# Patient Record
Sex: MALE | Race: WHITE | Employment: OTHER | URBAN - METROPOLITAN AREA
[De-identification: names, ages, dates, MRNs, and addresses within clinical notes are randomized per-mention and may not be internally consistent; named-entity substitution may affect disease eponyms.]

---

## 2019-02-08 ENCOUNTER — HOSPITAL ENCOUNTER (INPATIENT)
Age: 69
LOS: 4 days | Discharge: HOME | DRG: 281 | End: 2019-02-12
Attending: EMERGENCY MEDICINE | Admitting: INTERNAL MEDICINE
Payer: MEDICARE

## 2019-02-08 ENCOUNTER — APPOINTMENT (OUTPATIENT)
Dept: GENERAL RADIOLOGY | Age: 69
DRG: 281 | End: 2019-02-08
Payer: MEDICARE

## 2019-02-08 DIAGNOSIS — T82.9XXA DISORDER OF CARDIAC PACEMAKER SYSTEM, INITIAL ENCOUNTER: ICD-10-CM

## 2019-02-08 DIAGNOSIS — R91.1 LUNG NODULE: ICD-10-CM

## 2019-02-08 DIAGNOSIS — R31.29 HEMATURIA, MICROSCOPIC: ICD-10-CM

## 2019-02-08 DIAGNOSIS — R77.8 ELEVATED TROPONIN: Primary | ICD-10-CM

## 2019-02-08 DIAGNOSIS — R06.09 DYSPNEA ON EXERTION: ICD-10-CM

## 2019-02-08 DIAGNOSIS — N17.9 AKI (ACUTE KIDNEY INJURY) (HCC): ICD-10-CM

## 2019-02-08 PROBLEM — I21.4 NSTEMI (NON-ST ELEVATED MYOCARDIAL INFARCTION) (HCC): Status: ACTIVE | Noted: 2019-02-08

## 2019-02-08 PROBLEM — I25.5 ISCHEMIC CARDIOMYOPATHY: Status: ACTIVE | Noted: 2019-02-08

## 2019-02-08 PROBLEM — I10 HTN (HYPERTENSION): Status: ACTIVE | Noted: 2019-02-08

## 2019-02-08 PROBLEM — Z95.810 S/P IMPLANTATION OF AUTOMATIC CARDIOVERTER/DEFIBRILLATOR (AICD): Status: ACTIVE | Noted: 2019-02-08

## 2019-02-08 PROBLEM — I25.10 CAD (CORONARY ARTERY DISEASE): Status: ACTIVE | Noted: 2019-02-08

## 2019-02-08 LAB
ABSOLUTE EOS #: 0.08 K/UL (ref 0–0.44)
ABSOLUTE IMMATURE GRANULOCYTE: <0.03 K/UL (ref 0–0.3)
ABSOLUTE LYMPH #: 0.97 K/UL (ref 1.1–3.7)
ABSOLUTE MONO #: 0.39 K/UL (ref 0.1–1.2)
ANION GAP SERPL CALCULATED.3IONS-SCNC: 11 MMOL/L (ref 9–17)
BASOPHILS # BLD: 1 % (ref 0–2)
BASOPHILS ABSOLUTE: 0.05 K/UL (ref 0–0.2)
BUN BLDV-MCNC: 14 MG/DL (ref 8–23)
BUN/CREAT BLD: ABNORMAL (ref 9–20)
CALCIUM SERPL-MCNC: 9.3 MG/DL (ref 8.6–10.4)
CHLORIDE BLD-SCNC: 102 MMOL/L (ref 98–107)
CO2: 23 MMOL/L (ref 20–31)
CREAT SERPL-MCNC: 1.42 MG/DL (ref 0.7–1.2)
DIFFERENTIAL TYPE: ABNORMAL
EOSINOPHILS RELATIVE PERCENT: 1 % (ref 1–4)
ESTIMATED AVERAGE GLUCOSE: 111 MG/DL
GFR AFRICAN AMERICAN: 60 ML/MIN
GFR NON-AFRICAN AMERICAN: 49 ML/MIN
GFR SERPL CREATININE-BSD FRML MDRD: ABNORMAL ML/MIN/{1.73_M2}
GFR SERPL CREATININE-BSD FRML MDRD: ABNORMAL ML/MIN/{1.73_M2}
GLUCOSE BLD-MCNC: 115 MG/DL (ref 70–99)
HBA1C MFR BLD: 5.5 % (ref 4–6)
HCT VFR BLD CALC: 50.7 % (ref 40.7–50.3)
HEMOGLOBIN: 16.5 G/DL (ref 13–17)
IMMATURE GRANULOCYTES: 0 %
LYMPHOCYTES # BLD: 15 % (ref 24–43)
MCH RBC QN AUTO: 31 PG (ref 25.2–33.5)
MCHC RBC AUTO-ENTMCNC: 32.5 G/DL (ref 28.4–34.8)
MCV RBC AUTO: 95.1 FL (ref 82.6–102.9)
MONOCYTES # BLD: 6 % (ref 3–12)
NRBC AUTOMATED: 0 PER 100 WBC
PARTIAL THROMBOPLASTIN TIME: 26.1 SEC (ref 20.5–30.5)
PDW BLD-RTO: 13.6 % (ref 11.8–14.4)
PLATELET # BLD: 175 K/UL (ref 138–453)
PLATELET ESTIMATE: ABNORMAL
PMV BLD AUTO: 10.8 FL (ref 8.1–13.5)
POTASSIUM SERPL-SCNC: 4.2 MMOL/L (ref 3.7–5.3)
RBC # BLD: 5.33 M/UL (ref 4.21–5.77)
RBC # BLD: ABNORMAL 10*6/UL
SEG NEUTROPHILS: 77 % (ref 36–65)
SEGMENTED NEUTROPHILS ABSOLUTE COUNT: 5.08 K/UL (ref 1.5–8.1)
SODIUM BLD-SCNC: 136 MMOL/L (ref 135–144)
TROPONIN INTERP: ABNORMAL
TROPONIN T: ABNORMAL NG/ML
TROPONIN, HIGH SENSITIVITY: 35 NG/L (ref 0–22)
TROPONIN, HIGH SENSITIVITY: 36 NG/L (ref 0–22)
TROPONIN, HIGH SENSITIVITY: 81 NG/L (ref 0–22)
WBC # BLD: 6.6 K/UL (ref 3.5–11.3)
WBC # BLD: ABNORMAL 10*3/UL

## 2019-02-08 PROCEDURE — 6360000002 HC RX W HCPCS

## 2019-02-08 PROCEDURE — 2580000003 HC RX 258: Performed by: EMERGENCY MEDICINE

## 2019-02-08 PROCEDURE — 2500000003 HC RX 250 WO HCPCS

## 2019-02-08 PROCEDURE — 6370000000 HC RX 637 (ALT 250 FOR IP): Performed by: INTERNAL MEDICINE

## 2019-02-08 PROCEDURE — C1769 GUIDE WIRE: HCPCS

## 2019-02-08 PROCEDURE — 93005 ELECTROCARDIOGRAM TRACING: CPT

## 2019-02-08 PROCEDURE — 1200000000 HC SEMI PRIVATE

## 2019-02-08 PROCEDURE — 96374 THER/PROPH/DIAG INJ IV PUSH: CPT

## 2019-02-08 PROCEDURE — 36415 COLL VENOUS BLD VENIPUNCTURE: CPT

## 2019-02-08 PROCEDURE — 71045 X-RAY EXAM CHEST 1 VIEW: CPT

## 2019-02-08 PROCEDURE — 7100000011 HC PHASE II RECOVERY - ADDTL 15 MIN

## 2019-02-08 PROCEDURE — B2111ZZ FLUOROSCOPY OF MULTIPLE CORONARY ARTERIES USING LOW OSMOLAR CONTRAST: ICD-10-PCS | Performed by: INTERNAL MEDICINE

## 2019-02-08 PROCEDURE — 2709999900 HC NON-CHARGEABLE SUPPLY

## 2019-02-08 PROCEDURE — B2151ZZ FLUOROSCOPY OF LEFT HEART USING LOW OSMOLAR CONTRAST: ICD-10-PCS | Performed by: INTERNAL MEDICINE

## 2019-02-08 PROCEDURE — 4A023N7 MEASUREMENT OF CARDIAC SAMPLING AND PRESSURE, LEFT HEART, PERCUTANEOUS APPROACH: ICD-10-PCS | Performed by: INTERNAL MEDICINE

## 2019-02-08 PROCEDURE — 83036 HEMOGLOBIN GLYCOSYLATED A1C: CPT

## 2019-02-08 PROCEDURE — 6370000000 HC RX 637 (ALT 250 FOR IP): Performed by: EMERGENCY MEDICINE

## 2019-02-08 PROCEDURE — C1760 CLOSURE DEV, VASC: HCPCS

## 2019-02-08 PROCEDURE — C1894 INTRO/SHEATH, NON-LASER: HCPCS

## 2019-02-08 PROCEDURE — 85025 COMPLETE CBC W/AUTO DIFF WBC: CPT

## 2019-02-08 PROCEDURE — 7100000010 HC PHASE II RECOVERY - FIRST 15 MIN

## 2019-02-08 PROCEDURE — 93458 L HRT ARTERY/VENTRICLE ANGIO: CPT | Performed by: INTERNAL MEDICINE

## 2019-02-08 PROCEDURE — 6360000004 HC RX CONTRAST MEDICATION

## 2019-02-08 PROCEDURE — 80048 BASIC METABOLIC PNL TOTAL CA: CPT

## 2019-02-08 PROCEDURE — 85730 THROMBOPLASTIN TIME PARTIAL: CPT

## 2019-02-08 PROCEDURE — 99285 EMERGENCY DEPT VISIT HI MDM: CPT

## 2019-02-08 PROCEDURE — 84484 ASSAY OF TROPONIN QUANT: CPT

## 2019-02-08 RX ORDER — POTASSIUM CHLORIDE 20 MEQ/1
40 TABLET, EXTENDED RELEASE ORAL PRN
Status: DISCONTINUED | OUTPATIENT
Start: 2019-02-08 | End: 2019-02-12 | Stop reason: HOSPADM

## 2019-02-08 RX ORDER — SODIUM CHLORIDE 0.9 % (FLUSH) 0.9 %
10 SYRINGE (ML) INJECTION PRN
Status: DISCONTINUED | OUTPATIENT
Start: 2019-02-08 | End: 2019-02-12 | Stop reason: HOSPADM

## 2019-02-08 RX ORDER — NITROGLYCERIN 0.4 MG/1
0.4 TABLET SUBLINGUAL EVERY 5 MIN PRN
Status: DISCONTINUED | OUTPATIENT
Start: 2019-02-08 | End: 2019-02-12 | Stop reason: HOSPADM

## 2019-02-08 RX ORDER — LABETALOL HYDROCHLORIDE 5 MG/ML
10 INJECTION, SOLUTION INTRAVENOUS EVERY 6 HOURS PRN
Status: DISCONTINUED | OUTPATIENT
Start: 2019-02-08 | End: 2019-02-12 | Stop reason: HOSPADM

## 2019-02-08 RX ORDER — POTASSIUM CHLORIDE 7.45 MG/ML
10 INJECTION INTRAVENOUS PRN
Status: DISCONTINUED | OUTPATIENT
Start: 2019-02-08 | End: 2019-02-12 | Stop reason: HOSPADM

## 2019-02-08 RX ORDER — ATORVASTATIN CALCIUM 80 MG/1
80 TABLET, FILM COATED ORAL NIGHTLY
Status: DISCONTINUED | OUTPATIENT
Start: 2019-02-08 | End: 2019-02-12 | Stop reason: HOSPADM

## 2019-02-08 RX ORDER — SODIUM CHLORIDE 0.9 % (FLUSH) 0.9 %
10 SYRINGE (ML) INJECTION EVERY 12 HOURS SCHEDULED
Status: DISCONTINUED | OUTPATIENT
Start: 2019-02-08 | End: 2019-02-12 | Stop reason: HOSPADM

## 2019-02-08 RX ORDER — ASPIRIN 81 MG/1
324 TABLET, CHEWABLE ORAL ONCE
Status: COMPLETED | OUTPATIENT
Start: 2019-02-08 | End: 2019-02-08

## 2019-02-08 RX ORDER — ASPIRIN 81 MG/1
81 TABLET, CHEWABLE ORAL DAILY
Status: DISCONTINUED | OUTPATIENT
Start: 2019-02-09 | End: 2019-02-12 | Stop reason: HOSPADM

## 2019-02-08 RX ORDER — ONDANSETRON 2 MG/ML
4 INJECTION INTRAMUSCULAR; INTRAVENOUS EVERY 6 HOURS PRN
Status: DISCONTINUED | OUTPATIENT
Start: 2019-02-08 | End: 2019-02-12 | Stop reason: HOSPADM

## 2019-02-08 RX ORDER — POTASSIUM CHLORIDE 20MEQ/15ML
40 LIQUID (ML) ORAL PRN
Status: DISCONTINUED | OUTPATIENT
Start: 2019-02-08 | End: 2019-02-12 | Stop reason: HOSPADM

## 2019-02-08 RX ORDER — HEPARIN SODIUM 1000 [USP'U]/ML
INJECTION, SOLUTION INTRAVENOUS; SUBCUTANEOUS
Status: COMPLETED
Start: 2019-02-08 | End: 2019-02-08

## 2019-02-08 RX ORDER — HEPARIN SODIUM 1000 [USP'U]/ML
4000 INJECTION, SOLUTION INTRAVENOUS; SUBCUTANEOUS ONCE
Status: COMPLETED | OUTPATIENT
Start: 2019-02-08 | End: 2019-02-08

## 2019-02-08 RX ORDER — HEPARIN SODIUM 1000 [USP'U]/ML
4000 INJECTION, SOLUTION INTRAVENOUS; SUBCUTANEOUS PRN
Status: DISCONTINUED | OUTPATIENT
Start: 2019-02-08 | End: 2019-02-08

## 2019-02-08 RX ORDER — SODIUM CHLORIDE 9 MG/ML
INJECTION, SOLUTION INTRAVENOUS CONTINUOUS
Status: ACTIVE | OUTPATIENT
Start: 2019-02-08 | End: 2019-02-08

## 2019-02-08 RX ORDER — METOPROLOL TARTRATE 50 MG/1
25 TABLET, FILM COATED ORAL 2 TIMES DAILY
Status: DISCONTINUED | OUTPATIENT
Start: 2019-02-08 | End: 2019-02-09

## 2019-02-08 RX ORDER — HEPARIN SODIUM 10000 [USP'U]/100ML
12 INJECTION, SOLUTION INTRAVENOUS CONTINUOUS
Status: DISCONTINUED | OUTPATIENT
Start: 2019-02-08 | End: 2019-02-08

## 2019-02-08 RX ORDER — ACETAMINOPHEN 325 MG/1
650 TABLET ORAL EVERY 4 HOURS PRN
Status: DISCONTINUED | OUTPATIENT
Start: 2019-02-08 | End: 2019-02-12 | Stop reason: HOSPADM

## 2019-02-08 RX ORDER — HEPARIN SODIUM 10000 [USP'U]/100ML
INJECTION, SOLUTION INTRAVENOUS
Status: COMPLETED
Start: 2019-02-08 | End: 2019-02-08

## 2019-02-08 RX ORDER — HEPARIN SODIUM 1000 [USP'U]/ML
2000 INJECTION, SOLUTION INTRAVENOUS; SUBCUTANEOUS PRN
Status: DISCONTINUED | OUTPATIENT
Start: 2019-02-08 | End: 2019-02-08

## 2019-02-08 RX ADMIN — HEPARIN SODIUM AND DEXTROSE 12 UNITS/KG/HR: 10000; 5 INJECTION INTRAVENOUS at 01:00

## 2019-02-08 RX ADMIN — Medication 10 ML: at 20:57

## 2019-02-08 RX ADMIN — HEPARIN SODIUM 4000 UNITS: 1000 INJECTION, SOLUTION INTRAVENOUS; SUBCUTANEOUS at 11:29

## 2019-02-08 RX ADMIN — METOPROLOL TARTRATE 25 MG: 50 TABLET, FILM COATED ORAL at 20:57

## 2019-02-08 RX ADMIN — HEPARIN SODIUM 12 UNITS/KG/HR: 10000 INJECTION, SOLUTION INTRAVENOUS at 01:00

## 2019-02-08 RX ADMIN — ASPIRIN 81 MG 324 MG: 81 TABLET ORAL at 08:29

## 2019-02-08 RX ADMIN — METOPROLOL TARTRATE 25 MG: 50 TABLET, FILM COATED ORAL at 12:21

## 2019-02-09 ENCOUNTER — APPOINTMENT (OUTPATIENT)
Dept: ULTRASOUND IMAGING | Age: 69
DRG: 281 | End: 2019-02-09
Payer: MEDICARE

## 2019-02-09 LAB
-: NORMAL
AMORPHOUS: NORMAL
ANION GAP SERPL CALCULATED.3IONS-SCNC: 10 MMOL/L (ref 9–17)
BACTERIA: NORMAL
BILIRUBIN URINE: NEGATIVE
BUN BLDV-MCNC: 16 MG/DL (ref 8–23)
BUN/CREAT BLD: ABNORMAL (ref 9–20)
CALCIUM SERPL-MCNC: 8.9 MG/DL (ref 8.6–10.4)
CASTS UA: NORMAL /LPF (ref 0–8)
CHLORIDE BLD-SCNC: 105 MMOL/L (ref 98–107)
CHOLESTEROL/HDL RATIO: 4.8
CHOLESTEROL: 195 MG/DL
CO2: 24 MMOL/L (ref 20–31)
COLOR: YELLOW
COMPLEMENT C3: 109 MG/DL (ref 90–180)
COMPLEMENT C4: 30 MG/DL (ref 10–40)
CREAT SERPL-MCNC: 1.5 MG/DL (ref 0.7–1.2)
CREATININE URINE: 71.9 MG/DL (ref 39–259)
CRYSTALS, UA: NORMAL /HPF
EPITHELIAL CELLS UA: NORMAL /HPF (ref 0–5)
GFR AFRICAN AMERICAN: 56 ML/MIN
GFR NON-AFRICAN AMERICAN: 46 ML/MIN
GFR SERPL CREATININE-BSD FRML MDRD: ABNORMAL ML/MIN/{1.73_M2}
GFR SERPL CREATININE-BSD FRML MDRD: ABNORMAL ML/MIN/{1.73_M2}
GLUCOSE BLD-MCNC: 103 MG/DL (ref 70–99)
GLUCOSE URINE: NEGATIVE
HCT VFR BLD CALC: 46.7 % (ref 40.7–50.3)
HDLC SERPL-MCNC: 41 MG/DL
HEMOGLOBIN: 15.1 G/DL (ref 13–17)
KETONES, URINE: NEGATIVE
LDL CHOLESTEROL: 139 MG/DL (ref 0–130)
LEUKOCYTE ESTERASE, URINE: NEGATIVE
MCH RBC QN AUTO: 30.6 PG (ref 25.2–33.5)
MCHC RBC AUTO-ENTMCNC: 32.3 G/DL (ref 28.4–34.8)
MCV RBC AUTO: 94.7 FL (ref 82.6–102.9)
MUCUS: NORMAL
NITRITE, URINE: NEGATIVE
NRBC AUTOMATED: 0 PER 100 WBC
OTHER OBSERVATIONS UA: NORMAL
PARTIAL THROMBOPLASTIN TIME: 24 SEC (ref 20.5–30.5)
PARTIAL THROMBOPLASTIN TIME: 27.3 SEC (ref 20.5–30.5)
PDW BLD-RTO: 13.7 % (ref 11.8–14.4)
PH UA: 6.5 (ref 5–8)
PLATELET # BLD: 170 K/UL (ref 138–453)
PMV BLD AUTO: 10.8 FL (ref 8.1–13.5)
POTASSIUM SERPL-SCNC: 5.1 MMOL/L (ref 3.7–5.3)
POTASSIUM, UR: 47 MMOL/L
PROTEIN UA: NEGATIVE
RBC # BLD: 4.93 M/UL (ref 4.21–5.77)
RBC UA: NORMAL /HPF (ref 0–4)
RENAL EPITHELIAL, UA: NORMAL /HPF
SODIUM BLD-SCNC: 139 MMOL/L (ref 135–144)
SPECIFIC GRAVITY UA: 1.02 (ref 1–1.03)
TOTAL PROTEIN, URINE: 6 MG/DL
TRICHOMONAS: NORMAL
TRIGL SERPL-MCNC: 77 MG/DL
TURBIDITY: CLEAR
URINE HGB: NEGATIVE
UROBILINOGEN, URINE: NORMAL
VLDLC SERPL CALC-MCNC: ABNORMAL MG/DL (ref 1–30)
WBC # BLD: 6.8 K/UL (ref 3.5–11.3)
WBC UA: NORMAL /HPF (ref 0–5)
YEAST: NORMAL

## 2019-02-09 PROCEDURE — 86038 ANTINUCLEAR ANTIBODIES: CPT

## 2019-02-09 PROCEDURE — 6360000002 HC RX W HCPCS: Performed by: STUDENT IN AN ORGANIZED HEALTH CARE EDUCATION/TRAINING PROGRAM

## 2019-02-09 PROCEDURE — 85730 THROMBOPLASTIN TIME PARTIAL: CPT

## 2019-02-09 PROCEDURE — 6370000000 HC RX 637 (ALT 250 FOR IP): Performed by: INTERNAL MEDICINE

## 2019-02-09 PROCEDURE — 84165 PROTEIN E-PHORESIS SERUM: CPT

## 2019-02-09 PROCEDURE — 81001 URINALYSIS AUTO W/SCOPE: CPT

## 2019-02-09 PROCEDURE — 76770 US EXAM ABDO BACK WALL COMP: CPT

## 2019-02-09 PROCEDURE — 85027 COMPLETE CBC AUTOMATED: CPT

## 2019-02-09 PROCEDURE — 83516 IMMUNOASSAY NONANTIBODY: CPT

## 2019-02-09 PROCEDURE — 84166 PROTEIN E-PHORESIS/URINE/CSF: CPT

## 2019-02-09 PROCEDURE — 36415 COLL VENOUS BLD VENIPUNCTURE: CPT

## 2019-02-09 PROCEDURE — 80061 LIPID PANEL: CPT

## 2019-02-09 PROCEDURE — 2580000003 HC RX 258: Performed by: INTERNAL MEDICINE

## 2019-02-09 PROCEDURE — 6370000000 HC RX 637 (ALT 250 FOR IP): Performed by: STUDENT IN AN ORGANIZED HEALTH CARE EDUCATION/TRAINING PROGRAM

## 2019-02-09 PROCEDURE — 84155 ASSAY OF PROTEIN SERUM: CPT

## 2019-02-09 PROCEDURE — 84133 ASSAY OF URINE POTASSIUM: CPT

## 2019-02-09 PROCEDURE — 86160 COMPLEMENT ANTIGEN: CPT

## 2019-02-09 PROCEDURE — 84156 ASSAY OF PROTEIN URINE: CPT

## 2019-02-09 PROCEDURE — 99223 1ST HOSP IP/OBS HIGH 75: CPT | Performed by: INTERNAL MEDICINE

## 2019-02-09 PROCEDURE — 1200000000 HC SEMI PRIVATE

## 2019-02-09 PROCEDURE — 86334 IMMUNOFIX E-PHORESIS SERUM: CPT

## 2019-02-09 PROCEDURE — 80048 BASIC METABOLIC PNL TOTAL CA: CPT

## 2019-02-09 PROCEDURE — 82570 ASSAY OF URINE CREATININE: CPT

## 2019-02-09 RX ORDER — HEPARIN SODIUM 5000 [USP'U]/ML
5000 INJECTION, SOLUTION INTRAVENOUS; SUBCUTANEOUS EVERY 8 HOURS SCHEDULED
Status: DISCONTINUED | OUTPATIENT
Start: 2019-02-09 | End: 2019-02-12 | Stop reason: HOSPADM

## 2019-02-09 RX ORDER — METOPROLOL SUCCINATE 25 MG/1
25 TABLET, EXTENDED RELEASE ORAL NIGHTLY
Status: DISCONTINUED | OUTPATIENT
Start: 2019-02-09 | End: 2019-02-12 | Stop reason: HOSPADM

## 2019-02-09 RX ORDER — FUROSEMIDE 20 MG/1
20 TABLET ORAL DAILY
Status: DISCONTINUED | OUTPATIENT
Start: 2019-02-09 | End: 2019-02-12 | Stop reason: HOSPADM

## 2019-02-09 RX ADMIN — METOPROLOL SUCCINATE 25 MG: 25 TABLET, FILM COATED, EXTENDED RELEASE ORAL at 22:13

## 2019-02-09 RX ADMIN — ASPIRIN 81 MG: 81 TABLET, CHEWABLE ORAL at 11:10

## 2019-02-09 RX ADMIN — METOPROLOL TARTRATE 25 MG: 50 TABLET, FILM COATED ORAL at 11:10

## 2019-02-09 RX ADMIN — FUROSEMIDE 20 MG: 20 TABLET ORAL at 13:30

## 2019-02-09 RX ADMIN — HEPARIN SODIUM 5000 UNITS: 5000 INJECTION INTRAVENOUS; SUBCUTANEOUS at 22:13

## 2019-02-09 RX ADMIN — Medication 10 ML: at 11:11

## 2019-02-09 RX ADMIN — Medication 10 ML: at 22:14

## 2019-02-09 RX ADMIN — ATORVASTATIN CALCIUM 80 MG: 80 TABLET, FILM COATED ORAL at 22:13

## 2019-02-09 ASSESSMENT — ENCOUNTER SYMPTOMS
EYE PAIN: 0
NAUSEA: 0
DIARRHEA: 0
SORE THROAT: 0
COUGH: 0
ABDOMINAL PAIN: 0

## 2019-02-10 ENCOUNTER — APPOINTMENT (OUTPATIENT)
Dept: CT IMAGING | Age: 69
DRG: 281 | End: 2019-02-10
Payer: MEDICARE

## 2019-02-10 LAB
ANION GAP SERPL CALCULATED.3IONS-SCNC: 13 MMOL/L (ref 9–17)
BUN BLDV-MCNC: 19 MG/DL (ref 8–23)
BUN/CREAT BLD: ABNORMAL (ref 9–20)
CALCIUM SERPL-MCNC: 9.2 MG/DL (ref 8.6–10.4)
CHLORIDE BLD-SCNC: 103 MMOL/L (ref 98–107)
CO2: 22 MMOL/L (ref 20–31)
CREAT SERPL-MCNC: 1.39 MG/DL (ref 0.7–1.2)
GFR AFRICAN AMERICAN: >60 ML/MIN
GFR NON-AFRICAN AMERICAN: 51 ML/MIN
GFR SERPL CREATININE-BSD FRML MDRD: ABNORMAL ML/MIN/{1.73_M2}
GFR SERPL CREATININE-BSD FRML MDRD: ABNORMAL ML/MIN/{1.73_M2}
GLUCOSE BLD-MCNC: 101 MG/DL (ref 70–99)
P E INTERPRETATION, U: NORMAL
PATHOLOGIST: NORMAL
PLATELET # BLD: 248 K/UL (ref 138–453)
POTASSIUM SERPL-SCNC: 4.5 MMOL/L (ref 3.7–5.3)
SODIUM BLD-SCNC: 138 MMOL/L (ref 135–144)
SPECIMEN TYPE: NORMAL
URINE TOTAL PROTEIN: 6 MG/DL

## 2019-02-10 PROCEDURE — 6360000002 HC RX W HCPCS: Performed by: STUDENT IN AN ORGANIZED HEALTH CARE EDUCATION/TRAINING PROGRAM

## 2019-02-10 PROCEDURE — 85049 AUTOMATED PLATELET COUNT: CPT

## 2019-02-10 PROCEDURE — 6370000000 HC RX 637 (ALT 250 FOR IP): Performed by: INTERNAL MEDICINE

## 2019-02-10 PROCEDURE — 6370000000 HC RX 637 (ALT 250 FOR IP): Performed by: STUDENT IN AN ORGANIZED HEALTH CARE EDUCATION/TRAINING PROGRAM

## 2019-02-10 PROCEDURE — 99232 SBSQ HOSP IP/OBS MODERATE 35: CPT | Performed by: INTERNAL MEDICINE

## 2019-02-10 PROCEDURE — 2580000003 HC RX 258: Performed by: INTERNAL MEDICINE

## 2019-02-10 PROCEDURE — 74176 CT ABD & PELVIS W/O CONTRAST: CPT

## 2019-02-10 PROCEDURE — 1200000000 HC SEMI PRIVATE

## 2019-02-10 PROCEDURE — 51798 US URINE CAPACITY MEASURE: CPT

## 2019-02-10 PROCEDURE — 80048 BASIC METABOLIC PNL TOTAL CA: CPT

## 2019-02-10 PROCEDURE — 36415 COLL VENOUS BLD VENIPUNCTURE: CPT

## 2019-02-10 RX ORDER — LISINOPRIL 5 MG/1
5 TABLET ORAL DAILY
Status: DISCONTINUED | OUTPATIENT
Start: 2019-02-10 | End: 2019-02-12 | Stop reason: HOSPADM

## 2019-02-10 RX ADMIN — LISINOPRIL 5 MG: 5 TABLET ORAL at 14:32

## 2019-02-10 RX ADMIN — Medication 10 ML: at 09:52

## 2019-02-10 RX ADMIN — HEPARIN SODIUM 5000 UNITS: 5000 INJECTION INTRAVENOUS; SUBCUTANEOUS at 14:02

## 2019-02-10 RX ADMIN — METOPROLOL SUCCINATE 25 MG: 25 TABLET, FILM COATED, EXTENDED RELEASE ORAL at 19:32

## 2019-02-10 RX ADMIN — FUROSEMIDE 20 MG: 20 TABLET ORAL at 09:51

## 2019-02-10 RX ADMIN — HEPARIN SODIUM 5000 UNITS: 5000 INJECTION INTRAVENOUS; SUBCUTANEOUS at 05:32

## 2019-02-10 RX ADMIN — HEPARIN SODIUM 5000 UNITS: 5000 INJECTION INTRAVENOUS; SUBCUTANEOUS at 21:48

## 2019-02-10 RX ADMIN — ASPIRIN 81 MG: 81 TABLET, CHEWABLE ORAL at 09:51

## 2019-02-10 RX ADMIN — ATORVASTATIN CALCIUM 80 MG: 80 TABLET, FILM COATED ORAL at 19:32

## 2019-02-10 RX ADMIN — Medication 10 ML: at 19:32

## 2019-02-11 ENCOUNTER — APPOINTMENT (OUTPATIENT)
Dept: CARDIAC CATH/INVASIVE PROCEDURES | Age: 69
DRG: 281 | End: 2019-02-11
Payer: MEDICARE

## 2019-02-11 LAB
ALBUMIN (CALCULATED): 3.9 G/DL (ref 3.2–5.2)
ALBUMIN PERCENT: 64 % (ref 45–65)
ALPHA 1 PERCENT: 3 % (ref 3–6)
ALPHA 2 PERCENT: 10 % (ref 6–13)
ALPHA-1-GLOBULIN: 0.2 G/DL (ref 0.1–0.4)
ALPHA-2-GLOBULIN: 0.6 G/DL (ref 0.5–0.9)
ANION GAP SERPL CALCULATED.3IONS-SCNC: 12 MMOL/L (ref 9–17)
ANTI-NUCLEAR ANTIBODY (ANA): NEGATIVE
BETA GLOBULIN: 0.6 G/DL (ref 0.5–1.1)
BETA PERCENT: 10 % (ref 11–19)
BUN BLDV-MCNC: 21 MG/DL (ref 8–23)
BUN/CREAT BLD: ABNORMAL (ref 9–20)
CALCIUM SERPL-MCNC: 9.4 MG/DL (ref 8.6–10.4)
CHLORIDE BLD-SCNC: 104 MMOL/L (ref 98–107)
CO2: 23 MMOL/L (ref 20–31)
CREAT SERPL-MCNC: 1.48 MG/DL (ref 0.7–1.2)
GAMMA GLOBULIN %: 13 % (ref 9–20)
GAMMA GLOBULIN: 0.8 G/DL (ref 0.5–1.5)
GFR AFRICAN AMERICAN: 57 ML/MIN
GFR NON-AFRICAN AMERICAN: 47 ML/MIN
GFR SERPL CREATININE-BSD FRML MDRD: ABNORMAL ML/MIN/{1.73_M2}
GFR SERPL CREATININE-BSD FRML MDRD: ABNORMAL ML/MIN/{1.73_M2}
GLUCOSE BLD-MCNC: 112 MG/DL (ref 70–99)
GLUCOSE BLD-MCNC: 127 MG/DL (ref 75–110)
LV EF: 18 %
LVEF MODALITY: NORMAL
PATHOLOGIST: ABNORMAL
PATHOLOGIST: NORMAL
POTASSIUM SERPL-SCNC: 4.8 MMOL/L (ref 3.7–5.3)
PROTEIN ELECTROPHORESIS, SERUM: ABNORMAL
SERUM IFX INTERP: NORMAL
SODIUM BLD-SCNC: 139 MMOL/L (ref 135–144)
TOTAL PROT. SUM,%: 100 % (ref 98–102)
TOTAL PROT. SUM: 6.1 G/DL (ref 6.3–8.2)
TOTAL PROTEIN: 6.1 G/DL (ref 6.4–8.3)

## 2019-02-11 PROCEDURE — 99232 SBSQ HOSP IP/OBS MODERATE 35: CPT | Performed by: INTERNAL MEDICINE

## 2019-02-11 PROCEDURE — 6370000000 HC RX 637 (ALT 250 FOR IP): Performed by: INTERNAL MEDICINE

## 2019-02-11 PROCEDURE — 82947 ASSAY GLUCOSE BLOOD QUANT: CPT

## 2019-02-11 PROCEDURE — 6360000002 HC RX W HCPCS: Performed by: STUDENT IN AN ORGANIZED HEALTH CARE EDUCATION/TRAINING PROGRAM

## 2019-02-11 PROCEDURE — 6360000002 HC RX W HCPCS

## 2019-02-11 PROCEDURE — 1200000000 HC SEMI PRIVATE

## 2019-02-11 PROCEDURE — C1882 AICD, OTHER THAN SING/DUAL: HCPCS

## 2019-02-11 PROCEDURE — 33264 RMVL & RPLCMT DFB GEN MLT LD: CPT | Performed by: INTERNAL MEDICINE

## 2019-02-11 PROCEDURE — 36415 COLL VENOUS BLD VENIPUNCTURE: CPT

## 2019-02-11 PROCEDURE — 2709999900 HC NON-CHARGEABLE SUPPLY

## 2019-02-11 PROCEDURE — 94762 N-INVAS EAR/PLS OXIMTRY CONT: CPT

## 2019-02-11 PROCEDURE — 93306 TTE W/DOPPLER COMPLETE: CPT

## 2019-02-11 PROCEDURE — 2720000010 HC SURG SUPPLY STERILE

## 2019-02-11 PROCEDURE — 2500000003 HC RX 250 WO HCPCS

## 2019-02-11 PROCEDURE — 80048 BASIC METABOLIC PNL TOTAL CA: CPT

## 2019-02-11 RX ORDER — NITROGLYCERIN 0.4 MG/1
TABLET SUBLINGUAL
Qty: 25 TABLET | Refills: 3 | Status: SHIPPED | OUTPATIENT
Start: 2019-02-11

## 2019-02-11 RX ORDER — FUROSEMIDE 20 MG/1
20 TABLET ORAL DAILY
Qty: 60 TABLET | Refills: 3 | Status: SHIPPED | OUTPATIENT
Start: 2019-02-12 | End: 2020-01-01

## 2019-02-11 RX ORDER — ATORVASTATIN CALCIUM 80 MG/1
80 TABLET, FILM COATED ORAL NIGHTLY
Qty: 30 TABLET | Refills: 3 | Status: SHIPPED | OUTPATIENT
Start: 2019-02-11 | End: 2020-01-01

## 2019-02-11 RX ORDER — ASPIRIN 81 MG/1
81 TABLET, CHEWABLE ORAL DAILY
Qty: 30 TABLET | Refills: 3 | Status: SHIPPED | OUTPATIENT
Start: 2019-02-12

## 2019-02-11 RX ORDER — METOPROLOL SUCCINATE 25 MG/1
25 TABLET, EXTENDED RELEASE ORAL NIGHTLY
Qty: 30 TABLET | Refills: 3 | Status: SHIPPED | OUTPATIENT
Start: 2019-02-11 | End: 2020-01-01

## 2019-02-11 RX ORDER — LISINOPRIL 5 MG/1
5 TABLET ORAL DAILY
Qty: 30 TABLET | Refills: 3 | Status: SHIPPED | OUTPATIENT
Start: 2019-02-12 | End: 2020-01-01

## 2019-02-11 RX ADMIN — ATORVASTATIN CALCIUM 80 MG: 80 TABLET, FILM COATED ORAL at 20:45

## 2019-02-11 RX ADMIN — ONDANSETRON 4 MG: 2 INJECTION INTRAMUSCULAR; INTRAVENOUS at 06:35

## 2019-02-11 RX ADMIN — METOPROLOL SUCCINATE 25 MG: 25 TABLET, FILM COATED, EXTENDED RELEASE ORAL at 20:45

## 2019-02-11 RX ADMIN — HEPARIN SODIUM 5000 UNITS: 5000 INJECTION INTRAVENOUS; SUBCUTANEOUS at 06:50

## 2019-02-11 ASSESSMENT — ENCOUNTER SYMPTOMS
SHORTNESS OF BREATH: 1
VOMITING: 0

## 2019-02-12 VITALS
DIASTOLIC BLOOD PRESSURE: 71 MMHG | TEMPERATURE: 97.2 F | SYSTOLIC BLOOD PRESSURE: 131 MMHG | RESPIRATION RATE: 16 BRPM | BODY MASS INDEX: 22.61 KG/M2 | HEART RATE: 67 BPM | HEIGHT: 65 IN | OXYGEN SATURATION: 99 % | WEIGHT: 135.7 LBS

## 2019-02-12 LAB
ANCA MYELOPEROXIDASE: 15 AU/ML
ANCA PROTEINASE 3: 13 AU/ML
ANION GAP SERPL CALCULATED.3IONS-SCNC: 12 MMOL/L (ref 9–17)
BUN BLDV-MCNC: 22 MG/DL (ref 8–23)
BUN/CREAT BLD: ABNORMAL (ref 9–20)
CALCIUM SERPL-MCNC: 9.1 MG/DL (ref 8.6–10.4)
CHLORIDE BLD-SCNC: 103 MMOL/L (ref 98–107)
CO2: 23 MMOL/L (ref 20–31)
CREAT SERPL-MCNC: 1.47 MG/DL (ref 0.7–1.2)
GFR AFRICAN AMERICAN: 58 ML/MIN
GFR NON-AFRICAN AMERICAN: 47 ML/MIN
GFR SERPL CREATININE-BSD FRML MDRD: ABNORMAL ML/MIN/{1.73_M2}
GFR SERPL CREATININE-BSD FRML MDRD: ABNORMAL ML/MIN/{1.73_M2}
GLUCOSE BLD-MCNC: 104 MG/DL (ref 70–99)
PLATELET # BLD: 172 K/UL (ref 138–453)
POTASSIUM SERPL-SCNC: 4.9 MMOL/L (ref 3.7–5.3)
SODIUM BLD-SCNC: 138 MMOL/L (ref 135–144)

## 2019-02-12 PROCEDURE — 36415 COLL VENOUS BLD VENIPUNCTURE: CPT

## 2019-02-12 PROCEDURE — 6360000002 HC RX W HCPCS: Performed by: STUDENT IN AN ORGANIZED HEALTH CARE EDUCATION/TRAINING PROGRAM

## 2019-02-12 PROCEDURE — 6370000000 HC RX 637 (ALT 250 FOR IP): Performed by: STUDENT IN AN ORGANIZED HEALTH CARE EDUCATION/TRAINING PROGRAM

## 2019-02-12 PROCEDURE — 85049 AUTOMATED PLATELET COUNT: CPT

## 2019-02-12 PROCEDURE — 6370000000 HC RX 637 (ALT 250 FOR IP): Performed by: EMERGENCY MEDICINE

## 2019-02-12 PROCEDURE — 6370000000 HC RX 637 (ALT 250 FOR IP): Performed by: INTERNAL MEDICINE

## 2019-02-12 PROCEDURE — 80048 BASIC METABOLIC PNL TOTAL CA: CPT

## 2019-02-12 PROCEDURE — 2580000003 HC RX 258: Performed by: INTERNAL MEDICINE

## 2019-02-12 PROCEDURE — 99239 HOSP IP/OBS DSCHRG MGMT >30: CPT | Performed by: INTERNAL MEDICINE

## 2019-02-12 RX ADMIN — LISINOPRIL 5 MG: 5 TABLET ORAL at 09:13

## 2019-02-12 RX ADMIN — Medication 10 ML: at 09:15

## 2019-02-12 RX ADMIN — HEPARIN SODIUM 5000 UNITS: 5000 INJECTION INTRAVENOUS; SUBCUTANEOUS at 09:15

## 2019-02-12 RX ADMIN — ACETAMINOPHEN 650 MG: 325 TABLET ORAL at 13:25

## 2019-02-12 RX ADMIN — ACETAMINOPHEN 650 MG: 325 TABLET ORAL at 06:23

## 2019-02-12 RX ADMIN — ASPIRIN 81 MG: 81 TABLET, CHEWABLE ORAL at 09:13

## 2019-02-12 RX ADMIN — HEPARIN SODIUM 5000 UNITS: 5000 INJECTION INTRAVENOUS; SUBCUTANEOUS at 01:05

## 2019-02-12 RX ADMIN — FUROSEMIDE 20 MG: 20 TABLET ORAL at 09:13

## 2019-02-12 RX ADMIN — ACETAMINOPHEN 650 MG: 325 TABLET ORAL at 01:09

## 2019-02-12 ASSESSMENT — PAIN SCALES - GENERAL
PAINLEVEL_OUTOF10: 3
PAINLEVEL_OUTOF10: 3
PAINLEVEL_OUTOF10: 4

## 2019-02-13 ENCOUNTER — CARE COORDINATION (OUTPATIENT)
Dept: CASE MANAGEMENT | Age: 69
End: 2019-02-13

## 2019-02-13 ENCOUNTER — TELEPHONE (OUTPATIENT)
Dept: PULMONOLOGY | Age: 69
End: 2019-02-13

## 2019-02-13 LAB
EKG ATRIAL RATE: 84 BPM
EKG P AXIS: 38 DEGREES
EKG P-R INTERVAL: 140 MS
EKG Q-T INTERVAL: 398 MS
EKG QRS DURATION: 128 MS
EKG QTC CALCULATION (BAZETT): 470 MS
EKG R AXIS: -92 DEGREES
EKG T AXIS: 84 DEGREES
EKG VENTRICULAR RATE: 84 BPM

## 2019-02-19 ENCOUNTER — HOSPITAL ENCOUNTER (OUTPATIENT)
Dept: CT IMAGING | Age: 69
Discharge: HOME | End: 2019-02-21
Payer: MEDICARE

## 2019-02-19 DIAGNOSIS — R91.1 LUNG NODULE: ICD-10-CM

## 2019-02-19 PROCEDURE — 71250 CT THORAX DX C-: CPT

## 2019-02-20 ENCOUNTER — TELEPHONE (OUTPATIENT)
Dept: INTERNAL MEDICINE | Age: 69
End: 2019-02-20

## 2019-02-20 DIAGNOSIS — R91.1 LUNG NODULE: Primary | ICD-10-CM

## 2019-02-21 ENCOUNTER — CARE COORDINATION (OUTPATIENT)
Dept: CASE MANAGEMENT | Age: 69
End: 2019-02-21

## 2019-03-06 ENCOUNTER — CARE COORDINATION (OUTPATIENT)
Dept: CASE MANAGEMENT | Age: 69
End: 2019-03-06

## 2019-03-20 ENCOUNTER — CARE COORDINATION (OUTPATIENT)
Dept: CASE MANAGEMENT | Age: 69
End: 2019-03-20

## 2019-04-10 ENCOUNTER — HOSPITAL ENCOUNTER (OUTPATIENT)
Age: 69
Setting detail: SPECIMEN
Discharge: HOME | End: 2019-04-10
Payer: MEDICARE

## 2019-04-10 LAB
ANION GAP SERPL CALCULATED.3IONS-SCNC: 20 MMOL/L (ref 9–17)
BUN BLDV-MCNC: 16 MG/DL (ref 8–23)
BUN/CREAT BLD: ABNORMAL (ref 9–20)
CALCIUM SERPL-MCNC: 9.4 MG/DL (ref 8.6–10.4)
CHLORIDE BLD-SCNC: 111 MMOL/L (ref 98–107)
CO2: 20 MMOL/L (ref 20–31)
CREAT SERPL-MCNC: 1.29 MG/DL (ref 0.7–1.2)
GFR AFRICAN AMERICAN: >60 ML/MIN
GFR NON-AFRICAN AMERICAN: 55 ML/MIN
GFR SERPL CREATININE-BSD FRML MDRD: ABNORMAL ML/MIN/{1.73_M2}
GFR SERPL CREATININE-BSD FRML MDRD: ABNORMAL ML/MIN/{1.73_M2}
GLUCOSE BLD-MCNC: 103 MG/DL (ref 70–99)
POTASSIUM SERPL-SCNC: 5 MMOL/L (ref 3.7–5.3)
SODIUM BLD-SCNC: 151 MMOL/L (ref 135–144)

## 2019-04-22 ENCOUNTER — CARE COORDINATION (OUTPATIENT)
Dept: CASE MANAGEMENT | Age: 69
End: 2019-04-22

## 2019-05-13 ENCOUNTER — CARE COORDINATION (OUTPATIENT)
Dept: CASE MANAGEMENT | Age: 69
End: 2019-05-13

## 2019-05-13 NOTE — CARE COORDINATION
[address] [facility] Care Improvement [facility]) Final 90 Day Follow Up Call  Qualifying Diagnosis of AMI    5/13/2019  Patient Name:  Nitesh Nunez   YOB: 1950  Discharge Date:  2/12/19  RARS:  Readmission Risk Score: 11    PCP:  No primary care provider on file. 90 day final call placed:  Message left. Stated who I was, representing the [facility] SPECIALTY [facility], Care Transitions Team.   No future appointments.     Anastasiia Chin RN, CTC

## 2020-01-01 ENCOUNTER — HOSPITAL ENCOUNTER (OUTPATIENT)
Facility: MEDICAL CENTER | Age: 70
End: 2020-01-01
Payer: MEDICARE

## 2020-01-01 ENCOUNTER — TELEPHONE (OUTPATIENT)
Dept: CASE MANAGEMENT | Age: 70
End: 2020-01-01

## 2020-01-01 ENCOUNTER — APPOINTMENT (OUTPATIENT)
Dept: CT IMAGING | Age: 70
DRG: 987 | End: 2020-01-01
Payer: MEDICARE

## 2020-01-01 ENCOUNTER — APPOINTMENT (OUTPATIENT)
Dept: CT IMAGING | Age: 70
DRG: 477 | End: 2020-01-01
Payer: MEDICARE

## 2020-01-01 ENCOUNTER — HOSPITAL ENCOUNTER (OUTPATIENT)
Dept: RADIATION ONCOLOGY | Facility: MEDICAL CENTER | Age: 70
Discharge: HOME | End: 2020-08-18
Payer: MEDICARE

## 2020-01-01 ENCOUNTER — HOSPITAL ENCOUNTER (OUTPATIENT)
Dept: RADIATION ONCOLOGY | Facility: MEDICAL CENTER | Age: 70
Discharge: HOME | End: 2020-08-20
Attending: RADIOLOGY
Payer: MEDICARE

## 2020-01-01 ENCOUNTER — TELEPHONE (OUTPATIENT)
Dept: INFUSION THERAPY | Facility: MEDICAL CENTER | Age: 70
End: 2020-01-01

## 2020-01-01 ENCOUNTER — OFFICE VISIT (OUTPATIENT)
Dept: ONCOLOGY | Age: 70
End: 2020-01-01
Payer: MEDICARE

## 2020-01-01 ENCOUNTER — HOSPITAL ENCOUNTER (OUTPATIENT)
Dept: RADIATION ONCOLOGY | Facility: MEDICAL CENTER | Age: 70
Discharge: HOME | End: 2020-08-27
Attending: RADIOLOGY
Payer: MEDICARE

## 2020-01-01 ENCOUNTER — TELEPHONE (OUTPATIENT)
Dept: ONCOLOGY | Age: 70
End: 2020-01-01

## 2020-01-01 ENCOUNTER — HOSPITAL ENCOUNTER (OUTPATIENT)
Dept: INFUSION THERAPY | Facility: MEDICAL CENTER | Age: 70
Discharge: HOME | End: 2020-10-05
Payer: MEDICARE

## 2020-01-01 ENCOUNTER — HOSPITAL ENCOUNTER (OUTPATIENT)
Dept: RADIATION ONCOLOGY | Facility: MEDICAL CENTER | Age: 70
Discharge: HOME | End: 2020-08-25
Attending: RADIOLOGY
Payer: MEDICARE

## 2020-01-01 ENCOUNTER — HOSPITAL ENCOUNTER (INPATIENT)
Age: 70
LOS: 3 days | Discharge: HOME | DRG: 987 | End: 2020-08-12
Attending: EMERGENCY MEDICINE | Admitting: INTERNAL MEDICINE
Payer: MEDICARE

## 2020-01-01 ENCOUNTER — TELEPHONE (OUTPATIENT)
Dept: RADIATION ONCOLOGY | Facility: MEDICAL CENTER | Age: 70
End: 2020-01-01

## 2020-01-01 ENCOUNTER — HOSPITAL ENCOUNTER (OUTPATIENT)
Dept: CT IMAGING | Facility: CLINIC | Age: 70
Discharge: HOME | End: 2020-08-16
Payer: MEDICARE

## 2020-01-01 ENCOUNTER — SOCIAL WORK (OUTPATIENT)
Dept: ONCOLOGY | Age: 70
End: 2020-01-01

## 2020-01-01 ENCOUNTER — HOSPITAL ENCOUNTER (OUTPATIENT)
Dept: RADIATION ONCOLOGY | Facility: MEDICAL CENTER | Age: 70
Discharge: HOME | End: 2020-08-13
Payer: MEDICARE

## 2020-01-01 ENCOUNTER — APPOINTMENT (OUTPATIENT)
Dept: GENERAL RADIOLOGY | Age: 70
DRG: 808 | End: 2020-01-01
Payer: MEDICARE

## 2020-01-01 ENCOUNTER — HOSPITAL ENCOUNTER (OUTPATIENT)
Dept: RADIATION ONCOLOGY | Facility: MEDICAL CENTER | Age: 70
Discharge: HOME | End: 2020-08-21
Attending: RADIOLOGY
Payer: MEDICARE

## 2020-01-01 ENCOUNTER — HOSPITAL ENCOUNTER (INPATIENT)
Age: 70
LOS: 5 days | Discharge: HOME | DRG: 180 | End: 2020-10-14
Attending: EMERGENCY MEDICINE | Admitting: FAMILY MEDICINE
Payer: MEDICARE

## 2020-01-01 ENCOUNTER — HOSPITAL ENCOUNTER (OUTPATIENT)
Dept: PREADMISSION TESTING | Age: 70
Setting detail: SPECIMEN
Discharge: HOME | End: 2020-08-24
Payer: MEDICARE

## 2020-01-01 ENCOUNTER — INITIAL CONSULT (OUTPATIENT)
Dept: ONCOLOGY | Age: 70
End: 2020-01-01
Payer: MEDICARE

## 2020-01-01 ENCOUNTER — APPOINTMENT (OUTPATIENT)
Dept: CT IMAGING | Age: 70
End: 2020-01-01
Payer: MEDICARE

## 2020-01-01 ENCOUNTER — HOSPITAL ENCOUNTER (OUTPATIENT)
Dept: INFUSION THERAPY | Facility: MEDICAL CENTER | Age: 70
Discharge: HOME | End: 2020-09-10
Payer: MEDICARE

## 2020-01-01 ENCOUNTER — HOSPITAL ENCOUNTER (INPATIENT)
Age: 70
LOS: 5 days | Discharge: HOME HEALTH | DRG: 477 | End: 2020-09-29
Attending: EMERGENCY MEDICINE | Admitting: FAMILY MEDICINE
Payer: MEDICARE

## 2020-01-01 ENCOUNTER — APPOINTMENT (OUTPATIENT)
Dept: MRI IMAGING | Age: 70
DRG: 987 | End: 2020-01-01
Payer: MEDICARE

## 2020-01-01 ENCOUNTER — HOSPITAL ENCOUNTER (OUTPATIENT)
Dept: INFUSION THERAPY | Facility: MEDICAL CENTER | Age: 70
Discharge: HOME | End: 2020-08-26
Payer: MEDICARE

## 2020-01-01 ENCOUNTER — HOSPITAL ENCOUNTER (INPATIENT)
Age: 70
LOS: 3 days | Discharge: HOME | DRG: 808 | End: 2020-09-22
Attending: EMERGENCY MEDICINE | Admitting: INTERNAL MEDICINE
Payer: MEDICARE

## 2020-01-01 ENCOUNTER — APPOINTMENT (OUTPATIENT)
Dept: RADIATION ONCOLOGY | Facility: MEDICAL CENTER | Age: 70
End: 2020-01-01
Attending: RADIOLOGY
Payer: MEDICARE

## 2020-01-01 ENCOUNTER — TELEPHONE (OUTPATIENT)
Dept: PRIMARY CARE CLINIC | Age: 70
End: 2020-01-01

## 2020-01-01 ENCOUNTER — HOSPITAL ENCOUNTER (OUTPATIENT)
Dept: RADIATION ONCOLOGY | Facility: MEDICAL CENTER | Age: 70
Discharge: HOME | End: 2020-08-25
Payer: MEDICARE

## 2020-01-01 ENCOUNTER — APPOINTMENT (OUTPATIENT)
Dept: GENERAL RADIOLOGY | Age: 70
DRG: 477 | End: 2020-01-01
Payer: MEDICARE

## 2020-01-01 ENCOUNTER — HOSPITAL ENCOUNTER (OUTPATIENT)
Dept: RADIATION ONCOLOGY | Facility: MEDICAL CENTER | Age: 70
Discharge: HOME | End: 2020-08-19
Attending: RADIOLOGY
Payer: MEDICARE

## 2020-01-01 ENCOUNTER — HOSPITAL ENCOUNTER (OUTPATIENT)
Dept: RADIATION ONCOLOGY | Facility: MEDICAL CENTER | Age: 70
Discharge: HOME | End: 2020-08-13
Attending: RADIOLOGY
Payer: MEDICARE

## 2020-01-01 ENCOUNTER — HOSPITAL ENCOUNTER (EMERGENCY)
Facility: CLINIC | Age: 70
Discharge: HOME | End: 2020-08-21
Attending: EMERGENCY MEDICINE
Payer: MEDICARE

## 2020-01-01 ENCOUNTER — HOSPITAL ENCOUNTER (OUTPATIENT)
Dept: INFUSION THERAPY | Facility: MEDICAL CENTER | Age: 70
Discharge: HOME | End: 2020-09-28
Payer: MEDICARE

## 2020-01-01 ENCOUNTER — HOSPITAL ENCOUNTER (OUTPATIENT)
Facility: MEDICAL CENTER | Age: 70
Discharge: HOME | End: 2020-08-17
Payer: MEDICARE

## 2020-01-01 ENCOUNTER — VIRTUAL VISIT (OUTPATIENT)
Dept: PULMONOLOGY | Age: 70
End: 2020-01-01
Payer: MEDICARE

## 2020-01-01 ENCOUNTER — HOSPITAL ENCOUNTER (EMERGENCY)
Age: 70
Discharge: HOME | End: 2020-08-04
Attending: EMERGENCY MEDICINE
Payer: MEDICARE

## 2020-01-01 ENCOUNTER — HOSPITAL ENCOUNTER (OUTPATIENT)
Dept: INTERVENTIONAL RADIOLOGY/VASCULAR | Age: 70
Discharge: HOME | End: 2020-08-26
Payer: MEDICARE

## 2020-01-01 ENCOUNTER — HOSPITAL ENCOUNTER (OUTPATIENT)
Dept: RADIATION ONCOLOGY | Facility: MEDICAL CENTER | Age: 70
Discharge: HOME | End: 2020-08-24
Attending: RADIOLOGY
Payer: MEDICARE

## 2020-01-01 ENCOUNTER — ANESTHESIA EVENT (OUTPATIENT)
Dept: OPERATING ROOM | Age: 70
DRG: 477 | End: 2020-01-01
Payer: MEDICARE

## 2020-01-01 ENCOUNTER — HOSPITAL ENCOUNTER (OUTPATIENT)
Dept: INFUSION THERAPY | Facility: MEDICAL CENTER | Age: 70
Discharge: HOME | End: 2020-09-21
Payer: MEDICARE

## 2020-01-01 ENCOUNTER — ANESTHESIA (OUTPATIENT)
Dept: OPERATING ROOM | Age: 70
DRG: 477 | End: 2020-01-01
Payer: MEDICARE

## 2020-01-01 ENCOUNTER — HOSPITAL ENCOUNTER (OUTPATIENT)
Dept: RADIATION ONCOLOGY | Facility: MEDICAL CENTER | Age: 70
Discharge: HOME | End: 2020-08-26
Attending: RADIOLOGY
Payer: MEDICARE

## 2020-01-01 ENCOUNTER — HOSPITAL ENCOUNTER (OUTPATIENT)
Dept: RADIATION ONCOLOGY | Facility: MEDICAL CENTER | Age: 70
Discharge: HOME | End: 2020-08-28
Attending: RADIOLOGY
Payer: MEDICARE

## 2020-01-01 ENCOUNTER — HOSPITAL ENCOUNTER (OUTPATIENT)
Dept: INFUSION THERAPY | Facility: MEDICAL CENTER | Age: 70
Discharge: HOME | End: 2020-09-14
Payer: MEDICARE

## 2020-01-01 ENCOUNTER — APPOINTMENT (OUTPATIENT)
Dept: GENERAL RADIOLOGY | Age: 70
End: 2020-01-01
Payer: MEDICARE

## 2020-01-01 ENCOUNTER — APPOINTMENT (OUTPATIENT)
Dept: GENERAL RADIOLOGY | Facility: CLINIC | Age: 70
End: 2020-01-01
Payer: MEDICARE

## 2020-01-01 ENCOUNTER — HOSPITAL ENCOUNTER (OUTPATIENT)
Dept: RADIATION ONCOLOGY | Facility: MEDICAL CENTER | Age: 70
Discharge: HOME | End: 2020-08-18
Attending: RADIOLOGY
Payer: MEDICARE

## 2020-01-01 ENCOUNTER — APPOINTMENT (OUTPATIENT)
Dept: GENERAL RADIOLOGY | Age: 70
DRG: 180 | End: 2020-01-01
Payer: MEDICARE

## 2020-01-01 ENCOUNTER — HOSPITAL ENCOUNTER (OUTPATIENT)
Dept: RADIATION ONCOLOGY | Facility: MEDICAL CENTER | Age: 70
Discharge: HOME | End: 2020-08-31
Attending: RADIOLOGY
Payer: MEDICARE

## 2020-01-01 VITALS
RESPIRATION RATE: 20 BRPM | TEMPERATURE: 98.6 F | SYSTOLIC BLOOD PRESSURE: 106 MMHG | HEART RATE: 96 BPM | DIASTOLIC BLOOD PRESSURE: 55 MMHG | OXYGEN SATURATION: 96 % | WEIGHT: 136 LBS | HEIGHT: 65 IN | BODY MASS INDEX: 22.66 KG/M2

## 2020-01-01 VITALS
RESPIRATION RATE: 16 BRPM | HEART RATE: 62 BPM | DIASTOLIC BLOOD PRESSURE: 55 MMHG | WEIGHT: 119 LBS | BODY MASS INDEX: 19.8 KG/M2 | SYSTOLIC BLOOD PRESSURE: 94 MMHG | TEMPERATURE: 97.8 F

## 2020-01-01 VITALS
WEIGHT: 132 LBS | SYSTOLIC BLOOD PRESSURE: 120 MMHG | OXYGEN SATURATION: 98 % | DIASTOLIC BLOOD PRESSURE: 69 MMHG | BODY MASS INDEX: 21.99 KG/M2 | TEMPERATURE: 97.9 F | RESPIRATION RATE: 15 BRPM | HEART RATE: 62 BPM | HEIGHT: 65 IN

## 2020-01-01 VITALS
BODY MASS INDEX: 21.83 KG/M2 | DIASTOLIC BLOOD PRESSURE: 62 MMHG | OXYGEN SATURATION: 96 % | SYSTOLIC BLOOD PRESSURE: 121 MMHG | HEART RATE: 60 BPM | HEIGHT: 65 IN | TEMPERATURE: 97 F | WEIGHT: 131 LBS | RESPIRATION RATE: 15 BRPM

## 2020-01-01 VITALS
OXYGEN SATURATION: 96 % | DIASTOLIC BLOOD PRESSURE: 85 MMHG | SYSTOLIC BLOOD PRESSURE: 118 MMHG | WEIGHT: 128.15 LBS | RESPIRATION RATE: 24 BRPM | HEART RATE: 105 BPM | HEIGHT: 65 IN | TEMPERATURE: 96.8 F | BODY MASS INDEX: 21.35 KG/M2

## 2020-01-01 VITALS — DIASTOLIC BLOOD PRESSURE: 71 MMHG | TEMPERATURE: 97.5 F | SYSTOLIC BLOOD PRESSURE: 106 MMHG | OXYGEN SATURATION: 100 %

## 2020-01-01 VITALS
HEART RATE: 87 BPM | WEIGHT: 120.3 LBS | DIASTOLIC BLOOD PRESSURE: 87 MMHG | SYSTOLIC BLOOD PRESSURE: 120 MMHG | TEMPERATURE: 97.2 F | BODY MASS INDEX: 20.02 KG/M2

## 2020-01-01 VITALS
SYSTOLIC BLOOD PRESSURE: 74 MMHG | HEART RATE: 93 BPM | TEMPERATURE: 97.7 F | BODY MASS INDEX: 20.34 KG/M2 | WEIGHT: 122.2 LBS | DIASTOLIC BLOOD PRESSURE: 50 MMHG

## 2020-01-01 VITALS
OXYGEN SATURATION: 99 % | SYSTOLIC BLOOD PRESSURE: 163 MMHG | DIASTOLIC BLOOD PRESSURE: 96 MMHG | HEART RATE: 76 BPM | TEMPERATURE: 97.1 F | RESPIRATION RATE: 16 BRPM | BODY MASS INDEX: 22.66 KG/M2 | WEIGHT: 132 LBS

## 2020-01-01 VITALS
BODY MASS INDEX: 23.32 KG/M2 | TEMPERATURE: 97.7 F | WEIGHT: 140 LBS | SYSTOLIC BLOOD PRESSURE: 153 MMHG | HEIGHT: 65 IN | OXYGEN SATURATION: 99 % | HEART RATE: 80 BPM | RESPIRATION RATE: 20 BRPM | DIASTOLIC BLOOD PRESSURE: 73 MMHG

## 2020-01-01 VITALS
BODY MASS INDEX: 21.96 KG/M2 | DIASTOLIC BLOOD PRESSURE: 90 MMHG | WEIGHT: 131.8 LBS | HEIGHT: 65 IN | SYSTOLIC BLOOD PRESSURE: 155 MMHG | HEART RATE: 74 BPM | RESPIRATION RATE: 18 BRPM | OXYGEN SATURATION: 96 % | TEMPERATURE: 96.7 F

## 2020-01-01 VITALS
BODY MASS INDEX: 22.5 KG/M2 | HEART RATE: 60 BPM | TEMPERATURE: 97.5 F | WEIGHT: 131.1 LBS | SYSTOLIC BLOOD PRESSURE: 140 MMHG | DIASTOLIC BLOOD PRESSURE: 69 MMHG

## 2020-01-01 VITALS
SYSTOLIC BLOOD PRESSURE: 134 MMHG | HEIGHT: 64 IN | HEART RATE: 77 BPM | TEMPERATURE: 98.1 F | WEIGHT: 129.25 LBS | RESPIRATION RATE: 20 BRPM | BODY MASS INDEX: 22.07 KG/M2 | DIASTOLIC BLOOD PRESSURE: 77 MMHG | OXYGEN SATURATION: 96 %

## 2020-01-01 VITALS
SYSTOLIC BLOOD PRESSURE: 94 MMHG | DIASTOLIC BLOOD PRESSURE: 55 MMHG | WEIGHT: 119 LBS | RESPIRATION RATE: 20 BRPM | BODY MASS INDEX: 19.8 KG/M2 | TEMPERATURE: 97.8 F | HEART RATE: 62 BPM

## 2020-01-01 VITALS
DIASTOLIC BLOOD PRESSURE: 72 MMHG | HEART RATE: 87 BPM | TEMPERATURE: 97.7 F | RESPIRATION RATE: 18 BRPM | SYSTOLIC BLOOD PRESSURE: 131 MMHG

## 2020-01-01 VITALS
TEMPERATURE: 98 F | SYSTOLIC BLOOD PRESSURE: 108 MMHG | WEIGHT: 124 LBS | RESPIRATION RATE: 18 BRPM | HEART RATE: 72 BPM | DIASTOLIC BLOOD PRESSURE: 62 MMHG | BODY MASS INDEX: 20.63 KG/M2

## 2020-01-01 VITALS — HEART RATE: 61 BPM | OXYGEN SATURATION: 98 % | DIASTOLIC BLOOD PRESSURE: 73 MMHG | SYSTOLIC BLOOD PRESSURE: 141 MMHG

## 2020-01-01 VITALS
RESPIRATION RATE: 17 BRPM | DIASTOLIC BLOOD PRESSURE: 62 MMHG | TEMPERATURE: 98.9 F | WEIGHT: 138.67 LBS | OXYGEN SATURATION: 94 % | HEART RATE: 96 BPM | HEIGHT: 65 IN | BODY MASS INDEX: 23.1 KG/M2 | SYSTOLIC BLOOD PRESSURE: 112 MMHG

## 2020-01-01 VITALS
BODY MASS INDEX: 20.17 KG/M2 | WEIGHT: 121.2 LBS | RESPIRATION RATE: 16 BRPM | HEART RATE: 79 BPM | SYSTOLIC BLOOD PRESSURE: 129 MMHG | OXYGEN SATURATION: 97 % | TEMPERATURE: 97.4 F | DIASTOLIC BLOOD PRESSURE: 84 MMHG

## 2020-01-01 DIAGNOSIS — C79.52 SECONDARY MALIGNANT NEOPLASM OF BONE AND BONE MARROW (HCC): ICD-10-CM

## 2020-01-01 DIAGNOSIS — C79.52 SECONDARY MALIGNANT NEOPLASM OF BONE AND BONE MARROW (HCC): Primary | ICD-10-CM

## 2020-01-01 DIAGNOSIS — C79.51 SECONDARY MALIGNANT NEOPLASM OF BONE AND BONE MARROW (HCC): Primary | ICD-10-CM

## 2020-01-01 DIAGNOSIS — I10 ESSENTIAL HYPERTENSION: ICD-10-CM

## 2020-01-01 DIAGNOSIS — C34.91 ADENOCARCINOMA OF RIGHT LUNG (HCC): Primary | ICD-10-CM

## 2020-01-01 DIAGNOSIS — C34.91 ADENOCARCINOMA OF RIGHT LUNG (HCC): ICD-10-CM

## 2020-01-01 DIAGNOSIS — C79.51 SECONDARY MALIGNANT NEOPLASM OF BONE AND BONE MARROW (HCC): ICD-10-CM

## 2020-01-01 LAB
% CKMB: 1.7 % (ref 0–3.5)
% CKMB: 1.7 % (ref 0–3.5)
% CKMB: 1.8 % (ref 0–3.5)
-: ABNORMAL
-: ABNORMAL
-: NORMAL
-: NORMAL
ABO/RH: NORMAL
ABO/RH: NORMAL
ABSOLUTE EOS #: 0 K/UL (ref 0–0.4)
ABSOLUTE EOS #: 0.01 K/UL (ref 0–0.4)
ABSOLUTE EOS #: 0.01 K/UL (ref 0–0.44)
ABSOLUTE EOS #: 0.09 K/UL (ref 0–0.4)
ABSOLUTE EOS #: 0.18 K/UL (ref 0–0.4)
ABSOLUTE EOS #: 0.21 K/UL (ref 0–0.4)
ABSOLUTE EOS #: 0.57 K/UL (ref 0–0.4)
ABSOLUTE EOS #: 0.61 K/UL (ref 0–0.44)
ABSOLUTE EOS #: 0.65 K/UL (ref 0–0.44)
ABSOLUTE EOS #: <0.03 K/UL (ref 0–0.44)
ABSOLUTE IMMATURE GRANULOCYTE: 0 K/UL (ref 0–0.3)
ABSOLUTE IMMATURE GRANULOCYTE: 0 K/UL (ref 0–0.3)
ABSOLUTE IMMATURE GRANULOCYTE: 0.03 K/UL (ref 0–0.3)
ABSOLUTE IMMATURE GRANULOCYTE: 0.04 K/UL (ref 0–0.3)
ABSOLUTE IMMATURE GRANULOCYTE: 0.04 K/UL (ref 0–0.3)
ABSOLUTE IMMATURE GRANULOCYTE: 0.05 K/UL (ref 0–0.3)
ABSOLUTE IMMATURE GRANULOCYTE: 0.1 K/UL (ref 0–0.3)
ABSOLUTE IMMATURE GRANULOCYTE: 0.12 K/UL (ref 0–0.3)
ABSOLUTE IMMATURE GRANULOCYTE: 0.12 K/UL (ref 0–0.3)
ABSOLUTE IMMATURE GRANULOCYTE: 0.61 K/UL (ref 0–0.3)
ABSOLUTE IMMATURE GRANULOCYTE: 1.07 K/UL (ref 0–0.3)
ABSOLUTE IMMATURE GRANULOCYTE: 1.33 K/UL (ref 0–0.3)
ABSOLUTE IMMATURE GRANULOCYTE: 1.86 K/UL (ref 0–0.3)
ABSOLUTE IMMATURE GRANULOCYTE: 3.15 K/UL (ref 0–0.3)
ABSOLUTE IMMATURE GRANULOCYTE: 4.63 K/UL (ref 0–0.3)
ABSOLUTE LYMPH #: 0.08 K/UL (ref 1–4.8)
ABSOLUTE LYMPH #: 0.15 K/UL (ref 1–4.8)
ABSOLUTE LYMPH #: 0.2 K/UL (ref 1.1–3.7)
ABSOLUTE LYMPH #: 0.38 K/UL (ref 1–4.8)
ABSOLUTE LYMPH #: 0.38 K/UL (ref 1–4.8)
ABSOLUTE LYMPH #: 0.42 K/UL (ref 1–4.8)
ABSOLUTE LYMPH #: 0.7 K/UL (ref 1.1–3.7)
ABSOLUTE LYMPH #: 0.71 K/UL (ref 1–4.8)
ABSOLUTE LYMPH #: 0.74 K/UL (ref 1.1–3.7)
ABSOLUTE LYMPH #: 0.89 K/UL (ref 1–4.8)
ABSOLUTE LYMPH #: 0.92 K/UL (ref 1.1–3.7)
ABSOLUTE LYMPH #: 0.96 K/UL (ref 1.1–3.7)
ABSOLUTE LYMPH #: 1.12 K/UL (ref 1.1–3.7)
ABSOLUTE LYMPH #: 1.2 K/UL (ref 1–4.8)
ABSOLUTE LYMPH #: 1.66 K/UL (ref 1–4.8)
ABSOLUTE MONO #: 0.05 K/UL (ref 0.2–0.8)
ABSOLUTE MONO #: 0.06 K/UL (ref 0.2–0.8)
ABSOLUTE MONO #: 0.08 K/UL (ref 0.2–0.8)
ABSOLUTE MONO #: 0.09 K/UL (ref 0.2–0.8)
ABSOLUTE MONO #: 0.14 K/UL (ref 0.1–1.2)
ABSOLUTE MONO #: 0.19 K/UL (ref 0.1–1.2)
ABSOLUTE MONO #: 0.33 K/UL (ref 0.1–1.2)
ABSOLUTE MONO #: 0.5 K/UL (ref 0.1–1.2)
ABSOLUTE MONO #: 0.59 K/UL (ref 0.1–1.2)
ABSOLUTE MONO #: 0.61 K/UL (ref 0.1–1.2)
ABSOLUTE MONO #: 0.76 K/UL (ref 0.1–0.8)
ABSOLUTE MONO #: 0.89 K/UL (ref 0.1–0.8)
ABSOLUTE MONO #: 0.9 K/UL (ref 0.1–0.8)
ABSOLUTE MONO #: 1.07 K/UL (ref 0.1–0.8)
ABSOLUTE MONO #: 1.45 K/UL (ref 0.2–0.8)
ALBUMIN SERPL-MCNC: 1.9 G/DL (ref 3.5–5.2)
ALBUMIN SERPL-MCNC: 2 G/DL (ref 3.5–5.2)
ALBUMIN SERPL-MCNC: 2.2 G/DL (ref 3.5–5.2)
ALBUMIN SERPL-MCNC: 2.2 G/DL (ref 3.5–5.2)
ALBUMIN SERPL-MCNC: 2.4 G/DL (ref 3.5–5.2)
ALBUMIN SERPL-MCNC: 2.6 G/DL (ref 3.5–5.2)
ALBUMIN SERPL-MCNC: 2.7 G/DL (ref 3.5–5.2)
ALBUMIN SERPL-MCNC: 3.7 G/DL (ref 3.5–5.2)
ALBUMIN SERPL-MCNC: 3.7 G/DL (ref 3.5–5.2)
ALBUMIN/GLOBULIN RATIO: 0.8 (ref 1–2.5)
ALBUMIN/GLOBULIN RATIO: 0.9 (ref 1–2.5)
ALBUMIN/GLOBULIN RATIO: 0.9 (ref 1–2.5)
ALBUMIN/GLOBULIN RATIO: 1 (ref 1–2.5)
ALBUMIN/GLOBULIN RATIO: 1.1 (ref 1–2.5)
ALBUMIN/GLOBULIN RATIO: ABNORMAL (ref 1–2.5)
ALLEN TEST: ABNORMAL
ALP BLD-CCNC: 109 U/L (ref 40–129)
ALP BLD-CCNC: 117 U/L (ref 40–129)
ALP BLD-CCNC: 159 U/L (ref 40–129)
ALP BLD-CCNC: 210 U/L (ref 40–129)
ALP BLD-CCNC: 227 U/L (ref 40–129)
ALP BLD-CCNC: 84 U/L (ref 40–129)
ALP BLD-CCNC: 86 U/L (ref 40–129)
ALP BLD-CCNC: 87 U/L (ref 40–129)
ALP BLD-CCNC: 97 U/L (ref 40–129)
ALT SERPL-CCNC: 12 U/L (ref 5–41)
ALT SERPL-CCNC: 13 U/L (ref 5–41)
ALT SERPL-CCNC: 19 U/L (ref 5–41)
ALT SERPL-CCNC: 30 U/L (ref 5–41)
ALT SERPL-CCNC: 31 U/L (ref 5–41)
ALT SERPL-CCNC: 42 U/L (ref 5–41)
ALT SERPL-CCNC: 8 U/L (ref 5–41)
AMORPHOUS: ABNORMAL
AMORPHOUS: ABNORMAL
AMORPHOUS: NORMAL
AMORPHOUS: NORMAL
AMYLASE: 43 U/L (ref 28–100)
ANION GAP SERPL CALCULATED.3IONS-SCNC: 10 MMOL/L (ref 9–17)
ANION GAP SERPL CALCULATED.3IONS-SCNC: 11 MMOL/L (ref 9–17)
ANION GAP SERPL CALCULATED.3IONS-SCNC: 12 MMOL/L (ref 9–17)
ANION GAP SERPL CALCULATED.3IONS-SCNC: 13 MMOL/L (ref 9–17)
ANION GAP SERPL CALCULATED.3IONS-SCNC: 14 MMOL/L (ref 9–17)
ANION GAP SERPL CALCULATED.3IONS-SCNC: 20 MMOL/L (ref 9–17)
ANION GAP SERPL CALCULATED.3IONS-SCNC: 8 MMOL/L (ref 9–17)
ANION GAP SERPL CALCULATED.3IONS-SCNC: 9 MMOL/L (ref 9–17)
ANION GAP: 9 MMOL/L (ref 7–16)
ANTIBODY SCREEN: NEGATIVE
ANTIBODY SCREEN: NEGATIVE
ARM BAND NUMBER: NORMAL
ARM BAND NUMBER: NORMAL
AST SERPL-CCNC: 11 U/L
AST SERPL-CCNC: 12 U/L
AST SERPL-CCNC: 13 U/L
AST SERPL-CCNC: 15 U/L
AST SERPL-CCNC: 15 U/L
AST SERPL-CCNC: 17 U/L
AST SERPL-CCNC: 19 U/L
AST SERPL-CCNC: 30 U/L
AST SERPL-CCNC: 37 U/L
BACTERIA: ABNORMAL
BACTERIA: ABNORMAL
BACTERIA: NORMAL
BACTERIA: NORMAL
BASOPHILS # BLD: 0 %
BASOPHILS # BLD: 0 % (ref 0–2)
BASOPHILS # BLD: 1 %
BASOPHILS # BLD: 1 %
BASOPHILS # BLD: 1 % (ref 0–2)
BASOPHILS ABSOLUTE: 0 K/UL (ref 0–0.2)
BASOPHILS ABSOLUTE: 0.01 K/UL (ref 0–0.2)
BASOPHILS ABSOLUTE: 0.05 K/UL (ref 0–0.2)
BASOPHILS ABSOLUTE: 0.06 K/UL (ref 0–0.2)
BASOPHILS ABSOLUTE: 0.21 K/UL (ref 0–0.2)
BASOPHILS ABSOLUTE: <0.03 K/UL (ref 0–0.2)
BILIRUB SERPL-MCNC: 0.25 MG/DL (ref 0.3–1.2)
BILIRUB SERPL-MCNC: 0.28 MG/DL (ref 0.3–1.2)
BILIRUB SERPL-MCNC: 0.38 MG/DL (ref 0.3–1.2)
BILIRUB SERPL-MCNC: 0.55 MG/DL (ref 0.3–1.2)
BILIRUB SERPL-MCNC: 0.57 MG/DL (ref 0.3–1.2)
BILIRUB SERPL-MCNC: 0.59 MG/DL (ref 0.3–1.2)
BILIRUB SERPL-MCNC: 0.62 MG/DL (ref 0.3–1.2)
BILIRUB SERPL-MCNC: 0.65 MG/DL (ref 0.3–1.2)
BILIRUB SERPL-MCNC: 0.69 MG/DL (ref 0.3–1.2)
BILIRUBIN URINE: NEGATIVE
BLD PROD TYP BPU: NORMAL
BLD PROD TYP BPU: NORMAL
BNP INTERPRETATION: ABNORMAL
BUN BLDV-MCNC: 11 MG/DL (ref 8–23)
BUN BLDV-MCNC: 12 MG/DL (ref 8–23)
BUN BLDV-MCNC: 13 MG/DL (ref 8–23)
BUN BLDV-MCNC: 15 MG/DL (ref 8–23)
BUN BLDV-MCNC: 15 MG/DL (ref 8–23)
BUN BLDV-MCNC: 16 MG/DL (ref 8–23)
BUN BLDV-MCNC: 18 MG/DL (ref 8–23)
BUN BLDV-MCNC: 18 MG/DL (ref 8–23)
BUN BLDV-MCNC: 19 MG/DL (ref 8–23)
BUN BLDV-MCNC: 21 MG/DL (ref 8–23)
BUN BLDV-MCNC: 21 MG/DL (ref 8–23)
BUN BLDV-MCNC: 22 MG/DL (ref 8–23)
BUN BLDV-MCNC: 23 MG/DL (ref 8–23)
BUN BLDV-MCNC: 27 MG/DL (ref 8–23)
BUN BLDV-MCNC: 30 MG/DL (ref 8–23)
BUN BLDV-MCNC: 31 MG/DL (ref 8–23)
BUN BLDV-MCNC: 31 MG/DL (ref 8–23)
BUN/CREAT BLD: 17 (ref 9–20)
BUN/CREAT BLD: 18 (ref 9–20)
BUN/CREAT BLD: 22 (ref 9–20)
BUN/CREAT BLD: 22 (ref 9–20)
BUN/CREAT BLD: 25 (ref 9–20)
BUN/CREAT BLD: 29 (ref 9–20)
BUN/CREAT BLD: ABNORMAL (ref 9–20)
C-REACTIVE PROTEIN: 81.4 MG/L (ref 0–5)
CALCIUM IONIZED: 1.18 MMOL/L (ref 1.13–1.33)
CALCIUM IONIZED: 1.19 MMOL/L (ref 1.13–1.33)
CALCIUM SERPL-MCNC: 7.1 MG/DL (ref 8.6–10.4)
CALCIUM SERPL-MCNC: 7.3 MG/DL (ref 8.6–10.4)
CALCIUM SERPL-MCNC: 7.7 MG/DL (ref 8.6–10.4)
CALCIUM SERPL-MCNC: 7.7 MG/DL (ref 8.6–10.4)
CALCIUM SERPL-MCNC: 7.8 MG/DL (ref 8.6–10.4)
CALCIUM SERPL-MCNC: 7.8 MG/DL (ref 8.6–10.4)
CALCIUM SERPL-MCNC: 8.3 MG/DL (ref 8.6–10.4)
CALCIUM SERPL-MCNC: 8.5 MG/DL (ref 8.6–10.4)
CALCIUM SERPL-MCNC: 8.8 MG/DL (ref 8.6–10.4)
CALCIUM SERPL-MCNC: 8.9 MG/DL (ref 8.6–10.4)
CALCIUM SERPL-MCNC: 9.4 MG/DL (ref 8.6–10.4)
CALCIUM SERPL-MCNC: 9.6 MG/DL (ref 8.6–10.4)
CALCIUM SERPL-MCNC: 9.7 MG/DL (ref 8.6–10.4)
CALCIUM SERPL-MCNC: 9.8 MG/DL (ref 8.6–10.4)
CALCIUM SERPL-MCNC: 9.9 MG/DL (ref 8.6–10.4)
CASTS UA: ABNORMAL /LPF
CASTS UA: ABNORMAL /LPF (ref 0–2)
CASTS UA: NORMAL /LPF (ref 0–8)
CASTS UA: NORMAL /LPF (ref 0–8)
CHLORIDE BLD-SCNC: 102 MMOL/L (ref 98–107)
CHLORIDE BLD-SCNC: 103 MMOL/L (ref 98–107)
CHLORIDE BLD-SCNC: 104 MMOL/L (ref 98–107)
CHLORIDE BLD-SCNC: 105 MMOL/L (ref 98–107)
CHLORIDE BLD-SCNC: 105 MMOL/L (ref 98–107)
CHLORIDE BLD-SCNC: 106 MMOL/L (ref 98–107)
CHLORIDE BLD-SCNC: 106 MMOL/L (ref 98–107)
CHLORIDE BLD-SCNC: 107 MMOL/L (ref 98–107)
CHLORIDE BLD-SCNC: 110 MMOL/L (ref 98–107)
CHLORIDE BLD-SCNC: 112 MMOL/L (ref 98–107)
CHLORIDE BLD-SCNC: 113 MMOL/L (ref 98–107)
CHLORIDE BLD-SCNC: 97 MMOL/L (ref 98–107)
CHLORIDE BLD-SCNC: 99 MMOL/L (ref 98–107)
CHLORIDE BLD-SCNC: 99 MMOL/L (ref 98–107)
CHOLESTEROL/HDL RATIO: 12.6
CHOLESTEROL/HDL RATIO: 3.3
CHOLESTEROL: 151 MG/DL
CHOLESTEROL: 162 MG/DL
CHP ED QC CHECK: NORMAL
CK MB: 5 NG/ML
CK MB: 7.5 NG/ML
CK MB: 8.3 NG/ML
CKMB INTERPRETATION: ABNORMAL
CKMB INTERPRETATION: ABNORMAL
CKMB INTERPRETATION: NORMAL
CO2: 18 MMOL/L (ref 20–31)
CO2: 19 MMOL/L (ref 20–31)
CO2: 19 MMOL/L (ref 20–31)
CO2: 21 MMOL/L (ref 20–31)
CO2: 22 MMOL/L (ref 20–31)
CO2: 23 MMOL/L (ref 20–31)
CO2: 23 MMOL/L (ref 20–31)
CO2: 25 MMOL/L (ref 20–31)
CO2: 25 MMOL/L (ref 20–31)
CO2: 28 MMOL/L (ref 20–31)
COLOR: YELLOW
COMMENT UA: ABNORMAL
COMMENT UA: NORMAL
CREAT SERPL-MCNC: 0.59 MG/DL (ref 0.7–1.2)
CREAT SERPL-MCNC: 0.75 MG/DL (ref 0.7–1.2)
CREAT SERPL-MCNC: 0.83 MG/DL (ref 0.7–1.2)
CREAT SERPL-MCNC: 0.87 MG/DL (ref 0.7–1.2)
CREAT SERPL-MCNC: 0.92 MG/DL (ref 0.7–1.2)
CREAT SERPL-MCNC: 0.93 MG/DL (ref 0.7–1.2)
CREAT SERPL-MCNC: 0.95 MG/DL (ref 0.7–1.2)
CREAT SERPL-MCNC: 0.96 MG/DL (ref 0.7–1.2)
CREAT SERPL-MCNC: 0.97 MG/DL (ref 0.7–1.2)
CREAT SERPL-MCNC: 1.02 MG/DL (ref 0.7–1.2)
CREAT SERPL-MCNC: 1.07 MG/DL (ref 0.7–1.2)
CREAT SERPL-MCNC: 1.08 MG/DL (ref 0.7–1.2)
CREAT SERPL-MCNC: 1.1 MG/DL (ref 0.7–1.2)
CREAT SERPL-MCNC: 1.13 MG/DL (ref 0.7–1.2)
CREAT SERPL-MCNC: 1.18 MG/DL (ref 0.7–1.2)
CREAT SERPL-MCNC: 1.28 MG/DL (ref 0.7–1.2)
CREAT SERPL-MCNC: 1.29 MG/DL (ref 0.7–1.2)
CREAT SERPL-MCNC: 1.39 MG/DL (ref 0.7–1.2)
CREAT SERPL-MCNC: 1.43 MG/DL (ref 0.7–1.2)
CRYSTALS, UA: ABNORMAL /HPF
CRYSTALS, UA: ABNORMAL /HPF
CRYSTALS, UA: NORMAL /HPF
CRYSTALS, UA: NORMAL /HPF
CULTURE: ABNORMAL
CULTURE: NO GROWTH
CULTURE: NO GROWTH
CULTURE: NORMAL
DIFFERENTIAL TYPE: ABNORMAL
DISPENSE STATUS BLOOD BANK: NORMAL
DISPENSE STATUS BLOOD BANK: NORMAL
EKG ATRIAL RATE: 108 BPM
EKG ATRIAL RATE: 110 BPM
EKG ATRIAL RATE: 112 BPM
EKG ATRIAL RATE: 113 BPM
EKG ATRIAL RATE: 118 BPM
EKG ATRIAL RATE: 202 BPM
EKG ATRIAL RATE: 76 BPM
EKG ATRIAL RATE: 86 BPM
EKG ATRIAL RATE: 91 BPM
EKG ATRIAL RATE: 94 BPM
EKG ATRIAL RATE: 95 BPM
EKG ATRIAL RATE: 96 BPM
EKG ATRIAL RATE: 97 BPM
EKG P AXIS: 32 DEGREES
EKG P AXIS: 40 DEGREES
EKG P AXIS: 54 DEGREES
EKG P AXIS: 55 DEGREES
EKG P AXIS: 57 DEGREES
EKG P AXIS: 57 DEGREES
EKG P AXIS: 58 DEGREES
EKG P AXIS: 58 DEGREES
EKG P AXIS: 68 DEGREES
EKG P AXIS: 70 DEGREES
EKG P AXIS: 72 DEGREES
EKG P AXIS: 88 DEGREES
EKG P AXIS: 99 DEGREES
EKG P-R INTERVAL: 108 MS
EKG P-R INTERVAL: 114 MS
EKG P-R INTERVAL: 118 MS
EKG P-R INTERVAL: 122 MS
EKG P-R INTERVAL: 122 MS
EKG P-R INTERVAL: 124 MS
EKG P-R INTERVAL: 124 MS
EKG P-R INTERVAL: 128 MS
EKG P-R INTERVAL: 128 MS
EKG P-R INTERVAL: 136 MS
EKG P-R INTERVAL: 164 MS
EKG P-R INTERVAL: 94 MS
EKG Q-T INTERVAL: 356 MS
EKG Q-T INTERVAL: 356 MS
EKG Q-T INTERVAL: 358 MS
EKG Q-T INTERVAL: 366 MS
EKG Q-T INTERVAL: 370 MS
EKG Q-T INTERVAL: 374 MS
EKG Q-T INTERVAL: 378 MS
EKG Q-T INTERVAL: 380 MS
EKG Q-T INTERVAL: 384 MS
EKG Q-T INTERVAL: 384 MS
EKG Q-T INTERVAL: 386 MS
EKG Q-T INTERVAL: 390 MS
EKG Q-T INTERVAL: 392 MS
EKG QRS DURATION: 130 MS
EKG QRS DURATION: 134 MS
EKG QRS DURATION: 152 MS
EKG QRS DURATION: 156 MS
EKG QRS DURATION: 158 MS
EKG QRS DURATION: 158 MS
EKG QRS DURATION: 160 MS
EKG QRS DURATION: 160 MS
EKG QRS DURATION: 162 MS
EKG QRS DURATION: 166 MS
EKG QRS DURATION: 180 MS
EKG QRS DURATION: 184 MS
EKG QRS DURATION: 74 MS
EKG QTC CALCULATION (BAZETT): 432 MS
EKG QTC CALCULATION (BAZETT): 449 MS
EKG QTC CALCULATION (BAZETT): 449 MS
EKG QTC CALCULATION (BAZETT): 452 MS
EKG QTC CALCULATION (BAZETT): 462 MS
EKG QTC CALCULATION (BAZETT): 479 MS
EKG QTC CALCULATION (BAZETT): 495 MS
EKG QTC CALCULATION (BAZETT): 497 MS
EKG QTC CALCULATION (BAZETT): 498 MS
EKG QTC CALCULATION (BAZETT): 504 MS
EKG QTC CALCULATION (BAZETT): 513 MS
EKG QTC CALCULATION (BAZETT): 517 MS
EKG QTC CALCULATION (BAZETT): 524 MS
EKG R AXIS: -107 DEGREES
EKG R AXIS: -59 DEGREES
EKG R AXIS: -64 DEGREES
EKG R AXIS: -64 DEGREES
EKG R AXIS: -68 DEGREES
EKG R AXIS: -68 DEGREES
EKG R AXIS: -69 DEGREES
EKG R AXIS: -75 DEGREES
EKG R AXIS: -76 DEGREES
EKG R AXIS: -76 DEGREES
EKG R AXIS: -80 DEGREES
EKG R AXIS: -92 DEGREES
EKG R AXIS: 81 DEGREES
EKG T AXIS: 100 DEGREES
EKG T AXIS: 100 DEGREES
EKG T AXIS: 103 DEGREES
EKG T AXIS: 108 DEGREES
EKG T AXIS: 109 DEGREES
EKG T AXIS: 113 DEGREES
EKG T AXIS: 116 DEGREES
EKG T AXIS: 68 DEGREES
EKG T AXIS: 85 DEGREES
EKG T AXIS: 88 DEGREES
EKG T AXIS: 89 DEGREES
EKG T AXIS: 96 DEGREES
EKG T AXIS: 98 DEGREES
EKG VENTRICULAR RATE: 106 BPM
EKG VENTRICULAR RATE: 108 BPM
EKG VENTRICULAR RATE: 110 BPM
EKG VENTRICULAR RATE: 112 BPM
EKG VENTRICULAR RATE: 113 BPM
EKG VENTRICULAR RATE: 118 BPM
EKG VENTRICULAR RATE: 76 BPM
EKG VENTRICULAR RATE: 86 BPM
EKG VENTRICULAR RATE: 91 BPM
EKG VENTRICULAR RATE: 94 BPM
EKG VENTRICULAR RATE: 95 BPM
EKG VENTRICULAR RATE: 96 BPM
EKG VENTRICULAR RATE: 97 BPM
EOSINOPHILS RELATIVE PERCENT: 0 % (ref 1–4)
EOSINOPHILS RELATIVE PERCENT: 1 % (ref 1–4)
EOSINOPHILS RELATIVE PERCENT: 3 % (ref 1–4)
EOSINOPHILS RELATIVE PERCENT: 3 % (ref 1–4)
EOSINOPHILS RELATIVE PERCENT: 6 % (ref 1–4)
EOSINOPHILS RELATIVE PERCENT: 8 % (ref 1–4)
EOSINOPHILS RELATIVE PERCENT: 9 % (ref 1–4)
EPITHELIAL CELLS UA: ABNORMAL /HPF (ref 0–5)
EPITHELIAL CELLS UA: ABNORMAL /HPF (ref 0–5)
EPITHELIAL CELLS UA: NORMAL /HPF (ref 0–5)
EPITHELIAL CELLS UA: NORMAL /HPF (ref 0–5)
EXPIRATION DATE: NORMAL
EXPIRATION DATE: NORMAL
FIO2: ABNORMAL
FOLATE: 18.1 NG/ML
GFR AFRICAN AMERICAN: 59 ML/MIN
GFR AFRICAN AMERICAN: >60 ML/MIN
GFR NON-AFRICAN AMERICAN: 49 ML/MIN
GFR NON-AFRICAN AMERICAN: 50 ML/MIN
GFR NON-AFRICAN AMERICAN: 55 ML/MIN
GFR NON-AFRICAN AMERICAN: 55 ML/MIN
GFR NON-AFRICAN AMERICAN: >60 ML/MIN
GFR SERPL CREATININE-BSD FRML MDRD: >60 ML/MIN
GFR SERPL CREATININE-BSD FRML MDRD: ABNORMAL ML/MIN/{1.73_M2}
GFR SERPL CREATININE-BSD FRML MDRD: NORMAL ML/MIN/{1.73_M2}
GLUCOSE BLD-MCNC: 101 MG/DL (ref 70–99)
GLUCOSE BLD-MCNC: 101 MG/DL (ref 70–99)
GLUCOSE BLD-MCNC: 104 MG/DL (ref 70–99)
GLUCOSE BLD-MCNC: 105 MG/DL (ref 70–99)
GLUCOSE BLD-MCNC: 105 MG/DL (ref 70–99)
GLUCOSE BLD-MCNC: 107 MG/DL (ref 70–99)
GLUCOSE BLD-MCNC: 110 MG/DL (ref 70–99)
GLUCOSE BLD-MCNC: 113 MG/DL (ref 70–99)
GLUCOSE BLD-MCNC: 113 MG/DL (ref 70–99)
GLUCOSE BLD-MCNC: 114 MG/DL (ref 70–99)
GLUCOSE BLD-MCNC: 115 MG/DL (ref 70–99)
GLUCOSE BLD-MCNC: 117 MG/DL (ref 70–99)
GLUCOSE BLD-MCNC: 118 MG/DL (ref 70–99)
GLUCOSE BLD-MCNC: 119 MG/DL (ref 70–99)
GLUCOSE BLD-MCNC: 120 MG/DL (ref 70–99)
GLUCOSE BLD-MCNC: 124 MG/DL (ref 70–99)
GLUCOSE BLD-MCNC: 170 MG/DL (ref 70–99)
GLUCOSE BLD-MCNC: 86 MG/DL (ref 70–99)
GLUCOSE BLD-MCNC: 98 MG/DL (ref 70–99)
GLUCOSE BLD-MCNC: 99 MG/DL
GLUCOSE URINE: NEGATIVE
HCO3 VENOUS: 20.1 MMOL/L (ref 22–29)
HCT VFR BLD CALC: 26.2 % (ref 40.7–50.3)
HCT VFR BLD CALC: 27.8 % (ref 40.7–50.3)
HCT VFR BLD CALC: 27.9 % (ref 40.7–50.3)
HCT VFR BLD CALC: 28.3 % (ref 40.7–50.3)
HCT VFR BLD CALC: 28.4 % (ref 40.7–50.3)
HCT VFR BLD CALC: 28.5 % (ref 40.7–50.3)
HCT VFR BLD CALC: 29 % (ref 40.7–50.3)
HCT VFR BLD CALC: 29 % (ref 40.7–50.3)
HCT VFR BLD CALC: 29.5 % (ref 40.7–50.3)
HCT VFR BLD CALC: 30 % (ref 40.7–50.3)
HCT VFR BLD CALC: 30.9 % (ref 40.7–50.3)
HCT VFR BLD CALC: 31.9 % (ref 40.7–50.3)
HCT VFR BLD CALC: 32 % (ref 40.7–50.3)
HCT VFR BLD CALC: 32.6 % (ref 40.7–50.3)
HCT VFR BLD CALC: 38 % (ref 40.7–50.3)
HCT VFR BLD CALC: 39.1 % (ref 40.7–50.3)
HCT VFR BLD CALC: 43.6 % (ref 40.7–50.3)
HCT VFR BLD CALC: 43.7 % (ref 40.7–50.3)
HCT VFR BLD CALC: 44 % (ref 40.7–50.3)
HCT VFR BLD CALC: 44.7 % (ref 40.7–50.3)
HCT VFR BLD CALC: 45.6 % (ref 40.7–50.3)
HDLC SERPL-MCNC: 12 MG/DL
HDLC SERPL-MCNC: 49 MG/DL
HEMOGLOBIN: 10 G/DL (ref 13–17)
HEMOGLOBIN: 10.3 G/DL (ref 13–17)
HEMOGLOBIN: 12.2 G/DL (ref 13–17)
HEMOGLOBIN: 12.5 G/DL (ref 13–17)
HEMOGLOBIN: 13.3 G/DL (ref 13–17)
HEMOGLOBIN: 13.6 G/DL (ref 13–17)
HEMOGLOBIN: 13.7 G/DL (ref 13–17)
HEMOGLOBIN: 13.8 G/DL (ref 13–17)
HEMOGLOBIN: 13.8 G/DL (ref 13–17)
HEMOGLOBIN: 8.6 G/DL (ref 13–17)
HEMOGLOBIN: 8.7 G/DL (ref 13–17)
HEMOGLOBIN: 8.7 G/DL (ref 13–17)
HEMOGLOBIN: 8.8 G/DL (ref 13–17)
HEMOGLOBIN: 8.8 G/DL (ref 13–17)
HEMOGLOBIN: 8.9 G/DL (ref 13–17)
HEMOGLOBIN: 9 G/DL (ref 13–17)
HEMOGLOBIN: 9 G/DL (ref 13–17)
HEMOGLOBIN: 9.1 G/DL (ref 13–17)
HEMOGLOBIN: 9.4 G/DL (ref 13–17)
HEMOGLOBIN: 9.8 G/DL (ref 13–17)
HEMOGLOBIN: 9.8 G/DL (ref 13–17)
IMMATURE GRANULOCYTES: 0 %
IMMATURE GRANULOCYTES: 1 %
IMMATURE GRANULOCYTES: 13 %
IMMATURE GRANULOCYTES: 16 %
IMMATURE GRANULOCYTES: 21 %
IMMATURE GRANULOCYTES: 26 %
IMMATURE GRANULOCYTES: 6 %
IMMATURE GRANULOCYTES: 7 %
IMMATURE GRANULOCYTES: 9 %
IMMATURE GRANULOCYTES: 9 %
INR BLD: 1
INR BLD: 1.2
INR BLD: 1.2
INR BLD: 1.3
INR BLD: 1.3
INR BLD: 1.4
INR BLD: 1.4
INTERVENTION: NORMAL
KETONES, URINE: ABNORMAL
KETONES, URINE: ABNORMAL
KETONES, URINE: NEGATIVE
LACTIC ACID, SEPSIS WHOLE BLOOD: 1.7 MMOL/L (ref 0.5–1.9)
LACTIC ACID, SEPSIS: NORMAL MMOL/L (ref 0.5–1.9)
LACTIC ACID, WHOLE BLOOD: 1.9 MMOL/L (ref 0.7–2.1)
LACTIC ACID, WHOLE BLOOD: 2 MMOL/L (ref 0.7–2.1)
LACTIC ACID, WHOLE BLOOD: 2.9 MMOL/L (ref 0.7–2.1)
LACTIC ACID, WHOLE BLOOD: 3.2 MMOL/L (ref 0.7–2.1)
LACTIC ACID: 1.3 MMOL/L (ref 0.5–2.2)
LACTIC ACID: 1.5 MMOL/L (ref 0.5–2.2)
LACTIC ACID: ABNORMAL MMOL/L
LACTIC ACID: ABNORMAL MMOL/L
LACTIC ACID: NORMAL MMOL/L
LDL CHOLESTEROL: 74 MG/DL (ref 0–130)
LDL CHOLESTEROL: 93 MG/DL (ref 0–130)
LEGIONELLA PNEUMOPHILIA AG, URINE: NEGATIVE
LEUKOCYTE ESTERASE, URINE: NEGATIVE
LIPASE: 25 U/L (ref 13–60)
LIPASE: 28 U/L (ref 13–60)
LYMPHOCYTES # BLD: 10 % (ref 24–44)
LYMPHOCYTES # BLD: 11 % (ref 24–44)
LYMPHOCYTES # BLD: 12 % (ref 24–43)
LYMPHOCYTES # BLD: 16 % (ref 24–43)
LYMPHOCYTES # BLD: 2 % (ref 24–44)
LYMPHOCYTES # BLD: 21 % (ref 24–44)
LYMPHOCYTES # BLD: 22 % (ref 24–43)
LYMPHOCYTES # BLD: 4 % (ref 24–44)
LYMPHOCYTES # BLD: 5 % (ref 24–43)
LYMPHOCYTES # BLD: 5 % (ref 24–44)
LYMPHOCYTES # BLD: 6 % (ref 24–43)
LYMPHOCYTES # BLD: 6 % (ref 24–43)
LYMPHOCYTES # BLD: 7 % (ref 24–44)
LYMPHOCYTES # BLD: 8 % (ref 24–44)
LYMPHOCYTES # BLD: 8 % (ref 24–44)
Lab: ABNORMAL
Lab: NORMAL
MAGNESIUM: 1.8 MG/DL (ref 1.6–2.6)
MAGNESIUM: 2 MG/DL (ref 1.6–2.6)
MAGNESIUM: 2.1 MG/DL (ref 1.6–2.6)
MAGNESIUM: 2.2 MG/DL (ref 1.6–2.6)
MAGNESIUM: 4.4 MG/DL (ref 1.6–2.6)
MCH RBC QN AUTO: 28.4 PG (ref 25.2–33.5)
MCH RBC QN AUTO: 28.6 PG (ref 25.2–33.5)
MCH RBC QN AUTO: 28.7 PG (ref 25.2–33.5)
MCH RBC QN AUTO: 28.8 PG (ref 25.2–33.5)
MCH RBC QN AUTO: 28.9 PG (ref 25.2–33.5)
MCH RBC QN AUTO: 29.1 PG (ref 25.2–33.5)
MCH RBC QN AUTO: 29.1 PG (ref 25.2–33.5)
MCH RBC QN AUTO: 29.2 PG (ref 25.2–33.5)
MCH RBC QN AUTO: 29.2 PG (ref 25.2–33.5)
MCH RBC QN AUTO: 29.3 PG (ref 25.2–33.5)
MCH RBC QN AUTO: 29.4 PG (ref 25.2–33.5)
MCH RBC QN AUTO: 29.5 PG (ref 25.2–33.5)
MCH RBC QN AUTO: 29.7 PG (ref 25.2–33.5)
MCH RBC QN AUTO: 29.8 PG (ref 25.2–33.5)
MCH RBC QN AUTO: 29.9 PG (ref 25.2–33.5)
MCH RBC QN AUTO: 30.1 PG (ref 25.2–33.5)
MCH RBC QN AUTO: 30.1 PG (ref 25.2–33.5)
MCH RBC QN AUTO: 30.4 PG (ref 25.2–33.5)
MCH RBC QN AUTO: 30.6 PG (ref 25.2–33.5)
MCH RBC QN AUTO: 30.7 PG (ref 25.2–33.5)
MCH RBC QN AUTO: 31.4 PG (ref 25.2–33.5)
MCHC RBC AUTO-ENTMCNC: 29.2 G/DL (ref 28.4–34.8)
MCHC RBC AUTO-ENTMCNC: 30 G/DL (ref 28.4–34.8)
MCHC RBC AUTO-ENTMCNC: 30.5 G/DL (ref 28.4–34.8)
MCHC RBC AUTO-ENTMCNC: 30.6 G/DL (ref 28.4–34.8)
MCHC RBC AUTO-ENTMCNC: 30.7 G/DL (ref 28.4–34.8)
MCHC RBC AUTO-ENTMCNC: 30.7 G/DL (ref 28.4–34.8)
MCHC RBC AUTO-ENTMCNC: 30.8 G/DL (ref 28.4–34.8)
MCHC RBC AUTO-ENTMCNC: 30.9 G/DL (ref 28.4–34.8)
MCHC RBC AUTO-ENTMCNC: 30.9 G/DL (ref 28.4–34.8)
MCHC RBC AUTO-ENTMCNC: 31.1 G/DL (ref 28.4–34.8)
MCHC RBC AUTO-ENTMCNC: 31.3 G/DL (ref 28.4–34.8)
MCHC RBC AUTO-ENTMCNC: 31.4 G/DL (ref 28.4–34.8)
MCHC RBC AUTO-ENTMCNC: 31.6 G/DL (ref 28.4–34.8)
MCHC RBC AUTO-ENTMCNC: 31.6 G/DL (ref 28.4–34.8)
MCHC RBC AUTO-ENTMCNC: 31.7 G/DL (ref 28.4–34.8)
MCHC RBC AUTO-ENTMCNC: 31.7 G/DL (ref 28.4–34.8)
MCHC RBC AUTO-ENTMCNC: 32 G/DL (ref 28.4–34.8)
MCHC RBC AUTO-ENTMCNC: 32 G/DL (ref 28.4–34.8)
MCHC RBC AUTO-ENTMCNC: 32.1 G/DL (ref 28.4–34.8)
MCHC RBC AUTO-ENTMCNC: 32.3 G/DL (ref 28.4–34.8)
MCHC RBC AUTO-ENTMCNC: 33.2 G/DL (ref 28.4–34.8)
MCV RBC AUTO: 100.3 FL (ref 82.6–102.9)
MCV RBC AUTO: 102.5 FL (ref 82.6–102.9)
MCV RBC AUTO: 90.1 FL (ref 82.6–102.9)
MCV RBC AUTO: 90.7 FL (ref 82.6–102.9)
MCV RBC AUTO: 90.7 FL (ref 82.6–102.9)
MCV RBC AUTO: 91 FL (ref 82.6–102.9)
MCV RBC AUTO: 91 FL (ref 82.6–102.9)
MCV RBC AUTO: 92.8 FL (ref 82.6–102.9)
MCV RBC AUTO: 92.8 FL (ref 82.6–102.9)
MCV RBC AUTO: 92.9 FL (ref 82.6–102.9)
MCV RBC AUTO: 93.2 FL (ref 82.6–102.9)
MCV RBC AUTO: 93.3 FL (ref 82.6–102.9)
MCV RBC AUTO: 93.4 FL (ref 82.6–102.9)
MCV RBC AUTO: 94.6 FL (ref 82.6–102.9)
MCV RBC AUTO: 95 FL (ref 82.6–102.9)
MCV RBC AUTO: 95.2 FL (ref 82.6–102.9)
MCV RBC AUTO: 95.8 FL (ref 82.6–102.9)
MCV RBC AUTO: 97.4 FL (ref 82.6–102.9)
MCV RBC AUTO: 97.6 FL (ref 82.6–102.9)
MCV RBC AUTO: 99 FL (ref 82.6–102.9)
MCV RBC AUTO: 99.3 FL (ref 82.6–102.9)
MODE: ABNORMAL
MONOCYTES # BLD: 1 % (ref 1–7)
MONOCYTES # BLD: 14 % (ref 1–7)
MONOCYTES # BLD: 15 % (ref 3–12)
MONOCYTES # BLD: 2 % (ref 1–7)
MONOCYTES # BLD: 2 % (ref 3–12)
MONOCYTES # BLD: 2 % (ref 3–12)
MONOCYTES # BLD: 3 % (ref 3–12)
MONOCYTES # BLD: 4 % (ref 1–7)
MONOCYTES # BLD: 5 % (ref 1–7)
MONOCYTES # BLD: 6 % (ref 1–7)
MONOCYTES # BLD: 7 % (ref 1–7)
MONOCYTES # BLD: 7 % (ref 3–12)
MONOCYTES # BLD: 9 % (ref 3–12)
MORPHOLOGY: ABNORMAL
MUCUS: ABNORMAL
MUCUS: ABNORMAL
MUCUS: NORMAL
MUCUS: NORMAL
MYOGLOBIN: 139 NG/ML (ref 28–72)
MYOGLOBIN: 206 NG/ML (ref 28–72)
MYOGLOBIN: 314 NG/ML (ref 28–72)
MYOGLOBIN: 710 NG/ML (ref 28–72)
NEGATIVE BASE EXCESS, VEN: 5 (ref 0–2)
NITRITE, URINE: NEGATIVE
NRBC AUTOMATED: 0 PER 100 WBC
NRBC AUTOMATED: 0.1 PER 100 WBC
NRBC AUTOMATED: 0.2 PER 100 WBC
NRBC AUTOMATED: 0.2 PER 100 WBC
NRBC AUTOMATED: 0.3 PER 100 WBC
NRBC AUTOMATED: 0.3 PER 100 WBC
NRBC AUTOMATED: 0.4 PER 100 WBC
NRBC AUTOMATED: 0.8 PER 100 WBC
NUCLEATED RED BLOOD CELLS: 1 PER 100 WBC
O2 DEVICE/FLOW/%: ABNORMAL
O2 SAT, VEN: 57 % (ref 60–85)
OTHER OBSERVATIONS UA: ABNORMAL
OTHER OBSERVATIONS UA: ABNORMAL
OTHER OBSERVATIONS UA: NORMAL
OTHER OBSERVATIONS UA: NORMAL
PARTIAL THROMBOPLASTIN TIME: 28.3 SEC (ref 20.5–30.5)
PARTIAL THROMBOPLASTIN TIME: 29.4 SEC (ref 20.5–30.5)
PARTIAL THROMBOPLASTIN TIME: 29.4 SEC (ref 20.5–30.5)
PATIENT TEMP: ABNORMAL
PCO2, VEN: 34.6 MM HG (ref 41–51)
PDW BLD-RTO: 14.2 % (ref 11.8–14.4)
PDW BLD-RTO: 14.3 % (ref 11.8–14.4)
PDW BLD-RTO: 14.6 % (ref 11.8–14.4)
PDW BLD-RTO: 16.7 % (ref 11.8–14.4)
PDW BLD-RTO: 17.1 % (ref 11.8–14.4)
PDW BLD-RTO: 17.2 % (ref 11.8–14.4)
PDW BLD-RTO: 17.2 % (ref 11.8–14.4)
PDW BLD-RTO: 17.3 % (ref 11.8–14.4)
PDW BLD-RTO: 17.6 % (ref 11.8–14.4)
PDW BLD-RTO: 17.9 % (ref 11.8–14.4)
PDW BLD-RTO: 18.1 % (ref 11.8–14.4)
PDW BLD-RTO: 18.1 % (ref 11.8–14.4)
PDW BLD-RTO: 19.2 % (ref 11.8–14.4)
PDW BLD-RTO: 21.1 % (ref 11.8–14.4)
PDW BLD-RTO: 22.4 % (ref 11.8–14.4)
PDW BLD-RTO: 22.4 % (ref 11.8–14.4)
PDW BLD-RTO: 22.7 % (ref 11.8–14.4)
PDW BLD-RTO: 22.8 % (ref 11.8–14.4)
PDW BLD-RTO: 23 % (ref 11.8–14.4)
PH UA: 5 (ref 5–8)
PH UA: 5.5 (ref 5–8)
PH UA: 5.5 (ref 5–8)
PH UA: 6 (ref 5–8)
PH UA: 7 (ref 5–8)
PH VENOUS: 7.37 (ref 7.32–7.43)
PHOSPHORUS: 3.2 MG/DL (ref 2.5–4.5)
PHOSPHORUS: 3.4 MG/DL (ref 2.5–4.5)
PLATELET # BLD: 144 K/UL (ref 138–453)
PLATELET # BLD: 168 K/UL (ref 138–453)
PLATELET # BLD: 173 K/UL (ref 138–453)
PLATELET # BLD: 180 K/UL (ref 138–453)
PLATELET # BLD: 211 K/UL (ref 138–453)
PLATELET # BLD: 215 K/UL (ref 138–453)
PLATELET # BLD: 216 K/UL (ref 138–453)
PLATELET # BLD: 223 K/UL (ref 138–453)
PLATELET # BLD: 236 K/UL (ref 138–453)
PLATELET # BLD: 239 K/UL (ref 138–453)
PLATELET # BLD: 251 K/UL (ref 138–453)
PLATELET # BLD: 271 K/UL (ref 138–453)
PLATELET # BLD: 62 K/UL (ref 138–453)
PLATELET # BLD: 64 K/UL (ref 138–453)
PLATELET # BLD: 69 K/UL (ref 138–453)
PLATELET # BLD: 70 K/UL (ref 138–453)
PLATELET # BLD: 72 K/UL (ref 138–453)
PLATELET # BLD: ABNORMAL K/UL (ref 138–453)
PLATELET ESTIMATE: ABNORMAL
PLATELET, FLUORESCENCE: 11 K/UL (ref 138–453)
PLATELET, FLUORESCENCE: 22 K/UL (ref 138–453)
PLATELET, FLUORESCENCE: 29 K/UL (ref 138–453)
PLATELET, FLUORESCENCE: 30 K/UL (ref 138–453)
PLATELET, IMMATURE FRACTION: 11.3 % (ref 1.1–10.3)
PLATELET, IMMATURE FRACTION: 4.2 % (ref 1.1–10.3)
PLATELET, IMMATURE FRACTION: 6.4 % (ref 1.1–10.3)
PLATELET, IMMATURE FRACTION: 8.9 % (ref 1.1–10.3)
PMV BLD AUTO: 10 FL (ref 8.1–13.5)
PMV BLD AUTO: 10.3 FL (ref 8.1–13.5)
PMV BLD AUTO: 10.5 FL (ref 8.1–13.5)
PMV BLD AUTO: 10.5 FL (ref 8.1–13.5)
PMV BLD AUTO: 10.8 FL (ref 8.1–13.5)
PMV BLD AUTO: 10.8 FL (ref 8.1–13.5)
PMV BLD AUTO: 10.9 FL (ref 8.1–13.5)
PMV BLD AUTO: 11.1 FL (ref 8.1–13.5)
PMV BLD AUTO: 11.2 FL (ref 8.1–13.5)
PMV BLD AUTO: 11.7 FL (ref 8.1–13.5)
PMV BLD AUTO: 12.3 FL (ref 8.1–13.5)
PMV BLD AUTO: 9.8 FL (ref 8.1–13.5)
PMV BLD AUTO: 9.8 FL (ref 8.1–13.5)
PMV BLD AUTO: ABNORMAL FL (ref 8.1–13.5)
PO2, VEN: 30.2 MM HG (ref 30–50)
POC CHLORIDE: 110 MMOL/L (ref 98–107)
POC CREATININE: 0.9 MG/DL (ref 0.51–1.19)
POC HEMATOCRIT: 28 % (ref 41–53)
POC HEMOGLOBIN: 9.6 G/DL (ref 13.5–17.5)
POC IONIZED CALCIUM: 1.08 MMOL/L (ref 1.15–1.33)
POC LACTIC ACID: 1.84 MMOL/L (ref 0.56–1.39)
POC PCO2 TEMP: ABNORMAL MM HG
POC PH TEMP: ABNORMAL
POC PO2 TEMP: ABNORMAL MM HG
POC POTASSIUM: 4.9 MMOL/L (ref 3.5–4.5)
POC SODIUM: 139 MMOL/L (ref 138–146)
POSITIVE BASE EXCESS, VEN: ABNORMAL (ref 0–3)
POTASSIUM SERPL-SCNC: 3.2 MMOL/L (ref 3.7–5.3)
POTASSIUM SERPL-SCNC: 3.4 MMOL/L (ref 3.7–5.3)
POTASSIUM SERPL-SCNC: 3.4 MMOL/L (ref 3.7–5.3)
POTASSIUM SERPL-SCNC: 3.7 MMOL/L (ref 3.7–5.3)
POTASSIUM SERPL-SCNC: 3.8 MMOL/L (ref 3.7–5.3)
POTASSIUM SERPL-SCNC: 3.9 MMOL/L (ref 3.7–5.3)
POTASSIUM SERPL-SCNC: 4.1 MMOL/L (ref 3.7–5.3)
POTASSIUM SERPL-SCNC: 4.2 MMOL/L (ref 3.7–5.3)
POTASSIUM SERPL-SCNC: 4.2 MMOL/L (ref 3.7–5.3)
POTASSIUM SERPL-SCNC: 4.3 MMOL/L (ref 3.7–5.3)
POTASSIUM SERPL-SCNC: 4.4 MMOL/L (ref 3.7–5.3)
POTASSIUM SERPL-SCNC: 4.5 MMOL/L (ref 3.7–5.3)
POTASSIUM SERPL-SCNC: 4.6 MMOL/L (ref 3.7–5.3)
POTASSIUM SERPL-SCNC: 4.6 MMOL/L (ref 3.7–5.3)
POTASSIUM SERPL-SCNC: 4.7 MMOL/L (ref 3.7–5.3)
POTASSIUM SERPL-SCNC: 4.9 MMOL/L (ref 3.7–5.3)
POTASSIUM SERPL-SCNC: 5.1 MMOL/L (ref 3.7–5.3)
POTASSIUM SERPL-SCNC: 5.7 MMOL/L (ref 3.7–5.3)
PREALBUMIN: 5.7 MG/DL (ref 20–40)
PRO-BNP: 3856 PG/ML
PRO-BNP: 9289 PG/ML
PRO-BNP: 996 PG/ML
PROCALCITONIN: 0.49 NG/ML
PROCALCITONIN: 0.51 NG/ML
PROSTATE SPECIFIC ANTIGEN: 5.38 UG/L
PROSTATE SPECIFIC ANTIGEN: 5.86 UG/L
PROTEIN UA: ABNORMAL
PROTEIN UA: NEGATIVE
PROTHROMBIN TIME: 10.9 SEC (ref 9–12)
PROTHROMBIN TIME: 12.4 SEC (ref 9–12)
PROTHROMBIN TIME: 12.5 SEC (ref 9–12)
PROTHROMBIN TIME: 13.1 SEC (ref 9–12)
PROTHROMBIN TIME: 13.5 SEC (ref 9–12)
PROTHROMBIN TIME: 14.4 SEC (ref 9–12)
PROTHROMBIN TIME: 16.9 SEC (ref 11.5–14.2)
RBC # BLD: 2.83 M/UL (ref 4.21–5.77)
RBC # BLD: 2.87 M/UL (ref 4.21–5.77)
RBC # BLD: 2.89 M/UL (ref 4.21–5.77)
RBC # BLD: 2.92 M/UL (ref 4.21–5.77)
RBC # BLD: 2.93 M/UL (ref 4.21–5.77)
RBC # BLD: 2.94 M/UL (ref 4.21–5.77)
RBC # BLD: 2.95 M/UL (ref 4.21–5.77)
RBC # BLD: 2.98 M/UL (ref 4.21–5.77)
RBC # BLD: 3.06 M/UL (ref 4.21–5.77)
RBC # BLD: 3.22 M/UL (ref 4.21–5.77)
RBC # BLD: 3.33 M/UL (ref 4.21–5.77)
RBC # BLD: 3.34 M/UL (ref 4.21–5.77)
RBC # BLD: 3.34 M/UL (ref 4.21–5.77)
RBC # BLD: 3.54 M/UL (ref 4.21–5.77)
RBC # BLD: 4.19 M/UL (ref 4.21–5.77)
RBC # BLD: 4.19 M/UL (ref 4.21–5.77)
RBC # BLD: 4.68 M/UL (ref 4.21–5.77)
RBC # BLD: 4.71 M/UL (ref 4.21–5.77)
RBC # BLD: 4.79 M/UL (ref 4.21–5.77)
RBC # BLD: 4.8 M/UL (ref 4.21–5.77)
RBC # BLD: 4.81 M/UL (ref 4.21–5.77)
RBC # BLD: ABNORMAL 10*6/UL
RBC UA: ABNORMAL /HPF (ref 0–2)
RBC UA: ABNORMAL /HPF (ref 0–2)
RBC UA: NORMAL /HPF (ref 0–4)
RBC UA: NORMAL /HPF (ref 0–4)
RENAL EPITHELIAL, UA: ABNORMAL /HPF
RENAL EPITHELIAL, UA: ABNORMAL /HPF
RENAL EPITHELIAL, UA: NORMAL /HPF
RENAL EPITHELIAL, UA: NORMAL /HPF
SAMPLE SITE: ABNORMAL
SARS-COV-2, NAA: NOT DETECTED
SARS-COV-2, PCR: NORMAL
SARS-COV-2, RAPID: NORMAL
SARS-COV-2, RAPID: NORMAL
SARS-COV-2: NORMAL
SARS-COV-2: NOT DETECTED
SARS-COV-2: NOT DETECTED
SEDIMENTATION RATE, ERYTHROCYTE: 20 MM (ref 0–20)
SEG NEUTROPHILS: 48 % (ref 36–65)
SEG NEUTROPHILS: 52 % (ref 36–66)
SEG NEUTROPHILS: 63 % (ref 36–66)
SEG NEUTROPHILS: 64 % (ref 36–65)
SEG NEUTROPHILS: 65 % (ref 36–66)
SEG NEUTROPHILS: 71 % (ref 36–66)
SEG NEUTROPHILS: 72 % (ref 36–65)
SEG NEUTROPHILS: 74 % (ref 36–66)
SEG NEUTROPHILS: 78 % (ref 36–66)
SEG NEUTROPHILS: 84 % (ref 36–66)
SEG NEUTROPHILS: 84 % (ref 36–66)
SEG NEUTROPHILS: 90 % (ref 36–65)
SEG NEUTROPHILS: 92 % (ref 36–65)
SEG NEUTROPHILS: 92 % (ref 36–65)
SEG NEUTROPHILS: 92 % (ref 36–66)
SEGMENTED NEUTROPHILS ABSOLUTE COUNT: 0.21 K/UL (ref 1.8–7.7)
SEGMENTED NEUTROPHILS ABSOLUTE COUNT: 0.42 K/UL (ref 1.5–8.1)
SEGMENTED NEUTROPHILS ABSOLUTE COUNT: 1.17 K/UL (ref 1.8–7.7)
SEGMENTED NEUTROPHILS ABSOLUTE COUNT: 10.47 K/UL (ref 1.5–8.1)
SEGMENTED NEUTROPHILS ABSOLUTE COUNT: 11.21 K/UL (ref 1.8–7.7)
SEGMENTED NEUTROPHILS ABSOLUTE COUNT: 13.05 K/UL (ref 1.5–8.1)
SEGMENTED NEUTROPHILS ABSOLUTE COUNT: 13.88 K/UL (ref 1.5–8.1)
SEGMENTED NEUTROPHILS ABSOLUTE COUNT: 14.95 K/UL (ref 1.8–7.7)
SEGMENTED NEUTROPHILS ABSOLUTE COUNT: 15.31 K/UL (ref 1.8–7.7)
SEGMENTED NEUTROPHILS ABSOLUTE COUNT: 15.96 K/UL (ref 1.8–7.7)
SEGMENTED NEUTROPHILS ABSOLUTE COUNT: 2.69 K/UL (ref 1.8–7.7)
SEGMENTED NEUTROPHILS ABSOLUTE COUNT: 4.53 K/UL (ref 1.5–8.1)
SEGMENTED NEUTROPHILS ABSOLUTE COUNT: 4.97 K/UL (ref 1.8–7.7)
SEGMENTED NEUTROPHILS ABSOLUTE COUNT: 5.79 K/UL (ref 1.5–8.1)
SEGMENTED NEUTROPHILS ABSOLUTE COUNT: 9.75 K/UL (ref 1.8–7.7)
SODIUM BLD-SCNC: 136 MMOL/L (ref 135–144)
SODIUM BLD-SCNC: 136 MMOL/L (ref 135–144)
SODIUM BLD-SCNC: 137 MMOL/L (ref 135–144)
SODIUM BLD-SCNC: 138 MMOL/L (ref 135–144)
SODIUM BLD-SCNC: 139 MMOL/L (ref 135–144)
SODIUM BLD-SCNC: 139 MMOL/L (ref 135–144)
SODIUM BLD-SCNC: 140 MMOL/L (ref 135–144)
SODIUM BLD-SCNC: 140 MMOL/L (ref 135–144)
SODIUM BLD-SCNC: 141 MMOL/L (ref 135–144)
SODIUM BLD-SCNC: 142 MMOL/L (ref 135–144)
SOURCE: NORMAL
SOURCE: NORMAL
SPECIFIC GRAVITY UA: 1.01 (ref 1–1.03)
SPECIFIC GRAVITY UA: 1.02 (ref 1–1.03)
SPECIFIC GRAVITY UA: 1.06 (ref 1–1.03)
SPECIMEN DESCRIPTION: ABNORMAL
SPECIMEN DESCRIPTION: NORMAL
SURGICAL PATHOLOGY REPORT: NORMAL
SURGICAL PATHOLOGY REPORT: NORMAL
TOTAL CK: 302 U/L (ref 39–308)
TOTAL CK: 413 U/L (ref 39–308)
TOTAL CK: 451 U/L (ref 39–308)
TOTAL CK: 465 U/L (ref 39–308)
TOTAL CO2, VENOUS: 21 MMOL/L (ref 23–30)
TOTAL PROTEIN: 4 G/DL (ref 6.4–8.3)
TOTAL PROTEIN: 4.5 G/DL (ref 6.4–8.3)
TOTAL PROTEIN: 4.5 G/DL (ref 6.4–8.3)
TOTAL PROTEIN: 4.8 G/DL (ref 6.4–8.3)
TOTAL PROTEIN: 5.1 G/DL (ref 6.4–8.3)
TOTAL PROTEIN: 5.1 G/DL (ref 6.4–8.3)
TOTAL PROTEIN: 5.2 G/DL (ref 6.4–8.3)
TOTAL PROTEIN: 7 G/DL (ref 6.4–8.3)
TOTAL PROTEIN: 7.1 G/DL (ref 6.4–8.3)
TRANSFUSION STATUS: NORMAL
TRANSFUSION STATUS: NORMAL
TRICHOMONAS: ABNORMAL
TRICHOMONAS: ABNORMAL
TRICHOMONAS: NORMAL
TRICHOMONAS: NORMAL
TRIGL SERPL-MCNC: 100 MG/DL
TRIGL SERPL-MCNC: 325 MG/DL
TROPONIN INTERP: ABNORMAL
TROPONIN INTERP: NORMAL
TROPONIN T: ABNORMAL NG/ML
TROPONIN T: NORMAL NG/ML
TROPONIN, HIGH SENSITIVITY: 11 NG/L (ref 0–22)
TROPONIN, HIGH SENSITIVITY: 12 NG/L (ref 0–22)
TROPONIN, HIGH SENSITIVITY: 13 NG/L (ref 0–22)
TROPONIN, HIGH SENSITIVITY: 38 NG/L (ref 0–22)
TROPONIN, HIGH SENSITIVITY: 39 NG/L (ref 0–22)
TROPONIN, HIGH SENSITIVITY: 39 NG/L (ref 0–22)
TROPONIN, HIGH SENSITIVITY: 43 NG/L (ref 0–22)
TROPONIN, HIGH SENSITIVITY: 45 NG/L (ref 0–22)
TROPONIN, HIGH SENSITIVITY: 55 NG/L (ref 0–22)
TROPONIN, HIGH SENSITIVITY: 79 NG/L (ref 0–22)
TROPONIN, HIGH SENSITIVITY: 83 NG/L (ref 0–22)
TSH SERPL DL<=0.05 MIU/L-ACNC: 0.9 MIU/L (ref 0.3–5)
TSH SERPL DL<=0.05 MIU/L-ACNC: 1.47 MIU/L (ref 0.3–5)
TSH SERPL DL<=0.05 MIU/L-ACNC: 4.95 MIU/L (ref 0.3–5)
TURBIDITY: CLEAR
UNIT DIVISION: 0
UNIT DIVISION: 0
UNIT NUMBER: NORMAL
UNIT NUMBER: NORMAL
URIC ACID: 4.9 MG/DL (ref 3.4–7)
URINE HGB: NEGATIVE
UROBILINOGEN, URINE: NORMAL
VANCOMYCIN TROUGH DATE LAST DOSE: ABNORMAL
VANCOMYCIN TROUGH DOSE AMOUNT: ABNORMAL
VANCOMYCIN TROUGH TIME LAST DOSE: ABNORMAL
VANCOMYCIN TROUGH: 7.2 UG/ML (ref 10–20)
VITAMIN B-12: >2000 PG/ML (ref 232–1245)
VLDLC SERPL CALC-MCNC: ABNORMAL MG/DL (ref 1–30)
VLDLC SERPL CALC-MCNC: NORMAL MG/DL (ref 1–30)
WBC # BLD: 0.4 K/UL (ref 3.5–11.3)
WBC # BLD: 0.4 K/UL (ref 3.5–11.3)
WBC # BLD: 0.9 K/UL (ref 3.5–11.3)
WBC # BLD: 1.5 K/UL (ref 3.5–11.3)
WBC # BLD: 11.4 K/UL (ref 3.5–11.3)
WBC # BLD: 14.2 K/UL (ref 3.5–11.3)
WBC # BLD: 15 K/UL (ref 3.5–11.3)
WBC # BLD: 15.4 K/UL (ref 3.5–11.3)
WBC # BLD: 15.7 K/UL (ref 3.5–11.3)
WBC # BLD: 16.4 K/UL (ref 3.5–11.3)
WBC # BLD: 17.2 K/UL (ref 3.5–11.3)
WBC # BLD: 17.8 K/UL (ref 3.5–11.3)
WBC # BLD: 17.8 K/UL (ref 3.5–11.3)
WBC # BLD: 18.2 K/UL (ref 3.5–11.3)
WBC # BLD: 19 K/UL (ref 3.5–11.3)
WBC # BLD: 19.5 K/UL (ref 3.5–11.3)
WBC # BLD: 20.7 K/UL (ref 3.5–11.3)
WBC # BLD: 3.8 K/UL (ref 3.5–11.3)
WBC # BLD: 5.4 K/UL (ref 3.5–11.3)
WBC # BLD: 7 K/UL (ref 3.5–11.3)
WBC # BLD: 8.1 K/UL (ref 3.5–11.3)
WBC # BLD: ABNORMAL 10*3/UL
WBC UA: ABNORMAL /HPF (ref 0–5)
WBC UA: ABNORMAL /HPF (ref 0–5)
WBC UA: NORMAL /HPF (ref 0–5)
WBC UA: NORMAL /HPF (ref 0–5)
YEAST: ABNORMAL
YEAST: ABNORMAL
YEAST: NORMAL
YEAST: NORMAL

## 2020-01-01 PROCEDURE — 2060000000 HC ICU INTERMEDIATE R&B

## 2020-01-01 PROCEDURE — 6370000000 HC RX 637 (ALT 250 FOR IP): Performed by: NURSE PRACTITIONER

## 2020-01-01 PROCEDURE — 1200000000 HC SEMI PRIVATE

## 2020-01-01 PROCEDURE — U0003 INFECTIOUS AGENT DETECTION BY NUCLEIC ACID (DNA OR RNA); SEVERE ACUTE RESPIRATORY SYNDROME CORONAVIRUS 2 (SARS-COV-2) (CORONAVIRUS DISEASE [COVID-19]), AMPLIFIED PROBE TECHNIQUE, MAKING USE OF HIGH THROUGHPUT TECHNOLOGIES AS DESCRIBED BY CMS-2020-01-R: HCPCS

## 2020-01-01 PROCEDURE — 80053 COMPREHEN METABOLIC PANEL: CPT

## 2020-01-01 PROCEDURE — 99223 1ST HOSP IP/OBS HIGH 75: CPT | Performed by: INTERNAL MEDICINE

## 2020-01-01 PROCEDURE — 99232 SBSQ HOSP IP/OBS MODERATE 35: CPT | Performed by: INTERNAL MEDICINE

## 2020-01-01 PROCEDURE — 1123F ACP DISCUSS/DSCN MKR DOCD: CPT | Performed by: INTERNAL MEDICINE

## 2020-01-01 PROCEDURE — 84100 ASSAY OF PHOSPHORUS: CPT

## 2020-01-01 PROCEDURE — 77412 RADIATION TX DELIVERY LVL 3: CPT

## 2020-01-01 PROCEDURE — 97530 THERAPEUTIC ACTIVITIES: CPT

## 2020-01-01 PROCEDURE — 86900 BLOOD TYPING SEROLOGIC ABO: CPT

## 2020-01-01 PROCEDURE — 93010 ELECTROCARDIOGRAM REPORT: CPT | Performed by: INTERNAL MEDICINE

## 2020-01-01 PROCEDURE — 2580000003 HC RX 258: Performed by: NURSE PRACTITIONER

## 2020-01-01 PROCEDURE — 83690 ASSAY OF LIPASE: CPT

## 2020-01-01 PROCEDURE — 6360000002 HC RX W HCPCS: Performed by: NURSE PRACTITIONER

## 2020-01-01 PROCEDURE — 71045 X-RAY EXAM CHEST 1 VIEW: CPT

## 2020-01-01 PROCEDURE — 77387 GUIDANCE FOR RADJ TX DLVR: CPT

## 2020-01-01 PROCEDURE — 99231 SBSQ HOSP IP/OBS SF/LOW 25: CPT | Performed by: INTERNAL MEDICINE

## 2020-01-01 PROCEDURE — 72070 X-RAY EXAM THORAC SPINE 2VWS: CPT

## 2020-01-01 PROCEDURE — 36415 COLL VENOUS BLD VENIPUNCTURE: CPT

## 2020-01-01 PROCEDURE — 77300 RADIATION THERAPY DOSE PLAN: CPT | Performed by: RADIOLOGY

## 2020-01-01 PROCEDURE — 6370000000 HC RX 637 (ALT 250 FOR IP): Performed by: STUDENT IN AN ORGANIZED HEALTH CARE EDUCATION/TRAINING PROGRAM

## 2020-01-01 PROCEDURE — 1036F TOBACCO NON-USER: CPT | Performed by: INTERNAL MEDICINE

## 2020-01-01 PROCEDURE — 84484 ASSAY OF TROPONIN QUANT: CPT

## 2020-01-01 PROCEDURE — 97162 PT EVAL MOD COMPLEX 30 MIN: CPT

## 2020-01-01 PROCEDURE — 6370000000 HC RX 637 (ALT 250 FOR IP): Performed by: INTERNAL MEDICINE

## 2020-01-01 PROCEDURE — 99222 1ST HOSP IP/OBS MODERATE 55: CPT | Performed by: NURSE PRACTITIONER

## 2020-01-01 PROCEDURE — 82330 ASSAY OF CALCIUM: CPT

## 2020-01-01 PROCEDURE — G8427 DOCREV CUR MEDS BY ELIG CLIN: HCPCS | Performed by: INTERNAL MEDICINE

## 2020-01-01 PROCEDURE — 83605 ASSAY OF LACTIC ACID: CPT

## 2020-01-01 PROCEDURE — 6360000002 HC RX W HCPCS: Performed by: INTERNAL MEDICINE

## 2020-01-01 PROCEDURE — 3600000014 HC SURGERY LEVEL 4 ADDTL 15MIN: Performed by: NEUROLOGICAL SURGERY

## 2020-01-01 PROCEDURE — 77263 THER RADIOLOGY TX PLNG CPLX: CPT | Performed by: RADIOLOGY

## 2020-01-01 PROCEDURE — G8428 CUR MEDS NOT DOCUMENT: HCPCS | Performed by: INTERNAL MEDICINE

## 2020-01-01 PROCEDURE — 4040F PNEUMOC VAC/ADMIN/RCVD: CPT | Performed by: INTERNAL MEDICINE

## 2020-01-01 PROCEDURE — 87040 BLOOD CULTURE FOR BACTERIA: CPT

## 2020-01-01 PROCEDURE — 6370000000 HC RX 637 (ALT 250 FOR IP): Performed by: EMERGENCY MEDICINE

## 2020-01-01 PROCEDURE — 97166 OT EVAL MOD COMPLEX 45 MIN: CPT

## 2020-01-01 PROCEDURE — 81001 URINALYSIS AUTO W/SCOPE: CPT

## 2020-01-01 PROCEDURE — 6360000004 HC RX CONTRAST MEDICATION: Performed by: EMERGENCY MEDICINE

## 2020-01-01 PROCEDURE — 80061 LIPID PANEL: CPT

## 2020-01-01 PROCEDURE — 85027 COMPLETE CBC AUTOMATED: CPT

## 2020-01-01 PROCEDURE — 99285 EMERGENCY DEPT VISIT HI MDM: CPT

## 2020-01-01 PROCEDURE — 85025 COMPLETE CBC W/AUTO DIFF WBC: CPT

## 2020-01-01 PROCEDURE — 96374 THER/PROPH/DIAG INJ IV PUSH: CPT

## 2020-01-01 PROCEDURE — 2580000003 HC RX 258: Performed by: NEUROLOGICAL SURGERY

## 2020-01-01 PROCEDURE — 82565 ASSAY OF CREATININE: CPT

## 2020-01-01 PROCEDURE — 96366 THER/PROPH/DIAG IV INF ADDON: CPT

## 2020-01-01 PROCEDURE — 95816 EEG AWAKE AND DROWSY: CPT

## 2020-01-01 PROCEDURE — 83735 ASSAY OF MAGNESIUM: CPT

## 2020-01-01 PROCEDURE — 87086 URINE CULTURE/COLONY COUNT: CPT

## 2020-01-01 PROCEDURE — 84443 ASSAY THYROID STIM HORMONE: CPT

## 2020-01-01 PROCEDURE — 99233 SBSQ HOSP IP/OBS HIGH 50: CPT | Performed by: INTERNAL MEDICINE

## 2020-01-01 PROCEDURE — 82550 ASSAY OF CK (CPK): CPT

## 2020-01-01 PROCEDURE — 99239 HOSP IP/OBS DSCHRG MGMT >30: CPT | Performed by: FAMILY MEDICINE

## 2020-01-01 PROCEDURE — 0PS43ZZ REPOSITION THORACIC VERTEBRA, PERCUTANEOUS APPROACH: ICD-10-PCS | Performed by: NEUROLOGICAL SURGERY

## 2020-01-01 PROCEDURE — 74174 CTA ABD&PLVS W/CONTRAST: CPT

## 2020-01-01 PROCEDURE — 80202 ASSAY OF VANCOMYCIN: CPT

## 2020-01-01 PROCEDURE — 6360000002 HC RX W HCPCS: Performed by: NEUROLOGICAL SURGERY

## 2020-01-01 PROCEDURE — 6360000002 HC RX W HCPCS: Performed by: EMERGENCY MEDICINE

## 2020-01-01 PROCEDURE — 97116 GAIT TRAINING THERAPY: CPT

## 2020-01-01 PROCEDURE — 2580000003 HC RX 258: Performed by: STUDENT IN AN ORGANIZED HEALTH CARE EDUCATION/TRAINING PROGRAM

## 2020-01-01 PROCEDURE — 88307 TISSUE EXAM BY PATHOLOGIST: CPT

## 2020-01-01 PROCEDURE — 96360 HYDRATION IV INFUSION INIT: CPT

## 2020-01-01 PROCEDURE — 82746 ASSAY OF FOLIC ACID SERUM: CPT

## 2020-01-01 PROCEDURE — 71275 CT ANGIOGRAPHY CHEST: CPT

## 2020-01-01 PROCEDURE — 82435 ASSAY OF BLOOD CHLORIDE: CPT

## 2020-01-01 PROCEDURE — 6370000000 HC RX 637 (ALT 250 FOR IP): Performed by: FAMILY MEDICINE

## 2020-01-01 PROCEDURE — 77280 THER RAD SIMULAJ FIELD SMPL: CPT | Performed by: RADIOLOGY

## 2020-01-01 PROCEDURE — 85055 RETICULATED PLATELET ASSAY: CPT

## 2020-01-01 PROCEDURE — 96365 THER/PROPH/DIAG IV INF INIT: CPT

## 2020-01-01 PROCEDURE — 3017F COLORECTAL CA SCREEN DOC REV: CPT | Performed by: INTERNAL MEDICINE

## 2020-01-01 PROCEDURE — 6370000000 HC RX 637 (ALT 250 FOR IP): Performed by: NEUROLOGICAL SURGERY

## 2020-01-01 PROCEDURE — 74018 RADEX ABDOMEN 1 VIEW: CPT

## 2020-01-01 PROCEDURE — 99222 1ST HOSP IP/OBS MODERATE 55: CPT | Performed by: INTERNAL MEDICINE

## 2020-01-01 PROCEDURE — 77334 RADIATION TREATMENT AID(S): CPT

## 2020-01-01 PROCEDURE — 77334 RADIATION TREATMENT AID(S): CPT | Performed by: RADIOLOGY

## 2020-01-01 PROCEDURE — 77417 THER RADIOLOGY PORT IMAGE(S): CPT

## 2020-01-01 PROCEDURE — 84153 ASSAY OF PSA TOTAL: CPT

## 2020-01-01 PROCEDURE — G6002 STEREOSCOPIC X-RAY GUIDANCE: HCPCS | Performed by: RADIOLOGY

## 2020-01-01 PROCEDURE — 6360000002 HC RX W HCPCS: Performed by: STUDENT IN AN ORGANIZED HEALTH CARE EDUCATION/TRAINING PROGRAM

## 2020-01-01 PROCEDURE — 88311 DECALCIFY TISSUE: CPT

## 2020-01-01 PROCEDURE — 86850 RBC ANTIBODY SCREEN: CPT

## 2020-01-01 PROCEDURE — 2580000003 HC RX 258: Performed by: FAMILY MEDICINE

## 2020-01-01 PROCEDURE — 83874 ASSAY OF MYOGLOBIN: CPT

## 2020-01-01 PROCEDURE — 88342 IMHCHEM/IMCYTCHM 1ST ANTB: CPT

## 2020-01-01 PROCEDURE — 77427 RADIATION TX MANAGEMENT X5: CPT | Performed by: RADIOLOGY

## 2020-01-01 PROCEDURE — 2709999900 CT BIOPSY DEEP BONE PERCUTANEOUS

## 2020-01-01 PROCEDURE — 99222 1ST HOSP IP/OBS MODERATE 55: CPT | Performed by: PSYCHIATRY & NEUROLOGY

## 2020-01-01 PROCEDURE — 96417 CHEMO IV INFUS EACH ADDL SEQ: CPT

## 2020-01-01 PROCEDURE — 84132 ASSAY OF SERUM POTASSIUM: CPT

## 2020-01-01 PROCEDURE — 96372 THER/PROPH/DIAG INJ SC/IM: CPT

## 2020-01-01 PROCEDURE — 7100000011 HC PHASE II RECOVERY - ADDTL 15 MIN

## 2020-01-01 PROCEDURE — 97535 SELF CARE MNGMENT TRAINING: CPT

## 2020-01-01 PROCEDURE — 93005 ELECTROCARDIOGRAM TRACING: CPT

## 2020-01-01 PROCEDURE — 99233 SBSQ HOSP IP/OBS HIGH 50: CPT | Performed by: PSYCHIATRY & NEUROLOGY

## 2020-01-01 PROCEDURE — 36561 INSERT TUNNELED CV CATH: CPT

## 2020-01-01 PROCEDURE — 99212 OFFICE O/P EST SF 10 MIN: CPT

## 2020-01-01 PROCEDURE — 36591 DRAW BLOOD OFF VENOUS DEVICE: CPT

## 2020-01-01 PROCEDURE — 99232 SBSQ HOSP IP/OBS MODERATE 35: CPT | Performed by: FAMILY MEDICINE

## 2020-01-01 PROCEDURE — 72156 MRI NECK SPINE W/O & W/DYE: CPT

## 2020-01-01 PROCEDURE — 6360000002 HC RX W HCPCS: Performed by: FAMILY MEDICINE

## 2020-01-01 PROCEDURE — 2580000003 HC RX 258: Performed by: NURSE ANESTHETIST, CERTIFIED REGISTERED

## 2020-01-01 PROCEDURE — 2580000003 HC RX 258: Performed by: INTERNAL MEDICINE

## 2020-01-01 PROCEDURE — 74176 CT ABD & PELVIS W/O CONTRAST: CPT

## 2020-01-01 PROCEDURE — 88313 SPECIAL STAINS GROUP 2: CPT

## 2020-01-01 PROCEDURE — 93005 ELECTROCARDIOGRAM TRACING: CPT | Performed by: NEUROLOGICAL SURGERY

## 2020-01-01 PROCEDURE — 2500000003 HC RX 250 WO HCPCS: Performed by: NURSE ANESTHETIST, CERTIFIED REGISTERED

## 2020-01-01 PROCEDURE — 82150 ASSAY OF AMYLASE: CPT

## 2020-01-01 PROCEDURE — 96411 CHEMO IV PUSH ADDL DRUG: CPT

## 2020-01-01 PROCEDURE — 2580000003 HC RX 258: Performed by: EMERGENCY MEDICINE

## 2020-01-01 PROCEDURE — 0PU43JZ SUPPLEMENT THORACIC VERTEBRA WITH SYNTHETIC SUBSTITUTE, PERCUTANEOUS APPROACH: ICD-10-PCS | Performed by: NEUROLOGICAL SURGERY

## 2020-01-01 PROCEDURE — 82607 VITAMIN B-12: CPT

## 2020-01-01 PROCEDURE — 85610 PROTHROMBIN TIME: CPT

## 2020-01-01 PROCEDURE — 99232 SBSQ HOSP IP/OBS MODERATE 35: CPT | Performed by: PSYCHIATRY & NEUROLOGY

## 2020-01-01 PROCEDURE — G8420 CALC BMI NORM PARAMETERS: HCPCS | Performed by: INTERNAL MEDICINE

## 2020-01-01 PROCEDURE — A9576 INJ PROHANCE MULTIPACK: HCPCS | Performed by: STUDENT IN AN ORGANIZED HEALTH CARE EDUCATION/TRAINING PROGRAM

## 2020-01-01 PROCEDURE — 36430 TRANSFUSION BLD/BLD COMPNT: CPT

## 2020-01-01 PROCEDURE — 85730 THROMBOPLASTIN TIME PARTIAL: CPT

## 2020-01-01 PROCEDURE — 96413 CHEMO IV INFUSION 1 HR: CPT

## 2020-01-01 PROCEDURE — 77300 RADIATION THERAPY DOSE PLAN: CPT

## 2020-01-01 PROCEDURE — 82803 BLOOD GASES ANY COMBINATION: CPT

## 2020-01-01 PROCEDURE — 3700000001 HC ADD 15 MINUTES (ANESTHESIA): Performed by: NEUROLOGICAL SURGERY

## 2020-01-01 PROCEDURE — 71260 CT THORAX DX C+: CPT

## 2020-01-01 PROCEDURE — 7100000000 HC PACU RECOVERY - FIRST 15 MIN: Performed by: NEUROLOGICAL SURGERY

## 2020-01-01 PROCEDURE — 83880 ASSAY OF NATRIURETIC PEPTIDE: CPT

## 2020-01-01 PROCEDURE — C1894 INTRO/SHEATH, NON-LASER: HCPCS

## 2020-01-01 PROCEDURE — 99211 OFF/OP EST MAY X REQ PHY/QHP: CPT | Performed by: INTERNAL MEDICINE

## 2020-01-01 PROCEDURE — 88341 IMHCHEM/IMCYTCHM EA ADD ANTB: CPT

## 2020-01-01 PROCEDURE — 77331 SPECIAL RADIATION DOSIMETRY: CPT

## 2020-01-01 PROCEDURE — 70553 MRI BRAIN STEM W/O & W/DYE: CPT

## 2020-01-01 PROCEDURE — 6370000000 HC RX 637 (ALT 250 FOR IP)

## 2020-01-01 PROCEDURE — 93005 ELECTROCARDIOGRAM TRACING: CPT | Performed by: NURSE PRACTITIONER

## 2020-01-01 PROCEDURE — 80048 BASIC METABOLIC PNL TOTAL CA: CPT

## 2020-01-01 PROCEDURE — 77290 THER RAD SIMULAJ FIELD CPLX: CPT

## 2020-01-01 PROCEDURE — 94761 N-INVAS EAR/PLS OXIMETRY MLT: CPT

## 2020-01-01 PROCEDURE — 7100000001 HC PACU RECOVERY - ADDTL 15 MIN: Performed by: NEUROLOGICAL SURGERY

## 2020-01-01 PROCEDURE — G8484 FLU IMMUNIZE NO ADMIN: HCPCS | Performed by: INTERNAL MEDICINE

## 2020-01-01 PROCEDURE — 99999 PR OFFICE/OUTPT VISIT,PROCEDURE ONLY: CPT | Performed by: INTERNAL MEDICINE

## 2020-01-01 PROCEDURE — 3600000004 HC SURGERY LEVEL 4 BASE: Performed by: NEUROLOGICAL SURGERY

## 2020-01-01 PROCEDURE — 93005 ELECTROCARDIOGRAM TRACING: CPT | Performed by: STUDENT IN AN ORGANIZED HEALTH CARE EDUCATION/TRAINING PROGRAM

## 2020-01-01 PROCEDURE — 77001 FLUOROGUIDE FOR VEIN DEVICE: CPT

## 2020-01-01 PROCEDURE — 99214 OFFICE O/P EST MOD 30 MIN: CPT | Performed by: INTERNAL MEDICINE

## 2020-01-01 PROCEDURE — 77295 3-D RADIOTHERAPY PLAN: CPT | Performed by: RADIOLOGY

## 2020-01-01 PROCEDURE — 99223 1ST HOSP IP/OBS HIGH 75: CPT | Performed by: NURSE PRACTITIONER

## 2020-01-01 PROCEDURE — 2500000003 HC RX 250 WO HCPCS: Performed by: NEUROLOGICAL SURGERY

## 2020-01-01 PROCEDURE — 70450 CT HEAD/BRAIN W/O DYE: CPT

## 2020-01-01 PROCEDURE — 1111F DSCHRG MED/CURRENT MED MERGE: CPT | Performed by: INTERNAL MEDICINE

## 2020-01-01 PROCEDURE — 77290 THER RAD SIMULAJ FIELD CPLX: CPT | Performed by: RADIOLOGY

## 2020-01-01 PROCEDURE — G0103 PSA SCREENING: HCPCS

## 2020-01-01 PROCEDURE — 77336 RADIATION PHYSICS CONSULT: CPT

## 2020-01-01 PROCEDURE — 96375 TX/PRO/DX INJ NEW DRUG ADDON: CPT

## 2020-01-01 PROCEDURE — 84134 ASSAY OF PREALBUMIN: CPT

## 2020-01-01 PROCEDURE — 99239 HOSP IP/OBS DSCHRG MGMT >30: CPT | Performed by: INTERNAL MEDICINE

## 2020-01-01 PROCEDURE — 6360000002 HC RX W HCPCS: Performed by: NURSE ANESTHETIST, CERTIFIED REGISTERED

## 2020-01-01 PROCEDURE — 3700000000 HC ANESTHESIA ATTENDED CARE: Performed by: NEUROLOGICAL SURGERY

## 2020-01-01 PROCEDURE — 84550 ASSAY OF BLOOD/URIC ACID: CPT

## 2020-01-01 PROCEDURE — 7100000010 HC PHASE II RECOVERY - FIRST 15 MIN

## 2020-01-01 PROCEDURE — 2709999900 HC NON-CHARGEABLE SUPPLY: Performed by: NEUROLOGICAL SURGERY

## 2020-01-01 PROCEDURE — 77295 3-D RADIOTHERAPY PLAN: CPT

## 2020-01-01 PROCEDURE — 72157 MRI CHEST SPINE W/O & W/DYE: CPT

## 2020-01-01 PROCEDURE — 99283 EMERGENCY DEPT VISIT LOW MDM: CPT

## 2020-01-01 PROCEDURE — APPNB30 APP NON BILLABLE TIME 0-30 MINS: Performed by: REGISTERED NURSE

## 2020-01-01 PROCEDURE — 6360000002 HC RX W HCPCS: Performed by: RADIOLOGY

## 2020-01-01 PROCEDURE — 99215 OFFICE O/P EST HI 40 MIN: CPT | Performed by: INTERNAL MEDICINE

## 2020-01-01 PROCEDURE — 96376 TX/PRO/DX INJ SAME DRUG ADON: CPT

## 2020-01-01 PROCEDURE — APPNB15 APP NON BILLABLE TIME 0-15 MINS: Performed by: REGISTERED NURSE

## 2020-01-01 PROCEDURE — 82553 CREATINE MB FRACTION: CPT

## 2020-01-01 PROCEDURE — C1713 ANCHOR/SCREW BN/BN,TIS/BN: HCPCS | Performed by: NEUROLOGICAL SURGERY

## 2020-01-01 PROCEDURE — 0QB33ZX EXCISION OF LEFT PELVIC BONE, PERCUTANEOUS APPROACH, DIAGNOSTIC: ICD-10-PCS | Performed by: RADIOLOGY

## 2020-01-01 PROCEDURE — 77370 RADIATION PHYSICS CONSULT: CPT

## 2020-01-01 PROCEDURE — 77280 THER RAD SIMULAJ FIELD SMPL: CPT

## 2020-01-01 PROCEDURE — 87150 DNA/RNA AMPLIFIED PROBE: CPT

## 2020-01-01 PROCEDURE — 2500000003 HC RX 250 WO HCPCS: Performed by: EMERGENCY MEDICINE

## 2020-01-01 PROCEDURE — 96361 HYDRATE IV INFUSION ADD-ON: CPT

## 2020-01-01 PROCEDURE — 99153 MOD SED SAME PHYS/QHP EA: CPT

## 2020-01-01 PROCEDURE — 99222 1ST HOSP IP/OBS MODERATE 55: CPT | Performed by: SURGERY

## 2020-01-01 PROCEDURE — 84145 PROCALCITONIN (PCT): CPT

## 2020-01-01 PROCEDURE — 86901 BLOOD TYPING SEROLOGIC RH(D): CPT

## 2020-01-01 PROCEDURE — P9037 PLATE PHERES LEUKOREDU IRRAD: HCPCS

## 2020-01-01 PROCEDURE — 6360000004 HC RX CONTRAST MEDICATION: Performed by: NEUROLOGICAL SURGERY

## 2020-01-01 PROCEDURE — 0P540ZZ DESTRUCTION OF THORACIC VERTEBRA, OPEN APPROACH: ICD-10-PCS | Performed by: NEUROLOGICAL SURGERY

## 2020-01-01 PROCEDURE — 2500000003 HC RX 250 WO HCPCS: Performed by: STUDENT IN AN ORGANIZED HEALTH CARE EDUCATION/TRAINING PROGRAM

## 2020-01-01 PROCEDURE — 82947 ASSAY GLUCOSE BLOOD QUANT: CPT

## 2020-01-01 PROCEDURE — 95816 EEG AWAKE AND DROWSY: CPT | Performed by: PSYCHIATRY & NEUROLOGY

## 2020-01-01 PROCEDURE — 97161 PT EVAL LOW COMPLEX 20 MIN: CPT

## 2020-01-01 PROCEDURE — 2720000010 HC SURG SUPPLY STERILE: Performed by: NEUROLOGICAL SURGERY

## 2020-01-01 PROCEDURE — 84295 ASSAY OF SERUM SODIUM: CPT

## 2020-01-01 PROCEDURE — 2580000003 HC RX 258: Performed by: RADIOLOGY

## 2020-01-01 PROCEDURE — 99204 OFFICE O/P NEW MOD 45 MIN: CPT | Performed by: RADIOLOGY

## 2020-01-01 PROCEDURE — 81003 URINALYSIS AUTO W/O SCOPE: CPT

## 2020-01-01 PROCEDURE — 85014 HEMATOCRIT: CPT

## 2020-01-01 PROCEDURE — 99238 HOSP IP/OBS DSCHRG MGMT 30/<: CPT | Performed by: INTERNAL MEDICINE

## 2020-01-01 PROCEDURE — 99284 EMERGENCY DEPT VISIT MOD MDM: CPT

## 2020-01-01 PROCEDURE — 87449 NOS EACH ORGANISM AG IA: CPT

## 2020-01-01 PROCEDURE — 6360000004 HC RX CONTRAST MEDICATION: Performed by: STUDENT IN AN ORGANIZED HEALTH CARE EDUCATION/TRAINING PROGRAM

## 2020-01-01 PROCEDURE — 0PB40ZX EXCISION OF THORACIC VERTEBRA, OPEN APPROACH, DIAGNOSTIC: ICD-10-PCS | Performed by: NEUROLOGICAL SURGERY

## 2020-01-01 PROCEDURE — 93005 ELECTROCARDIOGRAM TRACING: CPT | Performed by: EMERGENCY MEDICINE

## 2020-01-01 PROCEDURE — 88333 PATH CONSLTJ SURG CYTO XM 1: CPT

## 2020-01-01 PROCEDURE — 76937 US GUIDE VASCULAR ACCESS: CPT

## 2020-01-01 PROCEDURE — 87205 SMEAR GRAM STAIN: CPT

## 2020-01-01 PROCEDURE — C1894 INTRO/SHEATH, NON-LASER: HCPCS | Performed by: NEUROLOGICAL SURGERY

## 2020-01-01 PROCEDURE — 85652 RBC SED RATE AUTOMATED: CPT

## 2020-01-01 PROCEDURE — 51798 US URINE CAPACITY MEASURE: CPT

## 2020-01-01 PROCEDURE — 2500000003 HC RX 250 WO HCPCS: Performed by: RADIOLOGY

## 2020-01-01 PROCEDURE — 86140 C-REACTIVE PROTEIN: CPT

## 2020-01-01 PROCEDURE — 99152 MOD SED SAME PHYS/QHP 5/>YRS: CPT

## 2020-01-01 DEVICE — CEMENT C01A KYPHX HV-R BONE CEMENT EN
Type: IMPLANTABLE DEVICE | Site: BACK | Status: FUNCTIONAL
Brand: KYPHON® HV-R® BONE CEMENT

## 2020-01-01 RX ORDER — MEPERIDINE HYDROCHLORIDE 50 MG/ML
12.5 INJECTION INTRAMUSCULAR; INTRAVENOUS; SUBCUTANEOUS ONCE
Status: CANCELLED | OUTPATIENT
Start: 2020-01-01

## 2020-01-01 RX ORDER — SENNA PLUS 8.6 MG/1
1 TABLET ORAL DAILY PRN
Status: DISCONTINUED | OUTPATIENT
Start: 2020-01-01 | End: 2020-01-01 | Stop reason: HOSPADM

## 2020-01-01 RX ORDER — SODIUM CHLORIDE 0.9 % (FLUSH) 0.9 %
10 SYRINGE (ML) INJECTION EVERY 12 HOURS SCHEDULED
Status: DISCONTINUED | OUTPATIENT
Start: 2020-01-01 | End: 2020-01-01 | Stop reason: HOSPADM

## 2020-01-01 RX ORDER — SODIUM CHLORIDE 9 MG/ML
20 INJECTION, SOLUTION INTRAVENOUS ONCE
Status: COMPLETED | OUTPATIENT
Start: 2020-01-01 | End: 2020-01-01

## 2020-01-01 RX ORDER — OXYCODONE HYDROCHLORIDE 5 MG/1
5 TABLET ORAL EVERY 4 HOURS PRN
Status: DISCONTINUED | OUTPATIENT
Start: 2020-01-01 | End: 2020-01-01 | Stop reason: HOSPADM

## 2020-01-01 RX ORDER — SODIUM CHLORIDE 0.9 % (FLUSH) 0.9 %
10 SYRINGE (ML) INJECTION PRN
Status: DISCONTINUED | OUTPATIENT
Start: 2020-01-01 | End: 2020-01-01 | Stop reason: HOSPADM

## 2020-01-01 RX ORDER — ACETAMINOPHEN 325 MG/1
650 TABLET ORAL EVERY 6 HOURS PRN
Status: DISCONTINUED | OUTPATIENT
Start: 2020-01-01 | End: 2020-01-01 | Stop reason: HOSPADM

## 2020-01-01 RX ORDER — OXYCODONE HYDROCHLORIDE 5 MG/1
5 TABLET ORAL EVERY 6 HOURS PRN
Qty: 60 TABLET | Refills: 0 | Status: SHIPPED | OUTPATIENT
Start: 2020-01-01 | End: 2020-01-01 | Stop reason: SDUPTHER

## 2020-01-01 RX ORDER — CALCIUM GLUCONATE 20 MG/ML
1 INJECTION, SOLUTION INTRAVENOUS ONCE
Status: COMPLETED | OUTPATIENT
Start: 2020-01-01 | End: 2020-01-01

## 2020-01-01 RX ORDER — MORPHINE SULFATE 15 MG/1
15 TABLET, FILM COATED, EXTENDED RELEASE ORAL 2 TIMES DAILY
Qty: 60 EACH | Refills: 0 | OUTPATIENT
Start: 2020-01-01 | End: 2020-01-01 | Stop reason: CLARIF

## 2020-01-01 RX ORDER — MEGESTROL ACETATE 125 MG/ML
625 SUSPENSION ORAL DAILY
Qty: 150 ML | Refills: 3 | Status: SHIPPED | OUTPATIENT
Start: 2020-01-01

## 2020-01-01 RX ORDER — CIPROFLOXACIN 500 MG/1
500 TABLET, FILM COATED ORAL 2 TIMES DAILY
Status: DISCONTINUED | OUTPATIENT
Start: 2020-01-01 | End: 2020-01-01

## 2020-01-01 RX ORDER — CEFAZOLIN SODIUM 1 G/50ML
INJECTION, SOLUTION INTRAVENOUS PRN
Status: DISCONTINUED | OUTPATIENT
Start: 2020-01-01 | End: 2020-01-01 | Stop reason: SDUPTHER

## 2020-01-01 RX ORDER — DOCUSATE SODIUM 100 MG/1
100 CAPSULE, LIQUID FILLED ORAL 2 TIMES DAILY
Qty: 15 CAPSULE | Refills: 0 | Status: SHIPPED | OUTPATIENT
Start: 2020-01-01 | End: 2020-01-01

## 2020-01-01 RX ORDER — PANTOPRAZOLE SODIUM 40 MG/1
40 TABLET, DELAYED RELEASE ORAL
Qty: 30 TABLET | Refills: 3 | Status: SHIPPED | OUTPATIENT
Start: 2020-01-01

## 2020-01-01 RX ORDER — SODIUM CHLORIDE 9 MG/ML
INJECTION, SOLUTION INTRAVENOUS CONTINUOUS
Status: CANCELLED | OUTPATIENT
Start: 2020-01-01

## 2020-01-01 RX ORDER — SODIUM CHLORIDE, SODIUM LACTATE, POTASSIUM CHLORIDE, CALCIUM CHLORIDE 600; 310; 30; 20 MG/100ML; MG/100ML; MG/100ML; MG/100ML
INJECTION, SOLUTION INTRAVENOUS CONTINUOUS PRN
Status: DISCONTINUED | OUTPATIENT
Start: 2020-01-01 | End: 2020-01-01 | Stop reason: SDUPTHER

## 2020-01-01 RX ORDER — OXYCODONE HYDROCHLORIDE 5 MG/1
5 TABLET ORAL EVERY 6 HOURS PRN
Qty: 12 TABLET | Refills: 0 | Status: SHIPPED | OUTPATIENT
Start: 2020-01-01 | End: 2020-01-01

## 2020-01-01 RX ORDER — POTASSIUM CHLORIDE 20 MEQ/1
20 TABLET, EXTENDED RELEASE ORAL
Status: DISCONTINUED | OUTPATIENT
Start: 2020-01-01 | End: 2020-01-01 | Stop reason: HOSPADM

## 2020-01-01 RX ORDER — CARVEDILOL 12.5 MG/1
12.5 TABLET ORAL 2 TIMES DAILY WITH MEALS
Status: DISCONTINUED | OUTPATIENT
Start: 2020-01-01 | End: 2020-01-01

## 2020-01-01 RX ORDER — CARVEDILOL 12.5 MG/1
12.5 TABLET ORAL 2 TIMES DAILY WITH MEALS
COMMUNITY

## 2020-01-01 RX ORDER — HEPARIN SODIUM (PORCINE) LOCK FLUSH IV SOLN 100 UNIT/ML 100 UNIT/ML
500 SOLUTION INTRAVENOUS PRN
Status: DISCONTINUED | OUTPATIENT
Start: 2020-01-01 | End: 2020-01-01 | Stop reason: HOSPADM

## 2020-01-01 RX ORDER — HEPARIN SODIUM 5000 [USP'U]/ML
5000 INJECTION, SOLUTION INTRAVENOUS; SUBCUTANEOUS EVERY 8 HOURS SCHEDULED
Status: DISCONTINUED | OUTPATIENT
Start: 2020-01-01 | End: 2020-01-01 | Stop reason: HOSPADM

## 2020-01-01 RX ORDER — MORPHINE SULFATE 15 MG/1
15 TABLET, FILM COATED, EXTENDED RELEASE ORAL 2 TIMES DAILY
Qty: 14 TABLET | Refills: 0 | Status: SHIPPED | OUTPATIENT
Start: 2020-01-01 | End: 2020-01-01

## 2020-01-01 RX ORDER — PROMETHAZINE HYDROCHLORIDE 12.5 MG/1
12.5 TABLET ORAL EVERY 6 HOURS PRN
Status: DISCONTINUED | OUTPATIENT
Start: 2020-01-01 | End: 2020-01-01 | Stop reason: HOSPADM

## 2020-01-01 RX ORDER — HYDROCODONE BITARTRATE AND ACETAMINOPHEN 5; 325 MG/1; MG/1
1 TABLET ORAL EVERY 4 HOURS PRN
Status: DISCONTINUED | OUTPATIENT
Start: 2020-01-01 | End: 2020-01-01

## 2020-01-01 RX ORDER — HYDROCODONE BITARTRATE AND ACETAMINOPHEN 5; 325 MG/1; MG/1
2 TABLET ORAL EVERY 4 HOURS PRN
Status: DISCONTINUED | OUTPATIENT
Start: 2020-01-01 | End: 2020-01-01

## 2020-01-01 RX ORDER — DRONABINOL 2.5 MG/1
2.5 CAPSULE ORAL
Qty: 60 CAPSULE | Refills: 1 | Status: SHIPPED | OUTPATIENT
Start: 2020-01-01 | End: 2020-11-04

## 2020-01-01 RX ORDER — METOPROLOL SUCCINATE 25 MG/1
25 TABLET, EXTENDED RELEASE ORAL NIGHTLY
Status: CANCELLED | OUTPATIENT
Start: 2020-01-01

## 2020-01-01 RX ORDER — 0.9 % SODIUM CHLORIDE 0.9 %
1000 INTRAVENOUS SOLUTION INTRAVENOUS ONCE
Status: COMPLETED | OUTPATIENT
Start: 2020-01-01 | End: 2020-01-01

## 2020-01-01 RX ORDER — LEVOFLOXACIN 5 MG/ML
500 INJECTION, SOLUTION INTRAVENOUS EVERY 24 HOURS
Status: DISCONTINUED | OUTPATIENT
Start: 2020-01-01 | End: 2020-01-01 | Stop reason: HOSPADM

## 2020-01-01 RX ORDER — DIPHENHYDRAMINE HYDROCHLORIDE 50 MG/ML
50 INJECTION INTRAMUSCULAR; INTRAVENOUS ONCE
Status: CANCELLED | OUTPATIENT
Start: 2020-01-01

## 2020-01-01 RX ORDER — ASPIRIN 81 MG/1
81 TABLET, CHEWABLE ORAL DAILY
Status: DISCONTINUED | OUTPATIENT
Start: 2020-01-01 | End: 2020-01-01 | Stop reason: HOSPADM

## 2020-01-01 RX ORDER — SODIUM CHLORIDE 9 MG/ML
INJECTION, SOLUTION INTRAVENOUS CONTINUOUS
Status: DISCONTINUED | OUTPATIENT
Start: 2020-01-01 | End: 2020-01-01

## 2020-01-01 RX ORDER — CARVEDILOL 12.5 MG/1
12.5 TABLET ORAL 2 TIMES DAILY WITH MEALS
Status: DISCONTINUED | OUTPATIENT
Start: 2020-01-01 | End: 2020-01-01 | Stop reason: HOSPADM

## 2020-01-01 RX ORDER — DEXAMETHASONE 4 MG/1
4 TABLET ORAL EVERY 12 HOURS SCHEDULED
Status: DISCONTINUED | OUTPATIENT
Start: 2020-01-01 | End: 2020-01-01 | Stop reason: HOSPADM

## 2020-01-01 RX ORDER — SODIUM CHLORIDE 0.9 % (FLUSH) 0.9 %
5 SYRINGE (ML) INJECTION PRN
Status: CANCELLED | OUTPATIENT
Start: 2020-01-01

## 2020-01-01 RX ORDER — OXYCODONE HYDROCHLORIDE 5 MG/1
10 TABLET ORAL ONCE
Status: COMPLETED | OUTPATIENT
Start: 2020-01-01 | End: 2020-01-01

## 2020-01-01 RX ORDER — ACETAMINOPHEN 650 MG/1
650 SUPPOSITORY RECTAL EVERY 6 HOURS PRN
Status: DISCONTINUED | OUTPATIENT
Start: 2020-01-01 | End: 2020-01-01 | Stop reason: HOSPADM

## 2020-01-01 RX ORDER — FENTANYL CITRATE 50 UG/ML
INJECTION, SOLUTION INTRAMUSCULAR; INTRAVENOUS PRN
Status: DISCONTINUED | OUTPATIENT
Start: 2020-01-01 | End: 2020-01-01 | Stop reason: SDUPTHER

## 2020-01-01 RX ORDER — POLYETHYLENE GLYCOL 3350 17 G/17G
17 POWDER, FOR SOLUTION ORAL DAILY PRN
Status: DISCONTINUED | OUTPATIENT
Start: 2020-01-01 | End: 2020-01-01 | Stop reason: HOSPADM

## 2020-01-01 RX ORDER — SODIUM CHLORIDE 0.9 % (FLUSH) 0.9 %
10 SYRINGE (ML) INJECTION PRN
Status: ACTIVE | OUTPATIENT
Start: 2020-01-01 | End: 2020-01-01

## 2020-01-01 RX ORDER — DEXAMETHASONE 4 MG/1
4 TABLET ORAL 3 TIMES DAILY
COMMUNITY
End: 2020-01-01 | Stop reason: SDUPTHER

## 2020-01-01 RX ORDER — ATORVASTATIN CALCIUM 40 MG/1
40 TABLET, FILM COATED ORAL NIGHTLY
Status: DISCONTINUED | OUTPATIENT
Start: 2020-01-01 | End: 2020-01-01 | Stop reason: HOSPADM

## 2020-01-01 RX ORDER — MAGNESIUM SULFATE 1 G/100ML
1 INJECTION INTRAVENOUS ONCE
Status: COMPLETED | OUTPATIENT
Start: 2020-01-01 | End: 2020-01-01

## 2020-01-01 RX ORDER — OXYCODONE HYDROCHLORIDE 5 MG/1
5 TABLET ORAL EVERY 6 HOURS PRN
Status: DISCONTINUED | OUTPATIENT
Start: 2020-01-01 | End: 2020-01-01 | Stop reason: HOSPADM

## 2020-01-01 RX ORDER — PROPOFOL 10 MG/ML
INJECTION, EMULSION INTRAVENOUS PRN
Status: DISCONTINUED | OUTPATIENT
Start: 2020-01-01 | End: 2020-01-01 | Stop reason: SDUPTHER

## 2020-01-01 RX ORDER — CLINDAMYCIN PHOSPHATE 600 MG/50ML
600 INJECTION INTRAVENOUS ONCE
Status: COMPLETED | OUTPATIENT
Start: 2020-01-01 | End: 2020-01-01

## 2020-01-01 RX ORDER — ONDANSETRON 4 MG/1
4 TABLET, ORALLY DISINTEGRATING ORAL EVERY 8 HOURS PRN
Qty: 20 TABLET | Refills: 0 | Status: SHIPPED | OUTPATIENT
Start: 2020-01-01

## 2020-01-01 RX ORDER — CYCLOBENZAPRINE HCL 10 MG
10 TABLET ORAL NIGHTLY PRN
Status: DISCONTINUED | OUTPATIENT
Start: 2020-01-01 | End: 2020-01-01 | Stop reason: HOSPADM

## 2020-01-01 RX ORDER — HEPARIN SODIUM (PORCINE) LOCK FLUSH IV SOLN 100 UNIT/ML 100 UNIT/ML
500 SOLUTION INTRAVENOUS PRN
Status: CANCELLED | OUTPATIENT
Start: 2020-01-01

## 2020-01-01 RX ORDER — ATORVASTATIN CALCIUM 80 MG/1
80 TABLET, FILM COATED ORAL NIGHTLY
Status: DISCONTINUED | OUTPATIENT
Start: 2020-01-01 | End: 2020-01-01 | Stop reason: HOSPADM

## 2020-01-01 RX ORDER — MAGNESIUM HYDROXIDE 1200 MG/15ML
LIQUID ORAL CONTINUOUS PRN
Status: COMPLETED | OUTPATIENT
Start: 2020-01-01 | End: 2020-01-01

## 2020-01-01 RX ORDER — METHYLPREDNISOLONE SODIUM SUCCINATE 125 MG/2ML
125 INJECTION, POWDER, LYOPHILIZED, FOR SOLUTION INTRAMUSCULAR; INTRAVENOUS ONCE
Status: CANCELLED | OUTPATIENT
Start: 2020-01-01

## 2020-01-01 RX ORDER — 0.9 % SODIUM CHLORIDE 0.9 %
500 INTRAVENOUS SOLUTION INTRAVENOUS ONCE
Status: COMPLETED | OUTPATIENT
Start: 2020-01-01 | End: 2020-01-01

## 2020-01-01 RX ORDER — DEXAMETHASONE 4 MG/1
4 TABLET ORAL 2 TIMES DAILY
Qty: 20 TABLET | Refills: 0 | OUTPATIENT
Start: 2020-01-01 | End: 2020-01-01

## 2020-01-01 RX ORDER — NICOTINE 21 MG/24HR
1 PATCH, TRANSDERMAL 24 HOURS TRANSDERMAL DAILY PRN
Status: DISCONTINUED | OUTPATIENT
Start: 2020-01-01 | End: 2020-01-01 | Stop reason: HOSPADM

## 2020-01-01 RX ORDER — PREDNISONE 10 MG/1
20 TABLET ORAL DAILY
Qty: 10 TABLET | Refills: 0 | Status: SHIPPED | OUTPATIENT
Start: 2020-01-01 | End: 2020-01-01 | Stop reason: ALTCHOICE

## 2020-01-01 RX ORDER — ACETAMINOPHEN 325 MG/1
650 TABLET ORAL EVERY 6 HOURS PRN
Status: DISCONTINUED | OUTPATIENT
Start: 2020-01-01 | End: 2020-01-01 | Stop reason: SDUPTHER

## 2020-01-01 RX ORDER — OMEPRAZOLE 10 MG/1
10 CAPSULE, DELAYED RELEASE ORAL DAILY
Status: ON HOLD | COMMUNITY
End: 2020-01-01 | Stop reason: HOSPADM

## 2020-01-01 RX ORDER — OXYCODONE HYDROCHLORIDE 10 MG/1
5 TABLET ORAL EVERY 6 HOURS PRN
Qty: 60 TABLET | Refills: 0 | OUTPATIENT
Start: 2020-01-01 | End: 2020-01-01 | Stop reason: CLARIF

## 2020-01-01 RX ORDER — SODIUM CHLORIDE 9 MG/ML
20 INJECTION, SOLUTION INTRAVENOUS ONCE
Status: CANCELLED | OUTPATIENT
Start: 2020-01-01

## 2020-01-01 RX ORDER — SODIUM CHLORIDE 9 MG/ML
250 INJECTION, SOLUTION INTRAVENOUS CONTINUOUS
Status: ACTIVE | OUTPATIENT
Start: 2020-01-01 | End: 2020-01-01

## 2020-01-01 RX ORDER — FENTANYL CITRATE 50 UG/ML
INJECTION, SOLUTION INTRAMUSCULAR; INTRAVENOUS
Status: COMPLETED | OUTPATIENT
Start: 2020-01-01 | End: 2020-01-01

## 2020-01-01 RX ORDER — 0.9 % SODIUM CHLORIDE 0.9 %
1000 INTRAVENOUS SOLUTION INTRAVENOUS ONCE
Status: CANCELLED | OUTPATIENT
Start: 2020-01-01

## 2020-01-01 RX ORDER — PALONOSETRON 0.05 MG/ML
0.25 INJECTION, SOLUTION INTRAVENOUS ONCE
Status: COMPLETED | OUTPATIENT
Start: 2020-01-01 | End: 2020-01-01

## 2020-01-01 RX ORDER — SODIUM CHLORIDE 9 MG/ML
INJECTION, SOLUTION INTRAVENOUS CONTINUOUS
Status: DISCONTINUED | OUTPATIENT
Start: 2020-01-01 | End: 2020-01-01 | Stop reason: HOSPADM

## 2020-01-01 RX ORDER — POTASSIUM CHLORIDE 7.45 MG/ML
10 INJECTION INTRAVENOUS PRN
Status: DISCONTINUED | OUTPATIENT
Start: 2020-01-01 | End: 2020-01-01 | Stop reason: HOSPADM

## 2020-01-01 RX ORDER — SODIUM CHLORIDE 0.9 % (FLUSH) 0.9 %
10 SYRINGE (ML) INJECTION PRN
Status: CANCELLED | OUTPATIENT
Start: 2020-01-01

## 2020-01-01 RX ORDER — CIPROFLOXACIN 500 MG/5ML
500 KIT ORAL 2 TIMES DAILY
Qty: 100 ML | Refills: 0 | Status: ON HOLD
Start: 2020-01-01 | End: 2020-01-01 | Stop reason: HOSPADM

## 2020-01-01 RX ORDER — POLYETHYLENE GLYCOL 3350 17 G/17G
17 POWDER, FOR SOLUTION ORAL DAILY PRN
COMMUNITY

## 2020-01-01 RX ORDER — ASPIRIN 81 MG/1
81 TABLET, CHEWABLE ORAL DAILY
Status: DISCONTINUED | OUTPATIENT
Start: 2020-01-01 | End: 2020-01-01

## 2020-01-01 RX ORDER — PREDNISONE 1 MG/1
10 TABLET ORAL DAILY
Qty: 10 TABLET | Refills: 0 | Status: SHIPPED | OUTPATIENT
Start: 2020-01-01 | End: 2020-01-01 | Stop reason: ALTCHOICE

## 2020-01-01 RX ORDER — ONDANSETRON 4 MG/1
4 TABLET, ORALLY DISINTEGRATING ORAL ONCE
Status: COMPLETED | OUTPATIENT
Start: 2020-01-01 | End: 2020-01-01

## 2020-01-01 RX ORDER — ESMOLOL HYDROCHLORIDE 10 MG/ML
100 INJECTION, SOLUTION INTRAVENOUS CONTINUOUS
Status: DISCONTINUED | OUTPATIENT
Start: 2020-01-01 | End: 2020-01-01

## 2020-01-01 RX ORDER — LIDOCAINE HYDROCHLORIDE 10 MG/ML
INJECTION, SOLUTION EPIDURAL; INFILTRATION; INTRACAUDAL; PERINEURAL PRN
Status: DISCONTINUED | OUTPATIENT
Start: 2020-01-01 | End: 2020-01-01

## 2020-01-01 RX ORDER — CYCLOBENZAPRINE HCL 10 MG
10 TABLET ORAL NIGHTLY PRN
Qty: 10 TABLET | Refills: 0 | Status: SHIPPED | OUTPATIENT
Start: 2020-01-01 | End: 2020-01-01

## 2020-01-01 RX ORDER — ONDANSETRON 2 MG/ML
4 INJECTION INTRAMUSCULAR; INTRAVENOUS ONCE
Status: DISCONTINUED | OUTPATIENT
Start: 2020-01-01 | End: 2020-01-01 | Stop reason: HOSPADM

## 2020-01-01 RX ORDER — LORAZEPAM 0.5 MG/1
0.5 TABLET ORAL PRN
Status: DISCONTINUED | OUTPATIENT
Start: 2020-01-01 | End: 2020-01-01 | Stop reason: HOSPADM

## 2020-01-01 RX ORDER — PANTOPRAZOLE SODIUM 40 MG/1
40 TABLET, DELAYED RELEASE ORAL
Status: DISCONTINUED | OUTPATIENT
Start: 2020-01-01 | End: 2020-01-01 | Stop reason: HOSPADM

## 2020-01-01 RX ORDER — 0.9 % SODIUM CHLORIDE 0.9 %
1000 INTRAVENOUS SOLUTION INTRAVENOUS ONCE
Status: CANCELLED
Start: 2020-01-01

## 2020-01-01 RX ORDER — OXYCODONE HYDROCHLORIDE 5 MG/1
10 TABLET ORAL EVERY 4 HOURS PRN
Status: DISCONTINUED | OUTPATIENT
Start: 2020-01-01 | End: 2020-01-01 | Stop reason: HOSPADM

## 2020-01-01 RX ORDER — POTASSIUM CHLORIDE 20 MEQ/1
40 TABLET, EXTENDED RELEASE ORAL PRN
Status: DISCONTINUED | OUTPATIENT
Start: 2020-01-01 | End: 2020-01-01 | Stop reason: HOSPADM

## 2020-01-01 RX ORDER — MAGNESIUM SULFATE 1 G/100ML
1 INJECTION INTRAVENOUS PRN
Status: DISCONTINUED | OUTPATIENT
Start: 2020-01-01 | End: 2020-01-01 | Stop reason: HOSPADM

## 2020-01-01 RX ORDER — DEXAMETHASONE SODIUM PHOSPHATE 4 MG/ML
INJECTION, SOLUTION INTRA-ARTICULAR; INTRALESIONAL; INTRAMUSCULAR; INTRAVENOUS; SOFT TISSUE PRN
Status: DISCONTINUED | OUTPATIENT
Start: 2020-01-01 | End: 2020-01-01 | Stop reason: SDUPTHER

## 2020-01-01 RX ORDER — HEPARIN SODIUM (PORCINE) LOCK FLUSH IV SOLN 100 UNIT/ML 100 UNIT/ML
SOLUTION INTRAVENOUS
Status: COMPLETED | OUTPATIENT
Start: 2020-01-01 | End: 2020-01-01

## 2020-01-01 RX ORDER — PROMETHAZINE HYDROCHLORIDE 25 MG/1
12.5 TABLET ORAL EVERY 6 HOURS PRN
Status: DISCONTINUED | OUTPATIENT
Start: 2020-01-01 | End: 2020-01-01 | Stop reason: HOSPADM

## 2020-01-01 RX ORDER — MIDAZOLAM HYDROCHLORIDE 1 MG/ML
INJECTION INTRAMUSCULAR; INTRAVENOUS
Status: COMPLETED | OUTPATIENT
Start: 2020-01-01 | End: 2020-01-01

## 2020-01-01 RX ORDER — CYANOCOBALAMIN 1000 UG/ML
1000 INJECTION INTRAMUSCULAR; SUBCUTANEOUS ONCE
Status: COMPLETED | OUTPATIENT
Start: 2020-01-01 | End: 2020-01-01

## 2020-01-01 RX ORDER — 0.9 % SODIUM CHLORIDE 0.9 %
1000 INTRAVENOUS SOLUTION INTRAVENOUS ONCE
Status: DISCONTINUED | OUTPATIENT
Start: 2020-01-01 | End: 2020-01-01 | Stop reason: HOSPADM

## 2020-01-01 RX ORDER — NEOSTIGMINE METHYLSULFATE 5 MG/5 ML
SYRINGE (ML) INTRAVENOUS PRN
Status: DISCONTINUED | OUTPATIENT
Start: 2020-01-01 | End: 2020-01-01 | Stop reason: SDUPTHER

## 2020-01-01 RX ORDER — ONDANSETRON 4 MG/1
4 TABLET, ORALLY DISINTEGRATING ORAL EVERY 8 HOURS PRN
Status: DISCONTINUED | OUTPATIENT
Start: 2020-01-01 | End: 2020-01-01 | Stop reason: HOSPADM

## 2020-01-01 RX ORDER — DEXAMETHASONE SODIUM PHOSPHATE 4 MG/ML
4 INJECTION, SOLUTION INTRA-ARTICULAR; INTRALESIONAL; INTRAMUSCULAR; INTRAVENOUS; SOFT TISSUE EVERY 6 HOURS
Status: DISCONTINUED | OUTPATIENT
Start: 2020-01-01 | End: 2020-01-01

## 2020-01-01 RX ORDER — ROCURONIUM BROMIDE 10 MG/ML
INJECTION, SOLUTION INTRAVENOUS PRN
Status: DISCONTINUED | OUTPATIENT
Start: 2020-01-01 | End: 2020-01-01 | Stop reason: SDUPTHER

## 2020-01-01 RX ORDER — PREDNISONE 20 MG/1
60 TABLET ORAL DAILY
Qty: 15 TABLET | Refills: 0 | Status: SHIPPED | OUTPATIENT
Start: 2020-01-01 | End: 2020-01-01 | Stop reason: ALTCHOICE

## 2020-01-01 RX ORDER — 0.9 % SODIUM CHLORIDE 0.9 %
20 INTRAVENOUS SOLUTION INTRAVENOUS ONCE
Status: COMPLETED | OUTPATIENT
Start: 2020-01-01 | End: 2020-01-01

## 2020-01-01 RX ORDER — ONDANSETRON 4 MG/1
4 TABLET, ORALLY DISINTEGRATING ORAL EVERY 8 HOURS PRN
Qty: 20 TABLET | Refills: 0 | Status: SHIPPED | OUTPATIENT
Start: 2020-01-01 | End: 2020-01-01 | Stop reason: SDUPTHER

## 2020-01-01 RX ORDER — ACETAMINOPHEN 325 MG/1
650 TABLET ORAL EVERY 4 HOURS PRN
Status: DISCONTINUED | OUTPATIENT
Start: 2020-01-01 | End: 2020-01-01 | Stop reason: HOSPADM

## 2020-01-01 RX ORDER — HEPARIN SODIUM (PORCINE) LOCK FLUSH IV SOLN 100 UNIT/ML 100 UNIT/ML
500 SOLUTION INTRAVENOUS PRN
Status: DISPENSED | OUTPATIENT
Start: 2020-01-01 | End: 2020-01-01

## 2020-01-01 RX ORDER — FENTANYL 25 UG/H
1 PATCH TRANSDERMAL
Qty: 10 PATCH | Refills: 0 | Status: ON HOLD
Start: 2020-01-01 | End: 2020-01-01 | Stop reason: HOSPADM

## 2020-01-01 RX ORDER — IBUPROFEN 800 MG/1
800 TABLET ORAL EVERY 6 HOURS PRN
Status: DISCONTINUED | OUTPATIENT
Start: 2020-01-01 | End: 2020-01-01

## 2020-01-01 RX ORDER — DEXAMETHASONE SODIUM PHOSPHATE 100 MG/10ML
10 INJECTION INTRAMUSCULAR; INTRAVENOUS ONCE
Status: COMPLETED | OUTPATIENT
Start: 2020-01-01 | End: 2020-01-01

## 2020-01-01 RX ORDER — OXYCODONE HYDROCHLORIDE 5 MG/1
5 TABLET ORAL ONCE
Status: COMPLETED | OUTPATIENT
Start: 2020-01-01 | End: 2020-01-01

## 2020-01-01 RX ORDER — PREDNISONE 10 MG/1
40 TABLET ORAL DAILY
Qty: 20 TABLET | Refills: 0 | Status: SHIPPED | OUTPATIENT
Start: 2020-01-01 | End: 2020-01-01 | Stop reason: ALTCHOICE

## 2020-01-01 RX ORDER — ONDANSETRON 2 MG/ML
4 INJECTION INTRAMUSCULAR; INTRAVENOUS EVERY 6 HOURS PRN
Status: DISCONTINUED | OUTPATIENT
Start: 2020-01-01 | End: 2020-01-01 | Stop reason: HOSPADM

## 2020-01-01 RX ORDER — CIPROFLOXACIN 500 MG/1
500 TABLET, FILM COATED ORAL 2 TIMES DAILY
Qty: 14 TABLET | Refills: 0 | Status: ON HOLD | OUTPATIENT
Start: 2020-01-01 | End: 2020-01-01 | Stop reason: HOSPADM

## 2020-01-01 RX ORDER — ALPRAZOLAM 0.5 MG/1
0.5 TABLET ORAL NIGHTLY PRN
Qty: 15 TABLET | Refills: 0 | Status: SHIPPED | OUTPATIENT
Start: 2020-01-01 | End: 2020-01-01

## 2020-01-01 RX ORDER — DEXAMETHASONE 4 MG/1
4 TABLET ORAL PRN
Qty: 30 TABLET | Refills: 0 | Status: SHIPPED | OUTPATIENT
Start: 2020-01-01 | End: 2020-01-01

## 2020-01-01 RX ORDER — ONDANSETRON 2 MG/ML
INJECTION INTRAMUSCULAR; INTRAVENOUS PRN
Status: DISCONTINUED | OUTPATIENT
Start: 2020-01-01 | End: 2020-01-01 | Stop reason: SDUPTHER

## 2020-01-01 RX ORDER — FENTANYL 12 UG/H
1 PATCH TRANSDERMAL
Qty: 10 PATCH | Refills: 0 | Status: ON HOLD
Start: 2020-01-01 | End: 2020-01-01 | Stop reason: HOSPADM

## 2020-01-01 RX ORDER — 0.9 % SODIUM CHLORIDE 0.9 %
80 INTRAVENOUS SOLUTION INTRAVENOUS ONCE
Status: COMPLETED | OUTPATIENT
Start: 2020-01-01 | End: 2020-01-01

## 2020-01-01 RX ORDER — OXYCODONE HYDROCHLORIDE 5 MG/1
5 TABLET ORAL EVERY 4 HOURS PRN
Qty: 60 TABLET | Refills: 0 | Status: SHIPPED | OUTPATIENT
Start: 2020-01-01 | End: 2020-01-01

## 2020-01-01 RX ORDER — MORPHINE SULFATE 2 MG/ML
2 INJECTION, SOLUTION INTRAMUSCULAR; INTRAVENOUS ONCE
Status: DISCONTINUED | OUTPATIENT
Start: 2020-01-01 | End: 2020-01-01 | Stop reason: HOSPADM

## 2020-01-01 RX ORDER — ESMOLOL HYDROCHLORIDE 10 MG/ML
20 INJECTION INTRAVENOUS ONCE
Status: DISCONTINUED | OUTPATIENT
Start: 2020-01-01 | End: 2020-01-01

## 2020-01-01 RX ORDER — ACETAMINOPHEN 500 MG
1000 TABLET ORAL ONCE
Status: COMPLETED | OUTPATIENT
Start: 2020-01-01 | End: 2020-01-01

## 2020-01-01 RX ORDER — FOLIC ACID 1 MG/1
1 TABLET ORAL DAILY
Status: DISCONTINUED | OUTPATIENT
Start: 2020-01-01 | End: 2020-01-01 | Stop reason: HOSPADM

## 2020-01-01 RX ORDER — MAGNESIUM SULFATE IN WATER 40 MG/ML
2 INJECTION, SOLUTION INTRAVENOUS ONCE
Status: COMPLETED | OUTPATIENT
Start: 2020-01-01 | End: 2020-01-01

## 2020-01-01 RX ORDER — METOPROLOL TARTRATE 5 MG/5ML
5 INJECTION INTRAVENOUS ONCE
Status: COMPLETED | OUTPATIENT
Start: 2020-01-01 | End: 2020-01-01

## 2020-01-01 RX ORDER — OXYCODONE HYDROCHLORIDE 5 MG/1
5 TABLET ORAL EVERY 6 HOURS PRN
Qty: 28 TABLET | Refills: 0 | Status: SHIPPED | OUTPATIENT
Start: 2020-01-01 | End: 2020-01-01

## 2020-01-01 RX ORDER — GLYCOPYRROLATE 1 MG/5 ML
SYRINGE (ML) INTRAVENOUS PRN
Status: DISCONTINUED | OUTPATIENT
Start: 2020-01-01 | End: 2020-01-01 | Stop reason: SDUPTHER

## 2020-01-01 RX ORDER — PALONOSETRON 0.05 MG/ML
0.25 INJECTION, SOLUTION INTRAVENOUS ONCE
Status: CANCELLED | OUTPATIENT
Start: 2020-01-01

## 2020-01-01 RX ORDER — FOLIC ACID 1 MG/1
1 TABLET ORAL DAILY
Qty: 30 TABLET | Refills: 2 | Status: SHIPPED | OUTPATIENT
Start: 2020-01-01

## 2020-01-01 RX ORDER — EPINEPHRINE 1 MG/ML
0.3 INJECTION, SOLUTION, CONCENTRATE INTRAVENOUS PRN
Status: CANCELLED | OUTPATIENT
Start: 2020-01-01

## 2020-01-01 RX ORDER — VANCOMYCIN HYDROCHLORIDE 1 G/200ML
1000 INJECTION, SOLUTION INTRAVENOUS ONCE
Status: COMPLETED | OUTPATIENT
Start: 2020-01-01 | End: 2020-01-01

## 2020-01-01 RX ORDER — LIDOCAINE HYDROCHLORIDE AND EPINEPHRINE 10; 10 MG/ML; UG/ML
INJECTION, SOLUTION INFILTRATION; PERINEURAL PRN
Status: DISCONTINUED | OUTPATIENT
Start: 2020-01-01 | End: 2020-01-01 | Stop reason: ALTCHOICE

## 2020-01-01 RX ORDER — DOCUSATE SODIUM 100 MG/1
100 CAPSULE, LIQUID FILLED ORAL 2 TIMES DAILY
Status: DISCONTINUED | OUTPATIENT
Start: 2020-01-01 | End: 2020-01-01 | Stop reason: HOSPADM

## 2020-01-01 RX ORDER — DRONABINOL 2.5 MG/1
2.5 CAPSULE ORAL
Status: DISCONTINUED | OUTPATIENT
Start: 2020-01-01 | End: 2020-01-01 | Stop reason: HOSPADM

## 2020-01-01 RX ADMIN — HEPARIN SODIUM 5000 UNITS: 5000 INJECTION INTRAVENOUS; SUBCUTANEOUS at 06:46

## 2020-01-01 RX ADMIN — MAGNESIUM SULFATE 2 G: 2 INJECTION INTRAVENOUS at 19:10

## 2020-01-01 RX ADMIN — NYSTATIN 500000 UNITS: 100000 SUSPENSION ORAL at 16:28

## 2020-01-01 RX ADMIN — PHENYLEPHRINE HYDROCHLORIDE 100 MCG: 10 INJECTION INTRAVENOUS at 14:58

## 2020-01-01 RX ADMIN — HEPARIN 500 UNITS: 100 SYRINGE at 12:33

## 2020-01-01 RX ADMIN — SODIUM CHLORIDE 1000 ML: 9 INJECTION, SOLUTION INTRAVENOUS at 10:55

## 2020-01-01 RX ADMIN — ASPIRIN 81 MG: 81 TABLET, CHEWABLE ORAL at 08:47

## 2020-01-01 RX ADMIN — LEVOFLOXACIN 500 MG: 5 INJECTION, SOLUTION INTRAVENOUS at 17:19

## 2020-01-01 RX ADMIN — Medication 10 ML: at 07:54

## 2020-01-01 RX ADMIN — CARBOPLATIN 430 MG: 10 INJECTION INTRAVENOUS at 12:12

## 2020-01-01 RX ADMIN — VANCOMYCIN HYDROCHLORIDE 750 MG: 100 INJECTION, POWDER, LYOPHILIZED, FOR SOLUTION INTRAVENOUS at 14:43

## 2020-01-01 RX ADMIN — DEXAMETHASONE SODIUM PHOSPHATE 4 MG: 4 INJECTION, SOLUTION INTRAMUSCULAR; INTRAVENOUS at 14:27

## 2020-01-01 RX ADMIN — FENTANYL CITRATE 50 MCG: 50 INJECTION INTRAMUSCULAR; INTRAVENOUS at 14:36

## 2020-01-01 RX ADMIN — SODIUM CHLORIDE, PRESERVATIVE FREE 10 ML: 5 INJECTION INTRAVENOUS at 21:21

## 2020-01-01 RX ADMIN — ACETAMINOPHEN 650 MG: 325 TABLET ORAL at 20:20

## 2020-01-01 RX ADMIN — Medication 10 ML: at 15:26

## 2020-01-01 RX ADMIN — GADOTERIDOL 11 ML: 279.3 INJECTION, SOLUTION INTRAVENOUS at 14:42

## 2020-01-01 RX ADMIN — CARVEDILOL 12.5 MG: 12.5 TABLET, FILM COATED ORAL at 17:06

## 2020-01-01 RX ADMIN — PANTOPRAZOLE SODIUM 40 MG: 40 TABLET, DELAYED RELEASE ORAL at 06:28

## 2020-01-01 RX ADMIN — MEROPENEM 1 G: 1 INJECTION, POWDER, FOR SOLUTION INTRAVENOUS at 21:16

## 2020-01-01 RX ADMIN — NYSTATIN 500000 UNITS: 100000 SUSPENSION ORAL at 12:52

## 2020-01-01 RX ADMIN — NYSTATIN 500000 UNITS: 100000 SUSPENSION ORAL at 19:50

## 2020-01-01 RX ADMIN — DEXAMETHASONE SODIUM PHOSPHATE 4 MG: 4 INJECTION, SOLUTION INTRAMUSCULAR; INTRAVENOUS at 06:05

## 2020-01-01 RX ADMIN — ATORVASTATIN CALCIUM 80 MG: 80 TABLET, FILM COATED ORAL at 20:05

## 2020-01-01 RX ADMIN — HEPARIN SODIUM 5000 UNITS: 5000 INJECTION INTRAVENOUS; SUBCUTANEOUS at 21:53

## 2020-01-01 RX ADMIN — OXYCODONE HYDROCHLORIDE 10 MG: 5 TABLET ORAL at 11:24

## 2020-01-01 RX ADMIN — DEXAMETHASONE SODIUM PHOSPHATE 4 MG: 4 INJECTION, SOLUTION INTRAMUSCULAR; INTRAVENOUS at 20:31

## 2020-01-01 RX ADMIN — OXYCODONE HYDROCHLORIDE 5 MG: 5 TABLET ORAL at 22:19

## 2020-01-01 RX ADMIN — ENOXAPARIN SODIUM 40 MG: 40 INJECTION SUBCUTANEOUS at 08:54

## 2020-01-01 RX ADMIN — MAGNESIUM CITRATE 296 ML: 1.75 LIQUID ORAL at 17:03

## 2020-01-01 RX ADMIN — ASPIRIN 81 MG: 81 TABLET, CHEWABLE ORAL at 10:22

## 2020-01-01 RX ADMIN — SODIUM CHLORIDE 1000 ML: 9 INJECTION, SOLUTION INTRAVENOUS at 20:22

## 2020-01-01 RX ADMIN — HEPARIN SODIUM 5000 UNITS: 5000 INJECTION INTRAVENOUS; SUBCUTANEOUS at 13:59

## 2020-01-01 RX ADMIN — OXYCODONE HYDROCHLORIDE 5 MG: 5 TABLET ORAL at 12:36

## 2020-01-01 RX ADMIN — Medication 0.4 MG: at 15:12

## 2020-01-01 RX ADMIN — Medication 10 ML: at 21:00

## 2020-01-01 RX ADMIN — OXYCODONE HYDROCHLORIDE 5 MG: 5 TABLET ORAL at 20:03

## 2020-01-01 RX ADMIN — PANTOPRAZOLE SODIUM 40 MG: 40 TABLET, DELAYED RELEASE ORAL at 06:29

## 2020-01-01 RX ADMIN — OXYCODONE HYDROCHLORIDE 10 MG: 5 TABLET ORAL at 21:07

## 2020-01-01 RX ADMIN — POTASSIUM CHLORIDE 20 MEQ: 1500 TABLET, EXTENDED RELEASE ORAL at 11:59

## 2020-01-01 RX ADMIN — FOLIC ACID 1 MG: 1 TABLET ORAL at 08:47

## 2020-01-01 RX ADMIN — CYCLOBENZAPRINE 10 MG: 10 TABLET, FILM COATED ORAL at 20:33

## 2020-01-01 RX ADMIN — CARVEDILOL 12.5 MG: 12.5 TABLET, FILM COATED ORAL at 08:47

## 2020-01-01 RX ADMIN — MEROPENEM 2 G: 1 INJECTION, POWDER, FOR SOLUTION INTRAVENOUS at 04:33

## 2020-01-01 RX ADMIN — CEFEPIME HYDROCHLORIDE 2 G: 2 INJECTION, POWDER, FOR SOLUTION INTRAVENOUS at 12:31

## 2020-01-01 RX ADMIN — FOLIC ACID 1 MG: 1 TABLET ORAL at 08:04

## 2020-01-01 RX ADMIN — CYANOCOBALAMIN 1000 MCG: 1000 INJECTION, SOLUTION INTRAMUSCULAR at 12:30

## 2020-01-01 RX ADMIN — VANCOMYCIN HYDROCHLORIDE 750 MG: 100 INJECTION, POWDER, LYOPHILIZED, FOR SOLUTION INTRAVENOUS at 12:10

## 2020-01-01 RX ADMIN — POTASSIUM CHLORIDE 20 MEQ: 1500 TABLET, EXTENDED RELEASE ORAL at 08:42

## 2020-01-01 RX ADMIN — TBO-FILGRASTIM 300 MCG: 300 INJECTION, SOLUTION SUBCUTANEOUS at 10:26

## 2020-01-01 RX ADMIN — SODIUM CHLORIDE, PRESERVATIVE FREE 10 ML: 5 INJECTION INTRAVENOUS at 09:01

## 2020-01-01 RX ADMIN — PHENYLEPHRINE HYDROCHLORIDE 100 MCG: 10 INJECTION INTRAVENOUS at 14:22

## 2020-01-01 RX ADMIN — LEVOFLOXACIN 500 MG: 5 INJECTION, SOLUTION INTRAVENOUS at 18:52

## 2020-01-01 RX ADMIN — PHENYLEPHRINE HYDROCHLORIDE 100 MCG: 10 INJECTION INTRAVENOUS at 14:17

## 2020-01-01 RX ADMIN — ASPIRIN 81 MG: 81 TABLET, CHEWABLE ORAL at 08:54

## 2020-01-01 RX ADMIN — OXYCODONE HYDROCHLORIDE 5 MG: 5 TABLET ORAL at 06:14

## 2020-01-01 RX ADMIN — ACETAMINOPHEN 1000 MG: 500 TABLET ORAL at 10:47

## 2020-01-01 RX ADMIN — Medication 10 ML: at 08:43

## 2020-01-01 RX ADMIN — Medication 10 ML: at 12:33

## 2020-01-01 RX ADMIN — NYSTATIN 500000 UNITS: 100000 SUSPENSION ORAL at 21:21

## 2020-01-01 RX ADMIN — IOPAMIDOL 100 ML: 755 INJECTION, SOLUTION INTRAVENOUS at 15:26

## 2020-01-01 RX ADMIN — SODIUM CHLORIDE: 9 INJECTION, SOLUTION INTRAVENOUS at 06:15

## 2020-01-01 RX ADMIN — CARVEDILOL 12.5 MG: 12.5 TABLET, FILM COATED ORAL at 07:50

## 2020-01-01 RX ADMIN — SACUBITRIL AND VALSARTAN 1 TABLET: 24; 26 TABLET, FILM COATED ORAL at 21:40

## 2020-01-01 RX ADMIN — CARVEDILOL 12.5 MG: 12.5 TABLET, FILM COATED ORAL at 20:32

## 2020-01-01 RX ADMIN — DESMOPRESSIN ACETATE 40 MG: 0.2 TABLET ORAL at 21:40

## 2020-01-01 RX ADMIN — Medication 10 MG: at 11:00

## 2020-01-01 RX ADMIN — FENTANYL CITRATE 100 MCG: 50 INJECTION INTRAMUSCULAR; INTRAVENOUS at 14:08

## 2020-01-01 RX ADMIN — PHENYLEPHRINE HYDROCHLORIDE 100 MCG: 10 INJECTION INTRAVENOUS at 14:49

## 2020-01-01 RX ADMIN — OXYCODONE HYDROCHLORIDE 5 MG: 5 TABLET ORAL at 08:04

## 2020-01-01 RX ADMIN — HEPARIN SODIUM 5000 UNITS: 5000 INJECTION INTRAVENOUS; SUBCUTANEOUS at 20:19

## 2020-01-01 RX ADMIN — ENOXAPARIN SODIUM 40 MG: 40 INJECTION SUBCUTANEOUS at 08:42

## 2020-01-01 RX ADMIN — SODIUM CHLORIDE, PRESERVATIVE FREE 10 ML: 5 INJECTION INTRAVENOUS at 20:14

## 2020-01-01 RX ADMIN — SACUBITRIL AND VALSARTAN 1 TABLET: 24; 26 TABLET, FILM COATED ORAL at 20:11

## 2020-01-01 RX ADMIN — DRONABINOL 2.5 MG: 2.5 CAPSULE ORAL at 08:42

## 2020-01-01 RX ADMIN — CARVEDILOL 12.5 MG: 12.5 TABLET, FILM COATED ORAL at 18:02

## 2020-01-01 RX ADMIN — SODIUM CHLORIDE 80 ML: 9 INJECTION, SOLUTION INTRAVENOUS at 15:26

## 2020-01-01 RX ADMIN — Medication 10 ML: at 20:36

## 2020-01-01 RX ADMIN — SODIUM CHLORIDE 500 ML: 0.9 INJECTION, SOLUTION INTRAVENOUS at 11:33

## 2020-01-01 RX ADMIN — PANTOPRAZOLE SODIUM 40 MG: 40 TABLET, DELAYED RELEASE ORAL at 06:15

## 2020-01-01 RX ADMIN — MEROPENEM 2 G: 1 INJECTION, POWDER, FOR SOLUTION INTRAVENOUS at 13:50

## 2020-01-01 RX ADMIN — OXYCODONE HYDROCHLORIDE 5 MG: 5 TABLET ORAL at 17:06

## 2020-01-01 RX ADMIN — DEXAMETHASONE SODIUM PHOSPHATE 4 MG: 4 INJECTION, SOLUTION INTRAMUSCULAR; INTRAVENOUS at 06:51

## 2020-01-01 RX ADMIN — Medication 10 ML: at 08:58

## 2020-01-01 RX ADMIN — SODIUM CHLORIDE: 9 INJECTION, SOLUTION INTRAVENOUS at 04:33

## 2020-01-01 RX ADMIN — Medication 10 ML: at 18:16

## 2020-01-01 RX ADMIN — DOCUSATE SODIUM 100 MG: 100 CAPSULE, LIQUID FILLED ORAL at 20:05

## 2020-01-01 RX ADMIN — Medication 10 ML: at 00:31

## 2020-01-01 RX ADMIN — Medication 10 ML: at 13:47

## 2020-01-01 RX ADMIN — DRONABINOL 2.5 MG: 2.5 CAPSULE ORAL at 20:57

## 2020-01-01 RX ADMIN — CARVEDILOL 12.5 MG: 12.5 TABLET, FILM COATED ORAL at 15:54

## 2020-01-01 RX ADMIN — CEFAZOLIN SODIUM 2 G: 1 INJECTION, SOLUTION INTRAVENOUS at 14:20

## 2020-01-01 RX ADMIN — PHENYLEPHRINE HYDROCHLORIDE 200 MCG: 10 INJECTION INTRAVENOUS at 15:04

## 2020-01-01 RX ADMIN — DOCUSATE SODIUM 100 MG: 100 CAPSULE, LIQUID FILLED ORAL at 20:33

## 2020-01-01 RX ADMIN — SODIUM CHLORIDE, PRESERVATIVE FREE 10 ML: 5 INJECTION INTRAVENOUS at 08:15

## 2020-01-01 RX ADMIN — PHENYLEPHRINE HYDROCHLORIDE 100 MCG: 10 INJECTION INTRAVENOUS at 14:55

## 2020-01-01 RX ADMIN — SACUBITRIL AND VALSARTAN 1 TABLET: 24; 26 TABLET, FILM COATED ORAL at 08:41

## 2020-01-01 RX ADMIN — NYSTATIN 500000 UNITS: 100000 SUSPENSION ORAL at 21:00

## 2020-01-01 RX ADMIN — SACUBITRIL AND VALSARTAN 1 TABLET: 24; 26 TABLET, FILM COATED ORAL at 20:55

## 2020-01-01 RX ADMIN — PHENYLEPHRINE HYDROCHLORIDE 100 MCG: 10 INJECTION INTRAVENOUS at 14:42

## 2020-01-01 RX ADMIN — ATORVASTATIN CALCIUM 80 MG: 80 TABLET, FILM COATED ORAL at 20:20

## 2020-01-01 RX ADMIN — METOPROLOL TARTRATE 5 MG: 1 INJECTION, SOLUTION INTRAVENOUS at 01:51

## 2020-01-01 RX ADMIN — Medication 10 ML: at 07:48

## 2020-01-01 RX ADMIN — FOLIC ACID 1 MG: 1 TABLET ORAL at 09:00

## 2020-01-01 RX ADMIN — DRONABINOL 2.5 MG: 2.5 CAPSULE ORAL at 06:29

## 2020-01-01 RX ADMIN — ENOXAPARIN SODIUM 40 MG: 40 INJECTION SUBCUTANEOUS at 09:03

## 2020-01-01 RX ADMIN — SODIUM CHLORIDE, PRESERVATIVE FREE 10 ML: 5 INJECTION INTRAVENOUS at 21:00

## 2020-01-01 RX ADMIN — CARVEDILOL 12.5 MG: 12.5 TABLET, FILM COATED ORAL at 09:00

## 2020-01-01 RX ADMIN — ATORVASTATIN CALCIUM 80 MG: 80 TABLET, FILM COATED ORAL at 20:32

## 2020-01-01 RX ADMIN — DRONABINOL 2.5 MG: 2.5 CAPSULE ORAL at 09:09

## 2020-01-01 RX ADMIN — OXYCODONE HYDROCHLORIDE 10 MG: 5 TABLET ORAL at 21:23

## 2020-01-01 RX ADMIN — NYSTATIN 500000 UNITS: 100000 SUSPENSION ORAL at 13:50

## 2020-01-01 RX ADMIN — SODIUM CHLORIDE 800 MG: 9 INJECTION, SOLUTION INTRAVENOUS at 11:54

## 2020-01-01 RX ADMIN — DRONABINOL 2.5 MG: 2.5 CAPSULE ORAL at 17:13

## 2020-01-01 RX ADMIN — ENOXAPARIN SODIUM 40 MG: 40 INJECTION SUBCUTANEOUS at 10:08

## 2020-01-01 RX ADMIN — NYSTATIN 500000 UNITS: 100000 SUSPENSION ORAL at 13:21

## 2020-01-01 RX ADMIN — MIDAZOLAM 1 MG: 1 INJECTION INTRAMUSCULAR; INTRAVENOUS at 14:19

## 2020-01-01 RX ADMIN — ENOXAPARIN SODIUM 40 MG: 40 INJECTION SUBCUTANEOUS at 09:16

## 2020-01-01 RX ADMIN — CARVEDILOL 12.5 MG: 12.5 TABLET, FILM COATED ORAL at 16:28

## 2020-01-01 RX ADMIN — PHENYLEPHRINE HYDROCHLORIDE 100 MCG: 10 INJECTION INTRAVENOUS at 14:08

## 2020-01-01 RX ADMIN — SODIUM CHLORIDE 500 ML: 9 INJECTION, SOLUTION INTRAVENOUS at 10:30

## 2020-01-01 RX ADMIN — DEXAMETHASONE SODIUM PHOSPHATE 4 MG: 4 INJECTION, SOLUTION INTRAMUSCULAR; INTRAVENOUS at 21:53

## 2020-01-01 RX ADMIN — NYSTATIN 500000 UNITS: 100000 SUSPENSION ORAL at 18:02

## 2020-01-01 RX ADMIN — SODIUM CHLORIDE 500 ML: 9 INJECTION, SOLUTION INTRAVENOUS at 08:25

## 2020-01-01 RX ADMIN — PANTOPRAZOLE SODIUM 40 MG: 40 TABLET, DELAYED RELEASE ORAL at 08:59

## 2020-01-01 RX ADMIN — CARVEDILOL 12.5 MG: 12.5 TABLET, FILM COATED ORAL at 08:03

## 2020-01-01 RX ADMIN — DESMOPRESSIN ACETATE 40 MG: 0.2 TABLET ORAL at 20:57

## 2020-01-01 RX ADMIN — NYSTATIN 500000 UNITS: 100000 SUSPENSION ORAL at 08:47

## 2020-01-01 RX ADMIN — Medication 10 ML: at 15:53

## 2020-01-01 RX ADMIN — FOLIC ACID 1 MG: 1 TABLET ORAL at 09:16

## 2020-01-01 RX ADMIN — DRONABINOL 2.5 MG: 2.5 CAPSULE ORAL at 06:15

## 2020-01-01 RX ADMIN — FOLIC ACID 1 MG: 1 TABLET ORAL at 08:42

## 2020-01-01 RX ADMIN — CLINDAMYCIN IN 5 PERCENT DEXTROSE 600 MG: 12 INJECTION, SOLUTION INTRAVENOUS at 14:15

## 2020-01-01 RX ADMIN — Medication 10 ML: at 10:23

## 2020-01-01 RX ADMIN — SODIUM CHLORIDE: 9 INJECTION, SOLUTION INTRAVENOUS at 10:21

## 2020-01-01 RX ADMIN — FOLIC ACID 1 MG: 1 TABLET ORAL at 10:08

## 2020-01-01 RX ADMIN — OXYCODONE HYDROCHLORIDE 5 MG: 5 TABLET ORAL at 09:11

## 2020-01-01 RX ADMIN — SODIUM CHLORIDE, POTASSIUM CHLORIDE, SODIUM LACTATE AND CALCIUM CHLORIDE: 600; 310; 30; 20 INJECTION, SOLUTION INTRAVENOUS at 13:47

## 2020-01-01 RX ADMIN — MAGNESIUM SULFATE HEPTAHYDRATE 1 G: 1 INJECTION, SOLUTION INTRAVENOUS at 02:00

## 2020-01-01 RX ADMIN — CARVEDILOL 12.5 MG: 12.5 TABLET, FILM COATED ORAL at 08:58

## 2020-01-01 RX ADMIN — PANTOPRAZOLE SODIUM 40 MG: 40 TABLET, DELAYED RELEASE ORAL at 05:37

## 2020-01-01 RX ADMIN — OXYCODONE HYDROCHLORIDE 5 MG: 5 TABLET ORAL at 23:28

## 2020-01-01 RX ADMIN — Medication 10 ML: at 10:02

## 2020-01-01 RX ADMIN — NYSTATIN 500000 UNITS: 100000 SUSPENSION ORAL at 16:45

## 2020-01-01 RX ADMIN — ASPIRIN 81 MG: 81 TABLET, CHEWABLE ORAL at 23:02

## 2020-01-01 RX ADMIN — Medication 10 ML: at 01:15

## 2020-01-01 RX ADMIN — PANTOPRAZOLE SODIUM 40 MG: 40 TABLET, DELAYED RELEASE ORAL at 05:48

## 2020-01-01 RX ADMIN — CARVEDILOL 12.5 MG: 12.5 TABLET, FILM COATED ORAL at 17:12

## 2020-01-01 RX ADMIN — CIPROFLOXACIN 500 MG: 500 TABLET ORAL at 23:05

## 2020-01-01 RX ADMIN — OXYCODONE HYDROCHLORIDE 5 MG: 5 TABLET ORAL at 23:13

## 2020-01-01 RX ADMIN — ROCURONIUM BROMIDE 30 MG: 10 INJECTION INTRAVENOUS at 14:08

## 2020-01-01 RX ADMIN — HEPARIN SODIUM 5000 UNITS: 5000 INJECTION INTRAVENOUS; SUBCUTANEOUS at 23:03

## 2020-01-01 RX ADMIN — Medication 10 ML: at 08:15

## 2020-01-01 RX ADMIN — SODIUM CHLORIDE: 9 INJECTION, SOLUTION INTRAVENOUS at 13:53

## 2020-01-01 RX ADMIN — ACETAMINOPHEN 650 MG: 325 TABLET ORAL at 05:01

## 2020-01-01 RX ADMIN — PANTOPRAZOLE SODIUM 40 MG: 40 TABLET, DELAYED RELEASE ORAL at 07:47

## 2020-01-01 RX ADMIN — ENOXAPARIN SODIUM 40 MG: 40 INJECTION SUBCUTANEOUS at 08:58

## 2020-01-01 RX ADMIN — FOLIC ACID 1 MG: 1 TABLET ORAL at 08:54

## 2020-01-01 RX ADMIN — ASPIRIN 81 MG: 81 TABLET, CHEWABLE ORAL at 08:42

## 2020-01-01 RX ADMIN — SACUBITRIL AND VALSARTAN 1 TABLET: 24; 26 TABLET, FILM COATED ORAL at 14:09

## 2020-01-01 RX ADMIN — POTASSIUM CHLORIDE 40 MEQ: 1500 TABLET, EXTENDED RELEASE ORAL at 08:54

## 2020-01-01 RX ADMIN — FOSAPREPITANT 150 MG: 150 INJECTION, POWDER, LYOPHILIZED, FOR SOLUTION INTRAVENOUS at 10:17

## 2020-01-01 RX ADMIN — OXYCODONE HYDROCHLORIDE 5 MG: 5 TABLET ORAL at 14:02

## 2020-01-01 RX ADMIN — SODIUM CHLORIDE 200 MG: 9 INJECTION, SOLUTION INTRAVENOUS at 10:54

## 2020-01-01 RX ADMIN — OXYCODONE HYDROCHLORIDE 5 MG: 5 TABLET ORAL at 10:10

## 2020-01-01 RX ADMIN — SODIUM CHLORIDE: 9 INJECTION, SOLUTION INTRAVENOUS at 13:04

## 2020-01-01 RX ADMIN — FOLIC ACID 1 MG: 1 TABLET ORAL at 08:41

## 2020-01-01 RX ADMIN — NYSTATIN 500000 UNITS: 100000 SUSPENSION ORAL at 09:03

## 2020-01-01 RX ADMIN — CARVEDILOL 12.5 MG: 12.5 TABLET, FILM COATED ORAL at 17:30

## 2020-01-01 RX ADMIN — PANTOPRAZOLE SODIUM 40 MG: 40 TABLET, DELAYED RELEASE ORAL at 06:33

## 2020-01-01 RX ADMIN — CARVEDILOL 12.5 MG: 12.5 TABLET, FILM COATED ORAL at 08:28

## 2020-01-01 RX ADMIN — PHENYLEPHRINE HYDROCHLORIDE 100 MCG: 10 INJECTION INTRAVENOUS at 14:40

## 2020-01-01 RX ADMIN — POTASSIUM CHLORIDE 10 MEQ: 7.46 INJECTION, SOLUTION INTRAVENOUS at 08:10

## 2020-01-01 RX ADMIN — ENOXAPARIN SODIUM 40 MG: 40 INJECTION SUBCUTANEOUS at 16:16

## 2020-01-01 RX ADMIN — SODIUM CHLORIDE 20 ML/HR: 9 INJECTION, SOLUTION INTRAVENOUS at 10:55

## 2020-01-01 RX ADMIN — Medication 10 ML: at 20:05

## 2020-01-01 RX ADMIN — FOLIC ACID 1 MG: 1 TABLET ORAL at 16:16

## 2020-01-01 RX ADMIN — IOHEXOL 100 ML: 350 INJECTION, SOLUTION INTRAVENOUS at 11:49

## 2020-01-01 RX ADMIN — SACUBITRIL AND VALSARTAN 1 TABLET: 24; 26 TABLET, FILM COATED ORAL at 08:42

## 2020-01-01 RX ADMIN — SACUBITRIL AND VALSARTAN 1 TABLET: 24; 26 TABLET, FILM COATED ORAL at 09:16

## 2020-01-01 RX ADMIN — TBO-FILGRASTIM 300 MCG: 300 INJECTION, SOLUTION SUBCUTANEOUS at 10:23

## 2020-01-01 RX ADMIN — OXYCODONE HYDROCHLORIDE 5 MG: 5 TABLET ORAL at 20:20

## 2020-01-01 RX ADMIN — CIPROFLOXACIN 500 MG: 500 TABLET ORAL at 08:58

## 2020-01-01 RX ADMIN — FENTANYL CITRATE 50 MCG: 50 INJECTION INTRAMUSCULAR; INTRAVENOUS at 14:53

## 2020-01-01 RX ADMIN — SODIUM CHLORIDE: 9 INJECTION, SOLUTION INTRAVENOUS at 12:16

## 2020-01-01 RX ADMIN — CARVEDILOL 12.5 MG: 12.5 TABLET, FILM COATED ORAL at 07:47

## 2020-01-01 RX ADMIN — ENOXAPARIN SODIUM 40 MG: 40 INJECTION SUBCUTANEOUS at 08:47

## 2020-01-01 RX ADMIN — DOCUSATE SODIUM 100 MG: 100 CAPSULE, LIQUID FILLED ORAL at 08:28

## 2020-01-01 RX ADMIN — OXYCODONE HYDROCHLORIDE 5 MG: 5 TABLET ORAL at 09:01

## 2020-01-01 RX ADMIN — NYSTATIN 500000 UNITS: 100000 SUSPENSION ORAL at 10:08

## 2020-01-01 RX ADMIN — PALONOSETRON 0.25 MG: 0.05 INJECTION, SOLUTION INTRAVENOUS at 10:53

## 2020-01-01 RX ADMIN — OXYCODONE HYDROCHLORIDE 10 MG: 5 TABLET ORAL at 08:15

## 2020-01-01 RX ADMIN — SODIUM CHLORIDE, PRESERVATIVE FREE 10 ML: 5 INJECTION INTRAVENOUS at 19:51

## 2020-01-01 RX ADMIN — DEXAMETHASONE SODIUM PHOSPHATE 4 MG: 4 INJECTION, SOLUTION INTRAMUSCULAR; INTRAVENOUS at 14:55

## 2020-01-01 RX ADMIN — DEXAMETHASONE SODIUM PHOSPHATE 4 MG: 4 INJECTION, SOLUTION INTRAMUSCULAR; INTRAVENOUS at 15:53

## 2020-01-01 RX ADMIN — DEXAMETHASONE SODIUM PHOSPHATE 4 MG: 4 INJECTION, SOLUTION INTRAMUSCULAR; INTRAVENOUS at 18:16

## 2020-01-01 RX ADMIN — NYSTATIN 500000 UNITS: 100000 SUSPENSION ORAL at 17:31

## 2020-01-01 RX ADMIN — POTASSIUM CHLORIDE 20 MEQ: 1500 TABLET, EXTENDED RELEASE ORAL at 08:41

## 2020-01-01 RX ADMIN — SODIUM CHLORIDE, PRESERVATIVE FREE 10 ML: 5 INJECTION INTRAVENOUS at 08:48

## 2020-01-01 RX ADMIN — DEXAMETHASONE SODIUM PHOSPHATE 4 MG: 4 INJECTION, SOLUTION INTRAMUSCULAR; INTRAVENOUS at 01:52

## 2020-01-01 RX ADMIN — POLYETHYLENE GLYCOL 3350 17 G: 17 POWDER, FOR SOLUTION ORAL at 17:26

## 2020-01-01 RX ADMIN — NYSTATIN 500000 UNITS: 100000 SUSPENSION ORAL at 20:03

## 2020-01-01 RX ADMIN — LORAZEPAM 0.5 MG: 0.5 TABLET ORAL at 12:27

## 2020-01-01 RX ADMIN — PANTOPRAZOLE SODIUM 40 MG: 40 TABLET, DELAYED RELEASE ORAL at 08:42

## 2020-01-01 RX ADMIN — TBO-FILGRASTIM 300 MCG: 300 INJECTION, SOLUTION SUBCUTANEOUS at 07:47

## 2020-01-01 RX ADMIN — SODIUM CHLORIDE: 9 INJECTION, SOLUTION INTRAVENOUS at 01:15

## 2020-01-01 RX ADMIN — DEXAMETHASONE SODIUM PHOSPHATE 4 MG: 4 INJECTION, SOLUTION INTRAMUSCULAR; INTRAVENOUS at 07:52

## 2020-01-01 RX ADMIN — SODIUM CHLORIDE: 9 INJECTION, SOLUTION INTRAVENOUS at 21:07

## 2020-01-01 RX ADMIN — HEPARIN 500 UNITS: 100 SYRINGE at 13:47

## 2020-01-01 RX ADMIN — PROPOFOL 100 MG: 10 INJECTION, EMULSION INTRAVENOUS at 14:08

## 2020-01-01 RX ADMIN — SODIUM CHLORIDE, PRESERVATIVE FREE 10 ML: 5 INJECTION INTRAVENOUS at 09:10

## 2020-01-01 RX ADMIN — CALCIUM GLUCONATE 1 G: 20 INJECTION, SOLUTION INTRAVENOUS at 23:20

## 2020-01-01 RX ADMIN — OXYCODONE HYDROCHLORIDE 10 MG: 5 TABLET ORAL at 21:00

## 2020-01-01 RX ADMIN — Medication 10 ML: at 07:52

## 2020-01-01 RX ADMIN — ENOXAPARIN SODIUM 40 MG: 40 INJECTION SUBCUTANEOUS at 08:41

## 2020-01-01 RX ADMIN — OXYCODONE HYDROCHLORIDE 10 MG: 5 TABLET ORAL at 04:28

## 2020-01-01 RX ADMIN — FOLIC ACID 1 MG: 1 TABLET ORAL at 09:03

## 2020-01-01 RX ADMIN — PHENYLEPHRINE HYDROCHLORIDE 200 MCG: 10 INJECTION INTRAVENOUS at 15:11

## 2020-01-01 RX ADMIN — Medication 10 ML: at 13:05

## 2020-01-01 RX ADMIN — ASPIRIN 81 MG: 81 TABLET, CHEWABLE ORAL at 17:13

## 2020-01-01 RX ADMIN — PHENYLEPHRINE HYDROCHLORIDE 100 MCG: 10 INJECTION INTRAVENOUS at 14:51

## 2020-01-01 RX ADMIN — HEPARIN SODIUM 5000 UNITS: 5000 INJECTION INTRAVENOUS; SUBCUTANEOUS at 15:52

## 2020-01-01 RX ADMIN — OXYCODONE HYDROCHLORIDE 5 MG: 5 TABLET ORAL at 12:51

## 2020-01-01 RX ADMIN — Medication 10 ML: at 09:00

## 2020-01-01 RX ADMIN — OXYCODONE HYDROCHLORIDE 5 MG: 5 TABLET ORAL at 18:33

## 2020-01-01 RX ADMIN — OXYCODONE HYDROCHLORIDE 10 MG: 5 TABLET ORAL at 04:29

## 2020-01-01 RX ADMIN — DESMOPRESSIN ACETATE 40 MG: 0.2 TABLET ORAL at 20:11

## 2020-01-01 RX ADMIN — ASPIRIN 81 MG: 81 TABLET, CHEWABLE ORAL at 07:47

## 2020-01-01 RX ADMIN — SODIUM CHLORIDE: 9 INJECTION, SOLUTION INTRAVENOUS at 17:13

## 2020-01-01 RX ADMIN — Medication 10 ML: at 11:31

## 2020-01-01 RX ADMIN — SODIUM CHLORIDE, PRESERVATIVE FREE 10 ML: 5 INJECTION INTRAVENOUS at 10:10

## 2020-01-01 RX ADMIN — Medication 2.5 MG: at 15:12

## 2020-01-01 RX ADMIN — LEVOFLOXACIN 500 MG: 5 INJECTION, SOLUTION INTRAVENOUS at 17:15

## 2020-01-01 RX ADMIN — PANTOPRAZOLE SODIUM 40 MG: 40 TABLET, DELAYED RELEASE ORAL at 10:22

## 2020-01-01 RX ADMIN — MIDAZOLAM HYDROCHLORIDE 1 MG: 1 INJECTION, SOLUTION INTRAMUSCULAR; INTRAVENOUS at 14:36

## 2020-01-01 RX ADMIN — POTASSIUM CHLORIDE 40 MEQ: 1500 TABLET, EXTENDED RELEASE ORAL at 09:56

## 2020-01-01 RX ADMIN — FOLIC ACID 1 MG: 1 TABLET ORAL at 08:58

## 2020-01-01 RX ADMIN — SODIUM CHLORIDE: 9 INJECTION, SOLUTION INTRAVENOUS at 20:55

## 2020-01-01 RX ADMIN — HEPARIN 500 UNITS: 100 SYRINGE at 13:05

## 2020-01-01 RX ADMIN — Medication 10 ML: at 08:55

## 2020-01-01 RX ADMIN — OXYCODONE HYDROCHLORIDE 10 MG: 5 TABLET ORAL at 13:11

## 2020-01-01 RX ADMIN — POTASSIUM CHLORIDE 40 MEQ: 1500 TABLET, EXTENDED RELEASE ORAL at 10:21

## 2020-01-01 RX ADMIN — CARVEDILOL 12.5 MG: 12.5 TABLET, FILM COATED ORAL at 16:40

## 2020-01-01 RX ADMIN — CARVEDILOL 12.5 MG: 12.5 TABLET, FILM COATED ORAL at 10:01

## 2020-01-01 RX ADMIN — Medication 500 UNITS: at 14:28

## 2020-01-01 RX ADMIN — ONDANSETRON 4 MG: 4 TABLET, ORALLY DISINTEGRATING ORAL at 14:00

## 2020-01-01 RX ADMIN — OXYCODONE HYDROCHLORIDE 5 MG: 5 TABLET ORAL at 08:57

## 2020-01-01 RX ADMIN — IOHEXOL 75 ML: 350 INJECTION, SOLUTION INTRAVENOUS at 21:40

## 2020-01-01 RX ADMIN — SODIUM CHLORIDE 200 MG: 9 INJECTION, SOLUTION INTRAVENOUS at 11:19

## 2020-01-01 RX ADMIN — Medication 10 ML: at 08:41

## 2020-01-01 RX ADMIN — NYSTATIN 500000 UNITS: 100000 SUSPENSION ORAL at 08:04

## 2020-01-01 RX ADMIN — PHENYLEPHRINE HYDROCHLORIDE 100 MCG: 10 INJECTION INTRAVENOUS at 14:32

## 2020-01-01 RX ADMIN — ASPIRIN 81 MG: 81 TABLET, CHEWABLE ORAL at 08:41

## 2020-01-01 RX ADMIN — SACUBITRIL AND VALSARTAN 1 TABLET: 24; 26 TABLET, FILM COATED ORAL at 08:54

## 2020-01-01 RX ADMIN — DEXAMETHASONE SODIUM PHOSPHATE 4 MG: 4 INJECTION, SOLUTION INTRAMUSCULAR; INTRAVENOUS at 11:29

## 2020-01-01 RX ADMIN — Medication 0.1 MG: at 14:09

## 2020-01-01 RX ADMIN — LEVOFLOXACIN 500 MG: 5 INJECTION, SOLUTION INTRAVENOUS at 18:34

## 2020-01-01 RX ADMIN — SODIUM CHLORIDE: 9 INJECTION, SOLUTION INTRAVENOUS at 20:05

## 2020-01-01 RX ADMIN — MAGNESIUM CITRATE 148 ML: 1.75 LIQUID ORAL at 17:12

## 2020-01-01 RX ADMIN — DEXAMETHASONE SODIUM PHOSPHATE 4 MG: 4 INJECTION, SOLUTION INTRAMUSCULAR; INTRAVENOUS at 00:31

## 2020-01-01 RX ADMIN — ASPIRIN 81 MG: 81 TABLET, CHEWABLE ORAL at 09:16

## 2020-01-01 RX ADMIN — NYSTATIN 500000 UNITS: 100000 SUSPENSION ORAL at 12:46

## 2020-01-01 RX ADMIN — ONDANSETRON 4 MG: 2 INJECTION, SOLUTION INTRAMUSCULAR; INTRAVENOUS at 15:12

## 2020-01-01 RX ADMIN — SODIUM CHLORIDE: 9 INJECTION, SOLUTION INTRAVENOUS at 06:51

## 2020-01-01 RX ADMIN — PANTOPRAZOLE SODIUM 40 MG: 40 TABLET, DELAYED RELEASE ORAL at 05:47

## 2020-01-01 RX ADMIN — CARVEDILOL 12.5 MG: 12.5 TABLET, FILM COATED ORAL at 09:03

## 2020-01-01 RX ADMIN — ACETAMINOPHEN 650 MG: 325 TABLET ORAL at 11:30

## 2020-01-01 RX ADMIN — ASPIRIN 81 MG: 81 TABLET, CHEWABLE ORAL at 08:28

## 2020-01-01 RX ADMIN — Medication 10 ML: at 08:28

## 2020-01-01 RX ADMIN — Medication 50 MCG: at 14:19

## 2020-01-01 RX ADMIN — PANTOPRAZOLE SODIUM 40 MG: 40 TABLET, DELAYED RELEASE ORAL at 06:46

## 2020-01-01 RX ADMIN — VANCOMYCIN HYDROCHLORIDE 1000 MG: 1 INJECTION, SOLUTION INTRAVENOUS at 22:05

## 2020-01-01 ASSESSMENT — PAIN DESCRIPTION - ORIENTATION
ORIENTATION: MID
ORIENTATION: LOWER
ORIENTATION: MID
ORIENTATION: RIGHT
ORIENTATION: MID
ORIENTATION: MID
ORIENTATION: RIGHT
ORIENTATION: RIGHT
ORIENTATION: LOWER
ORIENTATION: RIGHT
ORIENTATION: RIGHT
ORIENTATION: MID

## 2020-01-01 ASSESSMENT — PAIN SCALES - GENERAL
PAINLEVEL_OUTOF10: 3
PAINLEVEL_OUTOF10: 3
PAINLEVEL_OUTOF10: 0
PAINLEVEL_OUTOF10: 8
PAINLEVEL_OUTOF10: 4
PAINLEVEL_OUTOF10: 0
PAINLEVEL_OUTOF10: 3
PAINLEVEL_OUTOF10: 3
PAINLEVEL_OUTOF10: 7
PAINLEVEL_OUTOF10: 7
PAINLEVEL_OUTOF10: 0
PAINLEVEL_OUTOF10: 9
PAINLEVEL_OUTOF10: 3
PAINLEVEL_OUTOF10: 5
PAINLEVEL_OUTOF10: 4
PAINLEVEL_OUTOF10: 5
PAINLEVEL_OUTOF10: 0
PAINLEVEL_OUTOF10: 8
PAINLEVEL_OUTOF10: 6
PAINLEVEL_OUTOF10: 9
PAINLEVEL_OUTOF10: 6
PAINLEVEL_OUTOF10: 5
PAINLEVEL_OUTOF10: 2
PAINLEVEL_OUTOF10: 6
PAINLEVEL_OUTOF10: 8
PAINLEVEL_OUTOF10: 6
PAINLEVEL_OUTOF10: 0
PAINLEVEL_OUTOF10: 3
PAINLEVEL_OUTOF10: 9
PAINLEVEL_OUTOF10: 5
PAINLEVEL_OUTOF10: 0
PAINLEVEL_OUTOF10: 7
PAINLEVEL_OUTOF10: 8
PAINLEVEL_OUTOF10: 9
PAINLEVEL_OUTOF10: 6
PAINLEVEL_OUTOF10: 6
PAINLEVEL_OUTOF10: 0
PAINLEVEL_OUTOF10: 3
PAINLEVEL_OUTOF10: 6
PAINLEVEL_OUTOF10: 6
PAINLEVEL_OUTOF10: 5
PAINLEVEL_OUTOF10: 8
PAINLEVEL_OUTOF10: 7
PAINLEVEL_OUTOF10: 7
PAINLEVEL_OUTOF10: 6
PAINLEVEL_OUTOF10: 7
PAINLEVEL_OUTOF10: 5
PAINLEVEL_OUTOF10: 9
PAINLEVEL_OUTOF10: 4
PAINLEVEL_OUTOF10: 0
PAINLEVEL_OUTOF10: 5
PAINLEVEL_OUTOF10: 5
PAINLEVEL_OUTOF10: 0
PAINLEVEL_OUTOF10: 0
PAINLEVEL_OUTOF10: 4
PAINLEVEL_OUTOF10: 4
PAINLEVEL_OUTOF10: 8
PAINLEVEL_OUTOF10: 6
PAINLEVEL_OUTOF10: 0
PAINLEVEL_OUTOF10: 0
PAINLEVEL_OUTOF10: 9
PAINLEVEL_OUTOF10: 6
PAINLEVEL_OUTOF10: 5
PAINLEVEL_OUTOF10: 0
PAINLEVEL_OUTOF10: 8
PAINLEVEL_OUTOF10: 7
PAINLEVEL_OUTOF10: 3
PAINLEVEL_OUTOF10: 4
PAINLEVEL_OUTOF10: 6
PAINLEVEL_OUTOF10: 7
PAINLEVEL_OUTOF10: 2
PAINLEVEL_OUTOF10: 3
PAINLEVEL_OUTOF10: 0
PAINLEVEL_OUTOF10: 8
PAINLEVEL_OUTOF10: 4
PAINLEVEL_OUTOF10: 3
PAINLEVEL_OUTOF10: 6
PAINLEVEL_OUTOF10: 7
PAINLEVEL_OUTOF10: 0
PAINLEVEL_OUTOF10: 6
PAINLEVEL_OUTOF10: 1
PAINLEVEL_OUTOF10: 0
PAINLEVEL_OUTOF10: 2
PAINLEVEL_OUTOF10: 8
PAINLEVEL_OUTOF10: 0
PAINLEVEL_OUTOF10: 4
PAINLEVEL_OUTOF10: 5
PAINLEVEL_OUTOF10: 7
PAINLEVEL_OUTOF10: 5
PAINLEVEL_OUTOF10: 6
PAINLEVEL_OUTOF10: 0
PAINLEVEL_OUTOF10: 8
PAINLEVEL_OUTOF10: 0
PAINLEVEL_OUTOF10: 0
PAINLEVEL_OUTOF10: 7

## 2020-01-01 ASSESSMENT — PAIN DESCRIPTION - PROGRESSION

## 2020-01-01 ASSESSMENT — ENCOUNTER SYMPTOMS
COUGH: 0
BLOOD IN STOOL: 0
PHOTOPHOBIA: 0
SINUS PRESSURE: 0
BACK PAIN: 1
EYE PAIN: 0
GASTROINTESTINAL NEGATIVE: 1
NAUSEA: 0
CONSTIPATION: 1
NAUSEA: 0
WHEEZING: 0
RHINORRHEA: 0
WHEEZING: 0
BACK PAIN: 1
SINUS PRESSURE: 0
EYES NEGATIVE: 1
WHEEZING: 0
VOMITING: 0
NAUSEA: 0
RESPIRATORY NEGATIVE: 1
COUGH: 0
COUGH: 0
CONSTIPATION: 0
SORE THROAT: 0
EYE PAIN: 0
BLOOD IN STOOL: 0
EYES NEGATIVE: 1
CHOKING: 0
ABDOMINAL PAIN: 0
TROUBLE SWALLOWING: 0
COUGH: 0
SHORTNESS OF BREATH: 1
RHINORRHEA: 0
CHOKING: 0
DIARRHEA: 0
SHORTNESS OF BREATH: 0
ABDOMINAL PAIN: 0
CHOKING: 0
ABDOMINAL PAIN: 1
SORE THROAT: 0
BACK PAIN: 0
FACIAL SWELLING: 0
DIARRHEA: 0
ABDOMINAL PAIN: 1
RHINORRHEA: 0
ABDOMINAL PAIN: 0
BACK PAIN: 1
SHORTNESS OF BREATH: 0
NAUSEA: 0
SHORTNESS OF BREATH: 0
SHORTNESS OF BREATH: 0
CHOKING: 0
ANAL BLEEDING: 0
DIARRHEA: 0
COUGH: 0
CHOKING: 0
NAUSEA: 1
SHORTNESS OF BREATH: 0
COLOR CHANGE: 0
SHORTNESS OF BREATH: 0
GASTROINTESTINAL NEGATIVE: 1
ABDOMINAL PAIN: 0
ABDOMINAL PAIN: 0
SINUS PRESSURE: 0
RHINORRHEA: 0
TROUBLE SWALLOWING: 1
COUGH: 0
BLOOD IN STOOL: 0
BLOOD IN STOOL: 0
SORE THROAT: 1
VOMITING: 0
CONSTIPATION: 0
VOICE CHANGE: 0
CONSTIPATION: 0
SHORTNESS OF BREATH: 0
RHINORRHEA: 0
CHEST TIGHTNESS: 0
CHEST TIGHTNESS: 0
APNEA: 0
WHEEZING: 0
VOMITING: 1
BACK PAIN: 1
DIARRHEA: 0
VOMITING: 0
WHEEZING: 0
VOICE CHANGE: 0
DIARRHEA: 1
VOICE CHANGE: 0
ABDOMINAL PAIN: 0
DIARRHEA: 0
SHORTNESS OF BREATH: 0
BACK PAIN: 0
VOMITING: 0
SHORTNESS OF BREATH: 0
CHEST TIGHTNESS: 0
CONSTIPATION: 0
DIARRHEA: 0
CHEST TIGHTNESS: 0
SHORTNESS OF BREATH: 1
BACK PAIN: 0
RHINORRHEA: 0
SORE THROAT: 0
CONSTIPATION: 0
NAUSEA: 0
CONSTIPATION: 1
NAUSEA: 0
DIARRHEA: 0
COUGH: 0
VOMITING: 0
BACK PAIN: 1
NAUSEA: 0
COUGH: 0
NAUSEA: 0
CHEST TIGHTNESS: 0
CONSTIPATION: 1
CONSTIPATION: 0
VOMITING: 0
VOMITING: 0
ABDOMINAL DISTENTION: 0
WHEEZING: 0
BACK PAIN: 1
ABDOMINAL PAIN: 0
CONSTIPATION: 0
BLOOD IN STOOL: 0
ALLERGIC/IMMUNOLOGIC NEGATIVE: 1
VOICE CHANGE: 0
NAUSEA: 0
COLOR CHANGE: 0
CONSTIPATION: 0
NAUSEA: 0
RECTAL PAIN: 0
ABDOMINAL PAIN: 0
SORE THROAT: 0
CHEST TIGHTNESS: 0
VOMITING: 0
WHEEZING: 0
WHEEZING: 0
SINUS PRESSURE: 0
EYE DISCHARGE: 0
VOMITING: 0
CHEST TIGHTNESS: 0
ABDOMINAL PAIN: 0
COUGH: 0
SHORTNESS OF BREATH: 0
SHORTNESS OF BREATH: 0
EYES NEGATIVE: 1
SINUS PRESSURE: 0
DIARRHEA: 0
STRIDOR: 0
DIARRHEA: 0
COUGH: 0
CHEST TIGHTNESS: 0
DIARRHEA: 0
COUGH: 1
ABDOMINAL PAIN: 0
VOMITING: 0
TACHYPNEA: 1

## 2020-01-01 ASSESSMENT — PAIN DESCRIPTION - LOCATION
LOCATION: ABDOMEN;BACK
LOCATION: BACK
LOCATION: CHEST;FLANK
LOCATION: BACK
LOCATION: CHEST;FLANK
LOCATION: BACK
LOCATION: CHEST;FLANK
LOCATION: BACK;OTHER (COMMENT)
LOCATION: CHEST;FLANK
LOCATION: CHEST;FLANK
LOCATION: BACK

## 2020-01-01 ASSESSMENT — PAIN DESCRIPTION - PAIN TYPE
TYPE: ACUTE PAIN
TYPE: ACUTE PAIN
TYPE: CHRONIC PAIN
TYPE: ACUTE PAIN
TYPE: CHRONIC PAIN
TYPE: ACUTE PAIN
TYPE: CHRONIC PAIN
TYPE: CHRONIC PAIN
TYPE: ACUTE PAIN
TYPE: CHRONIC PAIN
TYPE: ACUTE PAIN
TYPE: CHRONIC PAIN
TYPE: ACUTE PAIN
TYPE: ACUTE PAIN;SURGICAL PAIN
TYPE: CHRONIC PAIN
TYPE: ACUTE PAIN
TYPE: CHRONIC PAIN
TYPE: ACUTE PAIN
TYPE: ACUTE PAIN;SURGICAL PAIN
TYPE: CHRONIC PAIN
TYPE: ACUTE PAIN
TYPE: ACUTE PAIN
TYPE: CHRONIC PAIN
TYPE: ACUTE PAIN

## 2020-01-01 ASSESSMENT — PULMONARY FUNCTION TESTS
PIF_VALUE: 14
PIF_VALUE: 14
PIF_VALUE: 12
PIF_VALUE: 11
PIF_VALUE: 15
PIF_VALUE: 11
PIF_VALUE: 11
PIF_VALUE: 24
PIF_VALUE: 1
PIF_VALUE: 14
PIF_VALUE: 13
PIF_VALUE: 17
PIF_VALUE: 17
PIF_VALUE: 14
PIF_VALUE: 13
PIF_VALUE: 15
PIF_VALUE: 13
PIF_VALUE: 11
PIF_VALUE: 11
PIF_VALUE: 15
PIF_VALUE: 14
PIF_VALUE: 13
PIF_VALUE: 12
PIF_VALUE: 14
PIF_VALUE: 14
PIF_VALUE: 1
PIF_VALUE: 1
PIF_VALUE: 13
PIF_VALUE: 13
PIF_VALUE: 14
PIF_VALUE: 13
PIF_VALUE: 13
PIF_VALUE: 2
PIF_VALUE: 15
PIF_VALUE: 1
PIF_VALUE: 14
PIF_VALUE: 1
PIF_VALUE: 14
PIF_VALUE: 13
PIF_VALUE: 19
PIF_VALUE: 14
PIF_VALUE: 13
PIF_VALUE: 3
PIF_VALUE: 14
PIF_VALUE: 1
PIF_VALUE: 14
PIF_VALUE: 13
PIF_VALUE: 10
PIF_VALUE: 14
PIF_VALUE: 13
PIF_VALUE: 11
PIF_VALUE: 14
PIF_VALUE: 13
PIF_VALUE: 15
PIF_VALUE: 13
PIF_VALUE: 11
PIF_VALUE: 0
PIF_VALUE: 13
PIF_VALUE: 14
PIF_VALUE: 11
PIF_VALUE: 13
PIF_VALUE: 16
PIF_VALUE: 14
PIF_VALUE: 13
PIF_VALUE: 1
PIF_VALUE: 13
PIF_VALUE: 11
PIF_VALUE: 4
PIF_VALUE: 11
PIF_VALUE: 4

## 2020-01-01 ASSESSMENT — PAIN DESCRIPTION - FREQUENCY
FREQUENCY: CONTINUOUS
FREQUENCY: INTERMITTENT
FREQUENCY: CONTINUOUS
FREQUENCY: INTERMITTENT
FREQUENCY: INTERMITTENT
FREQUENCY: CONTINUOUS
FREQUENCY: INTERMITTENT
FREQUENCY: CONTINUOUS

## 2020-01-01 ASSESSMENT — PAIN DESCRIPTION - ONSET
ONSET: ON-GOING

## 2020-01-01 ASSESSMENT — PAIN DESCRIPTION - DESCRIPTORS
DESCRIPTORS: ACHING
DESCRIPTORS: DISCOMFORT
DESCRIPTORS: SHOOTING
DESCRIPTORS: ACHING;DISCOMFORT
DESCRIPTORS: OTHER (COMMENT)
DESCRIPTORS: ACHING
DESCRIPTORS: SORE
DESCRIPTORS: DISCOMFORT;ACHING
DESCRIPTORS: ACHING
DESCRIPTORS: ACHING;DISCOMFORT
DESCRIPTORS: ACHING;DISCOMFORT
DESCRIPTORS: DISCOMFORT;ACHING
DESCRIPTORS: ACHING
DESCRIPTORS: ACHING
DESCRIPTORS: ACHING;DISCOMFORT
DESCRIPTORS: DISCOMFORT
DESCRIPTORS: ACHING
DESCRIPTORS: ACHING

## 2020-01-01 ASSESSMENT — PAIN - FUNCTIONAL ASSESSMENT
PAIN_FUNCTIONAL_ASSESSMENT: 0-10
PAIN_FUNCTIONAL_ASSESSMENT: 0-10

## 2020-01-01 ASSESSMENT — PAIN SCALES - WONG BAKER: WONGBAKER_NUMERICALRESPONSE: 6

## 2020-08-04 NOTE — ED PROVIDER NOTES
Cardiovascular: Negative for chest pain and palpitations. Gastrointestinal: Positive for abdominal pain and constipation. Negative for abdominal distention. Genitourinary: Negative for difficulty urinating and flank pain. Musculoskeletal: Positive for back pain. Right arm pain   Skin: Negative for wound. Neurological: Negative for dizziness, light-headedness and headaches. PASTMEDICAL HISTORY     Past Medical History:   Diagnosis Date    Cardiac resynchronization therapy defibrillator (CRT-D) in place     Chest pain 02/08/2019    Coronary artery disease     Dyspnea on exertion     Elevated troponin 02/08/2019    Heart attack ([facility] [address]     Ischemic cardiomyopathy      SURGICAL HISTORY       Past Surgical History:   Procedure Laterality Date    CORONARY ANGIOPLASTY WITH STENT PLACEMENT  2012    BMS to LAD    PACEMAKER PLACEMENT      AICD     CURRENT MEDICATIONS       Previous Medications    ASPIRIN 81 MG CHEWABLE TABLET    Take 1 tablet by mouth daily    ATORVASTATIN (LIPITOR) 80 MG TABLET    Take 1 tablet by mouth nightly    CARVEDILOL (COREG) 12.5 MG TABLET    Take 12.5 mg by mouth 2 times daily (with meals)    FUROSEMIDE (LASIX) 20 MG TABLET    Take 1 tablet by mouth daily    LISINOPRIL (PRINIVIL;ZESTRIL) 5 MG TABLET    Take 1 tablet by mouth daily    METOPROLOL SUCCINATE (TOPROL XL) 25 MG EXTENDED RELEASE TABLET    Take 1 tablet by mouth nightly    NITROGLYCERIN (NITROSTAT) 0.4 MG SL TABLET    up to max of 3 total doses. If no relief after 1 dose, call 911. ALLERGIES     is allergic to penicillins. FAMILY HISTORY     has no family status information on file.       SOCIAL HISTORY       Social History     Tobacco Use    Smoking status: Former Smoker    Smokeless tobacco: Never Used   Substance Use Topics    Alcohol use: No    Drug use: Yes     Types: Marijuana     Comment: last used 2 days     PHYSICAL EXAM     INITIAL VITALS: BP (!) 153/73   Pulse 80   Temp 97.7 °F (36.5 °C) (Oral)   Resp 20   Ht 5' 5\" (1.651 m)   Wt 140 lb (63.5 kg)   SpO2 99%   BMI 23.30 kg/m²    Physical Exam  Vitals signs and nursing note reviewed. Constitutional:       General: He is not in acute distress. Appearance: He is well-developed. He is not diaphoretic. HENT:      Head: Normocephalic and atraumatic. Eyes:      Pupils: Pupils are equal, round, and reactive to light. Neck:      Musculoskeletal: Normal range of motion and neck supple. Cardiovascular:      Rate and Rhythm: Normal rate and regular rhythm. Pulmonary:      Effort: Pulmonary effort is normal.      Breath sounds: Normal breath sounds. Abdominal:      General: Bowel sounds are normal. There is distension. Palpations: Abdomen is soft. Tenderness: There is abdominal tenderness. Musculoskeletal: Normal range of motion. Skin:     General: Skin is warm. Capillary Refill: Capillary refill takes less than 2 seconds. Neurological:      Mental Status: He is alert and oriented to person, place, and time. MEDICAL DECISION MAKING:   The patient is a 69-year-old male who presented to the emergency department secondary to back pain and constipation arm pain and a pressure sensation. Initially in triage he only complains of back pain and constipation. However on my arrival to give a more detailed history, given this EKG, chest x-ray, labs including troponin obtained as well as a CTA to rule out dissection and or aneurysm. EKG atrial sensed ventricular paced rhythm. EKG February 2019 atrial paced ventricular rhythm no change from previous. Return to medical record reveals patient has had a previous history of cardiac cath in 2019, stent of the LAD with minimal disease in the LCx and RCA with severe LV dysfunction. Echocardiogram also on February 2019 EF of 15 to 20%, with moderately dilated LV with severely reduced function. Laboratory analysis no acute findings.   However chest x-ray concerning for right hilar mass with recommendation for a CT chest.  Patient's are scheduled to undergo a CTA chest, abdomen and pelvis. No acute findings but incidental finding of a right lung mass. Results discussed with patient. He stated in 2019 he was told by pulmonologist that he had a very small lesion on his lung nothing to really worry about. Given this he had not followed up. Patient will be given outpatient pulmonology and heme-onc follow-up. Given mag citrate constipation. Given outpatient follow-up and parameters to return to the emergency department. Patient discussed with Dr. Ayden Nix with the heme-onc service given CT findings. He agreed to see patient tomorrow in the office, this information was relayed to patient. Given contact information. And parameters to return to the emergency department. All patient's question's and concerns were answered prior to disposition and patient and/or family expressed understanding and agreement of treatment plan. CRITICAL CARE:              NIH STROKE SCALE:            PROCEDURES:    Procedures    DIAGNOSTIC RESULTS   EKG:All EKG's are interpreted by the Emergency Department Physician who either signs or Co-signs this chart in the absence of a cardiologist.        RADIOLOGY:All plain film, CT, MRI, and formal ultrasound images (except ED bedside ultrasound) are read by the radiologist, see reports below, unless otherwisenoted in MDM or here. CTA CHEST ABDOMEN PELVIS W CONTRAST   Preliminary Result   No aortic dissection or aneurysm. Large right lower lobe mass with additional RLL and RUL nodules, significant   right hilar/ipsilateral mediastinal adenopathy and small pleural effusion. Additional findings, as detailed in the body of the report above. RECOMMENDATIONS:   Bronchoscopic directed or percutaneous biopsy recommended. XR CHEST PORTABLE   Preliminary Result   New right hilar mass.       RECOMMENDATION:   CT scan of the chest recommended, with IV contrast if possible. LABS: All lab results were reviewed by myself, and all abnormals are listed below. Labs Reviewed   CBC WITH AUTO DIFFERENTIAL - Abnormal; Notable for the following components:       Result Value    Lymphocytes 16 (*)     Eosinophils % 9 (*)     Immature Granulocytes 1 (*)     Absolute Eos # 0.61 (*)     All other components within normal limits   COMPREHENSIVE METABOLIC PANEL W/ REFLEX TO MG FOR LOW K - Abnormal; Notable for the following components:    Glucose 101 (*)     CREATININE 1.29 (*)     Total Bilirubin 0.25 (*)     GFR Non- 55 (*)     All other components within normal limits   LIPASE   AMYLASE   LACTIC ACID   TROPONIN       EMERGENCY DEPARTMENTCOURSE:         Vitals:    Vitals:    08/04/20 1313 08/04/20 1314   BP: (!) 153/73    Pulse: 80    Resp: 20    Temp: 97.7 °F (36.5 °C)    TempSrc: Oral    SpO2: 99%    Weight:  140 lb (63.5 kg)   Height:  5' 5\" (1.651 m)       The patient was given the following medications while in the emergency department:  Orders Placed This Encounter   Medications    ondansetron (ZOFRAN) injection 4 mg    0.9 % sodium chloride bolus    sodium chloride flush 0.9 % injection 10 mL    iopamidol (ISOVUE-370) 76 % injection 100 mL    magnesium citrate solution 296 mL    docusate sodium (COLACE) 100 MG capsule     Sig: Take 1 capsule by mouth 2 times daily     Dispense:  15 capsule     Refill:  0    cyclobenzaprine (FLEXERIL) 10 MG tablet     Sig: Take 1 tablet by mouth nightly as needed for Muscle spasms     Dispense:  10 tablet     Refill:  0     CONSULTS:  None    FINAL IMPRESSION      1. Constipation, unspecified constipation type    2. Generalized abdominal pain    3. Abnormal chest x-ray    4.  Lung mass          DISPOSITION/PLAN   DISPOSITION Decision To Discharge 08/04/2020 04:36:54 PM      PATIENT REFERRED TO:  Curt Chairez MD  [address] [place] [address] [id number] [phone]    Call   Call the office early tomorrow morning to be seen tomorrow at 11:30am    DISCHARGE MEDICATIONS:  New Prescriptions    CYCLOBENZAPRINE (FLEXERIL) 10 MG TABLET    Take 1 tablet by mouth nightly as needed for Muscle spasms    DOCUSATE SODIUM (COLACE) 100 MG CAPSULE    Take 1 capsule by mouth 2 times daily     Michelle Schrader MD  Attending Emergency Physician    This note was created with the assistance of a speech-recognition program. While intending to generate a document that actually reflects the content of the visit, no guarantees can be provided that every mistake has been identified and corrected by editing.                     Michelle Schrader MD  81/[id number]/[id number]

## 2020-08-06 NOTE — TELEPHONE ENCOUNTER
DEDE CALLED BACK AND STATED THAT HE FEELS HE DOES NOT NEED DR VARGAS [facility]/BAL SERVICES RIGHT NOW. [address]  HE HAS HAD THE 2 CM SPOT ON HIS LUNG FOR 2 YEARS AND IT IS NOT GROWING AND HE FEELS FINE. HE ALSO STATES HE WOULD NOT EVER DO CHEMOTHERAPY BUT IF HE FEELS SICK HE WILL CALL THE OFFICE TO SCHEDULE WITH DR Gabriele Rinaldi.

## 2020-08-09 PROBLEM — R91.8 LUNG MASS: Status: ACTIVE | Noted: 2020-01-01

## 2020-08-09 PROBLEM — C79.31 BRAIN METASTASIS (HCC): Status: ACTIVE | Noted: 2020-01-01

## 2020-08-09 PROBLEM — N40.0 ENLARGED PROSTATE: Status: ACTIVE | Noted: 2020-01-01

## 2020-08-09 PROBLEM — R06.09 DYSPNEA ON EXERTION: Status: RESOLVED | Noted: 2019-02-08 | Resolved: 2020-01-01

## 2020-08-09 PROBLEM — N17.9 AKI (ACUTE KIDNEY INJURY) (HCC): Status: RESOLVED | Noted: 2019-02-08 | Resolved: 2020-01-01

## 2020-08-09 PROBLEM — I63.9 CVA (CEREBRAL VASCULAR ACCIDENT) (HCC): Status: ACTIVE | Noted: 2020-01-01

## 2020-08-09 PROBLEM — N18.9 CKD (CHRONIC KIDNEY DISEASE): Status: ACTIVE | Noted: 2020-01-01

## 2020-08-09 NOTE — CONSULTS
[facility], [address], [address] [place] [address]  Ph: [phone] or [phone]  FAX: [phone]        Reason for consult: Abnormal CT head     I had the pleasure of seeing your patient in neurology consultation for his symptoms. As you would recall Elicia Xie is a 69 y.o. yo male admitted on 8/9/2020. The history has been obtained from the patient and from medical record. The patient was at his baseline last week when while lifting weight, he had a sudden pop in the back and excruciating back pain, difficulty urination. He went to chiropractor and had a back manipulation without improvement the symptoms. The patient came to the ER with ongoing symptoms. He also has noticed weakness in the right upper and lower extremity. CT scan of the head was done which showed hypodensity bilateral left cortex more the right side with vasogenic edema. CT scan of the chest showed right lung nodules and evidence of metastatic disease including multiple new masses invasion of the right hilum and necrotic congestion adenopathy.   We will also questionable small remote aortic dissection versus plaque  Past Medical History:   Diagnosis Date    Cardiac resynchronization therapy defibrillator (CRT-D) in place     Chest pain 02/08/2019    Coronary artery disease     Dyspnea on exertion     Elevated troponin 02/08/2019    Heart attack ([address]     Ischemic cardiomyopathy      Past Surgical History:   Procedure Laterality Date    CORONARY ANGIOPLASTY WITH STENT PLACEMENT  2012    BMS to LAD    PACEMAKER PLACEMENT      AICD     Social History     Socioeconomic History    Marital status:      Spouse name: Not on file    Number of children: Not on file    Years of education: Not on file    Highest education level: Not on file   Occupational History    Not on file   Social Needs    Financial resource strain: Not on file    Food insecurity     Worry: Not on file Inability: Not on file    Transportation needs     Medical: Not on file     Non-medical: Not on file   Tobacco Use    Smoking status: Former Smoker    Smokeless tobacco: Never Used   Substance and Sexual Activity    Alcohol use: No    Drug use: Yes     Types: Marijuana     Comment: last used 2 days    Sexual activity: Not on file   Lifestyle    Physical activity     Days per week: Not on file     Minutes per session: Not on file    Stress: Not on file   Relationships    Social connections     Talks on phone: Not on file     Gets together: Not on file     Attends Mandaeism service: Not on file     Active member of club or organization: Not on file     Attends meetings of clubs or organizations: Not on file     Relationship status: Not on file    Intimate partner violence     Fear of current or ex partner: Not on file     Emotionally abused: Not on file     Physically abused: Not on file     Forced sexual activity: Not on file   Other Topics Concern    Not on file   Social History Narrative    Not on file     History reviewed. No pertinent family history. Allergies   Allergen Reactions    Penicillins       Pulse 98   Temp 97.5 °F (36.4 °C) (Axillary)   Resp 17   SpO2 98%      ROS:  Constitutional Negative for fever and chills   HEENT Negative for ear discharge, ear pain, nosebleed   Eyes Negative for photophobia, pain and discharge   Respiratory Negative for hemoptysis and sputum   Cardiovascular Negative for orthopnea, claudication and PND   Gastrointestinal Negative for abdominal pain, diarrhea, blood in stool   Musculoskeletal Negative for joint pain, negative for myalgia   Skin Negative for rash or itching   Endo/heme/allergies Negative for polydipsia, environmental allergy   Psychiatric/behavioral Negative for suicidal ideation.   Patient is not anxious   General examination:    Head: Normocephalic, atraumatic  Eyes: Extraocular movements intact  Lungs: Respirations unlabored, chest wall no deformity  ENT: Normal external ear canals, no sinus tenderness  Heart: Regular rate rhythm  Abdomen: No masses, tenderness  Extremities: No cyanosis or edema, 2+ pulses  Skin: Intact, normal skin color    Neurological examination:    Mental status   Alert and oriented; intact memory with no confusion, speech or language problems; no hallucinations or delusions     Cranial nerves   II - visual fields intact to confrontation                                                III, IV, VI - extra-ocular muscles full: no pupillary defect; no SERGO, no nystagmus, no ptosis                                                                      V - normal facial sensation                                                               VII - normal facial symmetry                                                             VIII - intact hearing                                                                             IX, X - symmetrical palate                                                                  XI - symmetrical shoulder shrug                                                       XII - midline tongue without atrophy or fasciculation     Motor function   4+5 in the right upper and lower extremity   Sensory function Intact to touch, pin, vibration, proprioception     Cerebellar Intact fine motor movement. No involuntary movements or tremors     Reflex function 3+ DTR and symmetric.  Negative Babinski     Gait                  Not tested         Lab Results   Component Value Date    LDLCHOLESTEROL 139 (H) 02/09/2019     No components found for: CHLPL  Lab Results   Component Value Date    TRIG 77 02/09/2019    TRIG 77 07/12/2012     Lab Results   Component Value Date    HDL 41 02/09/2019    HDL 44 07/12/2012     No results found for: LDLCALC  No results found for: LABVLDL  Lab Results   Component Value Date    LABA1C 5.5 02/08/2019     Lab Results   Component Value Date     02/08/2019     No results found for: [id number]   Neurological work up:  CT head    CTA head and neck  MRI brain   2 D echo     Assessment and Recommendations:   CT scan findings are worrisome for metastatic brain disease rather than a stroke  Patient with multiple pulmonary masses and invasion of right hilum, concerning for lung carcinoma, multiple lytic lesions  Questionable remote aortic dissection versus plaque    I will obtain MRI scan of the brain, cervical and thoracic spine without contrast  Decadron 4 mg IV every 6 hours  I would also get hematology oncology consult  There is a concern of lytic lesions in the spine. Based on MRI, may consider getting neurosurgical evaluation. Findings were discussed with the patient and patient's family. Questions were answered. Patient to be admitted to internal medicine. We will continue to follow. Thank you for the consult. Marshall Muñoz MD  Neurology    This note is created with the assistance of a speech-recognition program. While intending to generate a document that actually reflects the content of the visit, the document can still have some errors including those of syntax and sound a- like substitutions which may escape proofreading. In such instances, actual meaning can be extrapolated by contextual derivation.

## 2020-08-09 NOTE — PROGRESS NOTES
Senior note. 8year-old male with significant PMH of CAD, CKD who presents with multiple chief complaints including suprapubic pain with difficulty urinating and constipation, back pain, right upper extremity weakness. All symptoms began about 1 week ago. Patient attributes back pain to lifting heavy equipment an popping something in his back. Daughter-in-law at bedside states patient is also had recent dizziness including 1 recent fall, no LOC, no head trauma. Patient's right upper arm weakness has since resolved. Patient is noncompliant with his medications and does not follow-up with any doctors. Patient states he has known history of BPH, never followed up. In the ED, patient was afebrile, hemodynamically stable, saturating well on room air. Labs include creatinine 1.43 which is within baseline. proBNP 996. PSA 5.86. CT head showing large subacute left MCA distribution infarct, however after neurology consult, deemed more likely metastatic brain disease. CT chest abdomen pelvis showing no acute vascular findings, questionable small bowel aortic dissection versus plaque just inferior to left renal artery. Metastatic disease, multiple pulmonary masses of the vision of the right hilum. Necrotic mediastinal adenopathy. Multiple areas of osseous metastatic disease with destructive lytic lesions, enlarged prostate. Vascular consulted from ED for aortic irregularity, deemed likely thrombus without aortic dissection. No acute surgical intervention at this time. Assessment/plan. 1. Suspected lung Ca. Multiple pulmonary masses and invasion of right hilum with mets to multiple organs. Pulmonology consult -recommend IR guided biopsy. 2. Metastatic brain disease. Neurology following. Continue Decadron. heme-onc consult. MRI brain and spine, neurosurgery evaluation pending results. 3. Aortic irregularity -possible thrombus. No dissection. Vascular consulted from ED.   No acute intervention. Continue aspirin, beta-blocker, tight BP control. Outpatient follow-up with Dr. Vlad Mcgill. 4.  Essential ypertension. Coreg 12.5 mg twice daily    5. CKD. Creatinine within baseline. 6. CAD. Stable. Continue aspirin, statin. 7. DVT ppx - heparin      Rupinder Schofield MD  Internal Medicine Resident, PGY-2  [address], [place]  8/9/2020, 8:21 PM   Attending Physician Statement  I have discussed the care of Bertha Almonte, including pertinent history and exam findings,  with the resident. I have seen and examined the patient and the key elements of all parts of the encounter have been performed by me. I agree with the assessment, plan and orders as documented by the resident with additions . Treatment plan Discussed with nursing staff in detail , all questions answered . Electronically signed by Dorothye Cooks, MD on   8/10/20 at 6:49 PM EDT    Please note that this chart was generated using voice recognition Dragon dictation software. Although every effort was made to ensure the accuracy of this automated transcription, some errors in transcription may have occurred.

## 2020-08-09 NOTE — ED PROVIDER NOTES
FACULTY SIGN-OUT  ADDENDUM     Care of this patient was assumed from previous attending physician. The patient's initial evaluation and plan have been discussed with the prior provider who initially evaluated the patient. Attestation  I was available and discussed any additional care issues that arose and coordinated the management plans with the resident(s) caring for the patient during my duty period. Any areas of disagreement with resident's documentation of care or procedures are noted on the chart. I was personally present for the key portions of any/all procedures, during my duty period. I have documented in the chart those procedures where I was not present during the key portions.        ED COURSE      The patient was given the following medications:  Orders Placed This Encounter   Medications    acetaminophen (TYLENOL) tablet 1,000 mg    DISCONTD: ibuprofen (ADVIL;MOTRIN) tablet 800 mg    iohexol (OMNIPAQUE 350) solution 100 mL    DISCONTD: esmolol (BREVIBLOC) 2.5gm/250ml NS infusion    DISCONTD: esmolol (BREVIBLOC) injection 20 mg    dexamethasone (DECADRON) injection 4 mg    sodium chloride flush 0.9 % injection 10 mL    sodium chloride flush 0.9 % injection 10 mL    acetaminophen (TYLENOL) tablet 650 mg    aspirin chewable tablet 81 mg    atorvastatin (LIPITOR) tablet 80 mg    carvedilol (COREG) tablet 12.5 mg    cyclobenzaprine (FLEXERIL) tablet 10 mg    docusate sodium (COLACE) capsule 100 mg    sodium chloride flush 0.9 % injection 10 mL    sodium chloride flush 0.9 % injection 10 mL    magnesium sulfate 1 g in dextrose 5% 100 mL IVPB    DISCONTD: acetaminophen (TYLENOL) tablet 650 mg    acetaminophen (TYLENOL) suppository 650 mg    polyethylene glycol (GLYCOLAX) packet 17 g    OR Linked Order Group     promethazine (PHENERGAN) tablet 12.5 mg     ondansetron (ZOFRAN) injection 4 mg    heparin (porcine) injection 5,000 Units       RECENT VITALS:   Temp: 97.5 °F (36.4 °C),

## 2020-08-09 NOTE — PROCEDURES
Will confirm mri compatibility with [employer] in am and if mri conditional we will get cardiology form filled out, we will set up a time with [employer] to do mri's on monday. Neuro aware of delay, and also they may add mri brain and want to assess creatinine contrast vs non contrast mri's. Thanks.

## 2020-08-09 NOTE — ED NOTES
Pt to ER with Multiple complaints. Suprapubic pain with difficulty urinating and constipation. Has h/o enlarged prostate. Pt seen at [address] recently and was given Mag Citrate with minimal relief. Pt also reports right arm numbness/weakness and back pain x 1 week after lifting a heavy garbage can. Pt reports trip and fall down 5 steps, denies hitting his head. No LOC. Denies N/V, no chest pain, SOB, cough. Afebrile. Placed on continuous monitor, no acute distress noted, rr even and NL.  Dr. Loraine Hassan at bedside to evaluate the pt     Jose Ramon Martin RN  08/09/20 1039

## 2020-08-09 NOTE — ED NOTES
Dr. Lydia Cruz at bedside updating pt and family on CT results and plan for admission with Neurology and Cardiology consult          Federico Morales RN  08/09/20 4292

## 2020-08-09 NOTE — CONSULTS
Today's Date: 8/9/2020  Patient Name: Maryellen Rasmussen  Date of admission: 8/9/2020 10:07 AM  Patient's age: 69 y.o., 1950  Admission Dx: Lung mass [R91.8]    Reason for Consult: management recommendations  Requesting Physician: No admitting provider for patient encounter. CHIEF COMPLAINT: Lower abdominal pain. Difficulty urinating. Right-sided weakness. Lung mass. History Obtained From:  patient, electronic medical record    HISTORY OF PRESENT ILLNESS:      The patient is a 69 y.o.  male who is admitted to the hospital for chief complaints of lower abdominal pain difficulty urinating back pain and right upper extremity weakness. Patient's symptoms started about 7 to 10 days ago. Patient did go to the [facility] where he had a CTA which showed a right lung mass as well as lymphadenopathy. Patient was advised to follow-up with pulmonary team as well as oncology team.  Patient also has had a CT chest done back in February 2019 which showed a lung spiculated nodule concerning for malignancy however patient did not follow-up with pulmonary team at that point. Patient denies any loss of appetite. Denies any weight loss. Patient's CT head without contrast shows large subacute MCA distribution infarct. Past Medical History:   has a past medical history of Cardiac resynchronization therapy defibrillator (CRT-D) in place, Chest pain, Coronary artery disease, Dyspnea on exertion, Elevated troponin, Heart attack ([address] and Ischemic cardiomyopathy. Past Surgical History:   has a past surgical history that includes Coronary angioplasty with stent (2012) and pacemaker placement. Medications:    Prior to Admission medications    Medication Sig Start Date End Date Taking?  Authorizing Provider   carvedilol (COREG) 12.5 MG tablet Take 12.5 mg by mouth 2 times daily (with meals)    Historical Provider, MD   docusate sodium (COLACE) 100 MG capsule Take 1 capsule by mouth 2 times daily 8/4/20 after 1 dose, call 911. 25 tablet 3    atorvastatin (LIPITOR) 80 MG tablet Take 1 tablet by mouth nightly 30 tablet 3    lisinopril (PRINIVIL;ZESTRIL) 5 MG tablet Take 1 tablet by mouth daily 30 tablet 3    metoprolol succinate (TOPROL XL) 25 MG extended release tablet Take 1 tablet by mouth nightly 30 tablet 3    furosemide (LASIX) 20 MG tablet Take 1 tablet by mouth daily 60 tablet 3       Allergies:  Penicillins    Social History:   reports that he has quit smoking. He has never used smokeless tobacco. He reports current drug use. Drug: Marijuana. He reports that he does not drink alcohol. Family History: Hypertension and diabetes    REVIEW OF SYSTEMS:      Constitutional: No fever or chills. No night sweats, no weight loss. Positive fatigue  Eyes: No eye discharge, double vision, or eye pain   HEENT: negative for sore mouth, sore throat, hoarseness and voice change   Respiratory: negative for cough , sputum, dyspnea, wheezing, hemoptysis, chest pain   Cardiovascular: negative for chest pain, dyspnea, palpitations, orthopnea, PND   Gastrointestinal: negative for nausea, vomiting, diarrhea, constipation, abdominal pain, Dysphagia, hematemesis and hematochezia   Genitourinary: negative for frequency, dysuria, nocturia, urinary incontinence, and hematuria   Integument: negative for rash, skin lesions, bruises.    Hematologic/Lymphatic: negative for easy bruising, bleeding, lymphadenopathy, or petechiae   Endocrine: negative for heat or cold intolerance,weight changes, change in bowel habits and hair loss   Musculoskeletal: Positive back pain  Neurological: Positive right upper extremity weakness    PHYSICAL EXAM:        BP (!) 146/81   Pulse 91   Temp 97.5 °F (36.4 °C) (Axillary)   Resp 17   SpO2 96%    Temp (24hrs), Av.5 °F (36.4 °C), Min:97.5 °F (36.4 °C), Max:97.5 °F (36.4 °C)      General appearance - well appearing, no in pain or distress   Mental status - alert and cooperative   Eyes - pupils equal and reactive, extraocular eye movements intact   Ears - bilateral TM's and external ear canals normal   Mouth - mucous membranes moist, pharynx normal without lesions   Neck - supple, no significant adenopathy   Lymphatics - no palpable lymphadenopathy, no hepatosplenomegaly   Chest - clear to auscultation, no wheezes, rales or rhonchi, symmetric air entry   Heart - normal rate, regular rhythm, normal S1, S2, no murmurs  Abdomen - soft, nontender, nondistended, no masses or organomegaly   Neurological - alert, oriented, normal speech, right upper extremity weakness  Musculoskeletal - no joint tenderness, deformity or swelling   Extremities - peripheral pulses normal, no pedal edema, no clubbing or cyanosis   Skin - normal coloration and turgor, no rashes, no suspicious skin lesions noted ,      DATA:      Labs:     Results for orders placed or performed during the hospital encounter of 08/09/20   CBC Auto Differential   Result Value Ref Range    WBC 8.1 3.5 - 11.3 k/uL    RBC 4.80 4.21 - 5.77 m/uL    Hemoglobin 13.8 13.0 - 17.0 g/dL    Hematocrit 43.7 40.7 - 50.3 %    MCV 91.0 82.6 - 102.9 fL    MCH 28.8 25.2 - 33.5 pg    MCHC 31.6 28.4 - 34.8 g/dL    RDW 14.2 11.8 - 14.4 %    Platelets 773 090 - 301 k/uL    MPV 10.0 8.1 - 13.5 fL    NRBC Automated 0.0 0.0 per 100 WBC    Differential Type NOT REPORTED     Seg Neutrophils 72 (H) 36 - 65 %    Lymphocytes 12 (L) 24 - 43 %    Monocytes 7 3 - 12 %    Eosinophils % 8 (H) 1 - 4 %    Basophils 1 0 - 2 %    Immature Granulocytes 0 0 %    Segs Absolute 5.79 1.50 - 8.10 k/uL    Absolute Lymph # 0.96 (L) 1.10 - 3.70 k/uL    Absolute Mono # 0.59 0.10 - 1.20 k/uL    Absolute Eos # 0.65 (H) 0.00 - 0.44 k/uL    Basophils Absolute 0.05 0.00 - 0.20 k/uL    Absolute Immature Granulocyte 0.03 0.00 - 0.30 k/uL    WBC Morphology NOT REPORTED     RBC Morphology NOT REPORTED     Platelet Estimate NOT REPORTED    Comprehensive Metabolic Panel w/ Reflex to MG   Result Value Ref Range Glucose 114 (H) 70 - 99 mg/dL    BUN 21 8 - 23 mg/dL    CREATININE 1.43 (H) 0.70 - 1.20 mg/dL    Bun/Cre Ratio NOT REPORTED 9 - 20    Calcium 9.7 8.6 - 10.4 mg/dL    Sodium 136 135 - 144 mmol/L    Potassium 5.1 3.7 - 5.3 mmol/L    Chloride 99 98 - 107 mmol/L    CO2 25 20 - 31 mmol/L    Anion Gap 12 9 - 17 mmol/L    Alkaline Phosphatase 84 40 - 129 U/L    ALT 8 5 - 41 U/L    AST 13 <40 U/L    Total Bilirubin 0.38 0.3 - 1.2 mg/dL    Total Protein 7.0 6.4 - 8.3 g/dL    Alb 3.7 3.5 - 5.2 g/dL    Albumin/Globulin Ratio 1.1 1.0 - 2.5    GFR Non- 49 (L) >60 mL/min    GFR  59 (L) >60 mL/min    GFR Comment          GFR Staging NOT REPORTED    Lipase   Result Value Ref Range    Lipase 25 13 - 60 U/L   Troponin   Result Value Ref Range    Troponin, High Sensitivity 13 0 - 22 ng/L    Troponin T NOT REPORTED <0.03 ng/mL    Troponin Interp NOT REPORTED    Brain Natriuretic Peptide   Result Value Ref Range    Pro- (H) <300 pg/mL    BNP Interpretation Pro-BNP Reference Range:    Troponin   Result Value Ref Range    Troponin, High Sensitivity 12 0 - 22 ng/L    Troponin T NOT REPORTED <0.03 ng/mL    Troponin Interp NOT REPORTED    Urinalysis, reflex to microscopic   Result Value Ref Range    Color, UA YELLOW YELLOW    Turbidity UA CLEAR CLEAR    Glucose, Ur NEGATIVE NEGATIVE    Bilirubin Urine NEGATIVE NEGATIVE    Ketones, Urine NEGATIVE NEGATIVE    Specific Gravity, UA 1.017 1.005 - 1.030    Urine Hgb NEGATIVE NEGATIVE    pH, UA 6.0 5.0 - 8.0    Protein, UA NEGATIVE NEGATIVE    Urobilinogen, Urine Normal Normal    Nitrite, Urine NEGATIVE NEGATIVE    Leukocyte Esterase, Urine NEGATIVE NEGATIVE    Urinalysis Comments       Microscopic exam not performed based on chemical results unless requested in original order.          IMAGING DATA:    Ct Head Wo Contrast    Result Date: 8/9/2020  EXAMINATION: CT OF THE HEAD WITHOUT CONTRAST 8/9/2020 11:47 am TECHNIQUE: CT of the head was performed without the administration of intravenous contrast. Dose modulation, iterative reconstruction, and/or weight based adjustment of the mA/kV was utilized to reduce the radiation dose to as low as reasonably achievable. COMPARISON: None. HISTORY: ORDERING SYSTEM PROVIDED HISTORY: New deficiet, one week old TECHNOLOGIST PROVIDED HISTORY: New deficiet, one week old FINDINGS: Large area of hypodensity within the left MCA distribution. Small area of hypodensity within the right posterior parietal lobe. No mass effect. No midline shift. Mild prominence of the ventricles and sulci is consistent with atrophy. No acute soft tissue abnormality. No acute orbital abnormality. No acute osseous abnormality. Opacity throughout the right maxillary sinus with bony overgrowth suggesting chronic paranasal sinus disease. Large subacute left MCA distribution infarct is suspected. Small right posterior parietal subacute infarct is suspected. Follow-up with MRI may be helpful. Xr Chest Portable    Result Date: 8/4/2020  EXAMINATION: ONE XRAY VIEW OF THE CHEST 8/4/2020 2:54 pm COMPARISON: AP chest from 02/08/2019. HISTORY: ORDERING SYSTEM PROVIDED HISTORY: chest pain TECHNOLOGIST PROVIDED HISTORY: chest pain Reason for Exam: chest pain and constipation Acuity: Unknown Type of Exam: Unknown History of cardiomyopathy. FINDINGS: Left-sided AICD via subclavian approach, with unchanged biventricular and right atrial electrodes. Cardiac silhouette unchanged; new rounded right hilar mass, not seen previously. Some fullness right-sided mediastinum suggests possible adenopathy. Remainder upper mediastinum, both lungs and both costophrenic angles clear. Unchanged mild DJD spine. New right hilar mass. The findings were sent to the Radiology Results [id number] at 3:03 p.m. on 8/4/2020to be communicated to a licensed caregiver. RECOMMENDATION: CT scan of the chest recommended, with IV contrast if possible.      Cta Chest Abdomen Pelvis W Contrast    Addendum Date: 8/9/2020    ADDENDUM: Right 7th rib, T6 and right iliac bone and several other possible bony metastatic lesions also identified. Result Date: 8/9/2020  EXAMINATION: CTA OF THE CHEST, ABDOMEN AND PELVIS WITH CONTRAST, 8/4/2020 3:09 pm TECHNIQUE: CTA of the chest, abdomen and pelvis was performed after the administration of intravenous contrast.  Multiplanar reformatted images are provided for review. MIP images are provided for review. Dose modulation, iterative reconstruction, and/or weight based adjustment of the mA/kV was utilized to reduce the radiation dose to as low as reasonably achievable. COMPARISON: CT scan of the abdomen and pelvis from 02/10/2019. HISTORY: ORDERING SYSTEM PROVIDED HISTORY: back pain TECHNOLOGIST PROVIDED HISTORY: back pain Reason for Exam: Mid back pain, right arm numbness. Left abd pain, chest pressure Acuity: Acute Type of Exam: Initial FINDINGS: CTA CHEST: No acute abnormality thoracic aorta; slight calcific plaque aortic arch. Normal patent branch vessels to the head and upper extremities. No aneurysm or dissection. Mildly dilated left ventricle. Left-sided AICD via subclavian approach with electrode leads in the right heart and coronary sinus. Incidental findings: Lobular irregular ~ 4.6 x 4.4 cm RLL mass superior segment abutting the thickened medial pleura and upper major fissure with central low attenuation, and a small high density focus abutting its superolateral border measuring 1.7 x 1.1 cm (slice 52, series 4). 2.0 x 1.6 cm right upper lobe mass (best seen on cuts 45-52, series 4). Extensive right hilar adenopathy and enlarged subcarinal and right paratracheal nodes, largest measuring 2.1 (short axis). Small right pleural effusion. Emphysematous changes upper lung zones and slight posteroinferior dependent or atelectatic changes. No contralateral nodules. No sizable left pleural effusion.  Mild kyphoscoliosis and DJD thoracic spine. CTA ABDOMEN: Moderate ASVD with some calcification abdominal aorta, but patent visceral vessels and no aneurysm or dissection. No adrenal mass. No focal hepatic mass. No splenomegaly or focal mass. Mild pancreatic ductal dilatation. Normal biliary tree. No calcified gallstone. Large amount of retained stool throughout large bowel with some diverticular disease but no diverticulitis or obvious mass. Symmetric renal perfusion bilaterally. Probable peripelvic cysts, especially on the left. No suspicious focal abnormality identified. A few mildly enlarged retroperitoneal nodes but no bulky lymphadenopathy. Small peripancreatic node. Mild-moderate degenerative changes lumbar spine with DDD L4-S1. No destructive or blastic lesion. CTA PELVIS: Elongated moderately diseased iliac arteries, especially external iliac arteries without aneurysm or clear cut dissection. Enlarged prostate measuring 5.1 x 4.4 cm. Urinary bladder WNL. THORACIC/LUMBAR SPINE: Please see separate thoracic/lumbar spine CT report. No aortic dissection or aneurysm. Large right lower lobe mass with additional RLL and RUL nodules, significant right hilar/ipsilateral mediastinal adenopathy and small pleural effusion. Additional findings, as detailed in the body of the report above. RECOMMENDATIONS: Bronchoscopic directed or percutaneous biopsy. Cta Chest Abdomen Pelvis W Contrast    Result Date: 8/9/2020  EXAMINATION: CTA OF THE CHEST, ABDOMEN AND PELVIS WITH CONTRAST 8/9/2020 11:50 am TECHNIQUE: CTA of the chest, abdomen and pelvis was performed after the administration of intravenous contrast.  Multiplanar reformatted images are provided for review. MIP images are provided for review. Dose modulation, iterative reconstruction, and/or weight based adjustment of the mA/kV was utilized to reduce the radiation dose to as low as reasonably achievable.   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 This area is unchanged. Celiac axis is patent. SMA is patent. Renal arteries are patent. Atherosclerotic calcification of the infrarenal abdominal aorta. No acute vascular findings. No acute findings within the chest, abdomen, or pelvis. Questionable small remote aortic dissection versus plaque just inferior to the left renal artery. No aortic aneurysm. Metastatic disease. Multiple pulmonary masses with invasion of the right hilum. Necrotic mediastinal adenopathy. Multiple areas of osseous metastatic disease with destructive lytic lesions. Prostate gland is enlarged and heterogeneous. Correlate with PSA values. IMPRESSION:   Primary Problem  <principal problem not specified>    Active Hospital Problems    Diagnosis Date Noted    Lung mass [R91.8] 08/09/2020       History of tobacco dependence, quit 8 years ago  Mediastinal and hilar lymphadenopathy concerning for malignancy  Lytic bone lesion concerning for malignancy  Possible brain metastasis      RECOMMENDATIONS:  1. I personally reviewed results of lab work-up imaging studies and other relevant clinical data. 2. Reviewed images of patient's scans done with the patient and daughter-in-law  3. Discussed over the phone with neurology team.  Overall findings are concerning for brain metastasis. Patient has an AICD. We are working on determining patient can have an MRI or not. Continue Decadron with PPI  4. Discussed with internal medicine team.  Recommend biopsy of the lung mass   5. Neurology team is working on ruling out cord compression given urinary complaints and bone lesions if there is any evidence of cord compression patient will need evaluation by neurosurgery  6. Continue pain control  7. Further recommendations from medical oncology depend upon final pathology       Discussed with patient and Nurse. Thank you for asking us to see this patient.           Roxanne Cooper

## 2020-08-09 NOTE — ED PROVIDER NOTES
[facility] ED  Emergency Department Encounter  Emergency Medicine Resident     Pt Name: Bertha Almonte  MRN: [id number]  Danyagfbritt 1950  Date of evaluation: 8/9/20  PCP:  No primary care provider on file. CHIEF COMPLAINT       Chief Complaint   Patient presents with    Abdominal Pain     Multiple complaints. Suprapubic pain with difficulty urinating and constipation, right arm numbness/weakness x 1 week.  Back Pain     Back pain s/p trip and fall down 5 steps, denies hitting his head. No LOC       HISTORY OFPRESENT ILLNESS  (Location/Symptom, Timing/Onset, Context/Setting, Quality, Duration, Modifying Factors,Severity.)      Bertha Almonte is a 69 y.o. male past medical history significant for heart disease, pacemaker in place. Who presents with 1 week worsening of back pain, constipation, urinary retention. Patient was [facility] at [facility] for back pain and constipation 5 days ago but does not turn lies at bedside no helping provide history. She states that he did not provide a whole story at that initial encounter. Patient has been having persistent back pain for 1 week after lifting up a garbage bag. Patient states that the pain is moderate to severe and sharp in nature, worse with movement. He states that he is also having right arm numbness and weakness since the initial occurrence. He has since gone to a chiropractor to have his back adjusted which did not help his pain. He has been using ibuprofen with minimal relief. Patient also found in the stairs this morning. He denies loss of consciousness, trauma to the head, he is not on blood thinners. Complaining of constipation that has been ongoing for 5 days, his last bowel movement was this morning it was a watery bladder bowel movement. He is still able to pass gas. No blood in his stool. He denies vomiting. Also complaining of urinary retention.   He was able to urinate this morning, he does not feel like he is emptying his bladder completely. He does have known history of prostate enlargement. PAST MEDICAL / SURGICAL / SOCIAL / FAMILY HISTORY      has a past medical history of Cardiac resynchronization therapy defibrillator (CRT-D) in place, Chest pain, Coronary artery disease, Dyspnea on exertion, Elevated troponin, Heart attack ([address] and Ischemic cardiomyopathy. has a past surgical history that includes Coronary angioplasty with stent (2012) and pacemaker placement. Social:  reports that he has quit smoking. He has never used smokeless tobacco. He reports current drug use. Drug: Marijuana. He reports that he does not drink alcohol. Family Hx: History reviewed. No pertinent family history. Allergies:  Penicillins    Home Medications:  Prior to Admission medications    Medication Sig Start Date End Date Taking? Authorizing Provider   carvedilol (COREG) 12.5 MG tablet Take 12.5 mg by mouth 2 times daily (with meals)    Historical Provider, MD   docusate sodium (COLACE) 100 MG capsule Take 1 capsule by mouth 2 times daily 5/6/17   Shanda Sun MD   cyclobenzaprine (FLEXERIL) 10 MG tablet Take 1 tablet by mouth nightly as needed for Muscle spasms 7/6/27 5/98/94  Shanda Sun MD   aspirin 81 MG chewable tablet Take 1 tablet by mouth daily 2/12/19   Erica Payne MD   nitroGLYCERIN (NITROSTAT) 0.4 MG SL tablet up to max of 3 total doses.  If no relief after 1 dose, call 911. 2/11/19   Erica Payne MD   atorvastatin (LIPITOR) 80 MG tablet Take 1 tablet by mouth nightly 2/11/19   Erica Payne MD   lisinopril (PRINIVIL;ZESTRIL) 5 MG tablet Take 1 tablet by mouth daily 2/12/19   Erica Payne MD   metoprolol succinate (TOPROL XL) 25 MG extended release tablet Take 1 tablet by mouth nightly 2/11/19   Erica Payne MD   furosemide (LASIX) 20 MG tablet Take 1 tablet by mouth daily 2/12/19   Erica Payne MD       REVIEW OFSYSTEMS    (2-9 systems for level 4, 10 or more for level 5)      Review of Systems   Constitutional: Positive for activity change. Negative for chills and fever. HENT: Negative for ear pain and sore throat. Eyes: Negative for pain. Respiratory: Negative for cough and shortness of breath. Cardiovascular: Positive for chest pain (Radiating from back). Negative for leg swelling. Gastrointestinal: Positive for abdominal pain (Constipation for the past 5 days) and constipation (Still passing gas). Negative for vomiting. Genitourinary: Positive for difficulty urinating (Known prostate disease) and urgency. Negative for dysuria and hematuria. Musculoskeletal: Positive for back pain (Mid thoracic pain). Negative for neck pain. Numbness of right arm, with weakness. For the past week   Skin: Negative for rash and wound. Neurological: Positive for numbness (Right arm). Negative for dizziness and light-headedness. PHYSICAL EXAM   (up to 7 for level 4, 8 or more forlevel 5)      INITIAL VITALS:   Vitals:    08/09/20 1608   BP:    Pulse:    Resp:    Temp:    SpO2: 95%        Physical Exam  Vitals signs and nursing note reviewed. Constitutional:       General: He is not in acute distress. Appearance: He is not ill-appearing. HENT:      Head: Normocephalic and atraumatic. Nose: Nose normal.      Mouth/Throat:      Mouth: Mucous membranes are moist.      Pharynx: Oropharynx is clear. Eyes:      General:         Right eye: No discharge. Left eye: No discharge. Pupils: Pupils are equal, round, and reactive to light. Neck:      Musculoskeletal: Normal range of motion. Cardiovascular:      Rate and Rhythm: Normal rate and regular rhythm. Pulses: Normal pulses. Heart sounds: No murmur. No friction rub. No gallop. Pulmonary:      Effort: Pulmonary effort is normal. No respiratory distress. Breath sounds: Normal breath sounds. No wheezing. Abdominal:      General: Abdomen is flat.  There is no distension. Palpations: Abdomen is soft. Tenderness: There is no abdominal tenderness. Musculoskeletal: Normal range of motion. Right lower leg: No edema. Left lower leg: No edema. Comments: 4-5 muscle strength of right arm. 5 out of 5 muscle strength of left arm. Station intact equally and bilaterally    Point tenderness to midline at the level of T8. Skin:     General: Skin is warm and dry. Neurological:      Mental Status: He is alert and oriented to person, place, and time. Psychiatric:         Mood and Affect: Mood normal.         Behavior: Behavior normal.         Thought Content: Thought content normal.         DIFFERENTIAL  DIAGNOSIS       Initial MDM/Plan: 69 y.o. male who presents with 1 week of worsening back pain, with right arm numbness, also patient in urinary retention. .  Plan for CBC, CMP, EKG, troponin, CT noncontrast, CTA chest abdomen pelvis, bladder scan.     DIAGNOSTIC RESULTS / EMERGENCYDEPARTMENT COURSE / MDM     LABS:  Labs Reviewed   CBC WITH AUTO DIFFERENTIAL - Abnormal; Notable for the following components:       Result Value    Seg Neutrophils 72 (*)     Lymphocytes 12 (*)     Eosinophils % 8 (*)     Absolute Lymph # 0.96 (*)     Absolute Eos # 0.65 (*)     All other components within normal limits   COMPREHENSIVE METABOLIC PANEL W/ REFLEX TO MG FOR LOW K - Abnormal; Notable for the following components:    Glucose 114 (*)     CREATININE 1.43 (*)     GFR Non- 49 (*)     GFR  59 (*)     All other components within normal limits   BRAIN NATRIURETIC PEPTIDE - Abnormal; Notable for the following components:    Pro- (*)     All other components within normal limits   LIPASE   TROPONIN   TROPONIN   URINALYSIS   PSA SCREENING         RADIOLOGY:  Ct Head Wo Contrast    Result Date: 8/9/2020  EXAMINATION: CT OF THE HEAD WITHOUT CONTRAST 8/9/2020 11:47 am TECHNIQUE: CT of the head was performed without the administration of intravenous contrast. Dose modulation, iterative reconstruction, and/or weight based adjustment of the mA/kV was utilized to reduce the radiation dose to as low as reasonably achievable. COMPARISON: None. HISTORY: ORDERING SYSTEM PROVIDED HISTORY: New deficiet, one week old TECHNOLOGIST PROVIDED HISTORY: New deficiet, one week old FINDINGS: Large area of hypodensity within the left MCA distribution. Small area of hypodensity within the right posterior parietal lobe. No mass effect. No midline shift. Mild prominence of the ventricles and sulci is consistent with atrophy. No acute soft tissue abnormality. No acute orbital abnormality. No acute osseous abnormality. Opacity throughout the right maxillary sinus with bony overgrowth suggesting chronic paranasal sinus disease. Large subacute left MCA distribution infarct is suspected. Small right posterior parietal subacute infarct is suspected. Follow-up with MRI may be helpful. Cta Chest Abdomen Pelvis W Contrast    Result Date: 8/9/2020  EXAMINATION: CTA OF THE CHEST, ABDOMEN AND PELVIS WITH CONTRAST 8/9/2020 11:50 am TECHNIQUE: CTA of the chest, abdomen and pelvis was performed after the administration of intravenous contrast.  Multiplanar reformatted images are provided for review. MIP images are provided for review. Dose modulation, iterative reconstruction, and/or weight based adjustment of the mA/kV was utilized to reduce the radiation dose to as low as reasonably achievable. 3D reconstructed images were performed on a separate workstation and provided for review. COMPARISON: August 4, 2020 HISTORY: ORDERING SYSTEM PROVIDED HISTORY: Chest pain, heart disease, persistent back pain, constipation, urinary retention TECHNOLOGIST PROVIDED HISTORY: Chest pain, heart disease, persistent back pain, constipation, urinary retention FINDINGS: CT CHEST: Chest wall: Left chest wall pacemaker. No axillary adenopathy. Mediastinum: Mild cardiomegaly.   Trace pericardial effusion. Coronary artery calcifications. Necrotic mediastinal adenopathy. For example there is a right paratracheal lymph node measuring 2.8 cm. Right hilar mass invading into the mediastinum measures at least 2.6 cm. Lungs: Right hilar mass causes narrowing of the right main pulmonary artery. Right lower lobe mass measures 4.2 x 4.7 cm and extends to the underlying pleural surface. Anteriorly this extends to the fissure. Right upper lobe mass measures 18 mm series 4, image 52. Mild centrilobular emphysema. No left pleural effusion. Trace right pleural effusion. Bones: Osseous metastatic disease involving the right 7th rib. Osseous metastatic disease within a midthoracic vertebral body. CT ABDOMEN-PELVIS: Organs: No liver lesion. No gallstones. No pancreatic lesion. No splenomegaly. No adrenal lesion. No hydronephrosis. Subcentimeter left renal hypodensity is likely a cyst but incompletely characterized. GI/Bowel: No bowel obstruction. No acute inflammatory process. Sigmoid diverticulosis without acute diverticulitis. Pelvis: No free fluid. No bladder calculus. Prostate gland is enlarged and slightly heterogeneous. Peritoneum/Retroperitoneum: No adenopathy. Bones/Soft Tissues: No acute soft tissue abnormality. Osseous metastatic disease. Destructive iliac lesions. VASCULAR: No thoracic aortic aneurysm. No thoracic aortic dissection. Origin of the great vessels are patent. No abdominal aortic aneurysm. No acute abdominal aortic dissection. Questionable area of plaque versus small focal dissection inferior to the left renal artery. This area is unchanged. Celiac axis is patent. SMA is patent. Renal arteries are patent. Atherosclerotic calcification of the infrarenal abdominal aorta. No acute vascular findings. No acute findings within the chest, abdomen, or pelvis. Questionable small remote aortic dissection versus plaque just inferior to the left renal artery.   No aortic aneurysm. Metastatic disease. Multiple pulmonary masses with invasion of the right hilum. Necrotic mediastinal adenopathy. Multiple areas of osseous metastatic disease with destructive lytic lesions. Prostate gland is enlarged and heterogeneous. Correlate with PSA values. EKG  EKG Interpretation    Interpreted by me    Rhythm: normal sinus  Rate: normal, 86  Axis: normal  Ectopy: none, no deviation noted  Conduction: normal  ST Segments: no acute change  T Waves: no acute change    Clinical Impression: no acute changes and normal EKG    All EKG's are interpreted by the Emergency Department Physicianwho either signs or Co-signs this chart in the absence of a cardiologist.    EMERGENCY DEPARTMENT COURSE:  ED Course as of Aug 09 1635   Sun Aug 09, 2020   1108 Bedside ultrasound performed by myself and Dr. Ofelia Toscano showing post void residual of 50cc will add urinalysis    [MA]   1110 CBC unremarkable   CBC Auto Differential(!):    WBC 8.1   RBC 4.80   Hemoglobin Quant 13.8   Hematocrit 43.7   MCV 91.0   MCH 28.8   MCHC 31.6   RDW 14.2   Platelet Count 084   MPV 10.0   NRBC Automated 0.0   Differential Type NOT REPORTED   Seg Neutrophils 72(!)   Lymphocytes 12(!)   Monocytes 7   Eosinophils % 8(!)   Basophils 1   Immature Granulocytes 0   Segs Absolute 5.79   Absolute Lymph # 0.96(!)   Absolute Mono # 0.59   Absolute Eos # 0.65(!)   Basophils Absolute 0.05   Absolute Immature Granulocyte 0.03   WB. .. [MA]   1126 Within normal limits    Troponin:    Troponin, High Sensitivity 12   Troponin T NOT REPORTED   Troponin Interp NOT REPORTED [MA]   1130 Noted to be elevated   Pro-BNP(!): 996 [MA]   1141 Within normal limits      Lipase:    Lipase 25 [MA]   200 One week old MCA infarct. Will page internal medicine for admission, neurology, and cardiology. CT Head WO Contrast [MA]   5790 Patient updated on CT Head results. Daughter in law at bedside.  Both are understanding and compliant with plan for admission for further evaluation and management of newly found CVA. [MA]   3330 CT Chest showing possible metastatic disease with multiple osseous lesions. Multiple lung cysts noted. CTA CHEST ABDOMEN PELVIS W CONTRAST [MA]   36 Allie with internal medicine team they are excepting. Discussed with vascular surgery nothing to do about chronic dissection acutely, they will follow inpatient and follow-up outpatient. Discussed with neurology who believe that the lesions are metastatic in nature       [MA]   1407 Started IV Dexamethasone 4 mg Q6 per Neurology. [MA]   3982 Discussed further findings of CT chest abdomen and pelvis and admission with patient and his daughter in law. They are compliant and understanding of plan for further work up and admission. [MA]      ED Course User Index  [MA] Gaye Villasenor DO          PROCEDURES:  None    CONSULTS:  IP CONSULT TO INTERNAL MEDICINE  IP CONSULT TO NEUROLOGY  IP CONSULT TO CARDIOLOGY  IP CONSULT TO VASCULAR SURGERY  IP CONSULT TO CASE MANAGEMENT  IP CONSULT TO HEM/ONC      FINAL IMPRESSION      1. Cerebrovascular accident (CVA), unspecified mechanism ([address]          [address] / PLAN     DISPOSITION      Admitted to internal medicine with consultations to neurology, cardiology, vascular surgery, case management, and hematology/oncology. PATIENT REFERRED TO:  No follow-up provider specified.     DISCHARGE MEDICATIONS:  New Prescriptions    No medications on file       Gaye Villasenor DO  Emergency Medicine Resident    (Please note that portions of this note were completed with a voice recognition program.Efforts were made to edit the dictations but occasionally words are mis-transcribed.)        Gaye Villasenor DO  Resident  08/09/20 [id number]

## 2020-08-09 NOTE — CONSULTS
Division of Vascular Surgery          Vascular Consult      Name: Frederick Sneed  MRN: [id number]       Physician Requesting Consult:  Dr. Elbert Curran    Reason for Consult:  Possible aortic dissection    Chief Complaint:     Abdominal and back pain    History of Present Illness:      Frederick Sneed is a 69 y.o.  male who presents with abdominal pain and back pain for the last week. Patient also admits to having difficulty with urination. PMHx significant for CAD s/p defibrillator. States that he has been having back pain and right leg pain. Per family at bedside, patient also has been having weakness on his right arm/hand. Per chart review, patient was seen on the 4th for similar complaints and at that time, CT scan showed stool but no other intraabdominal findings and patient was subsequently discharged with a bowel regimen. CT also demonstrated concerning RLL nodules for which hematology was recommended for outpatient follow up. CTA abd/pelvis shows possible aortic dissection just inferior to the left renal artery, no aortic aneurysm found; also concerning findings of metastatic disease to the bones. Past Medical History:     Past Medical History:   Diagnosis Date    Cardiac resynchronization therapy defibrillator (CRT-D) in place     Chest pain 02/08/2019    Coronary artery disease     Dyspnea on exertion     Elevated troponin 02/08/2019    Heart attack ([address]     Ischemic cardiomyopathy         Past Surgical History:     Past Surgical History:   Procedure Laterality Date    CORONARY ANGIOPLASTY WITH STENT PLACEMENT  2012    BMS to LAD    PACEMAKER PLACEMENT      AICD        Medications Prior to Admission:       Prior to Admission medications    Medication Sig Start Date End Date Taking?  Authorizing Provider   carvedilol (COREG) 12.5 MG tablet Take 12.5 mg by mouth 2 times daily (with meals)    Historical Provider, MD   docusate sodium (COLACE) 100 MG capsule Take 1 capsule by mouth 2 times daily 0/9/47   Angel Perry MD   cyclobenzaprine (FLEXERIL) 10 MG tablet Take 1 tablet by mouth nightly as needed for Muscle spasms 7/8/48 9/53/93  Angel Perry MD   aspirin 81 MG chewable tablet Take 1 tablet by mouth daily 2/12/19   Aidee Zhong MD   nitroGLYCERIN (NITROSTAT) 0.4 MG SL tablet up to max of 3 total doses. If no relief after 1 dose, call 911. 2/11/19   Aidee Zhong MD   atorvastatin (LIPITOR) 80 MG tablet Take 1 tablet by mouth nightly 2/11/19   Aidee Zhong MD   lisinopril (PRINIVIL;ZESTRIL) 5 MG tablet Take 1 tablet by mouth daily 2/12/19   Aidee Zhong MD   metoprolol succinate (TOPROL XL) 25 MG extended release tablet Take 1 tablet by mouth nightly 2/11/19   Aidee Zhong MD   furosemide (LASIX) 20 MG tablet Take 1 tablet by mouth daily 2/12/19   Aidee Zhong MD        Allergies:       Penicillins    Social History:     Tobacco:    reports that he has quit smoking. He has never used smokeless tobacco.  Alcohol:      reports no history of alcohol use. Drug Use:  reports current drug use. Drug: Marijuana. Family History:     History reviewed. No pertinent family history.     Review of Systems:     Positive and Negative as described in HPI    Constitutional:  negative for  fevers, chills, sweats, fatigue, and weight loss  HEENT:  negative for vision or hearing changes,   Respiratory:  negative for shortness of breath, cough, or congestion  Cardiovascular:  negative for  chest pain, palpitations  Gastrointestinal:  See HPI  Genitourinary:  negative for frequency, dysuria  Integument:  negative for rash, skin lesions  Chest/Breast:  No painful inspiration or expiration, no rib sternal pain  Musculoskeletal:  nsee HPI  Neurological:  See HPI  Lymphatics: no lymphadenopathy or painful masses  Behavior/Psych:  negative for depression and anxiety    Physical Exam:     Vitals:  Pulse 98   Temp 97.5 °F (36.4 °C) (Axillary)   Resp 17   SpO2 98% General appearance - alert and in no acute distress  Mental status - oriented to person, place and time with normal affect  Head - normocephalic and atraumatic  Eyes - pupils equal and reactive, extraocular eye movements intact, conjunctiva clear  Ears - hearing appears to be intact  Nose - no drainage noted  Mouth - mucous membranes moist  Neck - supple,  no JVD  Chest - normal effort  Heart - normal rate, regular rhythm  Abdomen - soft, non-distended  Neurological - normal speech, no focal findings or movement disorder noted, cranial nerves II through XII grossly intact  Extremities - palpable DP pulses bilaterally  Skin - no gross lesions, rashes, or induration noted      Imaging:   Ct Head Wo Contrast    Result Date: 8/9/2020  EXAMINATION: CT OF THE HEAD WITHOUT CONTRAST 8/9/2020 11:47 am TECHNIQUE: CT of the head was performed without the administration of intravenous contrast. Dose modulation, iterative reconstruction, and/or weight based adjustment of the mA/kV was utilized to reduce the radiation dose to as low as reasonably achievable. COMPARISON: None. HISTORY: ORDERING SYSTEM PROVIDED HISTORY: New deficiet, one week old TECHNOLOGIST PROVIDED HISTORY: New deficiet, one week old FINDINGS: Large area of hypodensity within the left MCA distribution. Small area of hypodensity within the right posterior parietal lobe. No mass effect. No midline shift. Mild prominence of the ventricles and sulci is consistent with atrophy. No acute soft tissue abnormality. No acute orbital abnormality. No acute osseous abnormality. Opacity throughout the right maxillary sinus with bony overgrowth suggesting chronic paranasal sinus disease. Large subacute left MCA distribution infarct is suspected. Small right posterior parietal subacute infarct is suspected. Follow-up with MRI may be helpful.      Cta Chest Abdomen Pelvis W Contrast    Result Date: 8/9/2020  EXAMINATION: CTA OF THE CHEST, ABDOMEN AND PELVIS WITH CONTRAST 8/9/2020 11:50 am TECHNIQUE: CTA of the chest, abdomen and pelvis was performed after the administration of intravenous contrast.  Multiplanar reformatted images are provided for review. MIP images are provided for review. Dose modulation, iterative reconstruction, and/or weight based adjustment of the mA/kV was utilized to reduce the radiation dose to as low as reasonably achievable. 3D reconstructed images were performed on a separate workstation and provided for review. COMPARISON: August 4, 2020 HISTORY: ORDERING SYSTEM PROVIDED HISTORY: Chest pain, heart disease, persistent back pain, constipation, urinary retention TECHNOLOGIST PROVIDED HISTORY: Chest pain, heart disease, persistent back pain, constipation, urinary retention FINDINGS: CT CHEST: Chest wall: Left chest wall pacemaker. No axillary adenopathy. Mediastinum: Mild cardiomegaly. Trace pericardial effusion. Coronary artery calcifications. Necrotic mediastinal adenopathy. For example there is a right paratracheal lymph node measuring 2.8 cm. Right hilar mass invading into the mediastinum measures at least 2.6 cm. Lungs: Right hilar mass causes narrowing of the right main pulmonary artery. Right lower lobe mass measures 4.2 x 4.7 cm and extends to the underlying pleural surface. Anteriorly this extends to the fissure. Right upper lobe mass measures 18 mm series 4, image 52. Mild centrilobular emphysema. No left pleural effusion. Trace right pleural effusion. Bones: Osseous metastatic disease involving the right 7th rib. Osseous metastatic disease within a midthoracic vertebral body. CT ABDOMEN-PELVIS: Organs: No liver lesion. No gallstones. No pancreatic lesion. No splenomegaly. No adrenal lesion. No hydronephrosis. Subcentimeter left renal hypodensity is likely a cyst but incompletely characterized. GI/Bowel: No bowel obstruction. No acute inflammatory process. Sigmoid diverticulosis without acute diverticulitis.  Pelvis: No free fluid. No bladder calculus. Prostate gland is enlarged and slightly heterogeneous. Peritoneum/Retroperitoneum: No adenopathy. Bones/Soft Tissues: No acute soft tissue abnormality. Osseous metastatic disease. Destructive iliac lesions. VASCULAR: No thoracic aortic aneurysm. No thoracic aortic dissection. Origin of the great vessels are patent. No abdominal aortic aneurysm. No acute abdominal aortic dissection. Questionable area of plaque versus small focal dissection inferior to the left renal artery. This area is unchanged. Celiac axis is patent. SMA is patent. Renal arteries are patent. Atherosclerotic calcification of the infrarenal abdominal aorta. No acute vascular findings. No acute findings within the chest, abdomen, or pelvis. Questionable small remote aortic dissection versus plaque just inferior to the left renal artery. No aortic aneurysm. Metastatic disease. Multiple pulmonary masses with invasion of the right hilum. Necrotic mediastinal adenopathy. Multiple areas of osseous metastatic disease with destructive lytic lesions. Prostate gland is enlarged and heterogeneous. Correlate with PSA values. Assessment:     Lele Martínez is a 69 y.o.  male with aortic irregularity - possible thrombus without aortic dissection      Plan:     1. No acute surgical intervention at this time  2. Recommend continuing ASA 81mg, continue atorvastatin and blood pressure control -130.   3. Follow up with Dr. Maria Teresa Cullen as an outpatient for continued monitoring. Patient seen with Dr. Maria Teresa Cullen    ----------------------------------------    Sowmya Zhong D.O.   General Surgery Resident PGY-3  Pager # [phone]  8/9/2020, 1:34 PM      [address] [place] [address],[address]: [phone]  C: [phone]

## 2020-08-09 NOTE — ED PROVIDER NOTES
[facility]     Emergency Department     Faculty Note/ Attestation      Pt Name: Josie De Souza                                       MRN: [id number]  Danyagfbritt 1950  Date of evaluation: 8/9/2020    Patients PCP:    No primary care provider on file. Attestation  I performed a history and physical examination of the patient and discussed management with the resident. I reviewed the residents note and agree with the documented findings and plan of care. Any areas of disagreement are noted on the chart. I was personally present for the key portions of any procedures. I have documented in the chart those procedures where I was not present during the key portions. I have reviewed the emergency nurses triage note. I agree with the chief complaint, past medical history, past surgical history, allergies, medications, social and family history as documented unless otherwise noted below. For Physician Assistant/ Nurse Practitioner cases/documentation I have personally evaluated this patient and have completed at least one if not all key elements of the E/M (history, physical exam, and MDM). Additional findings are as noted. Initial Screens:        [place] Coma Scale  Eye Opening: Spontaneous  Best Verbal Response: Oriented  Best Motor Response: Obeys commands  [place] Coma Scale Score: 15    Vitals:    Vitals:    08/09/20 1017   Pulse: 98   Resp: 17   Temp: 97.5 °F (36.4 °C)   TempSrc: Axillary   SpO2: 98%       CHIEF COMPLAINT       Chief Complaint   Patient presents with    Abdominal Pain     Multiple complaints. Suprapubic pain with difficulty urinating and constipation, right arm numbness/weakness x 1 week.  Back Pain     Back pain s/p trip and fall down 5 steps, denies hitting his head.  No LOC       Patient is a 76-year-old male with prior history of CAD who presents after a fall at home patient fell down 5 stairs denies hitting head denies loss of consciousness however has been feeling an increasing numbness and weakness of his right hand for the last week since he was seen at [facility]. Patient having increasing abdominal pain severe back pain ripping tearing radiating to the anterior chest and abdomen. No fevers chills cough wheezing    DIAGNOSTIC RESULTS     RADIOLOGY:   CT Head WO Contrast    (Results Pending)   CTA CHEST ABDOMEN PELVIS W CONTRAST    (Results Pending)     EKG Interpretation    Interpreted by emergency department physician    Rhythm: Atrial sensed ventricular paced  Rate: normal  Axis: left  Ectopy: none  Conduction: Paced LBBB (NO SGARBOSA CRITERIA)  ST Segments: elevation in  v2  T Waves: non specific changes  Q Waves: nonspecific    EKG  Impression: non-specific EKG      LABS:  Labs Reviewed   CBC WITH AUTO DIFFERENTIAL   COMPREHENSIVE METABOLIC PANEL W/ REFLEX TO MG FOR LOW K   LIPASE   TROPONIN   TROPONIN   BRAIN NATRIURETIC PEPTIDE       EMERGENCY DEPARTMENT COURSE:     -------------------------   , Temp: 97.5 °F (36.4 °C), Pulse: 98, Resp: 17  Physical Exam  Constitutional:       Appearance: He is well-developed. He is not diaphoretic. HENT:      Head: Normocephalic and atraumatic. Right Ear: External ear normal.      Left Ear: External ear normal.   Eyes:      General: No scleral icterus. Right eye: No discharge. Left eye: No discharge. Neck:      Musculoskeletal: Normal range of motion. Trachea: No tracheal deviation. Cardiovascular:      Rate and Rhythm: Normal rate. Pulmonary:      Effort: Pulmonary effort is normal. No respiratory distress. Breath sounds: No stridor. Abdominal:      General: Abdomen is flat. There is no distension. Musculoskeletal: Normal range of motion. General: Tenderness (TTP mid thoracic region) present. Skin:     General: Skin is warm and dry. Neurological:      Mental Status: He is alert and oriented to person, place, and time.       Coordination: Coordination normal. Psychiatric:         Behavior: Behavior normal.         Comments  MIPS 415     A head CT was ordered, but not by an emergency care provider: No    A head CT was ordered by an emergency care provider, and some of the indications for ordering the head CT included  Measure Exclusions:  Patient has a ventricular shunt: No  Patient has a brain tumor: No  Patient has multi-system trauma: No  Patient is pregnant: No  Patient is taking an antiplatelet medication (excluding aspirin): No  Patient is 72years old or older: Yes    Signs and Symptoms:  Patients GCS was less than 15: No  Focal neurological deficit: Yes  Severe Headache: No  Vomiting: No  Physical signs of a basilar skull fracture: No  Coagulopathy: No  Thrombocytopenia: No  Patient suspected of taking an anticoagulant medication: No  Dangerous mechanism of injury: No      Pt getting CT head for possible STROKE (Subacute Last known well 1 week ago)    Given the ripping tearing pain aortic dissection possible ACS also concerned patient will have troponins will have CTA of the chest abdomen pelvis to assess for this. Likely will need admission for cardiac evaluation        Sanchez DO, RDMS.   Attending Emergency Physician          Leana Zamorano DO  08/09/20 1057

## 2020-08-09 NOTE — ED PROVIDER NOTES
[facility] ED  Emergency Department  Emergency Medicine Resident Sign-out     Care of Gume Kim was assumed from Dr. Sree Ellis and is being seen for Abdominal Pain (Multiple complaints. Suprapubic pain with difficulty urinating and constipation, right arm numbness/weakness x 1 week.) and Back Pain (Back pain s/p trip and fall down 5 steps, denies hitting his head. No LOC)  . The patient's initial evaluation and plan have been discussed with the prior provider who initially evaluated the patient.      EMERGENCY DEPARTMENT COURSE / MEDICAL DECISION MAKING:       MEDICATIONS GIVEN:  Orders Placed This Encounter   Medications    acetaminophen (TYLENOL) tablet 1,000 mg    DISCONTD: ibuprofen (ADVIL;MOTRIN) tablet 800 mg    iohexol (OMNIPAQUE 350) solution 100 mL    DISCONTD: esmolol (BREVIBLOC) 2.5gm/250ml NS infusion    DISCONTD: esmolol (BREVIBLOC) injection 20 mg    dexamethasone (DECADRON) injection 4 mg    sodium chloride flush 0.9 % injection 10 mL    sodium chloride flush 0.9 % injection 10 mL    acetaminophen (TYLENOL) tablet 650 mg    aspirin chewable tablet 81 mg    atorvastatin (LIPITOR) tablet 80 mg    carvedilol (COREG) tablet 12.5 mg    cyclobenzaprine (FLEXERIL) tablet 10 mg    docusate sodium (COLACE) capsule 100 mg    sodium chloride flush 0.9 % injection 10 mL    sodium chloride flush 0.9 % injection 10 mL    magnesium sulfate 1 g in dextrose 5% 100 mL IVPB    DISCONTD: acetaminophen (TYLENOL) tablet 650 mg    acetaminophen (TYLENOL) suppository 650 mg    polyethylene glycol (GLYCOLAX) packet 17 g    OR Linked Order Group     promethazine (PHENERGAN) tablet 12.5 mg     ondansetron (ZOFRAN) injection 4 mg    heparin (porcine) injection 5,000 Units       LABS / RADIOLOGY:     Labs Reviewed   CBC WITH AUTO DIFFERENTIAL - Abnormal; Notable for the following components:       Result Value    Seg Neutrophils 72 (*)     Lymphocytes 12 (*)     Eosinophils % 8 (*)     Absolute Lymph # 0.96 (*)     Absolute Eos # 0.65 (*)     All other components within normal limits   COMPREHENSIVE METABOLIC PANEL W/ REFLEX TO MG FOR LOW K - Abnormal; Notable for the following components:    Glucose 114 (*)     CREATININE 1.43 (*)     GFR Non- 49 (*)     GFR  59 (*)     All other components within normal limits   BRAIN NATRIURETIC PEPTIDE - Abnormal; Notable for the following components:    Pro- (*)     All other components within normal limits   LIPASE   TROPONIN   TROPONIN   URINALYSIS   PSA SCREENING   BASIC METABOLIC PANEL W/ REFLEX TO MG FOR LOW K   CBC WITH AUTO DIFFERENTIAL       Ct Head Wo Contrast    Result Date: 8/9/2020  EXAMINATION: CT OF THE HEAD WITHOUT CONTRAST 8/9/2020 11:47 am TECHNIQUE: CT of the head was performed without the administration of intravenous contrast. Dose modulation, iterative reconstruction, and/or weight based adjustment of the mA/kV was utilized to reduce the radiation dose to as low as reasonably achievable. COMPARISON: None. HISTORY: ORDERING SYSTEM PROVIDED HISTORY: New deficiet, one week old TECHNOLOGIST PROVIDED HISTORY: New deficiet, one week old FINDINGS: Large area of hypodensity within the left MCA distribution. Small area of hypodensity within the right posterior parietal lobe. No mass effect. No midline shift. Mild prominence of the ventricles and sulci is consistent with atrophy. No acute soft tissue abnormality. No acute orbital abnormality. No acute osseous abnormality. Opacity throughout the right maxillary sinus with bony overgrowth suggesting chronic paranasal sinus disease. Large subacute left MCA distribution infarct is suspected. Small right posterior parietal subacute infarct is suspected. Follow-up with MRI may be helpful. Xr Chest Portable    Result Date: 8/4/2020  EXAMINATION: ONE XRAY VIEW OF THE CHEST 8/4/2020 2:54 pm COMPARISON: AP chest from 02/08/2019.  HISTORY: ORDERING SYSTEM PROVIDED HISTORY: chest pain TECHNOLOGIST PROVIDED HISTORY: chest pain Reason for Exam: chest pain and constipation Acuity: Unknown Type of Exam: Unknown History of cardiomyopathy. FINDINGS: Left-sided AICD via subclavian approach, with unchanged biventricular and right atrial electrodes. Cardiac silhouette unchanged; new rounded right hilar mass, not seen previously. Some fullness right-sided mediastinum suggests possible adenopathy. Remainder upper mediastinum, both lungs and both costophrenic angles clear. Unchanged mild DJD spine. New right hilar mass. The findings were sent to the Radiology Results [id number] at 3:03 p.m. on 8/4/2020to be communicated to a licensed caregiver. RECOMMENDATION: CT scan of the chest recommended, with IV contrast if possible. Cta Chest Abdomen Pelvis W Contrast    Addendum Date: 8/9/2020    ADDENDUM: Right 7th rib, T6 and right iliac bone and several other possible bony metastatic lesions also identified. Result Date: 8/9/2020  EXAMINATION: CTA OF THE CHEST, ABDOMEN AND PELVIS WITH CONTRAST, 8/4/2020 3:09 pm TECHNIQUE: CTA of the chest, abdomen and pelvis was performed after the administration of intravenous contrast.  Multiplanar reformatted images are provided for review. MIP images are provided for review. Dose modulation, iterative reconstruction, and/or weight based adjustment of the mA/kV was utilized to reduce the radiation dose to as low as reasonably achievable. COMPARISON: CT scan of the abdomen and pelvis from 02/10/2019. HISTORY: ORDERING SYSTEM PROVIDED HISTORY: back pain TECHNOLOGIST PROVIDED HISTORY: back pain Reason for Exam: Mid back pain, right arm numbness. Left abd pain, chest pressure Acuity: Acute Type of Exam: Initial FINDINGS: CTA CHEST: No acute abnormality thoracic aorta; slight calcific plaque aortic arch. Normal patent branch vessels to the head and upper extremities. No aneurysm or dissection.   Mildly dilated left ventricle. Left-sided AICD via subclavian approach with electrode leads in the right heart and coronary sinus. Incidental findings: Lobular irregular ~ 4.6 x 4.4 cm RLL mass superior segment abutting the thickened medial pleura and upper major fissure with central low attenuation, and a small high density focus abutting its superolateral border measuring 1.7 x 1.1 cm (slice 52, series 4). 2.0 x 1.6 cm right upper lobe mass (best seen on cuts 45-52, series 4). Extensive right hilar adenopathy and enlarged subcarinal and right paratracheal nodes, largest measuring 2.1 (short axis). Small right pleural effusion. Emphysematous changes upper lung zones and slight posteroinferior dependent or atelectatic changes. No contralateral nodules. No sizable left pleural effusion. Mild kyphoscoliosis and DJD thoracic spine. CTA ABDOMEN: Moderate ASVD with some calcification abdominal aorta, but patent visceral vessels and no aneurysm or dissection. No adrenal mass. No focal hepatic mass. No splenomegaly or focal mass. Mild pancreatic ductal dilatation. Normal biliary tree. No calcified gallstone. Large amount of retained stool throughout large bowel with some diverticular disease but no diverticulitis or obvious mass. Symmetric renal perfusion bilaterally. Probable peripelvic cysts, especially on the left. No suspicious focal abnormality identified. A few mildly enlarged retroperitoneal nodes but no bulky lymphadenopathy. Small peripancreatic node. Mild-moderate degenerative changes lumbar spine with DDD L4-S1. No destructive or blastic lesion. CTA PELVIS: Elongated moderately diseased iliac arteries, especially external iliac arteries without aneurysm or clear cut dissection. Enlarged prostate measuring 5.1 x 4.4 cm. Urinary bladder WNL. THORACIC/LUMBAR SPINE: Please see separate thoracic/lumbar spine CT report. No aortic dissection or aneurysm.  Large right lower lobe mass with additional RLL and RUL nodules, significant right hilar/ipsilateral mediastinal adenopathy and small pleural effusion. Additional findings, as detailed in the body of the report above. RECOMMENDATIONS: Bronchoscopic directed or percutaneous biopsy. Cta Chest Abdomen Pelvis W Contrast    Result Date: 8/9/2020  EXAMINATION: CTA OF THE CHEST, ABDOMEN AND PELVIS WITH CONTRAST 8/9/2020 11:50 am TECHNIQUE: CTA of the chest, abdomen and pelvis was performed after the administration of intravenous contrast.  Multiplanar reformatted images are provided for review. MIP images are provided for review. Dose modulation, iterative reconstruction, and/or weight based adjustment of the mA/kV was utilized to reduce the radiation dose to as low as reasonably achievable. 3D reconstructed images were performed on a separate workstation and provided for review. COMPARISON: August 4, 2020 HISTORY: ORDERING SYSTEM PROVIDED HISTORY: Chest pain, heart disease, persistent back pain, constipation, urinary retention TECHNOLOGIST PROVIDED HISTORY: Chest pain, heart disease, persistent back pain, constipation, urinary retention FINDINGS: CT CHEST: Chest wall: Left chest wall pacemaker. No axillary adenopathy. Mediastinum: Mild cardiomegaly. Trace pericardial effusion. Coronary artery calcifications. Necrotic mediastinal adenopathy. For example there is a right paratracheal lymph node measuring 2.8 cm. Right hilar mass invading into the mediastinum measures at least 2.6 cm. Lungs: Right hilar mass causes narrowing of the right main pulmonary artery. Right lower lobe mass measures 4.2 x 4.7 cm and extends to the underlying pleural surface. Anteriorly this extends to the fissure. Right upper lobe mass measures 18 mm series 4, image 52. Mild centrilobular emphysema. No left pleural effusion. Trace right pleural effusion. Bones: Osseous metastatic disease involving the right 7th rib. Osseous metastatic disease within a midthoracic vertebral body.  CT ABDOMEN-PELVIS: Organs: No liver lesion. No gallstones. No pancreatic lesion. No splenomegaly. No adrenal lesion. No hydronephrosis. Subcentimeter left renal hypodensity is likely a cyst but incompletely characterized. GI/Bowel: No bowel obstruction. No acute inflammatory process. Sigmoid diverticulosis without acute diverticulitis. Pelvis: No free fluid. No bladder calculus. Prostate gland is enlarged and slightly heterogeneous. Peritoneum/Retroperitoneum: No adenopathy. Bones/Soft Tissues: No acute soft tissue abnormality. Osseous metastatic disease. Destructive iliac lesions. VASCULAR: No thoracic aortic aneurysm. No thoracic aortic dissection. Origin of the great vessels are patent. No abdominal aortic aneurysm. No acute abdominal aortic dissection. Questionable area of plaque versus small focal dissection inferior to the left renal artery. This area is unchanged. Celiac axis is patent. SMA is patent. Renal arteries are patent. Atherosclerotic calcification of the infrarenal abdominal aorta. No acute vascular findings. No acute findings within the chest, abdomen, or pelvis. Questionable small remote aortic dissection versus plaque just inferior to the left renal artery. No aortic aneurysm. Metastatic disease. Multiple pulmonary masses with invasion of the right hilum. Necrotic mediastinal adenopathy. Multiple areas of osseous metastatic disease with destructive lytic lesions. Prostate gland is enlarged and heterogeneous. Correlate with PSA values.        RECENT VITALS:     Temp: 97.5 °F (36.4 °C),  Pulse: 91, Resp: 17, BP: (!) 144/75, SpO2: 95 %    ED Course as of Aug 09 1853   Sun Aug 09, 2020   1108 Bedside ultrasound performed by myself and Dr. Celina Garcia showing post void residual of 50cc will add urinalysis    [MA]   1110 CBC unremarkable   CBC Auto Differential(!):    WBC 8.1   RBC 4.80   Hemoglobin Quant 13.8   Hematocrit 43.7   MCV 91.0   MCH 28.8   MCHC 31.6   RDW 14.2   Platelet Count 223   MPV 10.0   NRBC Automated 0.0   Differential Type NOT REPORTED   Seg Neutrophils 72(!)   Lymphocytes 12(!)   Monocytes 7   Eosinophils % 8(!)   Basophils 1   Immature Granulocytes 0   Segs Absolute 5.79   Absolute Lymph # 0.96(!)   Absolute Mono # 0.59   Absolute Eos # 0.65(!)   Basophils Absolute 0.05   Absolute Immature Granulocyte 0.03   WB. .. [MA]   1126 Within normal limits    Troponin:    Troponin, High Sensitivity 12   Troponin T NOT REPORTED   Troponin Interp NOT REPORTED [MA]   1130 Noted to be elevated   Pro-BNP(!): 996 [MA]   1141 Within normal limits      Lipase:    Lipase 25 [MA]   200 One week old MCA infarct. Will page internal medicine for admission, neurology, and cardiology. CT Head WO Contrast [MA]   4819 Patient updated on CT Head results. Daughter in law at bedside. Both are understanding and compliant with plan for admission for further evaluation and management of newly found CVA. [MA]   0405 CT Chest showing possible metastatic disease with multiple osseous lesions. Multiple lung cysts noted. CTA CHEST ABDOMEN PELVIS W CONTRAST [MA]   36 Allie with internal medicine team they are excepting. Discussed with vascular surgery nothing to do about chronic dissection acutely, they will follow inpatient and follow-up outpatient. Discussed with neurology who believe that the lesions are metastatic in nature       [MA]   1407 Started IV Dexamethasone 4 mg Q6 per Neurology. [MA]   3815 Discussed further findings of CT chest abdomen and pelvis and admission with patient and his daughter in law. They are compliant and understanding of plan for further work up and admission. [MA]      ED Course User Index  [MA] Bhavana Dimas DO       This patient is a 69 y.o. [facility]  Found to have lung mass and a large prostate. also had some focal neurological deficits in the right arm. Concern for large L MCA R parietal lesion. Patient with extensive metastasis. 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 Vascular surgery consulted no acute intervention. Hematology oncology saw and evaluated patient. Patient admitted to internal medicine for further characterization work-up of cancer. Hemodynamically stable. Awaiting bed. OUTSTANDING TASKS / RECOMMENDATIONS:    1. Admit IM     FINAL IMPRESSION:     1. Cerebrovascular accident (CVA), unspecified mechanism ([facility] [address]        DISPOSITION:         DISPOSITION:  []  Discharge   []  Transfer -    [x]  Admission -     []  Against Medical Advice   []  Eloped   FOLLOW-UP: No follow-up provider specified.    DISCHARGE MEDICATIONS: New Prescriptions    No medications on file          Rena Butler DO  Emergency Medicine Resident  [id number] [facility]       Rena Butler, [address] [place] [address]  Resident  08/10/20 3011

## 2020-08-09 NOTE — H&P
[facility]     Department of Internal Medicine - Staff Internal Medicine Teaching Service          ADMISSION NOTE/HISTORY AND PHYSICAL EXAMINATION   Date: 2020  Patient Name: Nikia Figueroa  Date of admission: 2020 10:07 AM  YOB: 1950  PCP: No primary care provider on file. History Obtained From:  patient, family member -daughter-in-law, electronic medical record    CHIEF COMPLAINT     Chief complaint: Pain abdomen                                Back pain    HISTORY OF PRESENTING ILLNESS     The patient is a pleasant 69 y.o. male presents with known history of CAD, hypertension, NSTEMI, s/p AICD now presented with chief complaint of back pain and pain abdomen. Patient states that last week he had a sudden pop in the back and excruciating back pain after lifting heavy object. Patient states that he went to a chiropractor and had a back manipulation but it did not help his symptoms. Patient took some pain medication without any relief. Patient also complains of constipation and difficulty urination during the same period. Patient complains of weakness in the right upper and lower extremity during this past week. Daughter-in-law reported that patient was losing his balance lately. No history of any headache, urinary incontinence, nausea vomiting. She did mention about patient having loss of appetite recently but denies any change in weight. Patient complains of diarrhea after taking some medication for his constipation. Patient mentions about having week urinary stream, straining during urination and was told about prostatic enlargement in the past.  Was never evaluated further. Patient has remote history of hemorrhoids, but no history of melena/bloody stools. Never got colonoscopy done so far. No history of chest pain, shortness of breath, diaphoresis, palpitation and pedal edema.   Patient mentions about history of cancer in the family, mother  at 68 due to some unknown cancer, father had asbestosis and prostatic cancer. Patient worked in [employer] in the past, used to work in [employer]. CT chest without contrast 02/19/2019: Showed spiculated and partially cavitary nodule in the superior segment of the right lower lobe, measuring up to 1.2 cm, concerning for malignancy. Unchanged 0.4 cm nodule in the posterior left lower lobe, initially seen on CT abdomen 02/10/2019. Mild to moderate centrilobular emphysema. Tissue sampling and/or PT/CT was recommended, but patient did not follow-up. In the ED, proBNP was 996, creatinine 1.43, glucose 114, PSA 5.86. Neurology, Pulmonology, Heme-Onc and vascular were consulted in the ED. CT chest abdomen pelvis 08/09: No aortic dissection or aneurysm. Large right lower lobe mass with additional right lower lobe and right upper lobe nodules, significant right hilar  /Ipsilateral mediastinal adenopathy and small pleural effusion. Bronchoscopic directed or percutaneous biopsy was recommended. CT head Without contrast 08/09: Large subacute left MCA distribution infarct is suspected. Small right posterior parietal subacute infarct is suspected. Recommended MRI. MRI cervical spine and thoracic spine ordered, IR guided needle biopsy ordered, patient will remain n.p.o. after midnight. Review of Systems   Constitutional: Positive for appetite change. Negative for activity change, chills, diaphoresis, fatigue, fever and unexpected weight change. HENT: Negative for congestion, hearing loss and sore throat. Respiratory: Negative for apnea, cough, choking, chest tightness and shortness of breath. Cardiovascular: Negative for chest pain, palpitations and leg swelling. Gastrointestinal: Positive for abdominal pain, constipation and diarrhea. Negative for anal bleeding, blood in stool, nausea, rectal pain and vomiting. Endocrine: Negative for polydipsia, polyphagia and polyuria.    Genitourinary: Positive for decreased urine volume and difficulty urinating. Negative for dysuria, enuresis, flank pain, frequency, hematuria and urgency. Musculoskeletal: Positive for back pain. Skin: Negative for color change, pallor, rash and wound. Neurological: Positive for weakness. Negative for dizziness, seizures, syncope, facial asymmetry, light-headedness, numbness and headaches. Psychiatric/Behavioral: Negative for confusion. PAST MEDICAL HISTORY     Past Medical History:   Diagnosis Date    Cardiac resynchronization therapy defibrillator (CRT-D) in place     Chest pain 02/08/2019    Coronary artery disease     Dyspnea on exertion     Elevated troponin 02/08/2019    Heart attack ([facility] [address]     Ischemic cardiomyopathy        PAST SURGICAL HISTORY     Past Surgical History:   Procedure Laterality Date    CORONARY ANGIOPLASTY WITH STENT PLACEMENT  2012    BMS to LAD    PACEMAKER PLACEMENT  02/11/2019    AICD/ [employer]  MODEL #[id number] CRTD AMPLIA MRI USDF4. LEADS [id number], [id number] AND 1646-70        ALLERGIES     Penicillins    MEDICATIONS PRIOR TO ADMISSION     Prior to Admission medications    Medication Sig Start Date End Date Taking? Authorizing Provider   carvedilol (COREG) 12.5 MG tablet Take 12.5 mg by mouth 2 times daily (with meals)    Historical Provider, MD   docusate sodium (COLACE) 100 MG capsule Take 1 capsule by mouth 2 times daily 2/6/07   Shanda Sun MD   cyclobenzaprine (FLEXERIL) 10 MG tablet Take 1 tablet by mouth nightly as needed for Muscle spasms 7/5/42 0/56/71  Shanda Sun MD   aspirin 81 MG chewable tablet Take 1 tablet by mouth daily 2/12/19   Erica Payne MD   nitroGLYCERIN (NITROSTAT) 0.4 MG SL tablet up to max of 3 total doses.  If no relief after 1 dose, call 911. 2/11/19   Erica Payne MD   atorvastatin (LIPITOR) 80 MG tablet Take 1 tablet by mouth nightly 2/11/19   Erica Payne MD   lisinopril (PRINIVIL;ZESTRIL) 5 MG tablet Take 1 tablet by mouth daily 19   Ayla Hogue MD   metoprolol succinate (TOPROL XL) 25 MG extended release tablet Take 1 tablet by mouth nightly 19   Ayla Hogue MD   furosemide (LASIX) 20 MG tablet Take 1 tablet by mouth daily 19   Ayla Hogue MD       SOCIAL HISTORY     Tobacco: Quit smoking 5 years ago. Used to smoke 2 packs/day for 50 years. Alcohol: Quit alcohol. Illicits: Marijuana  Occupation: Worked in the [employer]. Now retired    FAMILY HISTORY     Father had asbestosis and prostate cancer diagnosed in 76s. Mother  of some unknown cancer. Sister has diabetes. Son has diabetes. PHYSICAL EXAM     Vitals: BP (!) 144/75   Pulse 91   Temp 97.5 °F (36.4 °C) (Axillary)   Resp 17   Wt 140 lb (63.5 kg)   SpO2 95%   BMI 23.30 kg/m²   Tmax: Temp (24hrs), Av.5 °F (36.4 °C), Min:97.5 °F (36.4 °C), Max:97.5 °F (36.4 °C)    Last Body weight:   Wt Readings from Last 3 Encounters:   20 140 lb (63.5 kg)   20 140 lb (63.5 kg)   19 135 lb 11.2 oz (61.6 kg)     Body Mass Index : Body mass index is 23.3 kg/m². PHYSICAL EXAMINATION:  Constitutional: This is a well developed, well nourished, 18.5-24.9 - Normal 69y.o. year old male who is alert, oriented, cooperative and in no apparent distress. Head:normocephalic and atraumatic. Neck: Supple without thyromegaly. No elevated JVP. Trachea was midline. Respiratory: Chest was symmetrical without dullness to percussion. Cardiovascular: Regular without murmur, clicks, gallops or rubs. Abdomen: Slightly rounded and soft without organomegaly. Mild suprapubic tenderness. Lymphatic: No lymphadenopathy. Musculoskeletal: Normal curvature of the spine. No gross muscle weakness. Extremities:  No lower extremity edema, ulcerations, tenderness, varicosities or erythema. Skin:  Warm and dry. Good color, turgor and pigmentation. No lesions or scars.   No cyanosis or clubbing  Neurological/Psychiatric: The patient's general behavior, level of consciousness, thought content and emotional status is normal.          INVESTIGATIONS     Laboratory Testing:     Recent Results (from the past 24 hour(s))   EKG 12 Lead    Collection Time: 08/09/20 10:50 AM   Result Value Ref Range    Ventricular Rate 86 BPM    Atrial Rate 86 BPM    P-R Interval 136 ms    QRS Duration 134 ms    Q-T Interval 378 ms    QTc Calculation (Bazett) 452 ms    P Axis 40 degrees    R Axis -92 degrees    T Axis 100 degrees   CBC Auto Differential    Collection Time: 08/09/20 10:54 AM   Result Value Ref Range    WBC 8.1 3.5 - 11.3 k/uL    RBC 4.80 4.21 - 5.77 m/uL    Hemoglobin 13.8 13.0 - 17.0 g/dL    Hematocrit 43.7 40.7 - 50.3 %    MCV 91.0 82.6 - 102.9 fL    MCH 28.8 25.2 - 33.5 pg    MCHC 31.6 28.4 - 34.8 g/dL    RDW 14.2 11.8 - 14.4 %    Platelets 286 404 - 352 k/uL    MPV 10.0 8.1 - 13.5 fL    NRBC Automated 0.0 0.0 per 100 WBC    Differential Type NOT REPORTED     Seg Neutrophils 72 (H) 36 - 65 %    Lymphocytes 12 (L) 24 - 43 %    Monocytes 7 3 - 12 %    Eosinophils % 8 (H) 1 - 4 %    Basophils 1 0 - 2 %    Immature Granulocytes 0 0 %    Segs Absolute 5.79 1.50 - 8.10 k/uL    Absolute Lymph # 0.96 (L) 1.10 - 3.70 k/uL    Absolute Mono # 0.59 0.10 - 1.20 k/uL    Absolute Eos # 0.65 (H) 0.00 - 0.44 k/uL    Basophils Absolute 0.05 0.00 - 0.20 k/uL    Absolute Immature Granulocyte 0.03 0.00 - 0.30 k/uL    WBC Morphology NOT REPORTED     RBC Morphology NOT REPORTED     Platelet Estimate NOT REPORTED    Comprehensive Metabolic Panel w/ Reflex to MG    Collection Time: 08/09/20 10:54 AM   Result Value Ref Range    Glucose 114 (H) 70 - 99 mg/dL    BUN 21 8 - 23 mg/dL    CREATININE 1.43 (H) 0.70 - 1.20 mg/dL    Bun/Cre Ratio NOT REPORTED 9 - 20    Calcium 9.7 8.6 - 10.4 mg/dL    Sodium 136 135 - 144 mmol/L    Potassium 5.1 3.7 - 5.3 mmol/L    Chloride 99 98 - 107 mmol/L    CO2 25 20 - 31 mmol/L    Anion Gap 12 9 - 17 mmol/L    Alkaline Phosphatase 84 40 - 129 U/L ALT 8 5 - 41 U/L    AST 13 <40 U/L    Total Bilirubin 0.38 0.3 - 1.2 mg/dL    Total Protein 7.0 6.4 - 8.3 g/dL    Alb 3.7 3.5 - 5.2 g/dL    Albumin/Globulin Ratio 1.1 1.0 - 2.5    GFR Non- 49 (L) >60 mL/min    GFR  59 (L) >60 mL/min    GFR Comment          GFR Staging NOT REPORTED    Lipase    Collection Time: 08/09/20 10:54 AM   Result Value Ref Range    Lipase 25 13 - 60 U/L   Brain Natriuretic Peptide    Collection Time: 08/09/20 10:54 AM   Result Value Ref Range    Pro- (H) <300 pg/mL    BNP Interpretation Pro-BNP Reference Range:    Troponin    Collection Time: 08/09/20 10:54 AM   Result Value Ref Range    Troponin, High Sensitivity 12 0 - 22 ng/L    Troponin T NOT REPORTED <0.03 ng/mL    Troponin Interp NOT REPORTED    Urinalysis, reflex to microscopic    Collection Time: 08/09/20 12:34 PM   Result Value Ref Range    Color, UA YELLOW YELLOW    Turbidity UA CLEAR CLEAR    Glucose, Ur NEGATIVE NEGATIVE    Bilirubin Urine NEGATIVE NEGATIVE    Ketones, Urine NEGATIVE NEGATIVE    Specific Gravity, UA 1.017 1.005 - 1.030    Urine Hgb NEGATIVE NEGATIVE    pH, UA 6.0 5.0 - 8.0    Protein, UA NEGATIVE NEGATIVE    Urobilinogen, Urine Normal Normal    Nitrite, Urine NEGATIVE NEGATIVE    Leukocyte Esterase, Urine NEGATIVE NEGATIVE    Urinalysis Comments       Microscopic exam not performed based on chemical results unless requested in original order. Troponin    Collection Time: 08/09/20 12:49 PM   Result Value Ref Range    Troponin, High Sensitivity 13 0 - 22 ng/L    Troponin T NOT REPORTED <0.03 ng/mL    Troponin Interp NOT REPORTED        Imaging:   Ct Head Wo Contrast    Result Date: 8/9/2020  Large subacute left MCA distribution infarct is suspected. Small right posterior parietal subacute infarct is suspected. Follow-up with MRI may be helpful. Xr Chest Portable    Result Date: 8/4/2020  New right hilar mass.  The findings were sent to the Radiology Results Communication [facility] at 3:03 p.m. on 8/4/2020to be communicated to a licensed caregiver. RECOMMENDATION: CT scan of the chest recommended, with IV contrast if possible. Cta Chest Abdomen Pelvis W Contrast    Addendum Date: 8/9/2020    ADDENDUM: Right 7th rib, T6 and right iliac bone and several other possible bony metastatic lesions also identified. Result Date: 8/9/2020  No aortic dissection or aneurysm. Large right lower lobe mass with additional RLL and RUL nodules, significant right hilar/ipsilateral mediastinal adenopathy and small pleural effusion. Additional findings, as detailed in the body of the report above. RECOMMENDATIONS: Bronchoscopic directed or percutaneous biopsy. Cta Chest Abdomen Pelvis W Contrast    Result Date: 8/9/2020  No acute vascular findings. No acute findings within the chest, abdomen, or pelvis. Questionable small remote aortic dissection versus plaque just inferior to the left renal artery. No aortic aneurysm. Metastatic disease. Multiple pulmonary masses with invasion of the right hilum. Necrotic mediastinal adenopathy. Multiple areas of osseous metastatic disease with destructive lytic lesions. Prostate gland is enlarged and heterogeneous. Correlate with PSA values. ASSESSMENT & PLAN     Active Problems:    Lung mass    Enlarged prostate  Resolved Problems:    * No resolved hospital problems. *    Plan:  -Lung Mass: CT chest abdomen pelvis with contrast: Questionable small remote aortic dissection versus plaque just inferior to the left renal artery, no aortic aneurysm. Metastatic disease, multiple pulmonary masses with invasion of the right hilum, necrotic mediastinal adenopathy. Metastatic areas of osseous metastatic disease with destructive lytic lesions. Prostate gland is enlarged and heterogeneous, correlate with PSA. Findings consistent with metastatic disease. Heme/ Onc and pulmonology consulted.   Recommended IR guided needle biopsy, patient n.p.o. after midnight.    -Stroke/metastatic brain disease: Patient complains of right-sided weakness, losing balance. CT scan findings worrisome for metastatic brain disease rather than a stroke. Neurology consulted and recommended Decadron 4 mg IV every 6 hours and MRI scan of the brain, cervical and thoracic spine without contrast.    -Enlarged prostate: History of weak stream, inadequate emptying. CT: Enlarged and heterogeneous prostate, PSA 5.86. Hemo/Onc-consulted. -CAD,s/p AICD: Stable, patient on aspirin 80 mg. -HTN: /76, patient started on home dose of Coreg 12.5 mg twice daily, Lipitor 80mg nightly. -CKD:  creatinine 1.43, baseline 1.29-1.48, monitor BMP. -Elevated proBNP: Patient denies SOB, orthopnea, no pedal edema. We will continue to monitor. DVT ppx: heparin. PT/OT: Consulted  Discharge Planning:  consulted. Bob Alvarado MD  Internal Medicine Resident, PGY-1  [facility]; [facility]  8/9/2020, 6:20 PM   Attending Physician Statement  I have discussed the care of Aislinn Gilbert, including pertinent history and exam findings,  with the resident. I have seen and examined the patient and the key elements of all parts of the encounter have been performed by me. I agree with the assessment, plan and orders as documented by the resident with additions . Treatment plan Discussed with nursing staff in detail , all questions answered . Electronically signed by Aydee Nava MD on   8/9/20 at 8:45 PM EDT    Please note that this chart was generated using voice recognition Dragon dictation software. Although every effort was made to ensure the accuracy of this automated transcription, some errors in transcription may have occurred.

## 2020-08-10 NOTE — PROGRESS NOTES
Physical Therapy    Facility/Department: [facility] RENAL//MED SURG  Initial Assessment    NAME: Peyman Monsivais  : 1950  MRN: [id number]    Date of Service: 8/10/2020  Discharge Recommendations:    No further therapy required at discharge. Assessment   Body structures, Functions, Activity limitations: Decreased functional mobility ; Decreased endurance;Decreased strength  Assessment: The pt ambulated 50 ft without a device x CGA. He reported increased pain in his L hip with mobilization. Continue to see for gait strengthening. Prognosis: Good  Decision Making: Medium Complexity  PT Education: Goals;PT Role;Plan of Care  REQUIRES PT FOLLOW UP: Yes  Activity Tolerance  Activity Tolerance: Patient limited by pain   Patient Diagnosis(es): The encounter diagnosis was Cerebrovascular accident (CVA), unspecified mechanism ([facility] [address] has a past medical history of Cardiac resynchronization therapy defibrillator (CRT-D) in place, Chest pain, Coronary artery disease, Dyspnea on exertion, Elevated troponin, Heart attack ([address] and Ischemic cardiomyopathy. has a past surgical history that includes Coronary angioplasty with stent () and pacemaker placement (2019). Restrictions  Restrictions/Precautions  Restrictions/Precautions: Up as Tolerated(Simultaneous filing. User may not have seen previous data.)  Required Braces or Orthoses?: No  Position Activity Restriction  Other position/activity restrictions: Up with assist(Simultaneous filing.  User may not have seen previous data.)  Vision/Hearing  Hearing: Within functional limits     Subjective  General  Patient assessed for rehabilitation services?: Yes  Response To Previous Treatment: Not applicable  Family / Caregiver Present: No  Follows Commands: Within Functional Limits  Subjective  Subjective: RN and pt agreeable to therapy eval  Pain Screening  Patient Currently in Pain: Yes  Pain Assessment  Pain Assessment: 0-10  Pain Level: 3  Pain Location: Back  Vital Signs  Patient Currently in Pain: Yes     Orientation  Orientation  Overall Orientation Status: Within Normal Limits  Social/Functional History  Social/Functional History  Lives With: Son(Son and daughter in law)  Type of Home: House  Home Layout: Two level, Able to Live on Main level with bedroom/bathroom  Bathroom Shower/Tub: Walk-in shower  Bathroom Toilet: Standard  Bathroom Equipment: (no DME)  Home Equipment: (none)  ADL Assistance: Independent  Homemaking Assistance: Independent  Homemaking Responsibilities: Yes  Ambulation Assistance: Independent  Transfer Assistance: Independent  Active : Yes  Mode of Transportation: SUV  Occupation: Retired  Type of occupation:   Leisure & Hobbies: plays the [employer], writes music  Additional Comments: pt reports son has large dog that son cares for  Cognition      Objective     AROM RLE (degrees)  RLE AROM: WNL  AROM LLE (degrees)  LLE AROM : WNL  AROM RUE (degrees)  RUE AROM : WNL  AROM LUE (degrees)  LUE AROM : WNL  Strength RLE  Strength RLE: WNL  Strength LLE  Strength LLE: WNL  Strength RUE  Strength RUE: WFL  Strength LUE  Strength LUE: WNL     Sensation  Overall Sensation Status: WFL  Bed mobility  Supine to Sit: Stand by assistance  Sit to Supine: Stand by assistance  Scooting: Stand by assistance  Transfers  Sit to Stand: Stand by assistance  Stand to sit: Stand by assistance  Ambulation  Ambulation?: Yes  Ambulation 1  Surface: level tile  Device: No Device  Assistance: Contact guard assistance  Distance: amb 50 ft without a device x CGA     Balance  Posture: Good  Sitting - Static: Good  Sitting - Dynamic: Good  Standing - Static: Fair  Standing - Dynamic: [facility]  Times per week: 5-6x wk  Current Treatment Recommendations: Strengthening, Gait Training, Stair training, Functional Mobility Training, Endurance Training, Safety Education & Training  Safety Devices  Type of devices: Left in bed, Call light within reach, Nurse notified    G-Code    OutComes Score            AM-PAC Score  AM-PAC Inpatient Mobility Raw Score : 19 (08/10/20 1142)  AM-PAC Inpatient T-Scale Score : 45.44 (08/10/20 1142)  Mobility Inpatient CMS 0-100% Score: 41.77 (08/10/20 1142)  Mobility Inpatient CMS G-Code Modifier : CK (08/10/20 1142)    Goals  Short term goals  Time Frame for Short term goals: 10 visits  Short term goal 1: amb 150 ft without a device x SBA  Short term goal 2: ascend/descend 4 steps with SBA  Short term goal 3: exercise program x SBA  Patient Goals   Patient goals : Return home     Therapy Time   Individual Concurrent Group Co-treatment   Time In 0945         Time Out 1010         Minutes 25             [address] [place] [address] Tanmay Haro PT

## 2020-08-10 NOTE — PROGRESS NOTES
Comprehensive Nutrition Assessment    Type and Reason for Visit:  Initial, Positive Nutrition Screen(wt loss, poor appetite)    Nutrition Recommendations/Plan:   - Start nutrition within 24-48 hrs  - Start standard Ensure ONS BID.  - Monitor labs, weight, and diet advancement. Nutrition Assessment:  Pt screened d/t wt loss and poor appetite. Pt stated he has an appetite, but he is constipated so he doesn't want to eat a lot and get more \"backed up\". He states he has been constipated for about a week. Visible temple and clavicle wasting. Writer asked pt if he would be willing to try a supplement when he is able to eat again and he agreed to standard Ensure. Pt stated UBW is 140-150 lbs. Possible cancer per EMR review. Malnutrition Assessment:  Malnutrition Status: At risk for malnutrition (Comment)(decreased intake, possible catabolic disease, muscle mass loss)    Context:  Chronic Illness     Findings of the 6 clinical characteristics of malnutrition:  Energy Intake:  Mild decrease in energy intake (Comment)(pt has appetite but decreased intake d/t constipation)  Weight Loss:  1 - Mild weight loss (specify amount and time period)(10 lbs (7%) in 3 months)     Body Fat Loss:  No significant body fat loss     Muscle Mass Loss:  1 - Mild muscle mass loss Temples (temporalis), Clavicles (pectoralis & deltoids)  Fluid Accumulation:  No significant fluid accumulation     Strength:  Not Performed    Estimated Daily Nutrient Needs:  Energy (kcal):  0125-2525 kcal/day; Weight Used for Energy Requirements:  Current     Protein (g):  75-85 g/day; Weight Used for Protein Requirements:  Ideal(1.2-1.4 g/kg)          Nutrition Related Findings:  Labs and meds reviewed. Pt states no BM for 1 week.       Wounds:  None       Current Nutrition Therapies:    Diet NPO, After Midnight    Anthropometric Measures:  · Height: 5' 5\" (165.1 cm)  · Current Body Weight: 130 lb (59 kg)   · Admission Body Weight: 130 lb (59 kg) · Usual Body Weight: 140 lb (63.5 kg)(pt stated 140-150 lbs)     · Ideal Body Weight: 136 lbs; % Ideal Body Weight   95.5%  · BMI: 21.6   · BMI Categories: Normal Weight (BMI 18.5-24. 9)       Nutrition Diagnosis:   · Inadequate oral intake related to altered GI function as evidenced by NPO or clear liquid status due to medical condition, constipation, weight loss      Nutrition Interventions:   Food and/or Nutrient Delivery:  Continue NPO, Start Oral Nutrition Supplement, Start diet as able   Nutrition Education/Counseling:  Education not indicated   Coordination of Nutrition Care:  Continued Inpatient Monitoring    Goals:  Start nutrition with 24-48 hrs       Nutrition Monitoring and Evaluation:   Food/Nutrient Intake Outcomes:  Diet Advancement/Tolerance  Physical Signs/Symptoms Outcomes:  Biochemical Data, GI Status, Constipation, Nutrition Focused Physical Findings, Weight     Discharge Planning:     Too soon to determine     Electronically signed by Maria R Mims on 8/10/20 at 11:54 AM EDT    Contact: [phone]

## 2020-08-10 NOTE — CARE COORDINATION
Case Management Initial Discharge Plan  Rudolph Galindo,             Met with:patient to discuss discharge plans. Information verified: address, contacts, phone number, , insurance Yes    Emergency Contact/Next of Kin name & number: Fidel Walker [phone]    PCP: No primary care provider on file. Date of last visit: list provided    Insurance Provider: [employer]    Discharge Planning    Living Arrangements:  Family Members   Support Systems:  Family Members, [id number] has 2 stories  few stairs to climb to get into front door, flight stairs to climb to reach second floor  Location of bedroom/bathroom in home main    Patient able to perform ADL's:Independent    Current Services (outpatient & in home) none   DME equipment: none   DME provider: n/a    Receiving oral anticoagulation therapy? No    If indicated:   Physician managing anticoagulation treatment:   Where does patient obtain lab work for ATC treatment? Potential Assistance Needed:  N/A    Patient agreeable to home care: No  [place] of choice provided:  n/a    Prior SNF/Rehab Placement and Facility:   Agreeable to SNF/Rehab: No  [place] of choice provided: n/a     Evaluation: no    Expected Discharge date:  08/10/20    Patient expects to be discharged to:  home  Follow Up Appointment: Best Day/ Time: Monday AM    Transportation provider: self  Transportation arrangements needed for discharge: Yes    Readmission Risk              Risk of Unplanned Readmission:        16             Does patient have a readmission risk score greater than 14?: Yes  If yes, follow-up appointment must be made within 7 days of discharge.      Goals of Care: pain control and better breathing      Discharge Plan: home with family will need ride          Electronically signed by Peterson RN on 8/10/20 at 4:30 PM EDT

## 2020-08-10 NOTE — PROGRESS NOTES
12.5 MG tablet Take 12.5 mg by mouth 2 times daily (with meals)   Yes Historical Provider, MD   docusate sodium (COLACE) 100 MG capsule Take 1 capsule by mouth 2 times daily 8/5/73   Dipti Crowe MD   cyclobenzaprine (FLEXERIL) 10 MG tablet Take 1 tablet by mouth nightly as needed for Muscle spasms 5/7/29 3/02/71  Dipti Crowe MD   aspirin 81 MG chewable tablet Take 1 tablet by mouth daily 2/12/19   Della Hazel MD   nitroGLYCERIN (NITROSTAT) 0.4 MG SL tablet up to max of 3 total doses.  If no relief after 1 dose, call 911. 2/11/19   Della Hazel MD   atorvastatin (LIPITOR) 80 MG tablet Take 1 tablet by mouth nightly 2/11/19   Della Hazel MD   lisinopril (PRINIVIL;ZESTRIL) 5 MG tablet Take 1 tablet by mouth daily 2/12/19   Della Hazel MD   metoprolol succinate (TOPROL XL) 25 MG extended release tablet Take 1 tablet by mouth nightly 2/11/19   Della Hazel MD   furosemide (LASIX) 20 MG tablet Take 1 tablet by mouth daily 2/12/19   Della Hazel MD     Current Facility-Administered Medications   Medication Dose Route Frequency Provider Last Rate Last Dose    sodium chloride flush 0.9 % injection 10 mL  10 mL Intravenous PRN Leeroy Edge MD   10 mL at 08/10/20 1553    dexamethasone (DECADRON) injection 4 mg  4 mg Intravenous Q6H Leeroy Edge MD   4 mg at 08/10/20 1553    sodium chloride flush 0.9 % injection 10 mL  10 mL Intravenous 2 times per day Michele Vazquez MD        sodium chloride flush 0.9 % injection 10 mL  10 mL Intravenous PRN Michele Vazquez MD        acetaminophen (TYLENOL) tablet 650 mg  650 mg Oral Q4H PRN Michele Vazquez MD        aspirin chewable tablet 81 mg  81 mg Oral Daily Leeroy Edge MD   Stopped at 08/10/20 0739    atorvastatin (LIPITOR) tablet 80 mg  80 mg Oral Nightly Leeroy Edge MD   80 mg at 08/09/20 2032    carvedilol (COREG) tablet 12.5 mg  12.5 mg Oral BID  Leeroy Edge MD   12.5 mg at 08/10/20 4539    cyclobenzaprine (FLEXERIL) tablet 10 mg  10 mg Oral Nightly PRN Yamilet Penny MD   10 mg at 08/09/20 2033    docusate sodium (COLACE) capsule 100 mg  100 mg Oral BID Yamilet Penny MD   100 mg at 08/09/20 2033    sodium chloride flush 0.9 % injection 10 mL  10 mL Intravenous 2 times per day Yamilet Penny MD   10 mL at 08/10/20 0754    sodium chloride flush 0.9 % injection 10 mL  10 mL Intravenous PRN Yamilet Penny MD        magnesium sulfate 1 g in dextrose 5% 100 mL IVPB  1 g Intravenous PRN aYmilet Penny MD        acetaminophen (TYLENOL) suppository 650 mg  650 mg Rectal Q6H PRN Yamilet Penny MD        polyethylene glycol [facility]) packet 17 g  17 g Oral Daily PRN Yamilet Penny MD        promethazine (PHENERGAN) tablet 12.5 mg  12.5 mg Oral Q6H PRN Yamilet Penny MD        Or    ondansetron [facility]) injection 4 mg  4 mg Intravenous Q6H PRN Yamilet Penny MD        heparin (porcine) injection 5,000 Units  5,000 Units Subcutaneous 3 times per day Yamilet Penny MD   Stopped at 08/11/20 0600    LORazepam (ATIVAN) tablet 0.5 mg  0.5 mg Oral PRN Madi Howard MD   0.5 mg at 08/10/20 1227    pantoprazole (PROTONIX) tablet 40 mg  40 mg Oral QAM AC Yamilet Penny MD   40 mg at 08/10/20 1598       Allergies:  Penicillins    Social History:   reports that he has quit smoking. He has never used smokeless tobacco. He reports current drug use. Drug: Marijuana. He reports that he does not drink alcohol. Family History: Hypertension and diabetes    REVIEW OF SYSTEMS:      Constitutional: No fever or chills. No night sweats, no weight loss.   Positive fatigue  Eyes: No eye discharge, double vision, or eye pain   HEENT: negative for sore mouth, sore throat, hoarseness and voice change   Respiratory: negative for cough , sputum, dyspnea, wheezing, hemoptysis, chest pain   Cardiovascular: negative for chest pain, dyspnea, palpitations, orthopnea, PND   Gastrointestinal: negative for nausea, vomiting, diarrhea, constipation, abdominal pain, Dysphagia, hematemesis and hematochezia   Genitourinary: negative for frequency, dysuria, nocturia, urinary incontinence, and hematuria   Integument: negative for rash, skin lesions, bruises.    Hematologic/Lymphatic: negative for easy bruising, bleeding, lymphadenopathy, or petechiae   Endocrine: negative for heat or cold intolerance,weight changes, change in bowel habits and hair loss   Musculoskeletal: Positive back pain  Neurological: Positive right upper extremity weakness    PHYSICAL EXAM:        /77   Pulse 86   Temp 97.3 °F (36.3 °C) (Oral)   Resp 18   Ht 5' 5\" (1.651 m)   Wt 130 lb 14.4 oz (59.4 kg)   SpO2 93%   BMI 21.78 kg/m²    Temp (24hrs), Av.4 °F (36.3 °C), Min:97.3 °F (36.3 °C), Max:97.4 °F (36.3 °C)      General appearance - well appearing, no in pain or distress   Mental status - alert and cooperative   Eyes - pupils equal and reactive, extraocular eye movements intact   Ears - bilateral TM's and external ear canals normal   Mouth - mucous membranes moist, pharynx normal without lesions   Neck - supple, no significant adenopathy   Lymphatics - no palpable lymphadenopathy, no hepatosplenomegaly   Chest - clear to auscultation, no wheezes, rales or rhonchi, symmetric air entry   Heart - normal rate, regular rhythm, normal S1, S2, no murmurs  Abdomen - soft, nontender, nondistended, no masses or organomegaly   Neurological - alert, oriented, normal speech, right upper extremity weakness  Musculoskeletal - no joint tenderness, deformity or swelling   Extremities - peripheral pulses normal, no pedal edema, no clubbing or cyanosis   Skin - normal coloration and turgor, no rashes, no suspicious skin lesions noted ,      DATA:      Labs:     Results for orders placed or performed during the hospital encounter of 20   CBC Auto Differential   Result Value Ref Range    WBC 8.1 3.5 - 11.3 k/uL    RBC 4.80 4.21 - 5.77 m/uL    Hemoglobin 13.8 13.0 - 17.0 g/dL    Hematocrit 43.7 40.7 - 50.3 %    MCV 91.0 82.6 - 102.9 fL    MCH 28.8 25.2 - 33.5 pg    MCHC 31.6 28.4 - 34.8 g/dL    RDW 14.2 11.8 - 14.4 %    Platelets 209 309 - 853 k/uL    MPV 10.0 8.1 - 13.5 fL    NRBC Automated 0.0 0.0 per 100 WBC    Differential Type NOT REPORTED     Seg Neutrophils 72 (H) 36 - 65 %    Lymphocytes 12 (L) 24 - 43 %    Monocytes 7 3 - 12 %    Eosinophils % 8 (H) 1 - 4 %    Basophils 1 0 - 2 %    Immature Granulocytes 0 0 %    Segs Absolute 5.79 1.50 - 8.10 k/uL    Absolute Lymph # 0.96 (L) 1.10 - 3.70 k/uL    Absolute Mono # 0.59 0.10 - 1.20 k/uL    Absolute Eos # 0.65 (H) 0.00 - 0.44 k/uL    Basophils Absolute 0.05 0.00 - 0.20 k/uL    Absolute Immature Granulocyte 0.03 0.00 - 0.30 k/uL    WBC Morphology NOT REPORTED     RBC Morphology NOT REPORTED     Platelet Estimate NOT REPORTED    Comprehensive Metabolic Panel w/ Reflex to MG   Result Value Ref Range    Glucose 114 (H) 70 - 99 mg/dL    BUN 21 8 - 23 mg/dL    CREATININE 1.43 (H) 0.70 - 1.20 mg/dL    Bun/Cre Ratio NOT REPORTED 9 - 20    Calcium 9.7 8.6 - 10.4 mg/dL    Sodium 136 135 - 144 mmol/L    Potassium 5.1 3.7 - 5.3 mmol/L    Chloride 99 98 - 107 mmol/L    CO2 25 20 - 31 mmol/L    Anion Gap 12 9 - 17 mmol/L    Alkaline Phosphatase 84 40 - 129 U/L    ALT 8 5 - 41 U/L    AST 13 <40 U/L    Total Bilirubin 0.38 0.3 - 1.2 mg/dL    Total Protein 7.0 6.4 - 8.3 g/dL    Alb 3.7 3.5 - 5.2 g/dL    Albumin/Globulin Ratio 1.1 1.0 - 2.5    GFR Non- 49 (L) >60 mL/min    GFR  59 (L) >60 mL/min    GFR Comment          GFR Staging NOT REPORTED    Lipase   Result Value Ref Range    Lipase 25 13 - 60 U/L   Troponin   Result Value Ref Range    Troponin, High Sensitivity 13 0 - 22 ng/L    Troponin T NOT REPORTED <0.03 ng/mL    Troponin Interp NOT REPORTED    Brain Natriuretic Peptide   Result Value Ref Range    Pro- (H) <300 pg/mL    BNP Interpretation Pro-BNP Reference Range:    Troponin   Result Value Ref Range    Troponin, 43 %    Monocytes 2 (L) 3 - 12 %    Eosinophils % 0 (L) 1 - 4 %    Basophils 0 0 - 2 %    Immature Granulocytes 0 0 %    Segs Absolute 10.47 (H) 1.50 - 8.10 k/uL    Absolute Lymph # 0.70 (L) 1.10 - 3.70 k/uL    Absolute Mono # 0.19 0.10 - 1.20 k/uL    Absolute Eos # <0.03 0.00 - 0.44 k/uL    Basophils Absolute <0.03 0.00 - 0.20 k/uL    Absolute Immature Granulocyte 0.05 0.00 - 0.30 k/uL   COVID-19    Specimen: Other   Result Value Ref Range    SARS-CoV-2 PENDING     SARS-CoV-2, Rapid          Source . NASOPHARYNGEAL SWAB     SARS-CoV-2, PCR         EKG 12 Lead   Result Value Ref Range    Ventricular Rate 86 BPM    Atrial Rate 86 BPM    P-R Interval 136 ms    QRS Duration 134 ms    Q-T Interval 378 ms    QTc Calculation (Bazett) 452 ms    P Axis 40 degrees    R Axis -92 degrees    T Axis 100 degrees         IMAGING DATA:    Ct Head Wo Contrast    Result Date: 8/9/2020  EXAMINATION: CT OF THE HEAD WITHOUT CONTRAST 8/9/2020 11:47 am TECHNIQUE: CT of the head was performed without the administration of intravenous contrast. Dose modulation, iterative reconstruction, and/or weight based adjustment of the mA/kV was utilized to reduce the radiation dose to as low as reasonably achievable. COMPARISON: None. HISTORY: ORDERING SYSTEM PROVIDED HISTORY: New deficiet, one week old TECHNOLOGIST PROVIDED HISTORY: New deficiet, one week old FINDINGS: Large area of hypodensity within the left MCA distribution. Small area of hypodensity within the right posterior parietal lobe. No mass effect. No midline shift. Mild prominence of the ventricles and sulci is consistent with atrophy. No acute soft tissue abnormality. No acute orbital abnormality. No acute osseous abnormality. Opacity throughout the right maxillary sinus with bony overgrowth suggesting chronic paranasal sinus disease. Large subacute left MCA distribution infarct is suspected. Small right posterior parietal subacute infarct is suspected.  Follow-up with MRI may be helpful. Xr Chest Portable    Result Date: 8/4/2020  EXAMINATION: ONE XRAY VIEW OF THE CHEST 8/4/2020 2:54 pm COMPARISON: AP chest from 02/08/2019. HISTORY: ORDERING SYSTEM PROVIDED HISTORY: chest pain TECHNOLOGIST PROVIDED HISTORY: chest pain Reason for Exam: chest pain and constipation Acuity: Unknown Type of Exam: Unknown History of cardiomyopathy. FINDINGS: Left-sided AICD via subclavian approach, with unchanged biventricular and right atrial electrodes. Cardiac silhouette unchanged; new rounded right hilar mass, not seen previously. Some fullness right-sided mediastinum suggests possible adenopathy. Remainder upper mediastinum, both lungs and both costophrenic angles clear. Unchanged mild DJD spine. New right hilar mass. The findings were sent to the Radiology Results [id number] at 3:03 p.m. on 8/4/2020to be communicated to a licensed caregiver. RECOMMENDATION: CT scan of the chest recommended, with IV contrast if possible. Cta Chest Abdomen Pelvis W Contrast    Addendum Date: 8/9/2020    ADDENDUM: Right 7th rib, T6 and right iliac bone and several other possible bony metastatic lesions also identified. Result Date: 8/9/2020  EXAMINATION: CTA OF THE CHEST, ABDOMEN AND PELVIS WITH CONTRAST, 8/4/2020 3:09 pm TECHNIQUE: CTA of the chest, abdomen and pelvis was performed after the administration of intravenous contrast.  Multiplanar reformatted images are provided for review. MIP images are provided for review. Dose modulation, iterative reconstruction, and/or weight based adjustment of the mA/kV was utilized to reduce the radiation dose to as low as reasonably achievable. COMPARISON: CT scan of the abdomen and pelvis from 02/10/2019. HISTORY: ORDERING SYSTEM PROVIDED HISTORY: back pain TECHNOLOGIST PROVIDED HISTORY: back pain Reason for Exam: Mid back pain, right arm numbness.  Left abd pain, chest pressure Acuity: Acute Type of Exam: Initial FINDINGS: CTA CHEST: No acute abnormality thoracic aorta; slight calcific plaque aortic arch. Normal patent branch vessels to the head and upper extremities. No aneurysm or dissection. Mildly dilated left ventricle. Left-sided AICD via subclavian approach with electrode leads in the right heart and coronary sinus. Incidental findings: Lobular irregular ~ 4.6 x 4.4 cm RLL mass superior segment abutting the thickened medial pleura and upper major fissure with central low attenuation, and a small high density focus abutting its superolateral border measuring 1.7 x 1.1 cm (slice 52, series 4). 2.0 x 1.6 cm right upper lobe mass (best seen on cuts 45-52, series 4). Extensive right hilar adenopathy and enlarged subcarinal and right paratracheal nodes, largest measuring 2.1 (short axis). Small right pleural effusion. Emphysematous changes upper lung zones and slight posteroinferior dependent or atelectatic changes. No contralateral nodules. No sizable left pleural effusion. Mild kyphoscoliosis and DJD thoracic spine. CTA ABDOMEN: Moderate ASVD with some calcification abdominal aorta, but patent visceral vessels and no aneurysm or dissection. No adrenal mass. No focal hepatic mass. No splenomegaly or focal mass. Mild pancreatic ductal dilatation. Normal biliary tree. No calcified gallstone. Large amount of retained stool throughout large bowel with some diverticular disease but no diverticulitis or obvious mass. Symmetric renal perfusion bilaterally. Probable peripelvic cysts, especially on the left. No suspicious focal abnormality identified. A few mildly enlarged retroperitoneal nodes but no bulky lymphadenopathy. Small peripancreatic node. Mild-moderate degenerative changes lumbar spine with DDD L4-S1. No destructive or blastic lesion. CTA PELVIS: Elongated moderately diseased iliac arteries, especially external iliac arteries without aneurysm or clear cut dissection. Enlarged prostate measuring 5.1 x 4.4 cm. Urinary bladder WNL. THORACIC/LUMBAR SPINE: Please see separate thoracic/lumbar spine CT report. No aortic dissection or aneurysm. Large right lower lobe mass with additional RLL and RUL nodules, significant right hilar/ipsilateral mediastinal adenopathy and small pleural effusion. Additional findings, as detailed in the body of the report above. RECOMMENDATIONS: Bronchoscopic directed or percutaneous biopsy. Cta Chest Abdomen Pelvis W Contrast    Result Date: 8/9/2020  EXAMINATION: CTA OF THE CHEST, ABDOMEN AND PELVIS WITH CONTRAST 8/9/2020 11:50 am TECHNIQUE: CTA of the chest, abdomen and pelvis was performed after the administration of intravenous contrast.  Multiplanar reformatted images are provided for review. MIP images are provided for review. Dose modulation, iterative reconstruction, and/or weight based adjustment of the mA/kV was utilized to reduce the radiation dose to as low as reasonably achievable. 3D reconstructed images were performed on a separate workstation and provided for review. COMPARISON: August 4, 2020 HISTORY: ORDERING SYSTEM PROVIDED HISTORY: Chest pain, heart disease, persistent back pain, constipation, urinary retention TECHNOLOGIST PROVIDED HISTORY: Chest pain, heart disease, persistent back pain, constipation, urinary retention FINDINGS: CT CHEST: Chest wall: Left chest wall pacemaker. No axillary adenopathy. Mediastinum: Mild cardiomegaly. Trace pericardial effusion. Coronary artery calcifications. Necrotic mediastinal adenopathy. For example there is a right paratracheal lymph node measuring 2.8 cm. Right hilar mass invading into the mediastinum measures at least 2.6 cm. Lungs: Right hilar mass causes narrowing of the right main pulmonary artery. Right lower lobe mass measures 4.2 x 4.7 cm and extends to the underlying pleural surface. Anteriorly this extends to the fissure. Right upper lobe mass measures 18 mm series 4, image 52. Mild centrilobular emphysema.   No left pleural effusion. Trace right pleural effusion. Bones: Osseous metastatic disease involving the right 7th rib. Osseous metastatic disease within a midthoracic vertebral body. CT ABDOMEN-PELVIS: Organs: No liver lesion. No gallstones. No pancreatic lesion. No splenomegaly. No adrenal lesion. No hydronephrosis. Subcentimeter left renal hypodensity is likely a cyst but incompletely characterized. GI/Bowel: No bowel obstruction. No acute inflammatory process. Sigmoid diverticulosis without acute diverticulitis. Pelvis: No free fluid. No bladder calculus. Prostate gland is enlarged and slightly heterogeneous. Peritoneum/Retroperitoneum: No adenopathy. Bones/Soft Tissues: No acute soft tissue abnormality. Osseous metastatic disease. Destructive iliac lesions. VASCULAR: No thoracic aortic aneurysm. No thoracic aortic dissection. Origin of the great vessels are patent. No abdominal aortic aneurysm. No acute abdominal aortic dissection. Questionable area of plaque versus small focal dissection inferior to the left renal artery. This area is unchanged. Celiac axis is patent. SMA is patent. Renal arteries are patent. Atherosclerotic calcification of the infrarenal abdominal aorta. No acute vascular findings. No acute findings within the chest, abdomen, or pelvis. Questionable small remote aortic dissection versus plaque just inferior to the left renal artery. No aortic aneurysm. Metastatic disease. Multiple pulmonary masses with invasion of the right hilum. Necrotic mediastinal adenopathy. Multiple areas of osseous metastatic disease with destructive lytic lesions. Prostate gland is enlarged and heterogeneous. Correlate with PSA values.                IMPRESSION:   Primary Problem  <principal problem not specified>    Active Hospital Problems    Diagnosis Date Noted    Lung mass [R91.8] 08/09/2020    Enlarged prostate [N40.0] 08/09/2020    CKD (chronic kidney disease) [N18.9] 08/09/2020    CVA (cerebral vascular accident) ([facility] [I63.9] 08/09/2020    Brain metastasis ([facility] [C79.31] 08/09/2020    CAD (coronary artery disease) [I25.10] 02/08/2019    HTN (hypertension) [I10] 02/08/2019       History of tobacco dependence, quit 8 years ago  Mediastinal and hilar lymphadenopathy concerning for malignancy  Lytic bone lesion concerning for malignancy  Possible brain metastasis      RECOMMENDATIONS:  1. I personally reviewed results of lab work-up imaging studies and other relevant clinical data. 2. Reviewed images of MRI brain with the patient. Patient will need whole brain radiation as an outpatient. We will set it up at the time of discharge  3. Biopsy scheduled for tomorrow. 4. Okay to discharge after biopsy with outpatient follow-up if medically stable  5. Continue PPI with Decadron  6. Pain control  7. We will continue to follow  8. Further recommendation from medical oncology will depend upon final path report       Discussed with patient and Nurse. Thank you for asking us to see this patient. Alexis Pena MD          This note is created with the assistance of a speech recognition program.  While intending to generate a document that actually reflects the content of the visit, the document can still have some errors including those of syntax and sound a like substitutions which may escape proof reading. It such instances, actual meaning can be extrapolated by contextual diversion.

## 2020-08-10 NOTE — PROGRESS NOTES
[facility]  Internal Medicine Teaching Residency Program  Inpatient Daily Progress Note  ______________________________________________________________________________    Patient: Jennifer Chamberlain  YOB: 1950   HTA:[id number]    Acct: [de-identified]     Room: 1/80-12  Admit date: 8/9/2020  Today's date: 08/10/20  Number of days in the hospital: 1    SUBJECTIVE   Admitting Diagnosis: Metastatic lung disease. CC: Pain abdomen         Back pain  Pt examined at bedside. Chart & results reviewed. Cardiology saw the patient, recommended increasing Coreg to 12.5 mg twice daily check kidney functions, if stable increase lisinopril. As per cardiology patient is not good candidate for Aldactone so continue fluid and salt restriction. Blood pressure this morning was 107/47, heart rate 90, saturating 93% on room air. No fresh complaints overnight. ROS:  Constitutional:  negative for chills, fevers, sweats  Respiratory:  negative for cough, dyspnea on exertion, hemoptysis, shortness of breath, wheezing  Cardiovascular:  negative for chest pain, chest pressure/discomfort, lower extremity edema, palpitations  Gastrointestinal:  negative for abdominal pain, constipation, diarrhea, nausea, vomiting  Neurological:  negative for dizziness, headache  BRIEF HISTORY     69 y.o. male presents with known history of CAD, hypertension, NSTEMI, s/p AICD now presented with chief complaint of back pain and pain abdomen. Patient states that last week he had a sudden pop in the back and excruciating back pain after lifting heavy object. Patient states that he went to a chiropractor and had a back manipulation but it did not help his symptoms. Patient took some pain medication without any relief. Patient also complains of constipation and difficulty urination during the same period.   Patient complains of weakness in the right upper and lower extremity during this past week.  Daughter-in-law reported that patient was losing his balance lately. No history of any headache, urinary incontinence, nausea vomiting. She did mention about patient having loss of appetite recently but denies any change in weight. Patient complains of diarrhea after taking some medication for his constipation. Patient mentions about having week urinary stream, straining during urination and was told about prostatic enlargement in the past.  Was never evaluated further. Patient has remote history of hemorrhoids, but no history of melena/bloody stools. Never got colonoscopy done so far. No history of chest pain, shortness of breath, diaphoresis, palpitation and pedal edema. Patient mentions about history of cancer in the family, mother  at 68 due to some unknown cancer, father had asbestosis and prostatic cancer. Patient worked in [employer] in the past, used to work in [employer]. CT chest without contrast 2019: Showed spiculated and partially cavitary nodule in the superior segment of the right lower lobe, measuring up to 1.2 cm, concerning for malignancy. Unchanged 0.4 cm nodule in the posterior left lower lobe, initially seen on CT abdomen 02/10/2019. Mild to moderate centrilobular emphysema. Tissue sampling and/or PT/CT was recommended, but patient did not follow-up.     In the ED, proBNP was 996, creatinine 1.43, glucose 114, PSA 5.86. Neurology,  Cardiology, Pulmonology, Heme-Onc and vascular were consulted in the ED.     CT chest abdomen pelvis : No aortic dissection or aneurysm. Large right lower lobe mass with additional right lower lobe and right upper lobe nodules, significant right hilar  /Ipsilateral mediastinal adenopathy and small pleural effusion. Bronchoscopic directed or percutaneous biopsy was recommended. CT head Without contrast : Large subacute left MCA distribution infarct is suspected. Small right posterior parietal subacute infarct is suspected. Recommended MRI. MRI cervical spine and thoracic spine ordered, IR guided needle biopsy ordered, patient will remain n.p.o. after midnight. OBJECTIVE     Vital Signs:  /76   Pulse 100   Temp 97.4 °F (36.3 °C) (Oral)   Resp 16   Ht 5' 5\" (1.651 m)   Wt 140 lb (63.5 kg)   SpO2 97%   BMI 23.30 kg/m²     Temp (24hrs), Av.5 °F (36.4 °C), Min:97.4 °F (36.3 °C), Max:97.5 °F (36.4 °C)    In: -   Out: 275 [Urine:275]    Physical Exam:  Constitutional: This is a well developed, well nourished, 18.5-24.9 - Normal 69y.o. year old male who is alert, oriented, cooperative and in no apparent distress. Neck: Supple without thyromegaly. No elevated JVP. Trachea was midline. Respiratory: Chest was symmetrical without dullness to percussion. Cardiovascular: Regular without murmur, clicks, gallops or rubs. Abdomen:soft and non tender. Lymphatic: No lymphadenopathy. Musculoskeletal: Normal curvature of the spine. No gross muscle weakness. Extremities:  No lower extremity edema, ulcerations, tenderness, varicosities or erythema. Skin:  Warm and dry. Good color, turgor and pigmentation. No lesions or scars.   No cyanosis or clubbing  Neurological/Psychiatric: The patient's general behavior, level of consciousness, thought content and emotional status is normal.        Medications:  Scheduled Medications:    dexamethasone  4 mg Intravenous Q6H    sodium chloride flush  10 mL Intravenous 2 times per day    aspirin  81 mg Oral Daily    atorvastatin  80 mg Oral Nightly    carvedilol  12.5 mg Oral BID WC    docusate sodium  100 mg Oral BID    sodium chloride flush  10 mL Intravenous 2 times per day    heparin (porcine)  5,000 Units Subcutaneous 3 times per day    pantoprazole  40 mg Oral QAM AC     Continuous Infusions:   PRN Medicationssodium chloride flush, 10 mL, PRN  acetaminophen, 650 mg, Q4H PRN  cyclobenzaprine, 10 mg, Nightly PRN  sodium chloride flush, 10 mL, PRN  magnesium sulfate, 1 g, PRN  acetaminophen, 650 mg, Q6H PRN  polyethylene glycol, 17 g, Daily PRN  promethazine, 12.5 mg, Q6H PRN    Or  ondansetron, 4 mg, Q6H PRN  LORazepam, 0.5 mg, PRN        Diagnostic Labs:  CBC:   Recent Labs     08/09/20  1054   WBC 8.1   RBC 4.80   HGB 13.8   HCT 43.7   MCV 91.0   RDW 14.2        BMP:   Recent Labs     08/09/20  1054      K 5.1   CL 99   CO2 25   BUN 21   CREATININE 1.43*     BNP: No results for input(s): BNP in the last 72 hours. PT/INR: No results for input(s): PROTIME, INR in the last 72 hours. APTT: No results for input(s): APTT in the last 72 hours. CARDIAC ENZYMES: No results for input(s): CKMB, CKMBINDEX, TROPONINI in the last 72 hours. Invalid input(s): CKTOTAL;3  FASTING LIPID PANEL:  Lab Results   Component Value Date    CHOL 195 02/09/2019    HDL 41 02/09/2019    TRIG 77 02/09/2019     LIVER PROFILE:   Recent Labs     08/09/20  1054   AST 13   ALT 8   BILITOT 0.38   ALKPHOS 80      MICROBIOLOGY: No results found for: CULTURE    Imaging:    Ct Head Wo Contrast    Result Date: 8/9/2020  Large subacute left MCA distribution infarct is suspected. Small right posterior parietal subacute infarct is suspected. Follow-up with MRI may be helpful. Xr Chest Portable    Result Date: 8/4/2020  New right hilar mass. The findings were sent to the Radiology Results [id number] at 3:03 p.m. on 8/4/2020to be communicated to a licensed caregiver. RECOMMENDATION: CT scan of the chest recommended, with IV contrast if possible. Cta Chest Abdomen Pelvis W Contrast    Addendum Date: 8/9/2020    ADDENDUM: Right 7th rib, T6 and right iliac bone and several other possible bony metastatic lesions also identified. Result Date: 8/9/2020  No aortic dissection or aneurysm. Large right lower lobe mass with additional RLL and RUL nodules, significant right hilar/ipsilateral mediastinal adenopathy and small pleural effusion.  Additional findings, as detailed in the body of the report above. RECOMMENDATIONS: Bronchoscopic directed or percutaneous biopsy. Cta Chest Abdomen Pelvis W Contrast    Result Date: 8/9/2020  No acute vascular findings. No acute findings within the chest, abdomen, or pelvis. Questionable small remote aortic dissection versus plaque just inferior to the left renal artery. No aortic aneurysm. Metastatic disease. Multiple pulmonary masses with invasion of the right hilum. Necrotic mediastinal adenopathy. Multiple areas of osseous metastatic disease with destructive lytic lesions. Prostate gland is enlarged and heterogeneous. Correlate with PSA values. ASSESSMENT & PLAN   Active Problems:    Lung mass    Enlarged prostate  Resolved Problems:    * No resolved hospital problems. *    Plan:  -Lung Mass:  Likely metastatic disease . Heme/ Onc and pulmonology consulted. Recommended IR guided needle biopsy this morning 08/10/2020.     -Stroke/metastatic brain disease:   Decadron 4 mg IV every 6 hours as per neurology and MRI scan of the brain, cervical and thoracic spine without contrast.     -Enlarged prostate: PSA 5.86. Hemo/Onc-consulted.     -CAD,s/p AICD: Stable, patient on aspirin 80 mg.     -HTN: /47, patient started on home dose of Coreg 12.5 mg twice daily, Lipitor 80mg nightly.     -CKD:  creatinine baseline 1.29-1.48, monitor BMP.    -Elevated proBNP: We will continue to monitor.        DVT ppx: heparin.     PT/OT: Consulted  Discharge Planning:  consulted.  Mya Porras MD  Internal Medicine Resident, PGY-1  [facility]; [place], [place]  8/10/2020, 3:58 AM.0vm  Attending Physician Statement  I have discussed the care of Aislinn Gilbert, including pertinent history and exam findings,  with the resident. I have seen and examined the patient and the key elements of all parts of the encounter have been performed by me.   I agree with the assessment, plan and orders as documented by the resident with additions Elpidio Dumont Treatment plan Discussed with nursing staff in detail , all questions answered . Electronically signed by Veronica Castle MD on   8/10/20 at 6:45 PM EDT    Please note that this chart was generated using voice recognition Dragon dictation software. Although every effort was made to ensure the accuracy of this automated transcription, some errors in transcription may have occurred.

## 2020-08-10 NOTE — PROGRESS NOTES
[facility]  Documentation Note                Admission Dx: Lung mass [R91.8]    Past Medical History:   has a past medical history of Cardiac resynchronization therapy defibrillator (CRT-D) in place, Chest pain, Coronary artery disease, Dyspnea on exertion, Elevated troponin, Heart attack ([address] and Ischemic cardiomyopathy. Previous Testing:     ICD CHANGE OUT 2/11/19: Done by Dr. Tiera Greenberg. ECHO 2/11/19: EF 15-20%, ICD leads noted in RV, no valvular abnormalities noted. If apical clot is suspected, VLADIMIR is recommended. CATH 2/8/19: Minimal CAD in LCx and RCA. EF 15%. Previous office/hospital visit:   Dr. Tiera Greenberg 4/10/19:   1. Acute anterior ST-elevation myocardial infarction, S/P bare metal stent placement in the left  anterior descending artery on July 11, 2012. 2. Coronary angiogram on July 11, 2012 showed left main artery to be normal, left anterior  descending artery 100% occluded proximally, left circumflex artery with mild irregularities  and right coronary artery with mild irregularities. He also has severely reduced left  ventricular systolic function with ejection fraction estimated to be around 20%. 3. Former smoker. He quit smoking on July 10, 2012. 4. Echocardiography on July 13, 2012 showed severely reduced left ventricular systolic  function with ejection fraction estimated to be around 20%, akinesis of the anteroseptal and  apical wall segments, aortic valve sclerosis without stenosis, mild mitral regurgitation, mild  tricuspid regurgitation, right ventricular systolic pressure estimated to be around 16 mmHg  and no significant pericardial effusion. 5. Echocardiography on September 21, 2012 showed severely reduced left ventricular systolic  function with ejection fraction estimated to be around 20%, reduced left ventricular diastolic  compliance, positive evidence for dyssynchrony, mild to moderate mitral regurgitation and  moderate tricuspid regurgitation.   6. Hyperlipidemia. 7. Severe ischemic cardiomyopathy, S/P CRT-D placement on October 22, 2012. 8. Lost to Follow-up until 2/2019  [facility]. [facility], CRT-D beeping  10. Cardiac Cath 2/8/19 revealed patent LAD stent with minimal disease in the LCX and RCA with severely reduced LV systolic function, LVEF 58%  11. Echocardiogram 02/08/2019, moderately dilated LV with severely reduced EF, LVEF 15-20%  12. CRT-D at [facility] underwent battery change out 2/11/19. 13. VIDA, ? CKD    Plan --  1. Chronic systolic and diastolic congestive heart failure, NYHA class I-II, stage C  ** increase Coreg to 12.5 mg b.i.d.  ** continue lisinopril. Check kidney function. If kidney function is stable, will increase his lisinopril and maximize his treatment. ** I am concerned about his dependent biliary for follow-up and I have discussed that with him and discussed the need for him to follow up closely. I have also discussed it with his daughter. For that reason, he may not be a great candidate for Entresto or Aldactone. ** continue fluid and salt restriction was discussed    2. Status post CRT D  ** status post recent change out in February 2019  ** continue to interrogate on a 6 months basis with followup at the device clinic    3. Essential hypertension  ** blood pressure not optimally controlled. ** discussed with him the need to check routinely and bring the blood pressure log to the office.   ** changes to his medications as above    Belinda Garcia, [facility]

## 2020-08-10 NOTE — CONSULTS
[facility]    Consult / H&P               Today's Date: 8/10/2020  Patient Name: Jadon Pantoja  Date of admission: 8/9/2020 10:07 AM  Patient's age: 69 y.o., 1950  Admission Dx: Lung mass [R91.8]    Reason for Admission / Consult: Clearance for MRI    Requesting Physician: Malachi Mcdermott MD    CHIEF COMPLAINT: Abdominal pain    History Obtained From:  patient, electronic medical record    HISTORY OF PRESENT ILLNESS:      The patient is a 69 y.o.  male with a history of BMS to LAD in 2012, CRT-D placement in 2012 and previous smoking who presented with a chief complaints of constipation and left lower abdominal pain. Patient states that he has an enlarged prostate and as he had no bowel movement for 5 days he presented to [facility]. He was given mag citrate with which he had bowel movement but also developed mild left lower abdominal pain. He denies any chest pain and states that her shortness of breath is at his baseline. He denies any lightheadedness, syncope or lower extremity swelling. Upon admission, he was hemodynamically stable. EKG revealed a sensed V paced rhythm. Creatinine was elevated at 1.43 which is his baseline. Troponins x2 were negative. His BNP was mildly elevated at 996. CXR showed a new right hilar mass and an ensuing CT chest was negative for PE but showed metastatic disease in the lungs with lytic bone lesions. Patient is due for an MRI head later today to rule out brain mets. Past Medical History:   has a past medical history of Cardiac resynchronization therapy defibrillator (CRT-D) in place, Chest pain, Coronary artery disease, Dyspnea on exertion, Elevated troponin, Heart attack ([facility] [address] and Ischemic cardiomyopathy. Past Surgical History:   has a past surgical history that includes Coronary angioplasty with stent (2012) and pacemaker placement (02/11/2019).      Home Medications:    Prior to Admission medications    Medication Sig Start Date End Date Taking? Authorizing Provider   carvedilol (COREG) 12.5 MG tablet Take 12.5 mg by mouth 2 times daily (with meals)   Yes Historical Provider, MD   docusate sodium (COLACE) 100 MG capsule Take 1 capsule by mouth 2 times daily 6/8/57   Oniel Drake MD   cyclobenzaprine (FLEXERIL) 10 MG tablet Take 1 tablet by mouth nightly as needed for Muscle spasms 0/0/69 9/06/20  Oniel Drake MD   aspirin 81 MG chewable tablet Take 1 tablet by mouth daily 2/12/19   Isma Bell MD   nitroGLYCERIN (NITROSTAT) 0.4 MG SL tablet up to max of 3 total doses.  If no relief after 1 dose, call 911. 2/11/19   Isma Bell MD   atorvastatin (LIPITOR) 80 MG tablet Take 1 tablet by mouth nightly 2/11/19   Isma Bell MD   lisinopril (PRINIVIL;ZESTRIL) 5 MG tablet Take 1 tablet by mouth daily 2/12/19   Isma Bell MD   metoprolol succinate (TOPROL XL) 25 MG extended release tablet Take 1 tablet by mouth nightly 2/11/19   Isma Bell MD   furosemide (LASIX) 20 MG tablet Take 1 tablet by mouth daily 2/12/19   Isma Bell MD        Current Facility-Administered Medications: dexamethasone (DECADRON) injection 4 mg, 4 mg, Intravenous, Q6H  sodium chloride flush 0.9 % injection 10 mL, 10 mL, Intravenous, 2 times per day  sodium chloride flush 0.9 % injection 10 mL, 10 mL, Intravenous, PRN  acetaminophen (TYLENOL) tablet 650 mg, 650 mg, Oral, Q4H PRN  aspirin chewable tablet 81 mg, 81 mg, Oral, Daily  atorvastatin (LIPITOR) tablet 80 mg, 80 mg, Oral, Nightly  carvedilol (COREG) tablet 12.5 mg, 12.5 mg, Oral, BID WC  cyclobenzaprine (FLEXERIL) tablet 10 mg, 10 mg, Oral, Nightly PRN  docusate sodium (COLACE) capsule 100 mg, 100 mg, Oral, BID  sodium chloride flush 0.9 % injection 10 mL, 10 mL, Intravenous, 2 times per day  sodium chloride flush 0.9 % injection 10 mL, 10 mL, Intravenous, PRN  magnesium sulfate 1 g in dextrose 5% 100 mL IVPB, 1 g, Intravenous, PRN  [DISCONTINUED] Pulse 90   Temp 97.3 °F (36.3 °C) (Oral)   Resp 18   Ht 5' 5\" (1.651 m)   Wt 130 lb 14.4 oz (59.4 kg)   SpO2 93%   BMI 21.78 kg/m²      Intake/Output Summary (Last 24 hours) at 8/10/2020 5843  Last data filed at 8/10/2020 0858  Gross per 24 hour   Intake --   Output 550 ml   Net -550 ml         Constitutional and General Appearance: alert, cooperative, no distress and appears stated age  HEENT: PERRL, no cervical lymphadenopathy. No masses palpable. Normal oral mucosa  Respiratory:  · Normal excursion and expansion without use of accessory muscles  · Resp Auscultation: Good respiratory effort. No for increased work of breathing. On auscultation: clear to auscultation bilaterally  Cardiovascular:  · The apical impulse is not displaced  · Heart tones are crisp and normal. regular S1 and S2.  · Jugular venous pulsation Normal  · The carotid upstroke is normal in amplitude and contour without delay or bruit  · Peripheral pulses are symmetrical and full     Abdomen:   · No masses or tenderness  · Bowel sounds present  Extremities:  ·  No Cyanosis or Clubbing  ·  Lower extremity edema: No  ·  Skin: Warm and dry  Neurological:  · Alert and oriented. · Moves all extremities well  · No abnormalities of mood, affect, memory, mentation, or behavior are noted    DATA:    Diagnostics:    EKG: A sensed V paced rhythm    ICD CHANGE OUT 2/11/19: Done by Dr. Baldev Bruno.      ECHO 2/11/19: EF 15-20%, ICD leads noted in RV, no valvular abnormalities noted. If apical clot is suspected, VLADIMIR is recommended.      CATH 2/8/19: Minimal CAD in LCx and RCA. EF 15%.      Labs:     CBC:   Recent Labs     08/09/20  1054 08/10/20  0900   WBC 8.1 11.4*   HGB 13.8 13.8   HCT 43.7 44.7    239       BMP:   Recent Labs     08/09/20  1054      K 5.1   CO2 25   BUN 21   CREATININE 1.43*   LABGLOM 49*   GLUCOSE 114*     Pro-BNP:    Recent Labs     08/09/20  1054   PROBNP 996*     BNP: No results for input(s): BNP in the last 72 hours.  PT/INR: No results for input(s): PROTIME, INR in the last 72 hours. APTT:No results for input(s): APTT in the last 72 hours. CARDIAC ENZYMES:No results for input(s): CKTOTAL, CKMB, CKMBINDEX, TROPONINI in the last 72 hours. Invalid input(s):  [address]  Recent Labs     08/09/20  1054 08/09/20  1249   TROPONINT NOT REPORTED NOT REPORTED      Recent Labs     08/09/20  1054 08/09/20  1249   TROPHS 12 13     FASTING LIPID PANEL:  Lab Results   Component Value Date    HDL 41 02/09/2019    TRIG 77 02/09/2019     LIVER PROFILE:  Recent Labs     08/09/20  1054   AST 13   ALT 8   LABALBU 3.7         Patient's Active Problem List  Active Problems:    HTN (hypertension)    CAD (coronary artery disease)    Lung mass    Enlarged prostate    CKD (chronic kidney disease)    CVA (cerebral vascular accident) ([facility] [address]    Brain metastasis ([facility] [address]  Resolved Problems:    * No resolved hospital problems. *        IMPRESSION:    1. Right lung mass with concern for metastatic disease  2. H/o STEMI s/p BMS to LAD in 2012  3. Ischemic cardiomyopathy with CRT-D placement in 2012, EF 15-20%  4. Hypertension  5. CKD stage III  6. Elevated BNP-clinically euvolemic    RECOMMENDATIONS:  1. Patient is currently asymptomatic with no evidence of ACS  2. Patient's CRT-D is MRI compatible and he has been cleared for MRI   3. Although patient's BNP is elevated, patient appears clinically euvolemic and can continue his home regimen. 4. Patient is cleared for biopsy, can follow-up outpatient. Discussed with patient and Nurse. Alan Gordon M.D. Fellow, [address]        Attestation signed by      Attending Physician Statement:    I have discussed the care of  Ailyn Odonnell , including pertinent history and exam findings, with the Cardiology fellow/resident. I have seen and examined the patient and the key elements of all parts of the encounter have been performed by me.  I agree with the assessment, plan and orders as documented by the fellow/resident, after I modified exam findings and plan of treatments, and the final version is my approved version of the assessment. Additional Comments:     Patient with known hx of CAD/Acute MI in 2011, severe ischemic cmp with CRT-D in situ admitted with right lung mass and concerning findings of metastatic disease. Ok to proceed with MRI brain and spine as his device is compatible. Will interrogate Device prior and after MRI to monitor defibrillation thresholds. He currently denies any symptoms which can be construed towards angina or CHF. Ok to proceed with biopsy of lung mass. Cont medical therapy as ordered. Will monitor remotely.

## 2020-08-10 NOTE — PLAN OF CARE
Nutrition Problem #1: Inadequate oral intake  Intervention: Food and/or Nutrient Delivery: Continue NPO, Start Oral Nutrition Supplement  Nutritional Goals: Start nutrition with 24-48 hrs

## 2020-08-10 NOTE — PROGRESS NOTES
Neurology Resident Progress Note      SUBJECTIVE:  This is a 69 y.o.  male admitted 8/9/2020 for Lung mass [R91.8]  This is a follow-up neurology progress note. The patient was seen and examined and the chart was reviewed. There were no acute events overnight. No bowel movement for last 4 days. ROS  Constitutional: no fever, chills, fatigue  HENT: No change in vision or hearing   Respiratory: No cough, SOB, wheezing. Cardiovascular:  No chest pain, palpitations, leg swelling. Gastrointestinal: No nausea, vomiting, diarrhea. Genitourinary: No increased frequency, urgency. Musculoskeletal: No myalgia or arthralgia. Skin: No rashes or scarring or bruises. Neurological: No headache, paresthesia, or focal weakness. Endo/Heme/Allergies: Negative for itchy eyes or runny nose. Psychiatric/Behavioral: No anxiety or depressed mood. HPI  See H&P     dexamethasone  4 mg Intravenous Q6H    sodium chloride flush  10 mL Intravenous 2 times per day    aspirin  81 mg Oral Daily    atorvastatin  80 mg Oral Nightly    carvedilol  12.5 mg Oral BID WC    docusate sodium  100 mg Oral BID    sodium chloride flush  10 mL Intravenous 2 times per day    heparin (porcine)  5,000 Units Subcutaneous 3 times per day    pantoprazole  40 mg Oral QAM AC       Past Medical History:   Diagnosis Date    Cardiac resynchronization therapy defibrillator (CRT-D) in place     Chest pain 02/08/2019    Coronary artery disease     Dyspnea on exertion     Elevated troponin 02/08/2019    Heart attack ([facility] [address]     Ischemic cardiomyopathy        Past Surgical History:   Procedure Laterality Date    CORONARY ANGIOPLASTY WITH STENT PLACEMENT  2012    BMS to LAD    PACEMAKER PLACEMENT  02/11/2019    AICD/ MEDTR[employer]  MODEL #[id number] CRTD AMPLIA MRI USDF4. LEADS [id number], [id number] AND 0870-48        PHYSICAL EXAM:      Blood pressure (!) 107/47, pulse 90, temperature 97.3 °F (36.3 °C), temperature source Oral, resp.  rate 18, height 5' 5\" (1.651 m), weight 130 lb 14.4 oz (59.4 kg), SpO2 93 %. General Examination    General Resting comfortably in bed   Head Normocephalic, without obvious abnormality   Neck Supple, symmetrical. Good ROM. No midline or paraspinal tenderness. Lungs Respirations unlabored, no wheezing   Chest Wall No deformity   Heart RRR, no murmur   Abdomen Soft. Non-tender, non-distended   Extremities No cyanosis or edema or warmth. Pulses 2+ and symmetric   Skin: Skin  turgor normal, no rashes or lesions     Mental status  Speech Alert. Oriented to person, place, and time. Speech is fluent without paraphasic errors  Good repetition and naming  Can do 1 step, 2 step, and cross-body commands  Can spell world backwards. Language appropriate. No hallucinations or delusions. No SI/HI. Cranial nerves   II - VFF, visual threat intact  III, IV, VI - extra-ocular muscles full. No nystagmus. Pupils symmetric and responsive.    V - sensation symmetric         VII -  No facial droop or asymmetric NLF  VIII - intact hearing to conversational tone          IX, X - symmetrical palate elevation   XI - 5/5 strength symmetric  XII - tongue midline   Motor function  Strength: grossly 5/5 in b/l              Deltoid, biceps, triceps, wrist flexion, wrist extension             Hip flexion/extension, knee flexion/extension, plantar flexion  Bulk: grossly normal no atrophy  Tone: symmetric b/l arms and legs  Abnormal movements: No abnormal movements or tremor   Sensory function Symmetric to touch in all extremities bilaterally   Cerebellar No dysmetria or dysdiadochocinesia    Reflex function DTR:        2+ b/l symmetric in biceps, brachioradialis, patellar, calcaneal  Babinski b/l plantar downgoing   Gait                  Not assessed       Investigations:      Laboratory Testing:  Recent Results (from the past 24 hour(s))   EKG 12 Lead    Collection Time: 08/09/20 10:50 AM   Result Value Ref Range    Ventricular Rate 86 BPM GFR Staging NOT REPORTED    Lipase    Collection Time: 08/09/20 10:54 AM   Result Value Ref Range    Lipase 25 13 - 60 U/L   Brain Natriuretic Peptide    Collection Time: 08/09/20 10:54 AM   Result Value Ref Range    Pro- (H) <300 pg/mL    BNP Interpretation Pro-BNP Reference Range:    Troponin    Collection Time: 08/09/20 10:54 AM   Result Value Ref Range    Troponin, High Sensitivity 12 0 - 22 ng/L    Troponin T NOT REPORTED <0.03 ng/mL    Troponin Interp NOT REPORTED    Psa screening    Collection Time: 08/09/20 10:54 AM   Result Value Ref Range    PSA 5.86 (H) <4.1 ug/L   Urinalysis, reflex to microscopic    Collection Time: 08/09/20 12:34 PM   Result Value Ref Range    Color, UA YELLOW YELLOW    Turbidity UA CLEAR CLEAR    Glucose, Ur NEGATIVE NEGATIVE    Bilirubin Urine NEGATIVE NEGATIVE    Ketones, Urine NEGATIVE NEGATIVE    Specific Gravity, UA 1.017 1.005 - 1.030    Urine Hgb NEGATIVE NEGATIVE    pH, UA 6.0 5.0 - 8.0    Protein, UA NEGATIVE NEGATIVE    Urobilinogen, Urine Normal Normal    Nitrite, Urine NEGATIVE NEGATIVE    Leukocyte Esterase, Urine NEGATIVE NEGATIVE    Urinalysis Comments       Microscopic exam not performed based on chemical results unless requested in original order.    Troponin    Collection Time: 08/09/20 12:49 PM   Result Value Ref Range    Troponin, High Sensitivity 13 0 - 22 ng/L    Troponin T NOT REPORTED <0.03 ng/mL    Troponin Interp NOT REPORTED    PSA, DIAGNOSTIC    Collection Time: 08/09/20  8:47 PM   Result Value Ref Range    PSA 5.38 (H) <4.1 ug/L   CBC WITH AUTO DIFFERENTIAL    Collection Time: 08/10/20  9:00 AM   Result Value Ref Range    WBC 11.4 (H) 3.5 - 11.3 k/uL    RBC 4.81 4.21 - 5.77 m/uL    Hemoglobin 13.8 13.0 - 17.0 g/dL    Hematocrit 44.7 40.7 - 50.3 %    MCV 92.9 82.6 - 102.9 fL    MCH 28.7 25.2 - 33.5 pg    MCHC 30.9 28.4 - 34.8 g/dL    RDW 14.2 11.8 - 14.4 %    Platelets 294 918 - 400 k/uL    MPV 9.8 8.1 - 13.5 fL    NRBC Automated 0.0 0.0 per 100 WBC Differential Type NOT REPORTED     WBC Morphology NOT REPORTED     RBC Morphology NOT REPORTED     Platelet Estimate NOT REPORTED     Seg Neutrophils 92 (H) 36 - 65 %    Lymphocytes 6 (L) 24 - 43 %    Monocytes 2 (L) 3 - 12 %    Eosinophils % 0 (L) 1 - 4 %    Basophils 0 0 - 2 %    Immature Granulocytes 0 0 %    Segs Absolute 10.47 (H) 1.50 - 8.10 k/uL    Absolute Lymph # 0.70 (L) 1.10 - 3.70 k/uL    Absolute Mono # 0.19 0.10 - 1.20 k/uL    Absolute Eos # <0.03 0.00 - 0.44 k/uL    Basophils Absolute <0.03 0.00 - 0.20 k/uL    Absolute Immature Granulocyte 0.05 0.00 - 0.30 k/uL       Imaging/Diagnostics:  Ct Head Wo Contrast    Result Date: 8/9/2020  EXAMINATION: CT OF THE HEAD WITHOUT CONTRAST 8/9/2020 11:47 am TECHNIQUE: CT of the head was performed without the administration of intravenous contrast. Dose modulation, iterative reconstruction, and/or weight based adjustment of the mA/kV was utilized to reduce the radiation dose to as low as reasonably achievable. COMPARISON: None. HISTORY: ORDERING SYSTEM PROVIDED HISTORY: New deficiet, one week old TECHNOLOGIST PROVIDED HISTORY: New deficiet, one week old FINDINGS: Large area of hypodensity within the left MCA distribution. Small area of hypodensity within the right posterior parietal lobe. No mass effect. No midline shift. Mild prominence of the ventricles and sulci is consistent with atrophy. No acute soft tissue abnormality. No acute orbital abnormality. No acute osseous abnormality. Opacity throughout the right maxillary sinus with bony overgrowth suggesting chronic paranasal sinus disease. Large subacute left MCA distribution infarct is suspected. Small right posterior parietal subacute infarct is suspected. Follow-up with MRI may be helpful. Xr Chest Portable    Result Date: 8/4/2020  EXAMINATION: ONE XRAY VIEW OF THE CHEST 8/4/2020 2:54 pm COMPARISON: AP chest from 02/08/2019.  HISTORY: ORDERING SYSTEM PROVIDED HISTORY: chest pain TECHNOLOGIST PROVIDED HISTORY: chest pain Reason for Exam: chest pain and constipation Acuity: Unknown Type of Exam: Unknown History of cardiomyopathy. FINDINGS: Left-sided AICD via subclavian approach, with unchanged biventricular and right atrial electrodes. Cardiac silhouette unchanged; new rounded right hilar mass, not seen previously. Some fullness right-sided mediastinum suggests possible adenopathy. Remainder upper mediastinum, both lungs and both costophrenic angles clear. Unchanged mild DJD spine. New right hilar mass. The findings were sent to the Radiology Results [id number] at 3:03 p.m. on 8/4/2020to be communicated to a licensed caregiver. RECOMMENDATION: CT scan of the chest recommended, with IV contrast if possible. Cta Chest Abdomen Pelvis W Contrast    Addendum Date: 8/9/2020    ADDENDUM: Right 7th rib, T6 and right iliac bone and several other possible bony metastatic lesions also identified. Result Date: 8/9/2020  EXAMINATION: CTA OF THE CHEST, ABDOMEN AND PELVIS WITH CONTRAST, 8/4/2020 3:09 pm TECHNIQUE: CTA of the chest, abdomen and pelvis was performed after the administration of intravenous contrast.  Multiplanar reformatted images are provided for review. MIP images are provided for review. Dose modulation, iterative reconstruction, and/or weight based adjustment of the mA/kV was utilized to reduce the radiation dose to as low as reasonably achievable. COMPARISON: CT scan of the abdomen and pelvis from 02/10/2019. HISTORY: ORDERING SYSTEM PROVIDED HISTORY: back pain TECHNOLOGIST PROVIDED HISTORY: back pain Reason for Exam: Mid back pain, right arm numbness. Left abd pain, chest pressure Acuity: Acute Type of Exam: Initial FINDINGS: CTA CHEST: No acute abnormality thoracic aorta; slight calcific plaque aortic arch. Normal patent branch vessels to the head and upper extremities. No aneurysm or dissection. Mildly dilated left ventricle.   Left-sided AICD via subclavian approach with adenopathy and small pleural effusion. Additional findings, as detailed in the body of the report above. RECOMMENDATIONS: Bronchoscopic directed or percutaneous biopsy. Cta Chest Abdomen Pelvis W Contrast    Result Date: 8/9/2020  EXAMINATION: CTA OF THE CHEST, ABDOMEN AND PELVIS WITH CONTRAST 8/9/2020 11:50 am TECHNIQUE: CTA of the chest, abdomen and pelvis was performed after the administration of intravenous contrast.  Multiplanar reformatted images are provided for review. MIP images are provided for review. Dose modulation, iterative reconstruction, and/or weight based adjustment of the mA/kV was utilized to reduce the radiation dose to as low as reasonably achievable. 3D reconstructed images were performed on a separate workstation and provided for review. COMPARISON: August 4, 2020 HISTORY: ORDERING SYSTEM PROVIDED HISTORY: Chest pain, heart disease, persistent back pain, constipation, urinary retention TECHNOLOGIST PROVIDED HISTORY: Chest pain, heart disease, persistent back pain, constipation, urinary retention FINDINGS: CT CHEST: Chest wall: Left chest wall pacemaker. No axillary adenopathy. Mediastinum: Mild cardiomegaly. Trace pericardial effusion. Coronary artery calcifications. Necrotic mediastinal adenopathy. For example there is a right paratracheal lymph node measuring 2.8 cm. Right hilar mass invading into the mediastinum measures at least 2.6 cm. Lungs: Right hilar mass causes narrowing of the right main pulmonary artery. Right lower lobe mass measures 4.2 x 4.7 cm and extends to the underlying pleural surface. Anteriorly this extends to the fissure. Right upper lobe mass measures 18 mm series 4, image 52. Mild centrilobular emphysema. No left pleural effusion. Trace right pleural effusion. Bones: Osseous metastatic disease involving the right 7th rib. Osseous metastatic disease within a midthoracic vertebral body. CT ABDOMEN-PELVIS: Organs: No liver lesion. No gallstones.   No pancreatic lesion. No splenomegaly. No adrenal lesion. No hydronephrosis. Subcentimeter left renal hypodensity is likely a cyst but incompletely characterized. GI/Bowel: No bowel obstruction. No acute inflammatory process. Sigmoid diverticulosis without acute diverticulitis. Pelvis: No free fluid. No bladder calculus. Prostate gland is enlarged and slightly heterogeneous. Peritoneum/Retroperitoneum: No adenopathy. Bones/Soft Tissues: No acute soft tissue abnormality. Osseous metastatic disease. Destructive iliac lesions. VASCULAR: No thoracic aortic aneurysm. No thoracic aortic dissection. Origin of the great vessels are patent. No abdominal aortic aneurysm. No acute abdominal aortic dissection. Questionable area of plaque versus small focal dissection inferior to the left renal artery. This area is unchanged. Celiac axis is patent. SMA is patent. Renal arteries are patent. Atherosclerotic calcification of the infrarenal abdominal aorta. No acute vascular findings. No acute findings within the chest, abdomen, or pelvis. Questionable small remote aortic dissection versus plaque just inferior to the left renal artery. No aortic aneurysm. Metastatic disease. Multiple pulmonary masses with invasion of the right hilum. Necrotic mediastinal adenopathy. Multiple areas of osseous metastatic disease with destructive lytic lesions. Prostate gland is enlarged and heterogeneous. Correlate with PSA values.        Assessment & Differential Dx:      Primary Problem  <principal problem not specified>    Active Hospital Problems    Diagnosis Date Noted    Lung mass [R91.8] 08/09/2020    Enlarged prostate [N40.0] 08/09/2020    CKD (chronic kidney disease) [N18.9] 08/09/2020    CVA (cerebral vascular accident) ([facility] [address] [I63.9] 08/09/2020    Brain metastasis ([facility] [address] [C79.31] 08/09/2020    CAD (coronary artery disease) [I25.10] 02/08/2019    HTN (hypertension) [I10] 02/08/2019       Case of 65-year-old male,

## 2020-08-10 NOTE — PROGRESS NOTES
Occupational Therapy   Occupational Therapy Initial Assessment  Date: 8/10/2020   Patient Name: Brayan Tavarez  MRN: [id number]     : 1950    Date of Service: 8/10/2020  Chief Complaint   Patient presents with    Abdominal Pain     Multiple complaints. Suprapubic pain with difficulty urinating and constipation, right arm numbness/weakness x 1 week.  Back Pain     Back pain s/p trip and fall down 5 steps, denies hitting his head. No LOC     Copied from H&P  The patient is a pleasant 69 y.o. male presents with known history of CAD, hypertension, NSTEMI, s/p AICD now presented with chief complaint of back pain and pain abdomen. Patient states that last week he had a sudden pop in the back and excruciating back pain after lifting heavy object. Patient states that he went to a chiropractor and had a back manipulation but it did not help his symptoms. Patient took some pain medication without any relief. Patient also complains of constipation and difficulty urination during the same period. Patient complains of weakness in the right upper and lower extremity during this past week. Discharge Recommendations:  Patient would benefit from continued therapy after discharge  OT Equipment Recommendations  Other: CTA    Assessment   Performance deficits / Impairments: Decreased functional mobility ; Decreased endurance;Decreased ADL status; Decreased sensation;Decreased balance;Decreased safe awareness;Decreased high-level IADLs;Decreased fine motor control  Assessment: Pt would benefit from continued acute care and post acute care OT to address impairments in ADLs, IADLs, functional mobility, endurance, balance, sensation, fine motor control and safety awareness. Pt would benefit from education on fall prevention and safety awareness.  Pt would benefit from increased activity tolerance to improve I in ADLs/functional activities  Prognosis: Good  Decision Making: Medium Complexity  OT Education: OT Role;Plan of Care  Patient Education: pt educated on role of OT, POC, pt demo good understanding  REQUIRES OT FOLLOW UP: Yes  Activity Tolerance  Activity Tolerance: Patient Tolerated treatment well  Safety Devices  Safety Devices in place: Yes  Type of devices: Left in bed;Bed alarm in place;Nurse notified;Call light within reach;Gait belt;Patient at risk for falls  Restraints  Initially in place: No           Patient Diagnosis(es): The encounter diagnosis was Cerebrovascular accident (CVA), unspecified mechanism ([facility] [address] has a past medical history of Cardiac resynchronization therapy defibrillator (CRT-D) in place, Chest pain, Coronary artery disease, Dyspnea on exertion, Elevated troponin, Heart attack ([facility] [address] and Ischemic cardiomyopathy. has a past surgical history that includes Coronary angioplasty with stent (2012) and pacemaker placement (02/11/2019). Restrictions  Restrictions/Precautions  Restrictions/Precautions: Up as Tolerated  Required Braces or Orthoses?: No  Position Activity Restriction  Other position/activity restrictions: Up with assist    Subjective   General  Patient assessed for rehabilitation services?: Yes  Family / Caregiver Present: No  Patient Currently in Pain: Yes  Pain Assessment  Pain Assessment: 0-10  Pain Level: 3  Pain Location: Back  Non-Pharmaceutical Pain Intervention(s): Ambulation/Increased Activity; Distraction;Repositioned    Social/Functional History  Social/Functional History  Lives With: Son(Son and daughter in law)  Type of Home: House  Home Layout: Two level, Able to Live on Main level with bedroom/bathroom  Bathroom Shower/Tub: Walk-in shower  Bathroom Toilet: Standard  Bathroom Equipment: (no DME)  Home Equipment: (none)  ADL Assistance: Independent  Homemaking Assistance: Independent  Homemaking Responsibilities: Yes  Ambulation Assistance: Independent  Transfer Assistance: Independent  Active : Yes  Mode of Transportation: [facility]  Occupation: Retired  Type of occupation:   Leisure & Hobbies: plays the [email or web address], writes music  Additional Comments: pt reports son has large dog that son cares for       Objective   Vision: Impaired  Vision Exceptions: Wears glasses at all times(glasses present)  Hearing: Within functional limits    Orientation  Overall Orientation Status: Within Functional Limits  Observation/Palpation  Posture: Good    Balance  Sitting Balance: Supervision(~15 min seated EOB)  Standing Balance: Contact guard assistance  Standing Balance  Time: ~5-6 min  Activity: standing EOB, functional mobility  Comment: no device, no LOB    Functional Mobility  Functional - Mobility Device: No device  Activity: Other(into hallway)  Assist Level: Contact guard assistance  Functional Mobility Comments: pt demo no LOB, pt demo slight limp on LLE d/t hip pain, pt demo increased time required to complete functional mobility    ADL  Feeding: Independent  Grooming: Independent(pt performed oral hygiene seated in bed)  UE Bathing: Minimal assistance;Setup  LE Bathing: Minimal assistance;Setup  UE Dressing: Minimal assistance(for gown management)  LE Dressing: Minimal assistance  Toileting: Minimal assistance  Additional Comments: Pt supine in bed on arrival. Pt performed bed mobility to sit EOB. Pt performed sit > stand with no device to stand EOB. Pt reporting \"clumsiness with R hand. Pt demo fine motor skills to take tissue out of box with R hand, pt demo no difficulty. Pt performed functional mobility into hallway with no device to assess household distances. Pt returned to room. Pt performed bed mobility to return to supine in bed. Pt performed oral hygiene task, pt demo no fine motor difficulties opening toothpaste or brushing teeth. Pt left supine in bed, call light within reach, bed alarm armed and RN notified on OT exit.     Tone RUE  RUE Tone: Normotonic  Tone LUE  LUE Tone: Normotonic  Coordination  Movements Are Fluid And Coordinated: Yes  Quality of Movement Other  Comment: pt reporting numbness and \"clumsiness\" in R hand, pt demo good fine motor skills during evaluation, continue to assess     Bed mobility  Rolling to Left: Supervision  Supine to Sit: Stand by assistance  Sit to Supine: Stand by assistance  Scooting: Stand by assistance    Transfers  Stand Step Transfers: Contact guard assistance  Sit to stand: Contact guard assistance  Stand to sit: Contact guard assistance  Transfer Comments: no device     Cognition  Overall Cognitive Status: WFL     Sensation  Overall Sensation Status: Impaired(pt reporting numbness in R hand)      LUE AROM (degrees)  LUE AROM : WFL  Left Hand AROM (degrees)  Left Hand AROM: WFL  RUE AROM (degrees)  RUE AROM : WFL  Right Hand AROM (degrees)  Right Hand AROM: WFL  LUE Strength  Gross LUE Strength: WFL  RUE Strength  Gross RUE Strength: WFL     Plan   Plan  Times per week: 3-5x/week  Current Treatment Recommendations: Balance Training, Functional Mobility Training, Endurance Training, Safety Education & Training, Self-Care / ADL  Plan Comment: fine motor coordination    AM-PAC Score  AM-[facility] Inpatient Daily Activity Raw Score: 20 (08/10/20 1153)  AM-PAC Inpatient ADL T-Scale Score : 42.03 (08/10/20 1153)  ADL Inpatient CMS 0-100% Score: 38.32 (08/10/20 1153)  ADL Inpatient CMS G-Code Modifier : Shruthi Grumbling (08/10/20 [facility]3)    Goals  Short term goals  Time Frame for Short term goals: By discharge, pt will  Short term goal 1: demo I in UE ADLs  Short term goal 2: demo I in LE ADLs with AE as needed  Short term goal 3: demo I in functional mobility/transfers with good safety awareness  Short term goal 4: demo 30+ min activity tolerance during ADLs/functional activities  Short term goal 5: demo sup in bending and reaching during ADLs/functional activities  Short term goal 6: demo I in fine motor control activities during ADLs/functional activities  Short term goal 7: demo I and understanding of safety awareness and fall prevention during ADLs/functional activities       Therapy Time   Individual Concurrent Group Co-treatment   Time In 0947         Time Out 1011         Minutes 24         Timed Code Treatment Minutes: 12 Minutes   See above for LOF. RN reports patient is medically stable for therapy treatment this date. Chart reviewed prior to treatment and patient is agreeable for therapy. All lines intact and patient positioned comfortably at end of treatment. All patient needs addressed prior to ending therapy session.       Co-evaluation with PT.  PARDEEP Sevilla

## 2020-08-11 NOTE — PROGRESS NOTES
[facility]  Internal Medicine Teaching Residency Program  Inpatient Daily Progress Note  ______________________________________________________________________________    Patient: Maryellen Rasmussen  YOB: 1950   DZZ:[id number]    Acct: [de-identified]     Room: 1/80-12  Admit date: 8/9/2020  Today's date: 08/11/20  Number of days in the hospital: 2    SUBJECTIVE   Admitting Diagnosis: Metastatic lung disease  CC: Pain Abdomen         Back pain  Pt examined at bedside. Chart & results reviewed. /72, HR 63, saturating 96% on room air. Creatinine 1.39, glucose 120, WBC 15.4. MRI brain done 08/10/2020, recommended whole brain radiation as an outpatient. No fresh complaints overnight. ROS:  Constitutional:  negative for chills, fevers, sweats  Respiratory:  negative for cough, dyspnea on exertion, hemoptysis, shortness of breath, wheezing  Cardiovascular:  negative for chest pain, chest pressure/discomfort, lower extremity edema, palpitations  Gastrointestinal:  negative for abdominal pain, constipation, diarrhea, nausea, vomiting  Neurological:  negative for dizziness, headache  BRIEF HISTORY     69 y. o. male presents with known history of CAD, hypertension, NSTEMI, s/p AICD now presented with chief complaint of back pain and pain abdomen. Patient states that last week he had a sudden pop in the back and excruciating back pain after lifting heavy object.  Patient states that he went to a chiropractor and had a back manipulation but it did not help his symptoms.  Patient took some pain medication without any relief. Patient also complains of constipation and difficulty urination during the same period.  Patient complains of weakness in the right upper and lower extremity during this past week. Daughter-in-law reported that patient was losing his balance lately.  No history of any headache, urinary incontinence, nausea vomiting.   She did mention about patient having loss of appetite recently but denies any change in weight. Patient complains of diarrhea after taking some medication for his constipation. Patient mentions about having week urinary stream, straining during urination and was told about prostatic enlargement in the past.  Was never evaluated further. Patient has remote history of hemorrhoids, but no history of melena/bloody stools.  Never got colonoscopy done so far. No history of chest pain, shortness of breath, diaphoresis, palpitation and pedal edema. Patient mentions about history of cancer in the family, mother  at 65 due to some unknown cancer, father had asbestosis and prostatic cancer.  Patient worked in [employer] in the past, used to work in [employer]. CT chest without contrast 2019: Showed spiculated and partially cavitary nodule in the superior segment of the right lower lobe, measuring up to 1.2 cm, concerning for malignancy. Unchanged 0.4 cm nodule in the posterior left lower lobe, initially seen on CT abdomen 02/10/2019. Mild to moderate centrilobular emphysema.  Tissue sampling and/or PT/CT was recommended, but patient did not follow-up.     In the ED, proBNP was 996, creatinine 1.43, glucose 114, PSA 5.86. Neurology,  Cardiology, Pulmonology, Heme-Onc and vascular were consulted in the ED.     CT chest abdomen pelvis : No aortic dissection or aneurysm.  Large right lower lobe mass with additional right lower lobe and right upper lobe nodules, significant right hilar  /Ipsilateral mediastinal adenopathy and small pleural effusion. Bronchoscopic directed or percutaneous biopsy was recommended. CT head Without contrast : Large subacute left MCA distribution infarct is suspected. Small right posterior parietal subacute infarct is suspected.  Recommended MRI. MRI cervical spine and thoracic spine ordered, IR guided needle biopsy ordered, patient will remain n.p.o. after midnight.   Possibly getting biopsy iliac crest.    OBJECTIVE     Vital Signs:  /72   Pulse 63   Temp 97.7 °F (36.5 °C) (Oral)   Resp 18   Ht 5' 5\" (1.651 m)   Wt 131 lb 8 oz (59.6 kg)   SpO2 96%   BMI 21.88 kg/m²     Temp (24hrs), Av.7 °F (36.5 °C), Min:97.7 °F (36.5 °C), Max:97.7 °F (36.5 °C)    In: 749   Out: 600 [Urine:600]    Physical Exam:  Constitutional: This is a well developed, well nourished, 18.5-24.9 - Normal 69y.o. year old male who is alert, oriented, cooperative and in no apparent distress. Head:normocephalic and atraumatic. Respiratory: Chest was symmetrical without dullness to percussion. Breath sounds bilaterally were clear to auscultation. There were no wheezes, rhonchi or rales. There is no intercostal retraction or use of accessory muscles. No egophony noted. Cardiovascular: Regular without murmur, clicks, gallops or rubs. Abdomen: Slightly rounded and soft without organomegaly. No rebound, rigidity or guarding was appreciated. Lymphatic: No lymphadenopathy. Musculoskeletal: Normal curvature of the spine. No gross muscle weakness. Extremities:  No lower extremity edema, ulcerations, tenderness, varicosities or erythema. Muscle size, tone and strength are normal.  No involuntary movements are noted. Skin:  Warm and dry. Good color, turgor and pigmentation. No lesions or scars.   No cyanosis or clubbing  Neurological/Psychiatric: The patient's general behavior, level of consciousness, thought content and emotional status is normal.        Medications:  Scheduled Medications:    dexamethasone  4 mg Intravenous Q6H    sodium chloride flush  10 mL Intravenous 2 times per day    aspirin  81 mg Oral Daily    atorvastatin  80 mg Oral Nightly    carvedilol  12.5 mg Oral BID WC    docusate sodium  100 mg Oral BID    sodium chloride flush  10 mL Intravenous 2 times per day    heparin (porcine)  5,000 Units Subcutaneous 3 times per day    pantoprazole  40 mg Oral QAM AC     Continuous Infusions:   PRN Medicationssodium chloride flush, 10 mL, PRN  sodium chloride flush, 10 mL, PRN  acetaminophen, 650 mg, Q4H PRN  cyclobenzaprine, 10 mg, Nightly PRN  sodium chloride flush, 10 mL, PRN  magnesium sulfate, 1 g, PRN  acetaminophen, 650 mg, Q6H PRN  polyethylene glycol, 17 g, Daily PRN  promethazine, 12.5 mg, Q6H PRN    Or  ondansetron, 4 mg, Q6H PRN  LORazepam, 0.5 mg, PRN        Diagnostic Labs:  CBC:   Recent Labs     08/09/20  1054 08/10/20  0900 08/11/20  0600   WBC 8.1 11.4* 15.4*   RBC 4.80 4.81 4.68   HGB 13.8 13.8 13.3   HCT 43.7 44.7 45.6   MCV 91.0 92.9 97.4   RDW 14.2 14.2 14.6*    239 215     BMP:   Recent Labs     08/09/20  1054 08/10/20  0900    142   K 5.1 4.5   CL 99 104   CO2 25 18*   BUN 21 22   CREATININE 1.43* 1.39*     BNP: No results for input(s): BNP in the last 72 hours. PT/INR:   Recent Labs     08/11/20  0730   PROTIME 10.9   INR 1.0     APTT:   Recent Labs     08/11/20  0730   APTT 28.3       Mri Brain W Wo Contrast    Result Date: 8/10/2020  1. Multiple bilateral supratentorial enhancing lesions, consistent with metastatic disease. The largest of these is in the left parietal lobe measuring up to 1.7 cm and associated with extensive vasogenic edema. 2. No evidence of acute infarct or intracranial hemorrhage. 3. Severe chronic right maxillary sinusitis. ASSESSMENT & PLAN     Active Problems:    Lung mass    Enlarged prostate  Resolved Problems:    * No resolved hospital problems. *     Plan:  -Lung Mass:  Likely metastatic disease .  Heme/ Onc and pulmonology consulted.  Recommended IR guided needle biopsy this morning 08/11/2020.     -Stroke/metastatic brain disease:   Decadron 4 mg IV every 6 hours as per neurology and MRI scan of the brain, cervical and thoracic spine without contrast.  MRI brain done 08/10/2020, oncology recommended whole Brain radiation as an outpatient.   Biopsy due today, patient is okay to discharge after biopsy with outpatient follow-up if medically stable as per oncology.     -Enlarged prostate: PSA 5.86.  Hemo/Onc-consulted.     -CAD,s/p AICD: Stable, patient on aspirin 80 mg.     -HTN: /72 patient started on home dose of Coreg 12.5 mg twice daily, Lipitor 80mg nightly.     -CKD:  creatinine 1.39, baseline 1.29-1.48, monitor BMP.    -Elevated proBNP: We will continue to monitor.        DVT ppx: heparin.     PT/OT: Consulted  Discharge Planning: Patient likely going home today or tomorrow. Abhijeet Ferrell MD  Internal Medicine Resident, PGY-1  [facility]; [place], [place]  8/11/2020, 9:14 AM Attending Physician Statement  I have discussed the care of Rachel Camp, including pertinent history and exam findings,  with the resident. I have seen and examined the patient and the key elements of all parts of the encounter have been performed by me. I agree with the assessment, plan and orders as documented by the resident with additions . Treatment plan Discussed with nursing staff in detail , all questions answered . Electronically signed by Regino Roy MD on   8/11/20 at 3:02 PM EDT    Please note that this chart was generated using voice recognition Dragon dictation software. Although every effort was made to ensure the accuracy of this automated transcription, some errors in transcription may have occurred.

## 2020-08-11 NOTE — PROCEDURES
[address]  [facility]      ELECTROENCEPHALOGRAM REPORT        REFERRING PHYSICIAN:  Prabhu Angeles MD  PATIENT Scooter Clark  PATIENT MRN: [id number]  DATE OF EE2020    BRIEF HISTORY:  69year old male presented with back pain and abdominal pain, workup found brain metastasis, EEG was ordered to evaluate. CURRENT ANTI-EPILEPTIC MEDICATIONS: Ativan PRN    EEG DESCRIPTION:   During maximal wakefulness, the background was well organized and continuous consisting of an admixture of frequency of alpha, beta and theta ranging between 10-50uV. There was a posterior dominant alpha rhythm at 8 Hz, which was symmetric and reactive to eye opening/eye closure. Low amplitude 13-18 Hz beta rhythms were seen symmetrically over the frontal-central head regions. Spontaneous variability to stimulation were present. There were intermittent slow, multiregional in both hemispheres including left central/temporal/parietal, right frontocentral, in delta and theta frequency, lasted 1-2 seconds. No epileptiform discharges were recorded or clinical or electrographic seizures were recorded. Stage I sleep were recorded with alpha dropout, slow rolling eye movements. Stage II sleep was not recorded. Hyperventilation was not performed, hotic stimulation did not activate abnormal activity. Heart rate was regular at 60s per minute on a single lead EKG. CLASSIFICATION:  Abnormal II (Awake, drowsy)  1. Intermittent slow, multiregional, in left central/temporal/parietal, right frontocentral    IMPRESSION:  This was an abnormal routine awake and drowsy EEG which showed multifocal cortical dysfunction in both hemispheres, consistent with known multifocal cerebral mets. There is no epileptiform discharges or EEG seizures on this recording.      Naty Whipple MD, [address] [place] [address], Neurology  Board Certified Epileptologist

## 2020-08-11 NOTE — PROGRESS NOTES
Today's Date: 8/11/2020  Patient Name: Miryam Segal  Date of admission: 8/9/2020 10:07 AM  Patient's age: 69 y.o., 1950  Admission Dx: Lung mass [R91.8]    Reason for Consult: management recommendations  Requesting Physician: Alden Ramirez MD    CHIEF COMPLAINT: Lower abdominal pain. Difficulty urinating. Right-sided weakness. Lung mass. History Obtained From:  patient, electronic medical record    Interval history:    Patient seen and examined. Labs and vitals reviewed. Patient status post bone biopsy today. Complains of some pain over the pelvic area otherwise denies headaches denies double vision  Denies fever chills. HISTORY OF PRESENT ILLNESS:      The patient is a 69 y.o.  male who is admitted to the hospital for chief complaints of lower abdominal pain difficulty urinating back pain and right upper extremity weakness. Patient's symptoms started about 7 to 10 days ago. Patient did go to the [facility] ER where he had a CTA which showed a right lung mass as well as lymphadenopathy. Patient was advised to follow-up with pulmonary team as well as oncology team.  Patient also has had a CT chest done back in February 2019 which showed a lung spiculated nodule concerning for malignancy however patient did not follow-up with pulmonary team at that point. Patient denies any loss of appetite. Denies any weight loss. Patient's CT head without contrast shows large subacute MCA distribution infarct. Past Medical History:   has a past medical history of Cardiac resynchronization therapy defibrillator (CRT-D) in place, Chest pain, Coronary artery disease, Dyspnea on exertion, Elevated troponin, Heart attack ([address] and Ischemic cardiomyopathy. Past Surgical History:   has a past surgical history that includes Coronary angioplasty with stent (2012) and pacemaker placement (02/11/2019).      Medications:    Prior to Admission medications    Medication Sig Start Date End Date Taking? Authorizing Provider   carvedilol (COREG) 12.5 MG tablet Take 12.5 mg by mouth 2 times daily (with meals)   Yes Historical Provider, MD   docusate sodium (COLACE) 100 MG capsule Take 1 capsule by mouth 2 times daily 9/2/65   Silvia Root MD   cyclobenzaprine (FLEXERIL) 10 MG tablet Take 1 tablet by mouth nightly as needed for Muscle spasms 3/7/54 0/06/27  Silvia Root MD   aspirin 81 MG chewable tablet Take 1 tablet by mouth daily 2/12/19   Sharonda Mart MD   nitroGLYCERIN (NITROSTAT) 0.4 MG SL tablet up to max of 3 total doses.  If no relief after 1 dose, call 911. 2/11/19   Sharonda Mart MD   atorvastatin (LIPITOR) 80 MG tablet Take 1 tablet by mouth nightly 2/11/19   Sharonda Mart MD   lisinopril (PRINIVIL;ZESTRIL) 5 MG tablet Take 1 tablet by mouth daily 2/12/19   Sharonda Mart MD   metoprolol succinate (TOPROL XL) 25 MG extended release tablet Take 1 tablet by mouth nightly 2/11/19   Sharonda Mart MD   furosemide (LASIX) 20 MG tablet Take 1 tablet by mouth daily 2/12/19   Sharonda Mart MD     Current Facility-Administered Medications   Medication Dose Route Frequency Provider Last Rate Last Dose    sodium chloride flush 0.9 % injection 10 mL  10 mL Intravenous PRN Valerie Emmanuel MD   10 mL at 08/10/20 1553    dexamethasone (DECADRON) injection 4 mg  4 mg Intravenous Q6H Valerie Emmanuel MD   4 mg at 08/11/20 1129    sodium chloride flush 0.9 % injection 10 mL  10 mL Intravenous 2 times per day Ivon Davila MD   10 mL at 08/10/20 0900    sodium chloride flush 0.9 % injection 10 mL  10 mL Intravenous PRN Ivon Davila MD        acetaminophen (TYLENOL) tablet 650 mg  650 mg Oral Q4H PRN Ivon Davila MD   650 mg at 08/11/20 1130    aspirin chewable tablet 81 mg  81 mg Oral Daily Valerie Emmanuel MD   Stopped at 08/10/20 0739    atorvastatin (LIPITOR) tablet 80 mg  80 mg Oral Nightly Valerie Emmanuel MD   80 mg at 08/10/20 2005    carvedilol (COREG) tablet 12.5 mg  12.5 mg Oral BID WC Ralph Burroughs MD   12.5 mg at 08/11/20 1001    cyclobenzaprine (FLEXERIL) tablet 10 mg  10 mg Oral Nightly PRN Ralph Burroughs MD   10 mg at 08/09/20 2033    docusate sodium (COLACE) capsule 100 mg  100 mg Oral BID Ralph Burroughs MD   100 mg at 08/10/20 2005    sodium chloride flush 0.9 % injection 10 mL  10 mL Intravenous 2 times per day Ralph Burroughs MD   10 mL at 08/11/20 1002    sodium chloride flush 0.9 % injection 10 mL  10 mL Intravenous PRN Ralph Burroughs MD   10 mL at 08/11/20 1131    magnesium sulfate 1 g in dextrose 5% 100 mL IVPB  1 g Intravenous PRN Ralph Burroughs MD        acetaminophen (TYLENOL) suppository 650 mg  650 mg Rectal Q6H PRN Ralph Burroughs MD        polyethylene glycol [facility]) packet 17 g  17 g Oral Daily PRN Ralph Burroughs MD   17 g at 08/10/20 1726    promethazine (PHENERGAN) tablet 12.5 mg  12.5 mg Oral Q6H PRN Ralph Burroughs MD        Or    [facility]ron [facility] injection 4 mg  4 mg Intravenous Q6H PRN Ralph Burroughs MD        heparin (porcine) injection 5,000 Units  5,000 Units Subcutaneous 3 times per day Ralph Burroughs MD   Stopped at 08/11/20 0600    LORazepam (ATIVAN) tablet 0.5 mg  0.5 mg Oral PRN Mohinder Diania Dancer, MD   0.5 mg at 08/10/20 1227    pantoprazole (PROTONIX) tablet 40 mg  40 mg Oral QAM AC Ralph Burroughs MD   40 mg at 08/10/20 3727       Allergies:  Penicillins    Social History:   reports that he has quit smoking. He has never used smokeless tobacco. He reports current drug use. Drug: Marijuana. He reports that he does not drink alcohol. Family History: Hypertension and diabetes    REVIEW OF SYSTEMS:      Constitutional: No fever or chills. No night sweats, no weight loss.   Positive fatigue  Eyes: No eye discharge, double vision, or eye pain   HEENT: negative for sore mouth, sore throat, hoarseness and voice change   Respiratory: negative for cough , sputum, dyspnea, wheezing, hemoptysis, chest pain   Cardiovascular: negative for chest pain, dyspnea, palpitations, orthopnea, PND   Gastrointestinal: negative for nausea, vomiting, diarrhea, constipation, abdominal pain, Dysphagia, hematemesis and hematochezia   Genitourinary: negative for frequency, dysuria, nocturia, urinary incontinence, and hematuria   Integument: negative for rash, skin lesions, bruises.    Hematologic/Lymphatic: negative for easy bruising, bleeding, lymphadenopathy, or petechiae   Endocrine: negative for heat or cold intolerance,weight changes, change in bowel habits and hair loss   Musculoskeletal: Positive back pain  Neurological: Positive right upper extremity weakness    PHYSICAL EXAM:        /74   Pulse 68   Temp 97.7 °F (36.5 °C) (Oral)   Resp 23   Ht 5' 5\" (1.651 m)   Wt 131 lb 8 oz (59.6 kg)   SpO2 98%   BMI 21.88 kg/m²    Temp (24hrs), Av.7 °F (36.5 °C), Min:97.7 °F (36.5 °C), Max:97.7 °F (36.5 °C)      General appearance - well appearing, no in pain or distress   Mental status - alert and cooperative   Eyes - pupils equal and reactive, extraocular eye movements intact   Ears - bilateral TM's and external ear canals normal   Mouth - mucous membranes moist, pharynx normal without lesions   Neck - supple, no significant adenopathy   Lymphatics - no palpable lymphadenopathy, no hepatosplenomegaly   Chest - clear to auscultation, no wheezes, rales or rhonchi, symmetric air entry   Heart - normal rate, regular rhythm, normal S1, S2, no murmurs  Abdomen - soft, nontender, nondistended, no masses or organomegaly   Neurological - alert, oriented, normal speech, right upper extremity weakness  Musculoskeletal - no joint tenderness, deformity or swelling   Extremities - peripheral pulses normal, no pedal edema, no clubbing or cyanosis   Skin - normal coloration and turgor, no rashes, no suspicious skin lesions noted ,      DATA:      Labs:     Results for orders placed or performed during the hospital encounter of 20   CBC Auto Differential   Result Value (H) <300 pg/mL    BNP Interpretation Pro-BNP Reference Range:    Troponin   Result Value Ref Range    Troponin, High Sensitivity 12 0 - 22 ng/L    Troponin T NOT REPORTED <0.03 ng/mL    Troponin Interp NOT REPORTED    Urinalysis, reflex to microscopic   Result Value Ref Range    Color, UA YELLOW YELLOW    Turbidity UA CLEAR CLEAR    Glucose, Ur NEGATIVE NEGATIVE    Bilirubin Urine NEGATIVE NEGATIVE    Ketones, Urine NEGATIVE NEGATIVE    Specific Gravity, UA 1.017 1.005 - 1.030    Urine Hgb NEGATIVE NEGATIVE    pH, UA 6.0 5.0 - 8.0    Protein, UA NEGATIVE NEGATIVE    Urobilinogen, Urine Normal Normal    Nitrite, Urine NEGATIVE NEGATIVE    Leukocyte Esterase, Urine NEGATIVE NEGATIVE    Urinalysis Comments       Microscopic exam not performed based on chemical results unless requested in original order.    Psa screening   Result Value Ref Range    PSA 5.86 (H) <4.1 ug/L   PSA, DIAGNOSTIC   Result Value Ref Range    PSA 5.38 (H) <4.1 ug/L   Basic Metabolic Panel w/ Reflex to MG   Result Value Ref Range    Glucose 120 (H) 70 - 99 mg/dL    BUN 22 8 - 23 mg/dL    CREATININE 1.39 (H) 0.70 - 1.20 mg/dL    Bun/Cre Ratio NOT REPORTED 9 - 20    Calcium 9.8 8.6 - 10.4 mg/dL    Sodium 142 135 - 144 mmol/L    Potassium 4.5 3.7 - 5.3 mmol/L    Chloride 104 98 - 107 mmol/L    CO2 18 (L) 20 - 31 mmol/L    Anion Gap 20 (H) 9 - 17 mmol/L    GFR Non-African American 50 (L) >60 mL/min    GFR African American >60 >60 mL/min    GFR Comment          GFR Staging NOT REPORTED    CBC WITH AUTO DIFFERENTIAL   Result Value Ref Range    WBC 11.4 (H) 3.5 - 11.3 k/uL    RBC 4.81 4.21 - 5.77 m/uL    Hemoglobin 13.8 13.0 - 17.0 g/dL    Hematocrit 44.7 40.7 - 50.3 %    MCV 92.9 82.6 - 102.9 fL    MCH 28.7 25.2 - 33.5 pg    MCHC 30.9 28.4 - 34.8 g/dL    RDW 14.2 11.8 - 14.4 %    Platelets 758 316 - 301 k/uL    MPV 9.8 8.1 - 13.5 fL    NRBC Automated 0.0 0.0 per 100 WBC    Differential Type NOT REPORTED     WBC Morphology NOT REPORTED     RBC Morphology NOT REPORTED     Platelet Estimate NOT REPORTED     Seg Neutrophils 92 (H) 36 - 65 %    Lymphocytes 6 (L) 24 - 43 %    Monocytes 2 (L) 3 - 12 %    Eosinophils % 0 (L) 1 - 4 %    Basophils 0 0 - 2 %    Immature Granulocytes 0 0 %    Segs Absolute 10.47 (H) 1.50 - 8.10 k/uL    Absolute Lymph # 0.70 (L) 1.10 - 3.70 k/uL    Absolute Mono # 0.19 0.10 - 1.20 k/uL    Absolute Eos # <0.03 0.00 - 0.44 k/uL    Basophils Absolute <0.03 0.00 - 0.20 k/uL    Absolute Immature Granulocyte 0.05 0.00 - 0.30 k/uL   COVID-19    Specimen: Other   Result Value Ref Range    SARS-CoV-2 Not Detected Not Detected    SARS-CoV-2, Rapid          Source . NASOPHARYNGEAL SWAB     SARS-CoV-2, PCR         CBC WITH AUTO DIFFERENTIAL   Result Value Ref Range    WBC 15.4 (H) 3.5 - 11.3 k/uL    RBC 4.68 4.21 - 5.77 m/uL    Hemoglobin 13.3 13.0 - 17.0 g/dL    Hematocrit 45.6 40.7 - 50.3 %    MCV 97.4 82.6 - 102.9 fL    MCH 28.4 25.2 - 33.5 pg    MCHC 29.2 28.4 - 34.8 g/dL    RDW 14.6 (H) 11.8 - 14.4 %    Platelets 680 882 - 897 k/uL    MPV 10.0 8.1 - 13.5 fL    NRBC Automated 0.0 0.0 per 100 WBC    Differential Type NOT REPORTED     WBC Morphology NOT REPORTED     RBC Morphology ANISOCYTOSIS PRESENT     Platelet Estimate NOT REPORTED     Seg Neutrophils 90 (H) 36 - 65 %    Lymphocytes 6 (L) 24 - 43 %    Monocytes 3 3 - 12 %    Eosinophils % 0 (L) 1 - 4 %    Basophils 0 0 - 2 %    Immature Granulocytes 1 (H) 0 %    Segs Absolute 13.88 (H) 1.50 - 8.10 k/uL    Absolute Lymph # 0.92 (L) 1.10 - 3.70 k/uL    Absolute Mono # 0.50 0.10 - 1.20 k/uL    Absolute Eos # <0.03 0.00 - 0.44 k/uL    Basophils Absolute <0.03 0.00 - 0.20 k/uL    Absolute Immature Granulocyte 0.12 0.00 - 0.30 k/uL   APTT   Result Value Ref Range    PTT 28.3 20.5 - 30.5 sec   Protime-INR   Result Value Ref Range    Protime 10.9 9.0 - 12.0 sec    INR 1.0    Basic Metabolic Panel w/ Reflex to MG   Result Value Ref Range    Glucose 113 (H) 70 - 99 mg/dL    BUN 31 (H) 8 - 23 mg/dL    CREATININE 1.28 (H) 0.70 - 1.20 mg/dL    Bun/Cre Ratio NOT REPORTED 9 - 20    Calcium 9.9 8.6 - 10.4 mg/dL    Sodium 137 135 - 144 mmol/L    Potassium 4.9 3.7 - 5.3 mmol/L    Chloride 104 98 - 107 mmol/L    CO2 21 20 - 31 mmol/L    Anion Gap 12 9 - 17 mmol/L    GFR Non-African American 55 (L) >60 mL/min    GFR African American >60 >60 mL/min    GFR Comment          GFR Staging NOT REPORTED    EKG 12 Lead   Result Value Ref Range    Ventricular Rate 86 BPM    Atrial Rate 86 BPM    P-R Interval 136 ms    QRS Duration 134 ms    Q-T Interval 378 ms    QTc Calculation (Bazett) 452 ms    P Axis 40 degrees    R Axis -92 degrees    T Axis 100 degrees         IMAGING DATA:    Ct Head Wo Contrast    Result Date: 8/9/2020  EXAMINATION: CT OF THE HEAD WITHOUT CONTRAST 8/9/2020 11:47 am TECHNIQUE: CT of the head was performed without the administration of intravenous contrast. Dose modulation, iterative reconstruction, and/or weight based adjustment of the mA/kV was utilized to reduce the radiation dose to as low as reasonably achievable. COMPARISON: None. HISTORY: ORDERING SYSTEM PROVIDED HISTORY: New deficiet, one week old TECHNOLOGIST PROVIDED HISTORY: New deficiet, one week old FINDINGS: Large area of hypodensity within the left MCA distribution. Small area of hypodensity within the right posterior parietal lobe. No mass effect. No midline shift. Mild prominence of the ventricles and sulci is consistent with atrophy. No acute soft tissue abnormality. No acute orbital abnormality. No acute osseous abnormality. Opacity throughout the right maxillary sinus with bony overgrowth suggesting chronic paranasal sinus disease. Large subacute left MCA distribution infarct is suspected. Small right posterior parietal subacute infarct is suspected. Follow-up with MRI may be helpful. Xr Chest Portable    Result Date: 8/4/2020  EXAMINATION: ONE XRAY VIEW OF THE CHEST 8/4/2020 2:54 pm COMPARISON: AP chest from 02/08/2019.  HISTORY: ORDERING SYSTEM PROVIDED HISTORY: chest pain TECHNOLOGIST PROVIDED HISTORY: chest pain Reason for Exam: chest pain and constipation Acuity: Unknown Type of Exam: Unknown History of cardiomyopathy. FINDINGS: Left-sided AICD via subclavian approach, with unchanged biventricular and right atrial electrodes. Cardiac silhouette unchanged; new rounded right hilar mass, not seen previously. Some fullness right-sided mediastinum suggests possible adenopathy. Remainder upper mediastinum, both lungs and both costophrenic angles clear. Unchanged mild DJD spine. New right hilar mass. The findings were sent to the Radiology Results [id number] at 3:03 p.m. on 8/4/2020to be communicated to a licensed caregiver. RECOMMENDATION: CT scan of the chest recommended, with IV contrast if possible. Cta Chest Abdomen Pelvis W Contrast    Addendum Date: 8/9/2020    ADDENDUM: Right 7th rib, T6 and right iliac bone and several other possible bony metastatic lesions also identified. Result Date: 8/9/2020  EXAMINATION: CTA OF THE CHEST, ABDOMEN AND PELVIS WITH CONTRAST, 8/4/2020 3:09 pm TECHNIQUE: CTA of the chest, abdomen and pelvis was performed after the administration of intravenous contrast.  Multiplanar reformatted images are provided for review. MIP images are provided for review. Dose modulation, iterative reconstruction, and/or weight based adjustment of the mA/kV was utilized to reduce the radiation dose to as low as reasonably achievable. COMPARISON: CT scan of the abdomen and pelvis from 02/10/2019. HISTORY: ORDERING SYSTEM PROVIDED HISTORY: back pain TECHNOLOGIST PROVIDED HISTORY: back pain Reason for Exam: Mid back pain, right arm numbness. Left abd pain, chest pressure Acuity: Acute Type of Exam: Initial FINDINGS: CTA CHEST: No acute abnormality thoracic aorta; slight calcific plaque aortic arch. Normal patent branch vessels to the head and upper extremities. No aneurysm or dissection.   Mildly dilated left ventricle. Left-sided AICD via subclavian approach with electrode leads in the right heart and coronary sinus. Incidental findings: Lobular irregular ~ 4.6 x 4.4 cm RLL mass superior segment abutting the thickened medial pleura and upper major fissure with central low attenuation, and a small high density focus abutting its superolateral border measuring 1.7 x 1.1 cm (slice 52, series 4). 2.0 x 1.6 cm right upper lobe mass (best seen on cuts 45-52, series 4). Extensive right hilar adenopathy and enlarged subcarinal and right paratracheal nodes, largest measuring 2.1 (short axis). Small right pleural effusion. Emphysematous changes upper lung zones and slight posteroinferior dependent or atelectatic changes. No contralateral nodules. No sizable left pleural effusion. Mild kyphoscoliosis and DJD thoracic spine. CTA ABDOMEN: Moderate ASVD with some calcification abdominal aorta, but patent visceral vessels and no aneurysm or dissection. No adrenal mass. No focal hepatic mass. No splenomegaly or focal mass. Mild pancreatic ductal dilatation. Normal biliary tree. No calcified gallstone. Large amount of retained stool throughout large bowel with some diverticular disease but no diverticulitis or obvious mass. Symmetric renal perfusion bilaterally. Probable peripelvic cysts, especially on the left. No suspicious focal abnormality identified. A few mildly enlarged retroperitoneal nodes but no bulky lymphadenopathy. Small peripancreatic node. Mild-moderate degenerative changes lumbar spine with DDD L4-S1. No destructive or blastic lesion. CTA PELVIS: Elongated moderately diseased iliac arteries, especially external iliac arteries without aneurysm or clear cut dissection. Enlarged prostate measuring 5.1 x 4.4 cm. Urinary bladder WNL. THORACIC/LUMBAR SPINE: Please see separate thoracic/lumbar spine CT report. No aortic dissection or aneurysm.  Large right lower lobe mass with additional RLL and RUL nodules, significant right hilar/ipsilateral mediastinal adenopathy and small pleural effusion. Additional findings, as detailed in the body of the report above. RECOMMENDATIONS: Bronchoscopic directed or percutaneous biopsy. Cta Chest Abdomen Pelvis W Contrast    Result Date: 8/9/2020  EXAMINATION: CTA OF THE CHEST, ABDOMEN AND PELVIS WITH CONTRAST 8/9/2020 11:50 am TECHNIQUE: CTA of the chest, abdomen and pelvis was performed after the administration of intravenous contrast.  Multiplanar reformatted images are provided for review. MIP images are provided for review. Dose modulation, iterative reconstruction, and/or weight based adjustment of the mA/kV was utilized to reduce the radiation dose to as low as reasonably achievable. 3D reconstructed images were performed on a separate workstation and provided for review. COMPARISON: August 4, 2020 HISTORY: ORDERING SYSTEM PROVIDED HISTORY: Chest pain, heart disease, persistent back pain, constipation, urinary retention TECHNOLOGIST PROVIDED HISTORY: Chest pain, heart disease, persistent back pain, constipation, urinary retention FINDINGS: CT CHEST: Chest wall: Left chest wall pacemaker. No axillary adenopathy. Mediastinum: Mild cardiomegaly. Trace pericardial effusion. Coronary artery calcifications. Necrotic mediastinal adenopathy. For example there is a right paratracheal lymph node measuring 2.8 cm. Right hilar mass invading into the mediastinum measures at least 2.6 cm. Lungs: Right hilar mass causes narrowing of the right main pulmonary artery. Right lower lobe mass measures 4.2 x 4.7 cm and extends to the underlying pleural surface. Anteriorly this extends to the fissure. Right upper lobe mass measures 18 mm series 4, image 52. Mild centrilobular emphysema. No left pleural effusion. Trace right pleural effusion. Bones: Osseous metastatic disease involving the right 7th rib. Osseous metastatic disease within a midthoracic vertebral body.  CT ABDOMEN-PELVIS: Organs: No liver lesion. No gallstones. No pancreatic lesion. No splenomegaly. No adrenal lesion. No hydronephrosis. Subcentimeter left renal hypodensity is likely a cyst but incompletely characterized. GI/Bowel: No bowel obstruction. No acute inflammatory process. Sigmoid diverticulosis without acute diverticulitis. Pelvis: No free fluid. No bladder calculus. Prostate gland is enlarged and slightly heterogeneous. Peritoneum/Retroperitoneum: No adenopathy. Bones/Soft Tissues: No acute soft tissue abnormality. Osseous metastatic disease. Destructive iliac lesions. VASCULAR: No thoracic aortic aneurysm. No thoracic aortic dissection. Origin of the great vessels are patent. No abdominal aortic aneurysm. No acute abdominal aortic dissection. Questionable area of plaque versus small focal dissection inferior to the left renal artery. This area is unchanged. Celiac axis is patent. SMA is patent. Renal arteries are patent. Atherosclerotic calcification of the infrarenal abdominal aorta. No acute vascular findings. No acute findings within the chest, abdomen, or pelvis. Questionable small remote aortic dissection versus plaque just inferior to the left renal artery. No aortic aneurysm. Metastatic disease. Multiple pulmonary masses with invasion of the right hilum. Necrotic mediastinal adenopathy. Multiple areas of osseous metastatic disease with destructive lytic lesions. Prostate gland is enlarged and heterogeneous. Correlate with PSA values.                IMPRESSION:   Primary Problem  <principal problem not specified>    Active Hospital Problems    Diagnosis Date Noted    Vasogenic edema ([address] [G93.6]     Right hemiparesis ([address] [G81.91]     Lung mass [R91.8] 08/09/2020    Enlarged prostate [N40.0] 08/09/2020    CKD (chronic kidney disease) [N18.9] 08/09/2020    CVA (cerebral vascular accident) ([address] [I63.9] 08/09/2020    Brain metastasis ([address] [C79.31] 08/09/2020    CAD (coronary artery disease) [I25.10] 02/08/2019    HTN (hypertension) [I10] 02/08/2019       History of tobacco dependence, quit 8 years ago  Mediastinal and hilar lymphadenopathy concerning for malignancy  Lytic bone lesion concerning for malignancy  Possible brain metastasis      RECOMMENDATIONS:  1. I personally reviewed results of lab work-up imaging studies and other relevant clinical data. 2. Status post bone biopsy. We will follow-up on results. We will follow-up outpatient follow-up and seen in office on 8/17.  3. We will also set up outpatient follow-up with radiation oncology  4. Transition IV steroids to oral at time of discharge  5. Continue PPI  6. Pain management  7. Further recommendation from medical oncology will depend upon final path report       Discussed with patient and Nurse. Thank you for asking us to see this patient. Geovanni Pena MD          This note is created with the assistance of a speech recognition program.  While intending to generate a document that actually reflects the content of the visit, the document can still have some errors including those of syntax and sound a like substitutions which may escape proof reading. It such instances, actual meaning can be extrapolated by contextual diversion.

## 2020-08-11 NOTE — PROGRESS NOTES
Physical Therapy  DATE: 2020    NAME: Pavan Hendrickson  MRN: [id number]   : 1950    Patient not seen this date for Physical Therapy due to:  [] Blood transfusion in progress  [] Hemodialysis  [x]  Patient Declined   (pt c/o back pain and fatigue. Declined PT at this time. RN aware)  [] Spine Precautions   [] Strict Bedrest  [] Surgery/ Procedure  [] Testing      [] Other        [] PT being discontinued at this time. Patient independent. No further needs. [] PT being discontinued at this time as the patient has been transferred to palliative care. No further needs.     Anum Martin, PTA

## 2020-08-11 NOTE — PROGRESS NOTES
Neurology Resident Progress Note      SUBJECTIVE:  This is a 69 y.o.  male admitted 8/9/2020 for Lung mass [R91.8]  This is a follow-up neurology progress note. The patient was seen and examined and the chart was reviewed. There were no acute events overnight. ROS  Constitutional: no fever, chills, fatigue  HENT: No change in vision or hearing   Respiratory: No cough, SOB, wheezing. Cardiovascular:  No chest pain, palpitations, leg swelling. Gastrointestinal: No nausea, vomiting, diarrhea. Genitourinary: No increased frequency, urgency. Musculoskeletal: No myalgia or arthralgia. Skin: No rashes or scarring or bruises. Neurological: No headache, paresthesia, or focal weakness. Endo/Heme/Allergies: Negative for itchy eyes or runny nose. Psychiatric/Behavioral: No anxiety or depressed mood. HPI  See H&P     dexamethasone  4 mg Intravenous Q6H    sodium chloride flush  10 mL Intravenous 2 times per day    aspirin  81 mg Oral Daily    atorvastatin  80 mg Oral Nightly    carvedilol  12.5 mg Oral BID WC    docusate sodium  100 mg Oral BID    sodium chloride flush  10 mL Intravenous 2 times per day    heparin (porcine)  5,000 Units Subcutaneous 3 times per day    pantoprazole  40 mg Oral QAM AC       Past Medical History:   Diagnosis Date    Cardiac resynchronization therapy defibrillator (CRT-D) in place     Chest pain 02/08/2019    Coronary artery disease     Dyspnea on exertion     Elevated troponin 02/08/2019    Heart attack ([facility] [address]     Ischemic cardiomyopathy        Past Surgical History:   Procedure Laterality Date    CORONARY ANGIOPLASTY WITH STENT PLACEMENT  2012    BMS to LAD    PACEMAKER PLACEMENT  02/11/2019    AICD/ [employer]  MODEL #[id number] CRTD AMPLIA MRI USDF4. LEADS [id number], [id number] AND 0678-20        PHYSICAL EXAM:      Blood pressure 126/72, pulse 63, temperature 97.7 °F (36.5 °C), temperature source Oral, resp.  rate 18, height 5' 5\" (1.651 m), weight 131 lb 8 oz (59.6 kg), SpO2 96 %. General Examination    General Resting comfortably in bed   Head Normocephalic, without obvious abnormality   Neck Supple, symmetrical. Good ROM. No midline or paraspinal tenderness. Lungs Respirations unlabored, no wheezing   Chest Wall No deformity   Heart RRR, no murmur   Abdomen Soft. Non-tender, non-distended   Extremities No cyanosis or edema or warmth. Pulses 2+ and symmetric   Skin: Skin  turgor normal, no rashes or lesions     Mental status  Speech Alert. Oriented to person, place, and time. Speech is fluent without paraphasic errors  Good repetition and naming  Can do 1 step, 2 step, and cross-body commands  Can spell world backwards. Language appropriate. No hallucinations or delusions. No SI/HI. Cranial nerves   II - VFF, visual threat intact  III, IV, VI - extra-ocular muscles full. No nystagmus. Pupils symmetric and responsive.    V - sensation symmetric         VII -  No facial droop or asymmetric NLF  VIII - intact hearing to conversational tone          IX, X - symmetrical palate elevation   XI - 5/5 strength symmetric  XII - tongue midline   Motor function  Strength: grossly 5/5 in b/l              Deltoid, biceps, triceps, wrist flexion, wrist extension             Hip flexion/extension, knee flexion/extension, plantar flexion  Bulk: grossly normal no atrophy  Tone: symmetric b/l arms and legs  Abnormal movements: No abnormal movements or tremor   Sensory function Symmetric to touch in all extremities bilaterally   Cerebellar No dysmetria or dysdiadochocinesia    Reflex function DTR:        2+ b/l symmetric in biceps, brachioradialis, patellar, calcaneal  Babinski b/l plantar downgoing   Gait                  Not assessed       Investigations:      Laboratory Testing:  Recent Results (from the past 24 hour(s))   COVID-19    Collection Time: 08/10/20 12:12 PM    Specimen: Other   Result Value Ref Range    SARS-CoV-2 Not Detected Not Detected    SARS-CoV-2, Rapid          Source . NASOPHARYNGEAL SWAB     SARS-CoV-2, PCR         CBC WITH AUTO DIFFERENTIAL    Collection Time: 08/11/20  6:00 AM   Result Value Ref Range    WBC 15.4 (H) 3.5 - 11.3 k/uL    RBC 4.68 4.21 - 5.77 m/uL    Hemoglobin 13.3 13.0 - 17.0 g/dL    Hematocrit 45.6 40.7 - 50.3 %    MCV 97.4 82.6 - 102.9 fL    MCH 28.4 25.2 - 33.5 pg    MCHC 29.2 28.4 - 34.8 g/dL    RDW 14.6 (H) 11.8 - 14.4 %    Platelets 941 646 - 714 k/uL    MPV 10.0 8.1 - 13.5 fL    NRBC Automated 0.0 0.0 per 100 WBC    Differential Type NOT REPORTED     WBC Morphology NOT REPORTED     RBC Morphology ANISOCYTOSIS PRESENT     Platelet Estimate NOT REPORTED     Seg Neutrophils 90 (H) 36 - 65 %    Lymphocytes 6 (L) 24 - 43 %    Monocytes 3 3 - 12 %    Eosinophils % 0 (L) 1 - 4 %    Basophils 0 0 - 2 %    Immature Granulocytes 1 (H) 0 %    Segs Absolute 13.88 (H) 1.50 - 8.10 k/uL    Absolute Lymph # 0.92 (L) 1.10 - 3.70 k/uL    Absolute Mono # 0.50 0.10 - 1.20 k/uL    Absolute Eos # <0.03 0.00 - 0.44 k/uL    Basophils Absolute <0.03 0.00 - 0.20 k/uL    Absolute Immature Granulocyte 0.12 0.00 - 0.30 k/uL   APTT    Collection Time: 08/11/20  7:30 AM   Result Value Ref Range    PTT 28.3 20.5 - 30.5 sec   Protime-INR    Collection Time: 08/11/20  7:30 AM   Result Value Ref Range    Protime 10.9 9.0 - 12.0 sec    INR 1.0        Imaging/Diagnostics:  Ct Head Wo Contrast    Result Date: 8/9/2020  EXAMINATION: CT OF THE HEAD WITHOUT CONTRAST 8/9/2020 11:47 am TECHNIQUE: CT of the head was performed without the administration of intravenous contrast. Dose modulation, iterative reconstruction, and/or weight based adjustment of the mA/kV was utilized to reduce the radiation dose to as low as reasonably achievable. COMPARISON: None. HISTORY: ORDERING SYSTEM PROVIDED HISTORY: New deficiet, one week old TECHNOLOGIST PROVIDED HISTORY: New deficiet, one week old FINDINGS: Large area of hypodensity within the left MCA distribution.   Small area of hypodensity within the right posterior parietal lobe. No mass effect. No midline shift. Mild prominence of the ventricles and sulci is consistent with atrophy. No acute soft tissue abnormality. No acute orbital abnormality. No acute osseous abnormality. Opacity throughout the right maxillary sinus with bony overgrowth suggesting chronic paranasal sinus disease. Large subacute left MCA distribution infarct is suspected. Small right posterior parietal subacute infarct is suspected. Follow-up with MRI may be helpful. Xr Chest Portable    Result Date: 8/4/2020  EXAMINATION: ONE XRAY VIEW OF THE CHEST 8/4/2020 2:54 pm COMPARISON: AP chest from 02/08/2019. HISTORY: ORDERING SYSTEM PROVIDED HISTORY: chest pain TECHNOLOGIST PROVIDED HISTORY: chest pain Reason for Exam: chest pain and constipation Acuity: Unknown Type of Exam: Unknown History of cardiomyopathy. FINDINGS: Left-sided AICD via subclavian approach, with unchanged biventricular and right atrial electrodes. Cardiac silhouette unchanged; new rounded right hilar mass, not seen previously. Some fullness right-sided mediastinum suggests possible adenopathy. Remainder upper mediastinum, both lungs and both costophrenic angles clear. Unchanged mild DJD spine. New right hilar mass. The findings were sent to the Radiology Results [id number] at 3:03 p.m. on 8/4/2020to be communicated to a licensed caregiver. RECOMMENDATION: CT scan of the chest recommended, with IV contrast if possible. Cta Chest Abdomen Pelvis W Contrast    Addendum Date: 8/9/2020    ADDENDUM: Right 7th rib, T6 and right iliac bone and several other possible bony metastatic lesions also identified. Result Date: 8/9/2020  EXAMINATION: CTA OF THE CHEST, ABDOMEN AND PELVIS WITH CONTRAST, 8/4/2020 3:09 pm TECHNIQUE: CTA of the chest, abdomen and pelvis was performed after the administration of intravenous contrast.  Multiplanar reformatted images are provided for review. MIP images are provided for review. Dose modulation, iterative reconstruction, and/or weight based adjustment of the mA/kV was utilized to reduce the radiation dose to as low as reasonably achievable. COMPARISON: CT scan of the abdomen and pelvis from 02/10/2019. HISTORY: ORDERING SYSTEM PROVIDED HISTORY: back pain TECHNOLOGIST PROVIDED HISTORY: back pain Reason for Exam: Mid back pain, right arm numbness. Left abd pain, chest pressure Acuity: Acute Type of Exam: Initial FINDINGS: CTA CHEST: No acute abnormality thoracic aorta; slight calcific plaque aortic arch. Normal patent branch vessels to the head and upper extremities. No aneurysm or dissection. Mildly dilated left ventricle. Left-sided AICD via subclavian approach with electrode leads in the right heart and coronary sinus. Incidental findings: Lobular irregular ~ 4.6 x 4.4 cm RLL mass superior segment abutting the thickened medial pleura and upper major fissure with central low attenuation, and a small high density focus abutting its superolateral border measuring 1.7 x 1.1 cm (slice 52, series 4). 2.0 x 1.6 cm right upper lobe mass (best seen on cuts 45-52, series 4). Extensive right hilar adenopathy and enlarged subcarinal and right paratracheal nodes, largest measuring 2.1 (short axis). Small right pleural effusion. Emphysematous changes upper lung zones and slight posteroinferior dependent or atelectatic changes. No contralateral nodules. No sizable left pleural effusion. Mild kyphoscoliosis and DJD thoracic spine. CTA ABDOMEN: Moderate ASVD with some calcification abdominal aorta, but patent visceral vessels and no aneurysm or dissection. No adrenal mass. No focal hepatic mass. No splenomegaly or focal mass. Mild pancreatic ductal dilatation. Normal biliary tree. No calcified gallstone. Large amount of retained stool throughout large bowel with some diverticular disease but no diverticulitis or obvious mass.   Symmetric renal perfusion bilaterally. Probable peripelvic cysts, especially on the left. No suspicious focal abnormality identified. A few mildly enlarged retroperitoneal nodes but no bulky lymphadenopathy. Small peripancreatic node. Mild-moderate degenerative changes lumbar spine with DDD L4-S1. No destructive or blastic lesion. CTA PELVIS: Elongated moderately diseased iliac arteries, especially external iliac arteries without aneurysm or clear cut dissection. Enlarged prostate measuring 5.1 x 4.4 cm. Urinary bladder WNL. THORACIC/LUMBAR SPINE: Please see separate thoracic/lumbar spine CT report. No aortic dissection or aneurysm. Large right lower lobe mass with additional RLL and RUL nodules, significant right hilar/ipsilateral mediastinal adenopathy and small pleural effusion. Additional findings, as detailed in the body of the report above. RECOMMENDATIONS: Bronchoscopic directed or percutaneous biopsy. Cta Chest Abdomen Pelvis W Contrast    Result Date: 8/9/2020  EXAMINATION: CTA OF THE CHEST, ABDOMEN AND PELVIS WITH CONTRAST 8/9/2020 11:50 am TECHNIQUE: CTA of the chest, abdomen and pelvis was performed after the administration of intravenous contrast.  Multiplanar reformatted images are provided for review. MIP images are provided for review. Dose modulation, iterative reconstruction, and/or weight based adjustment of the mA/kV was utilized to reduce the radiation dose to as low as reasonably achievable. 3D reconstructed images were performed on a separate workstation and provided for review. COMPARISON: August 4, 2020 HISTORY: ORDERING SYSTEM PROVIDED HISTORY: Chest pain, heart disease, persistent back pain, constipation, urinary retention TECHNOLOGIST PROVIDED HISTORY: Chest pain, heart disease, persistent back pain, constipation, urinary retention FINDINGS: CT CHEST: Chest wall: Left chest wall pacemaker. No axillary adenopathy. Mediastinum: Mild cardiomegaly. Trace pericardial effusion.   Coronary artery calcifications. Necrotic mediastinal adenopathy. For example there is a right paratracheal lymph node measuring 2.8 cm. Right hilar mass invading into the mediastinum measures at least 2.6 cm. Lungs: Right hilar mass causes narrowing of the right main pulmonary artery. Right lower lobe mass measures 4.2 x 4.7 cm and extends to the underlying pleural surface. Anteriorly this extends to the fissure. Right upper lobe mass measures 18 mm series 4, image 52. Mild centrilobular emphysema. No left pleural effusion. Trace right pleural effusion. Bones: Osseous metastatic disease involving the right 7th rib. Osseous metastatic disease within a midthoracic vertebral body. CT ABDOMEN-PELVIS: Organs: No liver lesion. No gallstones. No pancreatic lesion. No splenomegaly. No adrenal lesion. No hydronephrosis. Subcentimeter left renal hypodensity is likely a cyst but incompletely characterized. GI/Bowel: No bowel obstruction. No acute inflammatory process. Sigmoid diverticulosis without acute diverticulitis. Pelvis: No free fluid. No bladder calculus. Prostate gland is enlarged and slightly heterogeneous. Peritoneum/Retroperitoneum: No adenopathy. Bones/Soft Tissues: No acute soft tissue abnormality. Osseous metastatic disease. Destructive iliac lesions. VASCULAR: No thoracic aortic aneurysm. No thoracic aortic dissection. Origin of the great vessels are patent. No abdominal aortic aneurysm. No acute abdominal aortic dissection. Questionable area of plaque versus small focal dissection inferior to the left renal artery. This area is unchanged. Celiac axis is patent. SMA is patent. Renal arteries are patent. Atherosclerotic calcification of the infrarenal abdominal aorta. No acute vascular findings. No acute findings within the chest, abdomen, or pelvis. Questionable small remote aortic dissection versus plaque just inferior to the left renal artery. No aortic aneurysm. Metastatic disease.   Multiple

## 2020-08-11 NOTE — PROGRESS NOTES
Physician Progress Note      Andra Cedillo  CSN #:                  [id number]  :                       1950  ADMIT DATE:       2020 10:07 AM  DISCH DATE:  RESPONDING  PROVIDER #:        Calin Steiner MD          QUERY TEXT:    Pt admitted with suspected lung CA w/ mets. Pt noted to have Thoracic   fracture. If possible, please document in progress notes and discharge summary   if you are evaluating and/or treating any of the following:[[Traumatic T6   fracture[de-identified] This patient has a traumatic fracture of T6. The medical record reflects the following:  Risk Factors: Metastatic lung disease, hx fall  Clinical Indicators: MRI T spine \"T6 metastasis with pathologic compression   fracture. \" per radiology read  Treatment: Imaging, Hem Onc consult, Neurology consult, pending lung biopsy,   labs/monitoring    Thank-you,  Sushma Arrington RN, CDS  Please Epic inbox/Perfect serve for questions. Options provided:  -- Pathological thoracic fracture  -- Traumatic and pathological T6 Fracture  -- Other - I will add my own diagnosis  -- Disagree - Not applicable / Not valid  -- Disagree - Clinically unable to determine / Unknown  -- Refer to Clinical Documentation Reviewer    PROVIDER RESPONSE TEXT:    This patient has a traumatic and pathological T6 fracture.     Query created by: Erica Aaron on 2020 9:48 AM      Electronically signed by:  Calin Steiner MD 2020 1:38 PM

## 2020-08-12 NOTE — PROGRESS NOTES
18.5-24. 9)       Nutrition Diagnosis:   · Inadequate oral intake related to (suspected catabolic illness) as evidenced by weight loss, poor intake prior to admission, constipation(10% wt loss in 3 months)      Nutrition Interventions:   Food and/or Nutrient Delivery:  Continue Current Diet, Start Oral Nutrition Supplement  Nutrition Education/Counseling:  Education not indicated   Coordination of Nutrition Care:  Continued Inpatient Monitoring    Goals:  Start nutrition with 24-48 hrs - achieved    Nutrition Monitoring and Evaluation:   Behavioral-Environmental Outcomes: Other (Comment)(N/A)   Food/Nutrient Intake Outcomes:  Food and Nutrient Intake, Supplement Intake  Physical Signs/Symptoms Outcomes:  Biochemical Data, GI Status, Constipation, Nutrition Focused Physical Findings, Weight     Discharge Planning:     Too soon to determine     Electronically signed by Enrico Starks on 8/12/20 at 12:20 PM EDT    Contact: [phone]

## 2020-08-12 NOTE — PROGRESS NOTES
Physical Therapy  DATE: 2020    NAME: Zev Donaldson  MRN: [id number]   : 1950    Patient not seen this date for Physical Therapy due to:  [] Blood transfusion in progress  [] Hemodialysis  []  Patient Declined  [] Spine Precautions   [] Strict Bedrest  [] Surgery/ Procedure  [] Testing      [x] Other: Pt states high pain with mobility, declines. Stats it's all his effort to get to bathroom d/t pain L hip. Ck         [] PT being discontinued at this time. Patient independent. No further needs. [] PT being discontinued at this time as the patient has been transferred to palliative care. No further needs.     Lianne Guevara, PT

## 2020-08-12 NOTE — PROGRESS NOTES
Occupational Therapy Not Seen Note    DATE: 2020  Name: Bertha Almonte  : 1950  MRN: [id number]    Patient not available for Occupational Therapy due to:    Patient Declined d/t high hip pain with activity.      Next Scheduled Treatment: Re-check 2020    Electronically signed by LALIT Castillo on 2020 at 3:47 PM

## 2020-08-12 NOTE — TELEPHONE ENCOUNTER
Name: Everett Corley  : 1950  MRN: [id number]    Oncology Navigation Follow-Up Note    Notes: Navigator received TC from [facility] asking for assistance with pt. .  Navigator adding self to care team and plan to follow. Pt. Has already been scheduled with RO and navigator requesting appt. With [facility] for , message sent to Ellyn and await time. Please let navigator know if needing any assistance with pts. Care.                                           Electronically signed by Sachin Morales RN on 2020 at 3:17 PM

## 2020-08-12 NOTE — PROGRESS NOTES
[facility]  Internal Medicine Teaching Residency Program  Inpatient Daily Progress Note  ______________________________________________________________________________    Patient: Tung Leung  YOB: 1950   PFT:[id number]    Acct: [de-identified]     Room: 1/80-12  Admit date: 8/9/2020  Today's date: 08/12/20  Number of days in the hospital: 3    SUBJECTIVE   Admitting Diagnosis: Metastatic lung disease  CC: Pain Abdomen         Back pain  Pt examined at bedside. Chart & results reviewed. Resting comfortably no acute distress. Underwent biopsy yesterday of hip, results pending. To be discharged today, will need outpatient radiation with heme-onc, also outpatient follow-ups with pulmonology. Vitals reviewed. Afebrile, hemodynamically stable. Labs reviewed. VIDA resolved. ROS:  Constitutional:  negative for chills, fevers, sweats  Respiratory:  negative for cough, dyspnea on exertion, hemoptysis, shortness of breath, wheezing  Cardiovascular:  negative for chest pain, chest pressure/discomfort, lower extremity edema, palpitations  Gastrointestinal:  negative for abdominal pain, constipation, diarrhea, nausea, vomiting  Neurological:  negative for dizziness, headache  BRIEF HISTORY     69 y. o. male presents with known history of CAD, hypertension, NSTEMI, s/p AICD now presented with chief complaint of back pain and pain abdomen. Patient states that last week he had a sudden pop in the back and excruciating back pain after lifting heavy object.  Patient states that he went to a chiropractor and had a back manipulation but it did not help his symptoms.  Patient took some pain medication without any relief. Patient also complains of constipation and difficulty urination during the same period.  Patient complains of weakness in the right upper and lower extremity during this past week.   Daughter-in-law reported that patient was losing his balance lately.  No history of any headache, urinary incontinence, nausea vomiting. She did mention about patient having loss of appetite recently but denies any change in weight. OBJECTIVE     Vital Signs:  BP (!) 155/90   Pulse 74   Temp 96.7 °F (35.9 °C)   Resp 18   Ht 5' 5\" (1.651 m)   Wt 131 lb 8 oz (59.6 kg)   SpO2 96%   BMI 21.88 kg/m²     Temp (24hrs), Av.2 °F (36.2 °C), Min:96.7 °F (35.9 °C), Max:97.6 °F (36.4 °C)    In: 944   Out: 450 [Urine:450]    Physical Exam:  Constitutional: This is a well developed, well nourished, 18.5-24.9 - Normal 69y.o. year old male who is alert, oriented, cooperative and in no apparent distress. Head:normocephalic and atraumatic. Respiratory: Clear to auscultation bilaterally. No wheezing, rhonchi, rales. No accessory muscle use. Cardiovascular: Regular without murmur, clicks, gallops or rubs. Abdomen: Slightly rounded and soft without organomegaly. No rebound, rigidity or guarding was appreciated. Lymphatic: No lymphadenopathy. Musculoskeletal: Normal curvature of the spine. No gross muscle weakness. Extremities:  No lower extremity edema, ulcerations, tenderness, varicosities or erythema. Muscle size, tone and strength are normal.  No involuntary movements are noted. Skin:  Warm and dry. Good color, turgor and pigmentation. No lesions or scars.   No cyanosis or clubbing  Neurological/Psychiatric: The patient's general behavior, level of consciousness, thought content and emotional status is normal.        Medications:  Scheduled Medications:    dexamethasone  4 mg Intravenous Q6H    sodium chloride flush  10 mL Intravenous 2 times per day    aspirin  81 mg Oral Daily    atorvastatin  80 mg Oral Nightly    carvedilol  12.5 mg Oral BID WC    docusate sodium  100 mg Oral BID    sodium chloride flush  10 mL Intravenous 2 times per day    heparin (porcine)  5,000 Units Subcutaneous 3 times per day    pantoprazole  40 mg Oral QAM AC Continuous Infusions:   PRN Medicationssodium chloride flush, 10 mL, PRN  sodium chloride flush, 10 mL, PRN  acetaminophen, 650 mg, Q4H PRN  cyclobenzaprine, 10 mg, Nightly PRN  sodium chloride flush, 10 mL, PRN  magnesium sulfate, 1 g, PRN  acetaminophen, 650 mg, Q6H PRN  polyethylene glycol, 17 g, Daily PRN  promethazine, 12.5 mg, Q6H PRN    Or  ondansetron, 4 mg, Q6H PRN  LORazepam, 0.5 mg, PRN        Diagnostic Labs:  CBC:   Recent Labs     08/10/20  0900 08/11/20  0600 08/12/20  0607   WBC 11.4* 15.4* 14.2*   RBC 4.81 4.68 4.71   HGB 13.8 13.3 13.6   HCT 44.7 45.6 44.0   MCV 92.9 97.4 93.4   RDW 14.2 14.6* 14.3    215 251     BMP:   Recent Labs     08/10/20  0900 08/11/20  0934 08/12/20  0607    137 137   K 4.5 4.9 5.7*    104 102   CO2 18* 21 23   BUN 22 31* 31*   CREATININE 1.39* 1.28* 1.18     BNP: No results for input(s): BNP in the last 72 hours. PT/INR:   Recent Labs     08/11/20  0730   PROTIME 10.9   INR 1.0     APTT:   Recent Labs     08/11/20  0730   APTT 28.3       Mri Brain W Wo Contrast    Result Date: 8/10/2020  1. Multiple bilateral supratentorial enhancing lesions, consistent with metastatic disease. The largest of these is in the left parietal lobe measuring up to 1.7 cm and associated with extensive vasogenic edema. 2. No evidence of acute infarct or intracranial hemorrhage. 3. Severe chronic right maxillary sinusitis. ASSESSMENT & PLAN     Active Problems:    Lung mass    Enlarged prostate  Resolved Problems:    * No resolved hospital problems. *     Plan:  -Lung Mass:  Likely metastatic disease .  Heme/ Onc and pulmonology following. Bone biopsy done yesterday. We will follow-up outpatient.     -Stroke/metastatic brain disease:   Decadron starts to prednisone taper on discharge.   D  MRI brain done 08/10/2020, oncology recommended whole Brain radiation as an outpatient.      -Enlarged prostate: PSA 5.86.  Hemo/Onc-consulted.     -CAD,s/p AICD: Stable, patient on aspirin 80 mg.     -HTN: Stable. Continue current medications. -CKD: Stable    -Elevated proBNP: We will continue to monitor.        DVT ppx: heparin.     PT/OT: Consulted  Discharge Planning: Patient likely going home today or tomorrow. Daniella Walker MD  Internal Medicine Resident, PGY-2  [facility]; [place], [place]  8/12/2020, 10:38 AM   Attending Physician Statement  I have discussed the care of Paula Davis, including pertinent history and exam findings,  with the resident. I have seen and examined the patient and the key elements of all parts of the encounter have been performed by me. I agree with the assessment, plan and orders as documented by the resident with additions . Treatment plan Discussed with nursing staff in detail , all questions answered . Electronically signed by Ashley Ellison MD on   8/12/20 at 11:20 AM EDT    Please note that this chart was generated using voice recognition Dragon dictation software. Although every effort was made to ensure the accuracy of this automated transcription, some errors in transcription may have occurred.

## 2020-08-12 NOTE — PROGRESS NOTES
Neurology Resident Progress Note      SUBJECTIVE:  This is a 69 y.o.  male admitted 8/9/2020 for Lung mass [R91.8]  This is a follow-up neurology progress note. The patient was seen and examined and the chart was reviewed. There were no acute events overnight. Patient feels depressed. Underwent biopsy. ROS  Constitutional: no fever, chills, fatigue  HENT: No change in vision or hearing   Respiratory: No cough, SOB, wheezing. Cardiovascular:  No chest pain, palpitations, leg swelling. Gastrointestinal: No nausea, vomiting, diarrhea. Genitourinary: No increased frequency, urgency. Musculoskeletal: No myalgia or arthralgia. Skin: No rashes or scarring or bruises. Neurological: No headache, paresthesia, or focal weakness. Endo/Heme/Allergies: Negative for itchy eyes or runny nose. Psychiatric/Behavioral: No anxiety or depressed mood. HPI  See H&P     dexamethasone  4 mg Intravenous Q6H    sodium chloride flush  10 mL Intravenous 2 times per day    aspirin  81 mg Oral Daily    atorvastatin  80 mg Oral Nightly    carvedilol  12.5 mg Oral BID WC    docusate sodium  100 mg Oral BID    sodium chloride flush  10 mL Intravenous 2 times per day    heparin (porcine)  5,000 Units Subcutaneous 3 times per day    pantoprazole  40 mg Oral QAM AC       Past Medical History:   Diagnosis Date    Cardiac resynchronization therapy defibrillator (CRT-D) in place     Chest pain 02/08/2019    Coronary artery disease     Dyspnea on exertion     Elevated troponin 02/08/2019    Heart attack ([facility] [address]     Ischemic cardiomyopathy        Past Surgical History:   Procedure Laterality Date    CORONARY ANGIOPLASTY WITH STENT PLACEMENT  2012    BMS to LAD    PACEMAKER PLACEMENT  02/11/2019    AICD/ [employer]  MODEL #[id number] CRTD AMPLIA MRI USDF4. LEADS [id number], [id number] AND 8756-63        PHYSICAL EXAM:      Blood pressure (!) 155/90, pulse 74, temperature 97.6 °F (36.4 °C), temperature source Oral, resp. Range    Glucose 113 (H) 70 - 99 mg/dL    BUN 31 (H) 8 - 23 mg/dL    CREATININE 1.28 (H) 0.70 - 1.20 mg/dL    Bun/Cre Ratio NOT REPORTED 9 - 20    Calcium 9.9 8.6 - 10.4 mg/dL    Sodium 137 135 - 144 mmol/L    Potassium 4.9 3.7 - 5.3 mmol/L    Chloride 104 98 - 107 mmol/L    CO2 21 20 - 31 mmol/L    Anion Gap 12 9 - 17 mmol/L    GFR Non-African American 55 (L) >60 mL/min    GFR African American >60 >60 mL/min    GFR Comment          GFR Staging NOT REPORTED    CBC WITH AUTO DIFFERENTIAL    Collection Time: 08/12/20  6:07 AM   Result Value Ref Range    WBC 14.2 (H) 3.5 - 11.3 k/uL    RBC 4.71 4.21 - 5.77 m/uL    Hemoglobin 13.6 13.0 - 17.0 g/dL    Hematocrit 44.0 40.7 - 50.3 %    MCV 93.4 82.6 - 102.9 fL    MCH 28.9 25.2 - 33.5 pg    MCHC 30.9 28.4 - 34.8 g/dL    RDW 14.3 11.8 - 14.4 %    Platelets 236 986 - 250 k/uL    MPV 10.3 8.1 - 13.5 fL    NRBC Automated 0.0 0.0 per 100 WBC    Differential Type NOT REPORTED     Seg Neutrophils 92 (H) 36 - 65 %    Lymphocytes 5 (L) 24 - 43 %    Monocytes 2 (L) 3 - 12 %    Eosinophils % 0 (L) 1 - 4 %    Basophils 0 0 - 2 %    Immature Granulocytes 1 (H) 0 %    Segs Absolute 13.05 (H) 1.50 - 8.10 k/uL    Absolute Lymph # 0.74 (L) 1.10 - 3.70 k/uL    Absolute Mono # 0.33 0.10 - 1.20 k/uL    Absolute Eos # <0.03 0.00 - 0.44 k/uL    Basophils Absolute <0.03 0.00 - 0.20 k/uL    Absolute Immature Granulocyte 0.10 0.00 - 0.30 k/uL    WBC Morphology NOT REPORTED     RBC Morphology NOT REPORTED     Platelet Estimate NOT REPORTED    Basic Metabolic Panel w/ Reflex to MG    Collection Time: 08/12/20  6:07 AM   Result Value Ref Range    Glucose 124 (H) 70 - 99 mg/dL    BUN 31 (H) 8 - 23 mg/dL    CREATININE 1.18 0.70 - 1.20 mg/dL    Bun/Cre Ratio NOT REPORTED 9 - 20    Calcium 9.6 8.6 - 10.4 mg/dL    Sodium 137 135 - 144 mmol/L    Potassium 5.7 (H) 3.7 - 5.3 mmol/L    Chloride 102 98 - 107 mmol/L    CO2 23 20 - 31 mmol/L    Anion Gap 12 9 - 17 mmol/L    GFR Non-African American >60 >60 mL/min GFR African American >60 >60 mL/min    GFR Comment          GFR Staging NOT REPORTED        Imaging/Diagnostics:  Ct Head Wo Contrast    Result Date: 8/9/2020  EXAMINATION: CT OF THE HEAD WITHOUT CONTRAST 8/9/2020 11:47 am TECHNIQUE: CT of the head was performed without the administration of intravenous contrast. Dose modulation, iterative reconstruction, and/or weight based adjustment of the mA/kV was utilized to reduce the radiation dose to as low as reasonably achievable. COMPARISON: None. HISTORY: ORDERING SYSTEM PROVIDED HISTORY: New deficiet, one week old TECHNOLOGIST PROVIDED HISTORY: New deficiet, one week old FINDINGS: Large area of hypodensity within the left MCA distribution. Small area of hypodensity within the right posterior parietal lobe. No mass effect. No midline shift. Mild prominence of the ventricles and sulci is consistent with atrophy. No acute soft tissue abnormality. No acute orbital abnormality. No acute osseous abnormality. Opacity throughout the right maxillary sinus with bony overgrowth suggesting chronic paranasal sinus disease. Large subacute left MCA distribution infarct is suspected. Small right posterior parietal subacute infarct is suspected. Follow-up with MRI may be helpful. Xr Chest Portable    Result Date: 8/4/2020  EXAMINATION: ONE XRAY VIEW OF THE CHEST 8/4/2020 2:54 pm COMPARISON: AP chest from 02/08/2019. HISTORY: ORDERING SYSTEM PROVIDED HISTORY: chest pain TECHNOLOGIST PROVIDED HISTORY: chest pain Reason for Exam: chest pain and constipation Acuity: Unknown Type of Exam: Unknown History of cardiomyopathy. FINDINGS: Left-sided AICD via subclavian approach, with unchanged biventricular and right atrial electrodes. Cardiac silhouette unchanged; new rounded right hilar mass, not seen previously. Some fullness right-sided mediastinum suggests possible adenopathy. Remainder upper mediastinum, both lungs and both costophrenic angles clear.  Unchanged mild DJD spine.     New right hilar mass. The findings were sent to the Radiology Results [id number] at 3:03 p.m. on 8/4/2020to be communicated to a licensed caregiver. RECOMMENDATION: CT scan of the chest recommended, with IV contrast if possible. Cta Chest Abdomen Pelvis W Contrast    Addendum Date: 8/9/2020    ADDENDUM: Right 7th rib, T6 and right iliac bone and several other possible bony metastatic lesions also identified. Result Date: 8/9/2020  EXAMINATION: CTA OF THE CHEST, ABDOMEN AND PELVIS WITH CONTRAST, 8/4/2020 3:09 pm TECHNIQUE: CTA of the chest, abdomen and pelvis was performed after the administration of intravenous contrast.  Multiplanar reformatted images are provided for review. MIP images are provided for review. Dose modulation, iterative reconstruction, and/or weight based adjustment of the mA/kV was utilized to reduce the radiation dose to as low as reasonably achievable. COMPARISON: CT scan of the abdomen and pelvis from 02/10/2019. HISTORY: ORDERING SYSTEM PROVIDED HISTORY: back pain TECHNOLOGIST PROVIDED HISTORY: back pain Reason for Exam: Mid back pain, right arm numbness. Left abd pain, chest pressure Acuity: Acute Type of Exam: Initial FINDINGS: CTA CHEST: No acute abnormality thoracic aorta; slight calcific plaque aortic arch. Normal patent branch vessels to the head and upper extremities. No aneurysm or dissection. Mildly dilated left ventricle. Left-sided AICD via subclavian approach with electrode leads in the right heart and coronary sinus. Incidental findings: Lobular irregular ~ 4.6 x 4.4 cm RLL mass superior segment abutting the thickened medial pleura and upper major fissure with central low attenuation, and a small high density focus abutting its superolateral border measuring 1.7 x 1.1 cm (slice 52, series 4). 2.0 x 1.6 cm right upper lobe mass (best seen on cuts 45-52, series 4).   Extensive right hilar adenopathy and enlarged subcarinal and right paratracheal nodes, largest measuring 2.1 (short axis). Small right pleural effusion. Emphysematous changes upper lung zones and slight posteroinferior dependent or atelectatic changes. No contralateral nodules. No sizable left pleural effusion. Mild kyphoscoliosis and DJD thoracic spine. CTA ABDOMEN: Moderate ASVD with some calcification abdominal aorta, but patent visceral vessels and no aneurysm or dissection. No adrenal mass. No focal hepatic mass. No splenomegaly or focal mass. Mild pancreatic ductal dilatation. Normal biliary tree. No calcified gallstone. Large amount of retained stool throughout large bowel with some diverticular disease but no diverticulitis or obvious mass. Symmetric renal perfusion bilaterally. Probable peripelvic cysts, especially on the left. No suspicious focal abnormality identified. A few mildly enlarged retroperitoneal nodes but no bulky lymphadenopathy. Small peripancreatic node. Mild-moderate degenerative changes lumbar spine with DDD L4-S1. No destructive or blastic lesion. CTA PELVIS: Elongated moderately diseased iliac arteries, especially external iliac arteries without aneurysm or clear cut dissection. Enlarged prostate measuring 5.1 x 4.4 cm. Urinary bladder WNL. THORACIC/LUMBAR SPINE: Please see separate thoracic/lumbar spine CT report. No aortic dissection or aneurysm. Large right lower lobe mass with additional RLL and RUL nodules, significant right hilar/ipsilateral mediastinal adenopathy and small pleural effusion. Additional findings, as detailed in the body of the report above. RECOMMENDATIONS: Bronchoscopic directed or percutaneous biopsy. Cta Chest Abdomen Pelvis W Contrast    Result Date: 8/9/2020  EXAMINATION: CTA OF THE CHEST, ABDOMEN AND PELVIS WITH CONTRAST 8/9/2020 11:50 am TECHNIQUE: CTA of the chest, abdomen and pelvis was performed after the administration of intravenous contrast.  Multiplanar reformatted images are provided for review.   MIP images are provided for review. Dose modulation, iterative reconstruction, and/or weight based adjustment of the mA/kV was utilized to reduce the radiation dose to as low as reasonably achievable. 3D reconstructed images were performed on a separate workstation and provided for review. COMPARISON: August 4, 2020 HISTORY: ORDERING SYSTEM PROVIDED HISTORY: Chest pain, heart disease, persistent back pain, constipation, urinary retention TECHNOLOGIST PROVIDED HISTORY: Chest pain, heart disease, persistent back pain, constipation, urinary retention FINDINGS: CT CHEST: Chest wall: Left chest wall pacemaker. No axillary adenopathy. Mediastinum: Mild cardiomegaly. Trace pericardial effusion. Coronary artery calcifications. Necrotic mediastinal adenopathy. For example there is a right paratracheal lymph node measuring 2.8 cm. Right hilar mass invading into the mediastinum measures at least 2.6 cm. Lungs: Right hilar mass causes narrowing of the right main pulmonary artery. Right lower lobe mass measures 4.2 x 4.7 cm and extends to the underlying pleural surface. Anteriorly this extends to the fissure. Right upper lobe mass measures 18 mm series 4, image 52. Mild centrilobular emphysema. No left pleural effusion. Trace right pleural effusion. Bones: Osseous metastatic disease involving the right 7th rib. Osseous metastatic disease within a midthoracic vertebral body. CT ABDOMEN-PELVIS: Organs: No liver lesion. No gallstones. No pancreatic lesion. No splenomegaly. No adrenal lesion. No hydronephrosis. Subcentimeter left renal hypodensity is likely a cyst but incompletely characterized. GI/Bowel: No bowel obstruction. No acute inflammatory process. Sigmoid diverticulosis without acute diverticulitis. Pelvis: No free fluid. No bladder calculus. Prostate gland is enlarged and slightly heterogeneous. Peritoneum/Retroperitoneum: No adenopathy. Bones/Soft Tissues: No acute soft tissue abnormality.  Osseous metastatic disease. Destructive iliac lesions. VASCULAR: No thoracic aortic aneurysm. No thoracic aortic dissection. Origin of the great vessels are patent. No abdominal aortic aneurysm. No acute abdominal aortic dissection. Questionable area of plaque versus small focal dissection inferior to the left renal artery. This area is unchanged. Celiac axis is patent. SMA is patent. Renal arteries are patent. Atherosclerotic calcification of the infrarenal abdominal aorta. No acute vascular findings. No acute findings within the chest, abdomen, or pelvis. Questionable small remote aortic dissection versus plaque just inferior to the left renal artery. No aortic aneurysm. Metastatic disease. Multiple pulmonary masses with invasion of the right hilum. Necrotic mediastinal adenopathy. Multiple areas of osseous metastatic disease with destructive lytic lesions. Prostate gland is enlarged and heterogeneous. Correlate with PSA values. Assessment & Differential Dx:      Primary Problem  <principal problem not specified>    Active Hospital Problems    Diagnosis Date Noted    Vasogenic edema ([address] [G93.6]     Right hemiparesis ([address] [G81.91]     Lung mass [R91.8] 08/09/2020    Enlarged prostate [N40.0] 08/09/2020    CKD (chronic kidney disease) [N18.9] 08/09/2020    CVA (cerebral vascular accident) ([address] [I63.9] 08/09/2020    Brain metastasis ([address] [C79.31] 08/09/2020    CAD (coronary artery disease) [I25.10] 02/08/2019    HTN (hypertension) [I10] 02/08/2019       Case of 62-year-old male, presented with back pain and abdominal pain. CT scan of head was done which showed hypodensity bilateral left Codrix more than right with vasogenic edema. CT scan of chest showed right lung nodules and evidence of metastatic disease including multiple new masses invasion of right hilum and necrotic congestion adenopathy. CT head Large subacute left MCA distribution infarct is suspected.      Metastatic lung disease  BPH/prostate carcinoma-elevated PSA  CAD  Hypertension  Stroke/metastatic brain disease    Plan:     MRI T-spine appears to show T6 pathologic compression fracture without any evidence of cord compression, cervical spine MRI negative. Will switch to decadron 4 mg po bid and follow up with hem/onc as an outpatient. Underwent bone biopsy. Buddie Kirks mass consistent with metastatic malena lesion, hem/onc on board.    EEG showed encephalopathy  Medical management as per primary      Kemi Joaquin MD  PGY-2, Internal medicine resident/neurology service  [address] [place] [address]

## 2020-08-12 NOTE — PLAN OF CARE
Problem: Coping:  Goal: Ability to cope will improve  Description: Ability to cope will improve  Outcome: Ongoing

## 2020-08-12 NOTE — PROGRESS NOTES
Today's Date: 8/12/2020  Patient Name: Brayan Tavarez  Date of admission: 8/9/2020 10:07 AM  Patient's age: 69 y.o., 1950  Admission Dx: Lung mass [R91.8]    Reason for Consult: management recommendations  Requesting Physician: Yvette Brown MD    CHIEF COMPLAINT: Lower abdominal pain. Difficulty urinating. Right-sided weakness. Lung mass. History Obtained From:  patient, electronic medical record    Interval history:    Patient seen and examined. Labs and vitals reviewed. Status post biopsy  Doing well  Denies any fever chills  Hoping to be discharged later today. HISTORY OF PRESENT ILLNESS:      The patient is a 69 y.o.  male who is admitted to the hospital for chief complaints of lower abdominal pain difficulty urinating back pain and right upper extremity weakness. Patient's symptoms started about 7 to 10 days ago. Patient did go to the [facility] where he had a CTA which showed a right lung mass as well as lymphadenopathy. Patient was advised to follow-up with pulmonary team as well as oncology team.  Patient also has had a CT chest done back in February 2019 which showed a lung spiculated nodule concerning for malignancy however patient did not follow-up with pulmonary team at that point. Patient denies any loss of appetite. Denies any weight loss. Patient's CT head without contrast shows large subacute MCA distribution infarct. Past Medical History:   has a past medical history of Cardiac resynchronization therapy defibrillator (CRT-D) in place, Chest pain, Coronary artery disease, Dyspnea on exertion, Elevated troponin, Heart attack ([address] and Ischemic cardiomyopathy. Past Surgical History:   has a past surgical history that includes Coronary angioplasty with stent (2012); pacemaker placement (02/11/2019); and CT BIOPSY DEEP BONE PERCUTANEOUS (8/11/2020). Medications:    Prior to Admission medications    Medication Sig Start Date End Date Taking? 20 0646       Allergies:  Penicillins    Social History:   reports that he has quit smoking. He has never used smokeless tobacco. He reports current drug use. Drug: Marijuana. He reports that he does not drink alcohol. Family History: Hypertension and diabetes    REVIEW OF SYSTEMS:      Constitutional: No fever or chills. No night sweats, no weight loss. Positive fatigue  Eyes: No eye discharge, double vision, or eye pain   HEENT: negative for sore mouth, sore throat, hoarseness and voice change   Respiratory: negative for cough , sputum, dyspnea, wheezing, hemoptysis, chest pain   Cardiovascular: negative for chest pain, dyspnea, palpitations, orthopnea, PND   Gastrointestinal: negative for nausea, vomiting, diarrhea, constipation, abdominal pain, Dysphagia, hematemesis and hematochezia   Genitourinary: negative for frequency, dysuria, nocturia, urinary incontinence, and hematuria   Integument: negative for rash, skin lesions, bruises.    Hematologic/Lymphatic: negative for easy bruising, bleeding, lymphadenopathy, or petechiae   Endocrine: negative for heat or cold intolerance,weight changes, change in bowel habits and hair loss   Musculoskeletal: Positive back pain  Neurological: Positive right upper extremity weakness    PHYSICAL EXAM:        BP (!) 155/90   Pulse 74   Temp 96.7 °F (35.9 °C)   Resp 18   Ht 5' 5\" (1.651 m)   Wt 131 lb 12.8 oz (59.8 kg)   SpO2 96%   BMI 21.93 kg/m²    Temp (24hrs), Av.2 °F (36.2 °C), Min:96.7 °F (35.9 °C), Max:97.6 °F (36.4 °C)      General appearance - well appearing, no in pain or distress   Mental status - alert and cooperative   Eyes - pupils equal and reactive, extraocular eye movements intact   Ears - bilateral TM's and external ear canals normal   Mouth - mucous membranes moist, pharynx normal without lesions   Neck - supple, no significant adenopathy   Lymphatics - no palpable lymphadenopathy, no hepatosplenomegaly   Chest - clear to auscultation, no wheezes, rales or rhonchi, symmetric air entry   Heart - normal rate, regular rhythm, normal S1, S2, no murmurs  Abdomen - soft, nontender, nondistended, no masses or organomegaly   Neurological - alert, oriented, normal speech, right upper extremity weakness  Musculoskeletal - no joint tenderness, deformity or swelling   Extremities - peripheral pulses normal, no pedal edema, no clubbing or cyanosis   Skin - normal coloration and turgor, no rashes, no suspicious skin lesions noted ,      DATA:      Labs:     Results for orders placed or performed during the hospital encounter of 08/09/20   CBC Auto Differential   Result Value Ref Range    WBC 8.1 3.5 - 11.3 k/uL    RBC 4.80 4.21 - 5.77 m/uL    Hemoglobin 13.8 13.0 - 17.0 g/dL    Hematocrit 43.7 40.7 - 50.3 %    MCV 91.0 82.6 - 102.9 fL    MCH 28.8 25.2 - 33.5 pg    MCHC 31.6 28.4 - 34.8 g/dL    RDW 14.2 11.8 - 14.4 %    Platelets 152 676 - 423 k/uL    MPV 10.0 8.1 - 13.5 fL    NRBC Automated 0.0 0.0 per 100 WBC    Differential Type NOT REPORTED     Seg Neutrophils 72 (H) 36 - 65 %    Lymphocytes 12 (L) 24 - 43 %    Monocytes 7 3 - 12 %    Eosinophils % 8 (H) 1 - 4 %    Basophils 1 0 - 2 %    Immature Granulocytes 0 0 %    Segs Absolute 5.79 1.50 - 8.10 k/uL    Absolute Lymph # 0.96 (L) 1.10 - 3.70 k/uL    Absolute Mono # 0.59 0.10 - 1.20 k/uL    Absolute Eos # 0.65 (H) 0.00 - 0.44 k/uL    Basophils Absolute 0.05 0.00 - 0.20 k/uL    Absolute Immature Granulocyte 0.03 0.00 - 0.30 k/uL    WBC Morphology NOT REPORTED     RBC Morphology NOT REPORTED     Platelet Estimate NOT REPORTED    Comprehensive Metabolic Panel w/ Reflex to MG   Result Value Ref Range    Glucose 114 (H) 70 - 99 mg/dL    BUN 21 8 - 23 mg/dL    CREATININE 1.43 (H) 0.70 - 1.20 mg/dL    Bun/Cre Ratio NOT REPORTED 9 - 20    Calcium 9.7 8.6 - 10.4 mg/dL    Sodium 136 135 - 144 mmol/L    Potassium 5.1 3.7 - 5.3 mmol/L    Chloride 99 98 - 107 mmol/L    CO2 25 20 - 31 mmol/L    Anion Gap 12 9 - 17 mmol/L Gap 20 (H) 9 - 17 mmol/L    GFR Non-African American 50 (L) >60 mL/min    GFR African American >60 >60 mL/min    GFR Comment          GFR Staging NOT REPORTED    CBC WITH AUTO DIFFERENTIAL   Result Value Ref Range    WBC 11.4 (H) 3.5 - 11.3 k/uL    RBC 4.81 4.21 - 5.77 m/uL    Hemoglobin 13.8 13.0 - 17.0 g/dL    Hematocrit 44.7 40.7 - 50.3 %    MCV 92.9 82.6 - 102.9 fL    MCH 28.7 25.2 - 33.5 pg    MCHC 30.9 28.4 - 34.8 g/dL    RDW 14.2 11.8 - 14.4 %    Platelets 903 368 - 463 k/uL    MPV 9.8 8.1 - 13.5 fL    NRBC Automated 0.0 0.0 per 100 WBC    Differential Type NOT REPORTED     WBC Morphology NOT REPORTED     RBC Morphology NOT REPORTED     Platelet Estimate NOT REPORTED     Seg Neutrophils 92 (H) 36 - 65 %    Lymphocytes 6 (L) 24 - 43 %    Monocytes 2 (L) 3 - 12 %    Eosinophils % 0 (L) 1 - 4 %    Basophils 0 0 - 2 %    Immature Granulocytes 0 0 %    Segs Absolute 10.47 (H) 1.50 - 8.10 k/uL    Absolute Lymph # 0.70 (L) 1.10 - 3.70 k/uL    Absolute Mono # 0.19 0.10 - 1.20 k/uL    Absolute Eos # <0.03 0.00 - 0.44 k/uL    Basophils Absolute <0.03 0.00 - 0.20 k/uL    Absolute Immature Granulocyte 0.05 0.00 - 0.30 k/uL   COVID-19    Specimen: Other   Result Value Ref Range    SARS-CoV-2 Not Detected Not Detected    SARS-CoV-2, Rapid          Source . NASOPHARYNGEAL SWAB     SARS-CoV-2, PCR         CBC WITH AUTO DIFFERENTIAL   Result Value Ref Range    WBC 15.4 (H) 3.5 - 11.3 k/uL    RBC 4.68 4.21 - 5.77 m/uL    Hemoglobin 13.3 13.0 - 17.0 g/dL    Hematocrit 45.6 40.7 - 50.3 %    MCV 97.4 82.6 - 102.9 fL    MCH 28.4 25.2 - 33.5 pg    MCHC 29.2 28.4 - 34.8 g/dL    RDW 14.6 (H) 11.8 - 14.4 %    Platelets 943 371 - 209 k/uL    MPV 10.0 8.1 - 13.5 fL    NRBC Automated 0.0 0.0 per 100 WBC    Differential Type NOT REPORTED     WBC Morphology NOT REPORTED     RBC Morphology ANISOCYTOSIS PRESENT     Platelet Estimate NOT REPORTED     Seg Neutrophils 90 (H) 36 - 65 %    Lymphocytes 6 (L) 24 - 43 %    Monocytes 3 3 - 12 % Eosinophils % 0 (L) 1 - 4 %    Basophils 0 0 - 2 %    Immature Granulocytes 1 (H) 0 %    Segs Absolute 13.88 (H) 1.50 - 8.10 k/uL    Absolute Lymph # 0.92 (L) 1.10 - 3.70 k/uL    Absolute Mono # 0.50 0.10 - 1.20 k/uL    Absolute Eos # <0.03 0.00 - 0.44 k/uL    Basophils Absolute <0.03 0.00 - 0.20 k/uL    Absolute Immature Granulocyte 0.12 0.00 - 0.30 k/uL   APTT   Result Value Ref Range    PTT 28.3 20.5 - 30.5 sec   Protime-INR   Result Value Ref Range    Protime 10.9 9.0 - 12.0 sec    INR 1.0    Basic Metabolic Panel w/ Reflex to MG   Result Value Ref Range    Glucose 113 (H) 70 - 99 mg/dL    BUN 31 (H) 8 - 23 mg/dL    CREATININE 1.28 (H) 0.70 - 1.20 mg/dL    Bun/Cre Ratio NOT REPORTED 9 - 20    Calcium 9.9 8.6 - 10.4 mg/dL    Sodium 137 135 - 144 mmol/L    Potassium 4.9 3.7 - 5.3 mmol/L    Chloride 104 98 - 107 mmol/L    CO2 21 20 - 31 mmol/L    Anion Gap 12 9 - 17 mmol/L    GFR Non-African American 55 (L) >60 mL/min    GFR African American >60 >60 mL/min    GFR Comment          GFR Staging NOT REPORTED    CBC WITH AUTO DIFFERENTIAL   Result Value Ref Range    WBC 14.2 (H) 3.5 - 11.3 k/uL    RBC 4.71 4.21 - 5.77 m/uL    Hemoglobin 13.6 13.0 - 17.0 g/dL    Hematocrit 44.0 40.7 - 50.3 %    MCV 93.4 82.6 - 102.9 fL    MCH 28.9 25.2 - 33.5 pg    MCHC 30.9 28.4 - 34.8 g/dL    RDW 14.3 11.8 - 14.4 %    Platelets 385 300 - 445 k/uL    MPV 10.3 8.1 - 13.5 fL    NRBC Automated 0.0 0.0 per 100 WBC    Differential Type NOT REPORTED     Seg Neutrophils 92 (H) 36 - 65 %    Lymphocytes 5 (L) 24 - 43 %    Monocytes 2 (L) 3 - 12 %    Eosinophils % 0 (L) 1 - 4 %    Basophils 0 0 - 2 %    Immature Granulocytes 1 (H) 0 %    Segs Absolute 13.05 (H) 1.50 - 8.10 k/uL    Absolute Lymph # 0.74 (L) 1.10 - 3.70 k/uL    Absolute Mono # 0.33 0.10 - 1.20 k/uL    Absolute Eos # <0.03 0.00 - 0.44 k/uL    Basophils Absolute <0.03 0.00 - 0.20 k/uL    Absolute Immature Granulocyte 0.10 0.00 - 0.30 k/uL    WBC Morphology NOT REPORTED     RBC Morphology NOT REPORTED     Platelet Estimate NOT REPORTED    Basic Metabolic Panel w/ Reflex to MG   Result Value Ref Range    Glucose 124 (H) 70 - 99 mg/dL    BUN 31 (H) 8 - 23 mg/dL    CREATININE 1.18 0.70 - 1.20 mg/dL    Bun/Cre Ratio NOT REPORTED 9 - 20    Calcium 9.6 8.6 - 10.4 mg/dL    Sodium 137 135 - 144 mmol/L    Potassium 5.7 (H) 3.7 - 5.3 mmol/L    Chloride 102 98 - 107 mmol/L    CO2 23 20 - 31 mmol/L    Anion Gap 12 9 - 17 mmol/L    GFR Non-African American >60 >60 mL/min    GFR African American >60 >60 mL/min    GFR Comment          GFR Staging NOT REPORTED    POTASSIUM   Result Value Ref Range    Potassium 4.7 3.7 - 5.3 mmol/L   EKG 12 Lead   Result Value Ref Range    Ventricular Rate 86 BPM    Atrial Rate 86 BPM    P-R Interval 136 ms    QRS Duration 134 ms    Q-T Interval 378 ms    QTc Calculation (Bazett) 452 ms    P Axis 40 degrees    R Axis -92 degrees    T Axis 100 degrees         IMAGING DATA:    Ct Head Wo Contrast    Result Date: 8/9/2020  EXAMINATION: CT OF THE HEAD WITHOUT CONTRAST 8/9/2020 11:47 am TECHNIQUE: CT of the head was performed without the administration of intravenous contrast. Dose modulation, iterative reconstruction, and/or weight based adjustment of the mA/kV was utilized to reduce the radiation dose to as low as reasonably achievable. COMPARISON: None. HISTORY: ORDERING SYSTEM PROVIDED HISTORY: New deficiet, one week old TECHNOLOGIST PROVIDED HISTORY: New deficiet, one week old FINDINGS: Large area of hypodensity within the left MCA distribution. Small area of hypodensity within the right posterior parietal lobe. No mass effect. No midline shift. Mild prominence of the ventricles and sulci is consistent with atrophy. No acute soft tissue abnormality. No acute orbital abnormality. No acute osseous abnormality. Opacity throughout the right maxillary sinus with bony overgrowth suggesting chronic paranasal sinus disease.      Large subacute left MCA distribution infarct is suspected. Small right posterior parietal subacute infarct is suspected. Follow-up with MRI may be helpful. Xr Chest Portable    Result Date: 8/4/2020  EXAMINATION: ONE XRAY VIEW OF THE CHEST 8/4/2020 2:54 pm COMPARISON: AP chest from 02/08/2019. HISTORY: ORDERING SYSTEM PROVIDED HISTORY: chest pain TECHNOLOGIST PROVIDED HISTORY: chest pain Reason for Exam: chest pain and constipation Acuity: Unknown Type of Exam: Unknown History of cardiomyopathy. FINDINGS: Left-sided AICD via subclavian approach, with unchanged biventricular and right atrial electrodes. Cardiac silhouette unchanged; new rounded right hilar mass, not seen previously. Some fullness right-sided mediastinum suggests possible adenopathy. Remainder upper mediastinum, both lungs and both costophrenic angles clear. Unchanged mild DJD spine. New right hilar mass. The findings were sent to the Radiology Results [id number] at 3:03 p.m. on 8/4/2020to be communicated to a licensed caregiver. RECOMMENDATION: CT scan of the chest recommended, with IV contrast if possible. Cta Chest Abdomen Pelvis W Contrast    Addendum Date: 8/9/2020    ADDENDUM: Right 7th rib, T6 and right iliac bone and several other possible bony metastatic lesions also identified. Result Date: 8/9/2020  EXAMINATION: CTA OF THE CHEST, ABDOMEN AND PELVIS WITH CONTRAST, 8/4/2020 3:09 pm TECHNIQUE: CTA of the chest, abdomen and pelvis was performed after the administration of intravenous contrast.  Multiplanar reformatted images are provided for review. MIP images are provided for review. Dose modulation, iterative reconstruction, and/or weight based adjustment of the mA/kV was utilized to reduce the radiation dose to as low as reasonably achievable. COMPARISON: CT scan of the abdomen and pelvis from 02/10/2019. HISTORY: ORDERING SYSTEM PROVIDED HISTORY: back pain TECHNOLOGIST PROVIDED HISTORY: back pain Reason for Exam: Mid back pain, right arm numbness.  Left abd pain, chest pressure Acuity: Acute Type of Exam: Initial FINDINGS: CTA CHEST: No acute abnormality thoracic aorta; slight calcific plaque aortic arch. Normal patent branch vessels to the head and upper extremities. No aneurysm or dissection. Mildly dilated left ventricle. Left-sided AICD via subclavian approach with electrode leads in the right heart and coronary sinus. Incidental findings: Lobular irregular ~ 4.6 x 4.4 cm RLL mass superior segment abutting the thickened medial pleura and upper major fissure with central low attenuation, and a small high density focus abutting its superolateral border measuring 1.7 x 1.1 cm (slice 52, series 4). 2.0 x 1.6 cm right upper lobe mass (best seen on cuts 45-52, series 4). Extensive right hilar adenopathy and enlarged subcarinal and right paratracheal nodes, largest measuring 2.1 (short axis). Small right pleural effusion. Emphysematous changes upper lung zones and slight posteroinferior dependent or atelectatic changes. No contralateral nodules. No sizable left pleural effusion. Mild kyphoscoliosis and DJD thoracic spine. CTA ABDOMEN: Moderate ASVD with some calcification abdominal aorta, but patent visceral vessels and no aneurysm or dissection. No adrenal mass. No focal hepatic mass. No splenomegaly or focal mass. Mild pancreatic ductal dilatation. Normal biliary tree. No calcified gallstone. Large amount of retained stool throughout large bowel with some diverticular disease but no diverticulitis or obvious mass. Symmetric renal perfusion bilaterally. Probable peripelvic cysts, especially on the left. No suspicious focal abnormality identified. A few mildly enlarged retroperitoneal nodes but no bulky lymphadenopathy. Small peripancreatic node. Mild-moderate degenerative changes lumbar spine with DDD L4-S1. No destructive or blastic lesion.  CTA PELVIS: Elongated moderately diseased iliac arteries, especially external iliac arteries without aneurysm or clear cut dissection. Enlarged prostate measuring 5.1 x 4.4 cm. Urinary bladder WNL. THORACIC/LUMBAR SPINE: Please see separate thoracic/lumbar spine CT report. No aortic dissection or aneurysm. Large right lower lobe mass with additional RLL and RUL nodules, significant right hilar/ipsilateral mediastinal adenopathy and small pleural effusion. Additional findings, as detailed in the body of the report above. RECOMMENDATIONS: Bronchoscopic directed or percutaneous biopsy. Cta Chest Abdomen Pelvis W Contrast    Result Date: 8/9/2020  EXAMINATION: CTA OF THE CHEST, ABDOMEN AND PELVIS WITH CONTRAST 8/9/2020 11:50 am TECHNIQUE: CTA of the chest, abdomen and pelvis was performed after the administration of intravenous contrast.  Multiplanar reformatted images are provided for review. MIP images are provided for review. Dose modulation, iterative reconstruction, and/or weight based adjustment of the mA/kV was utilized to reduce the radiation dose to as low as reasonably achievable. 3D reconstructed images were performed on a separate workstation and provided for review. COMPARISON: August 4, 2020 HISTORY: ORDERING SYSTEM PROVIDED HISTORY: Chest pain, heart disease, persistent back pain, constipation, urinary retention TECHNOLOGIST PROVIDED HISTORY: Chest pain, heart disease, persistent back pain, constipation, urinary retention FINDINGS: CT CHEST: Chest wall: Left chest wall pacemaker. No axillary adenopathy. Mediastinum: Mild cardiomegaly. Trace pericardial effusion. Coronary artery calcifications. Necrotic mediastinal adenopathy. For example there is a right paratracheal lymph node measuring 2.8 cm. Right hilar mass invading into the mediastinum measures at least 2.6 cm. Lungs: Right hilar mass causes narrowing of the right main pulmonary artery. Right lower lobe mass measures 4.2 x 4.7 cm and extends to the underlying pleural surface. Anteriorly this extends to the fissure.   Right upper lobe mass measures 18 mm series 4, image 52. Mild centrilobular emphysema. No left pleural effusion. Trace right pleural effusion. Bones: Osseous metastatic disease involving the right 7th rib. Osseous metastatic disease within a midthoracic vertebral body. CT ABDOMEN-PELVIS: Organs: No liver lesion. No gallstones. No pancreatic lesion. No splenomegaly. No adrenal lesion. No hydronephrosis. Subcentimeter left renal hypodensity is likely a cyst but incompletely characterized. GI/Bowel: No bowel obstruction. No acute inflammatory process. Sigmoid diverticulosis without acute diverticulitis. Pelvis: No free fluid. No bladder calculus. Prostate gland is enlarged and slightly heterogeneous. Peritoneum/Retroperitoneum: No adenopathy. Bones/Soft Tissues: No acute soft tissue abnormality. Osseous metastatic disease. Destructive iliac lesions. VASCULAR: No thoracic aortic aneurysm. No thoracic aortic dissection. Origin of the great vessels are patent. No abdominal aortic aneurysm. No acute abdominal aortic dissection. Questionable area of plaque versus small focal dissection inferior to the left renal artery. This area is unchanged. Celiac axis is patent. SMA is patent. Renal arteries are patent. Atherosclerotic calcification of the infrarenal abdominal aorta. No acute vascular findings. No acute findings within the chest, abdomen, or pelvis. Questionable small remote aortic dissection versus plaque just inferior to the left renal artery. No aortic aneurysm. Metastatic disease. Multiple pulmonary masses with invasion of the right hilum. Necrotic mediastinal adenopathy. Multiple areas of osseous metastatic disease with destructive lytic lesions. Prostate gland is enlarged and heterogeneous. Correlate with PSA values.                IMPRESSION:   Primary Problem  <principal problem not specified>    Active Hospital Problems    Diagnosis Date Noted    Vasogenic edema ([facility] [G93.6]     Right hemiparesis ([facility] [G81.91]     Lung mass [R91.8] 08/09/2020    Enlarged prostate [N40.0] 08/09/2020    CKD (chronic kidney disease) [N18.9] 08/09/2020    CVA (cerebral vascular accident) ([facility] [I63.9] 08/09/2020    Brain metastasis ([facility] [C79.31] 08/09/2020    CAD (coronary artery disease) [I25.10] 02/08/2019    HTN (hypertension) [I10] 02/08/2019       History of tobacco dependence, quit 8 years ago  Mediastinal and hilar lymphadenopathy concerning for malignancy  Lytic bone lesion concerning for malignancy  Possible brain metastasis      RECOMMENDATIONS:  1. I personally reviewed results of lab work-up imaging studies and other relevant clinical data. 2. Follow-up on results of biopsy  3. We have set up outpatient follow-up with radiation oncology for tomorrow  4. Continue oral steroids along with PPI  5. Continue pain management  6. We will see patient back in office on 8/17. Discussed with patient and Nurse. Thank you for asking us to see this patient. Alejandra Pena MD          This note is created with the assistance of a speech recognition program.  While intending to generate a document that actually reflects the content of the visit, the document can still have some errors including those of syntax and sound a like substitutions which may escape proof reading. It such instances, actual meaning can be extrapolated by contextual diversion.

## 2020-08-12 NOTE — PLAN OF CARE
Problem: Pain:  Goal: Pain level will decrease  Description: Pain level will decrease  8/12/2020 1754 by Mary Ellen Conn RN  Outcome: Completed  8/12/2020 1024 by Mary Ellen Conn RN  Outcome: Ongoing  Goal: Control of acute pain  Description: Control of acute pain  8/12/2020 1754 by Mary Ellen Conn RN  Outcome: Completed  8/12/2020 1024 by Mary Ellen Conn RN  Outcome: Ongoing  Goal: Control of chronic pain  Description: Control of chronic pain  8/12/2020 1754 by Mary Ellen Conn RN  Outcome: Completed  8/12/2020 1024 by Mary Ellen Conn RN  Outcome: Ongoing     Problem: Falls - Risk of:  Goal: Will remain free from falls  Description: Will remain free from falls  8/12/2020 1754 by Mary Ellen Conn RN  Outcome: Completed  8/12/2020 1024 by Mary Ellen Conn RN  Outcome: Ongoing  Goal: Absence of physical injury  Description: Absence of physical injury  8/12/2020 1754 by Mary Ellen Conn RN  Outcome: Completed  8/12/2020 1024 by Mary Ellen Conn RN  Outcome: Ongoing     Problem: Musculor/Skeletal Functional Status  Goal: Highest potential functional level  8/12/2020 1754 by Mary Ellen Conn RN  Outcome: Completed  8/12/2020 1024 by Mary Ellen Conn RN  Outcome: Ongoing     Problem: Musculor/Skeletal Functional Status  Goal: Highest potential functional level  8/12/2020 1754 by Mary Ellen Conn RN  Outcome: Completed  8/12/2020 1024 by Mary Ellen Conn RN  Outcome: Ongoing  Goal: Absence of falls  8/12/2020 1754 by Mary Ellen Conn RN  Outcome: Completed  8/12/2020 1024 by Mary Ellen Conn RN  Outcome: Ongoing     Problem: Nutrition  Goal: Optimal nutrition therapy  Description: Nutrition Problem #1: Inadequate oral intake  Intervention: Food and/or Nutrient Delivery: Continue NPO, Start Oral Nutrition Supplement  Nutritional Goals: Start nutrition with 24-48 hrs     8/12/2020 1754 by Mary Ellen Conn RN  Outcome: Completed  8/12/2020 1024 by Mary Ellen Conn RN  Outcome:

## 2020-08-12 NOTE — CARE COORDINATION
Transitional planning. Cassia Khan with [address] called to inform that pt does not have Part D coverage for meds. We can voucher Prednisone, but not Protonix    1500 informed pt of above information. He can pay for Protonix when his daughter in law picks him up. He asked for pain medication. Perfect served Dr. Lebron Arevalo, he will write script for medication. 1524 informed Dr. Lebron Arevalo that santyl ointment is $250 at pharmacy. He will write script for pt to take to his pharmacy. 1620 Pt states he does not have the $250 for ointment and he will take script to his [facility] [place]. He states he does not need home care and lives with his daughter.

## 2020-08-13 NOTE — PROGRESS NOTES
Referring Physician: Jean Joel here for Consult. Patient states he went in to [address] due to constipation, gave him magnesium citrate and then couple days after that constipation again and then he fell down the steps Aug 9th 2020. 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 Did biopsy of the bone on the left hip area. Pain dull ache where they took biopsy. Back pain T6-8. Patient has been feeling dizzy at times, denies headache. SOB with exertion. Patient does not have PCP and it almost out of Roxicodone and would like a few other meds refilled from hospital. Will meet with [facility] Monday for consult. Dr. Vinnie Younger to [facility]. Dr. Vinnie Younger would instructed RN to inform patient to stop prednisone right away and start Dexamethasone 4mgs po every 8 hours x 14 days. Patient and daughter in law verbalized understanding and gave # for pharmacy for [facility] [phone]. Emailed Haile Vazquez nurse navigator for assistance. Emergent Teach and Sim done on patient today. Pacemaker information gathered and Sandra Mora updated on emergent T&S. Dr. Vinnie Younger also ordered CT of Abdomen and Pelvis due to unexplained constipation. Will look for results and place on pending. Vitals:    08/13/20 1407   BP: 134/77   Pulse: 77   Resp: 20   Temp: 98.1 °F (36.7 °C)   SpO2: 96%    :  Patient Currently in Pain: Yes  Pain Assessment: 0-10  Pain Level: 8       Wt Readings from Last 1 Encounters:   08/13/20 129 lb 4 oz (58.6 kg)        Body mass index is 22.19 kg/m². Height: 5' 4\" (162.6 cm)         Immunizations:    Influenza status:    []   Current   [x]   Patient declined    Pneumococcal status:  []   Current  [x]   Patient declined    Smoking Status:    [] Smoker - PPD:   [x] Nonsmoker - Quit Date:       2012        [] Never a smoker      No chief complaint on file. Cancer Staging  No matching staging information was found for the patient. Prior Radiation Therapy?  No   If yes, site treated:   Facility: Date:    Concurrent Chemo/radiation? No   If yes, start date:    Prior Chemotherapy? No   If yes    Facility:                             Date:    Prior Hormonal Therapy? No   If yes   Facility:                             Date:    Head and Neck Cancer? No   If yes, please remind physician to place swallow study order and speech therapy order. Pacemaker/Defibulator/ICD:  Yes             BREAST/GYN  Patient only:     LMP:    Age at first Menses:    Para:    :    Cup size:    Lymphedema Evaluation[de-identified]   [] left arm      [] right arm  Location:     Measurement (cm)    Upper Bicep :    Lower Bicep :           Current Outpatient Medications   Medication Sig Dispense Refill    pantoprazole (PROTONIX) 40 MG tablet Take 1 tablet by mouth every morning (before breakfast) 30 tablet 3    predniSONE (DELTASONE) 20 MG tablet Take 3 tablets by mouth daily for 5 days 15 tablet 0    predniSONE (DELTASONE) 10 MG tablet Take 4 tablets by mouth daily for 5 days 20 tablet 0    predniSONE (DELTASONE) 10 MG tablet Take 2 tablets by mouth daily for 5 days 10 tablet 0    predniSONE (DELTASONE) 5 MG tablet Take 2 tablets by mouth daily for 5 days 10 tablet 0    oxyCODONE (ROXICODONE) 5 MG immediate release tablet Take 1 tablet by mouth every 6 hours as needed for Pain for up to 3 days. Intended supply: 3 days. Take lowest dose possible to manage pain 12 tablet 0    collagenase (SANTYL) 250 UNIT/GM ointment Apply topically daily. 1 Tube 1    carvedilol (COREG) 12.5 MG tablet Take 12.5 mg by mouth 2 times daily (with meals)      docusate sodium (COLACE) 100 MG capsule Take 1 capsule by mouth 2 times daily 15 capsule 0    aspirin 81 MG chewable tablet Take 1 tablet by mouth daily 30 tablet 3    nitroGLYCERIN (NITROSTAT) 0.4 MG SL tablet up to max of 3 total doses.  If no relief after 1 dose, call 911. 25 tablet 3    atorvastatin (LIPITOR) 80 MG tablet Take 1 tablet by mouth nightly 30 tablet 3    lisinopril (PRINIVIL;ZESTRIL) 5 MG tablet Take 1 tablet by mouth daily 30 tablet 3    cyclobenzaprine (FLEXERIL) 10 MG tablet Take 1 tablet by mouth nightly as needed for Muscle spasms 10 tablet 0    metoprolol succinate (TOPROL XL) 25 MG extended release tablet Take 1 tablet by mouth nightly 30 tablet 3    furosemide (LASIX) 20 MG tablet Take 1 tablet by mouth daily 60 tablet 3     No current facility-administered medications for this encounter. Past Medical History:   Diagnosis Date    Cancer [facility])     Cardiac resynchronization therapy defibrillator (CRT-D) in place     Chest pain 2019    Coronary artery disease     Dyspnea on exertion     Elevated troponin 2019    Heart [facility] ([facility] [address]     Ischemic cardiomyopathy        Past Surgical History:   Procedure Laterality Date    CORONARY ANGIOPLASTY WITH STENT PLACEMENT      BMS to LAD    CT BIOPSY PERCUTANEOUS DEEP BONE  2020    CT BIOPSY PERCUTANEOUS DEEP BONE 2020 [facility] CT SCAN    PACEMAKER PLACEMENT  2019    AICD/ [employer]  MODEL #[id number] CRTD AMPLIA MRI USDF4.  LEADS [id number], [id number] AND 2640-03        Family History   Problem Relation Age of Onset    Cancer Mother     Cancer Father     Prostate Cancer Father        Social History     Socioeconomic History    Marital status:      Spouse name: Not on file    Number of children: Not on file    Years of education: Not on file    Highest education level: Not on file   Occupational History    Not on file   Social Needs    Financial resource strain: Not on file    Food insecurity     Worry: Not on file     Inability: Not on file    Transportation needs     Medical: Not on file     Non-medical: Not on file   Tobacco Use    Smoking status: Former Smoker     Types: Cigarettes     Last attempt to quit: 2012     Years since quittin.0    Smokeless tobacco: Never Used    Tobacco comment: 48 yr hx of smoking   Substance and Sexual Activity    Alcohol use: No    Drug use: Yes     Types: Marijuana     Comment: last used 2 days    Sexual activity: Not on file   Lifestyle    Physical activity     Days per week: Not on file     Minutes per session: Not on file    Stress: Not on file   Relationships    Social connections     Talks on phone: Not on file     Gets together: Not on file     Attends Worship service: Not on file     Active member of club or organization: Not on file     Attends meetings of clubs or organizations: Not on file     Relationship status: Not on file    Intimate partner violence     Fear of current or ex partner: Not on file     Emotionally abused: Not on file     Physically abused: Not on file     Forced sexual activity: Not on file   Other Topics Concern    Not on file   Social History Narrative    Not on file             FALLS RISK SCREEN  Instructions:  Assess the patient and enter the appropriate indicators that are present for fall risk identification. Total the numbers entered and assign a fall risk score from Table 2.  Reassess patient at a minimum every 12 weeks or with status change. Assessment   Date  8/13/2020     1. Mental Ability: confusion/cognitively impaired 3     2. Elimination Issues: incontinence, frequency 0       3. Ambulatory: use of assistive devices (walker, cane, off-loading devices),        attached to equipment (IV pole, oxygen) 0     4. Sensory Limitations: dizziness, vertigo, impaired vision 0     5. Age less than 65        0     6. Age 72 or greater 1     7. Medication: diuretics, strong analgesics, hypnotics, sedatives,        antihypertensive agents 0   8. Falls:  recent history of falls within the last 3 months (not to include slipping or        tripping) 7   TOTAL 11    If score of 4 or greater was education given? Yes       TABLE 2   Risk Score Risk Level Plan of Care   0-3 Little or  No Risk 1. Provide assistance as indicated for ambulation activities  2.   Reorient confused/cognitively impaired patient  3. Call-light/bell within patient's reach  4. Chair/bed in low position, stretcher/bed with siderails up except when performing patient care activities  5. Educate patient/family/caregiver on falls prevention  6.  Reassess in 12 weeks or with any noted change in patient condition which places them at a risk for a fall   4-6 Moderate Risk 1. Provide assistance as indicated for ambulation activities  2. Reorient confused/cognitively impaired patient  3. Call-light/bell within patient's reach  4. Chair/bed in low position, stretcher/bed with siderails up except when performing patient care activities  5. Educate patient/family/caregiver on falls prevention     7 or   Higher High Risk 1. Place patient in easily observable treatment room  2. Patient attended at all times by family member or staff  3. Provide assistance as indicated for ambulation activities  4. Reorient confused/cognitively impaired patient  5. Call-light/bell within patient's reach  6. Chair/bed in low position, stretcher/bed with siderails up except when performing patient care activities  7. Educate patient/family/caregiver on falls prevention             Assessment/Plan: Patient was seen today for consultation.          Guerrero Chadwick 8/13/2020 2:24 PM

## 2020-08-13 NOTE — TELEPHONE ENCOUNTER
Name: Parag Brown  : 1950  MRN: [id number]    Oncology Navigation Follow-Up Note    Navigator calling both home and mobile numbers introducing self and left contact information as well as MO appt. Date and time. Pt. To see RO today, plan to follow.    Electronically signed by Kelly Cedeño RN on 2020 at 9:29 AM

## 2020-08-13 NOTE — TELEPHONE ENCOUNTER
Pt's family member calling for ativan for pt, she states they use the [facility] on [address]. She states that Debra Lund just was discharged from the hospital on 8/12/2020 and was helped by ativan (was given 0.5 mg po) that was prescribed while there. Asking Dr Moiz Reilly to prescribe for pt due to \"mind and body racing\", \"restless\", \"can't sleep\", and to \"calm nerves\". Pt will be seen per Dr Moiz Reilly on 8/17/2020. This was prescribed for prior to an MRI by a Dr Gustavo Vasquez, possibly a resident. Writer called and left message for Debra Lund, due to would need to see md at [facility] to check to see if md would feel comfortable prescribing this for the pt. He is a new pt and the ativan was prescribed by a different md.  Notified could call PCP if feels can not tolerate above stated symptoms until 8/17/2020, when he will be seen in the office.

## 2020-08-13 NOTE — PROGRESS NOTES
Pt here today for teach and simulation scan. Radiation and You information provided along with additional education regarding radiation therapy and what to expect. Pt verbalizes understanding and all questions answered to the best of my knowledge. Samples of aquaphor and community resource information provided. Pt escorted to CT room without any difficulty. Radiation Therapy Education    · A Simulation Scan is like having a CT scan. Your physician will use the images obtained to develop your radiation treatment. · May take 30 minutes to 1 hour to complete  · Marking will be applied to your skin to help insure accurate positioning for your future treatments. · A mold or a mask may also be needed to help aid in your positioning    · Schedule: You will have treatments Monday - Friday  · Treatments should take about 15 minutes from the time you enter the treatment room  · You will have the same appointment time each day  · You will see your doctor and nurse one day weekly while you are undergoing treatments. · Every effort will be make to start your treatment at the scheduled time. However, there may be occasional delays due to emergency patients, technical problems, or other difficulties. · Side Effects: Radiation is a local treatment so any side effects you may experience will be in your treatment area only. · If you experience side effects they will typically occur after the 2nd or 3rd week of treatments. · Many patients do not experience severe side effects and are able to continue working during their treatments.  If you feel you treatments are interfering with your job, please notify your nurse        Skin Care During Radiation Treatments          Start applying moisturizers to the skin two times a day when you start your radiation        treatments  · Suggested moisturizers include: Aquaphor, Eucerin, Exclair, Borion gel (may be purchased at [facility]) or Catrina Wild (to order call [phone] or go to www.[employer]  · Do not apply anything to your skin in your treatment area that is not recommended by your radiation nurse and/or doctor  Good general hygiene/bathing should be performed daily  · Use moisturizing soaps that do not contain perfume or fragrances. Recommended soaps include Dove or Dial  · Use warm water, not hot water when bathing  · After bathing, pat the skin dry rather than rubbing it, especially at the treatment site  · Do not shave the treatment area. If you must shave, such as a beard, use an electric shaver. Don't use pre/after shave creams on treatment area. Avoid friction on and around your treatment area  · Do not use tape, bandages, or medicated patches in the treatment area  · Avoid tight fitting clothing  · No massaging or scratching    Avoid extremes of temperature on the treatment area such as heating pads, ice packs, hot tubs, or saunas    If the area being treated is exposed to the sun, apply sunscreen routinely to the treatment site whenever you are outdoors. A sunscreen with a minimum of SFF30 should be used. Since the area being treated will always be more sensitive than the rest of your skin, continue to protect the area from sun exposure after your treatment ends    If your armpit is in the treatment area, do not use antiperspirants. An aluminum-free, non-metallic deodorant may be used. Tell your nurse or doctor if you have any of the following symptoms:  · Blistered, open, swollen or tender areas of the skin in and around the treatment area  · Pain or itching that is not helped by prescribed medications or recommended ointments      Questions and Answers about Radiation Therapy  1. What is radiation therapy? Radiation therapy (also called radiotherapy) is a cancer treatment that uses high  doses of radiation to kill cancer cells and shrink tumors.  At low doses, radiation  is used as an x-ray to see inside your body and take pictures, such as x-rays of  your teeth or broken bones. 2.  How is radiation therapy given? Radiation therapy can be external beam or internal. External beam involves a  machine outside your body that aims radiaition of cancer cells. Internal radiation  therapy involves placing radiation inside your body, in or near the cancer. Sometimes ppeople get both forms of radiation therapy. To learn more about  external beam radiation therapy. 3.  What does radiation therapy do to cancer cells? Given in high doses, radiation kills or slows the growth of cancer cells. Radiaton  therapy is used to:  · Treat Cancer. Radiation can be used to cure cancer, to prevent it from returning, or to stop or slow its growth  · Reduce symptoms. When a cure is not possible, radiation may be used to treat pain and other problems caused by the cancer tumor. Or, it can prevent problems that may be caused by a growing tumor, such as blindness or loss of bowel and bladder control. 4.  How long does radiation therapy take to work? Radiation therapy does not kill cancer cells right away. It takes days or weeks of  treatments before cancer cells start to die. Then, cancer cells keep dying for  weeks or months after radiation therapy ends. 5.  What does radiation therapy do to healthy cells? Radiation not only dills or slows the growth of cancer cells, it can also affect  nearby healthy cells. The healthy cells almost always recover after treatment is  over. But sometimes people may have side effects that are severe or do not get  better. Other side effects may how up months or years after radiation therapy is  over. These are called late side effects. Doctors try to protect healthy cells during treatment by:  · Using as low a does of radiation as possible. The radiation dose is balanced between being high enough to kill cancer cells, yet low enough to limit damage to healthy cells. · Spreading out treatment over time.  You may get radiation therapy once a day, or in smaller doses twice a day for several weeks. Spreading out the radiation dose allows normal cells to recover while cancer cells die. · Aimimng radiation at a precise part of your body. Some types of radiation therapy allow your doctor to aim high doses of radiaiton at your cancer while reducing radiation to nearby healthy tissue. These techniques use a computer to deliver precise radiation doses to a cancer tumor or to specific areas within the tumor. [address] [facility]             Radiation Therapy Side Effects  Side effects are problems that can occur as a result of treatment. They may occur with radiation therapy because the high doses of radiation that are used to kill cancer cells can also damage healthy cells in the treatment area. Side effects are different for each person. Some people have many side effects. Others have hardly any. Side effects may be more severe if you also receive chemotherapy before, during, or after your radiation therapy. Talk with your doctor or nurse about your chances of having side effects. The team will watch you closely and ask if you notice any problems. If you do have side effedts, your doctor or nurse will talk with you about ways to manage them. Common Side Effects  Many people who get radiation therapy have skin changes and some fatigue. Other side effects depend on the part of your body being treated. Skin changes may include dryness, itching, peeling, or blistering in the treatment area. These changes occur because radiation passes through the skin on its way to the cancer. You will need to take special care of your skin during radiation therapy. Fatigue is often described as feeling weary or exhausted.  There are many ways to manager fatigue  Depending on the part of your body being treated, you may also have:  · Diarrhea     · Hair Loss  · Mouth Problems  · Nausea and vomiting  · Sexual Changes  · Swelling  · Trouble swallowing · Urinary and bladder changes    Most of these side effects go away within 2 months after you have finished radiation therapy. Late side effects may occur 6 or more months after radiation therapy is over. They vary by the part of your body that was treated and the dose of radiation you received. Late side effects may include infertility, joint problems, lymphedema, mouth problems, and rarely, second primary cancers. Everyone is different, so talk with your doctor or nurse about whether you might have late side effects and what signs to look for.      [employer]

## 2020-08-13 NOTE — PROGRESS NOTES
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 (He has a left anterior chest wall pacemaker/defibrillator.)    Chief Complaint: \"My back hurts. \"    Dear Dr. Elizabeth Daley,    Thank you for requesting a Radiation Oncology consultation on your patient, Ailyn Odonnell , a 58-year-old  male former smoker with a [address] NX M1 stage IV right upper lobe adenocarcinoma of lung with multiple metastases to brain and to T6 causing significant pain, rated \"15\"/10 without, and 5/10 with 5 mg p.o. every 6 hours of oxycodone. Two to 3 weeks ago, the patient was lifting a heavy trash can and heard a pop on his back which became painful. The patient initially presented to the emergency department on 2020 because of constipation and left lower quadrant abdominal pain. He also complains of back pain radiating to the left shoulder and the left abdomen and also to the left lower extremity. Chest x-ray 2020 showed a new right hilar mass, and 2020 CT angiogram showed a 4.6 x 4.4 cm mass in the superior segment of the right lower lobe, a 2 x 1.6 cm right upper lobe mass, extensive lytic bone lesions, right hilar adenopathies, subcarinal enlarged lymph nodes, right paratracheal lymph nodes, negative adrenals and liver as well as a large amount of stool in the large bowel. There were a few enlarged retroperitoneal lymph nodes but no bulky adenopathies. In the pelvis, there was an enlarged prostate. He was treated with mag citrate and sent home, where he had bowel movements, but became constipated again. On 2020, he lost his footing on stairs at home and fell, but did not sustain any injuries to his head.   His daughter took him to the emergency department where he complained of significantly worsening back pain radiating anteriorly to the chest and abdomen as well as weakness and numbness of the right hand. August 9, 2020 CT of the chest, abdomen, and pelvis with contrast showed a at least 2.6 cm right hilar mass narrowing the right main pulmonary artery and invading the mediastinum, a 2.8 cm right paratracheal lymph nodes, a 4.2 x 4.7 cm right lower lobe mass extending to the pleural surface, right seventh rib metastasis, a mid T-spine metastasis, and destructive iliac lesions. August 10, 2020 MRI of the brain showed multiple ring-enhancing lesions bilaterally, the largest in the left anterior parietal measuring 1.4 x 1.1 x 1.7 cm with extensive edema but no midline shift. MRI of the C-spine and T-spine that day showed a T6 metastasis with 30% anterior compression fracture, no retropulsion, and a left third rib metastasis. The patient was started on prednisone 60 mg daily and was given enough 5 mg of oxycodone tablets to be taken every 6 hours to last through tomorrow, with plan for medical oncology follow-up on Monday, 4 days from now. Radiation oncology consultation is requested regarding evaluation and palliative treatment recommendation. Medical and Surgical History:  Past Medical History:   Diagnosis Date    Cancer [facility])     Cardiac resynchronization therapy defibrillator (CRT-D) in place     Chest pain 02/08/2019    Coronary artery disease     Dyspnea on exertion     Elevated troponin 02/08/2019    Heart attack ([facility] [address]     Ischemic cardiomyopathy      Past Surgical History:   Procedure Laterality Date    CORONARY ANGIOPLASTY WITH STENT PLACEMENT  2012    BMS to LAD    CT BIOPSY PERCUTANEOUS DEEP BONE  8/11/2020    CT BIOPSY PERCUTANEOUS DEEP BONE 8/11/2020 [facility] CT SCAN    PACEMAKER PLACEMENT  02/11/2019    AICD/ [employer]  MODEL #[id number] CRTD AMPLIA MRI USDF4.  LEADS [id number], [id number] AND 7797-15        Social Hx:  for 20 years with 2 children, the patient was in the [employer] for 3 years, smoked 2 packs a day for 50 years, discontinued in  when he underwent coronary angioplasty, and denies alcohol consumption. He is retired  and manager of a [employer]. Family Hx: Father  age 80 of mesothelioma. Mother  age 68 of some type of cancer. The patient denies any other family history of malignancies. Allergies   Allergen Reactions    Penicillins      Swollen hand with itching       Current Outpatient Medications:     pantoprazole (PROTONIX) 40 MG tablet, Take 1 tablet by mouth every morning (before breakfast), Disp: 30 tablet, Rfl: 3    predniSONE (DELTASONE) 20 MG tablet, Take 3 tablets by mouth daily for 5 days, Disp: 15 tablet, Rfl: 0    predniSONE (DELTASONE) 10 MG tablet, Take 4 tablets by mouth daily for 5 days, Disp: 20 tablet, Rfl: 0    predniSONE (DELTASONE) 10 MG tablet, Take 2 tablets by mouth daily for 5 days, Disp: 10 tablet, Rfl: 0    predniSONE (DELTASONE) 5 MG tablet, Take 2 tablets by mouth daily for 5 days, Disp: 10 tablet, Rfl: 0    oxyCODONE (ROXICODONE) 5 MG immediate release tablet, Take 1 tablet by mouth every 6 hours as needed for Pain for up to 3 days. Intended supply: 3 days. Take lowest dose possible to manage pain, Disp: 12 tablet, Rfl: 0    collagenase (SANTYL) 250 UNIT/GM ointment, Apply topically daily. , Disp: 1 Tube, Rfl: 1    carvedilol (COREG) 12.5 MG tablet, Take 12.5 mg by mouth 2 times daily (with meals), Disp: , Rfl:     docusate sodium (COLACE) 100 MG capsule, Take 1 capsule by mouth 2 times daily, Disp: 15 capsule, Rfl: 0    aspirin 81 MG chewable tablet, Take 1 tablet by mouth daily, Disp: 30 tablet, Rfl: 3    nitroGLYCERIN (NITROSTAT) 0.4 MG SL tablet, up to max of 3 total doses.  If no relief after 1 dose, call 911., Disp: 25 tablet, Rfl: 3    atorvastatin (LIPITOR) 80 MG tablet, Take 1 tablet by mouth nightly, Disp: 30 tablet, Rfl: 3   lisinopril (PRINIVIL;ZESTRIL) 5 MG tablet, Take 1 tablet by mouth daily, Disp: 30 tablet, Rfl: 3    cyclobenzaprine (FLEXERIL) 10 MG tablet, Take 1 tablet by mouth nightly as needed for Muscle spasms, Disp: 10 tablet, Rfl: 0    metoprolol succinate (TOPROL XL) 25 MG extended release tablet, Take 1 tablet by mouth nightly, Disp: 30 tablet, Rfl: 3    furosemide (LASIX) 20 MG tablet, Take 1 tablet by mouth daily, Disp: 60 tablet, Rfl: 3      SYSTEMS REVIEW:  Constitutional:  Fair appetite, unknown if weight loss. ECOG performance status 3  HEENT:  No headache or visual symptoms but he has intermittent slight dizziness and has to get up slowly. Respiratory:  No cough or hemoptysis but he has dyspnea on exertion or with lying down in a certain way. Cardiovascular:  No chest pain, palpitation, orthopnea, or paroxysmal nocturnal dyspnea  GI:  No incontinence, diarrhea, or hematochezia but he has been constipated with no stool production for the last 7 days. :  No incontinence, dysuria, or hematuria  Lymph:  No edema  Neuro:  No pain or paralysis but he has numbness of the right hand. Psychiatric:  No psychiatric illness  All other categories of review of systems are negative. PHYSICAL EXAMINATION:  Vital Signs: /77   Pulse 77   Temp 98.1 °F (36.7 °C) (Oral)   Resp 20   Ht 5' 4\" (1.626 m)   Wt 129 lb 4 oz (58.6 kg)   SpO2 96%   BMI 22.19 kg/m²    General:  The patient looks well and is here with Aminta Urias, his daughter-in-law, and Epifanio Mathias, her daughter. HEENT:  No jaundice or icterus. Pupils reactive to light. Cranial Nerves II-XII grossly intact. Lymphatics:  No palpable adenopathy in the neck, supraclavicular, infraclavicular, axillary, or inguinal regions. Heart:  Regular rate and rhythm without murmur, rub, or gallop. Lungs:  Clear to auscultation. He has a left anterior chest wall pacemaker/defibrillator.   Abdomen:  Non-distended non-tender abdomen with positive bowel sounds, no palpable assess the cause of his constipation. PLAN: 1. Simulation to whole brain with an immobilization mask today. 2.  Simulation to T5-T7 and adjacent gross tumor today. 3.  Radiation department staff to expedite the initial assessment and monitoring of the patient's pacemaker/defibrillator prior to starting radiation. 4.  Discontinued prednisone. Start dexamethasone 4 mg p.o. every 8 hours for 14 days, at the end of which written tapering schedule need to be given to the patient along with prescription for dexamethasone for tapering purposes. 5.  Order CT of the abdomen and pelvis to assess the patient's constipation. 6.  Convert the patient's short acting opioid into long-acting one with breakthrough pain medication. 8- Addendum: I have reviewed the patient's simulation CT images and contoured T6 and adjacent gross tumors including those in the right paratracheal region, right hilum, and right lung. I recommend treating all of these as part of these tumor volumes would be in the radiation treatment field ordinarily covering T5-T7, and the portions outside of radiation treatment field could grow and cause future problems such as SVC syndrome for which radiation matching would be nearly impossible. In discussing with dosimetry, oblique opposed field would lead to 30% inhomogeneity, and adding a third field for more 3D conformality would lead to significant the more dose to the pacemaker/defibrillator. Therefore, I recommend proceeding with whole brain radiation, pending the monitoring by the  for his pacemaker while attempting to generate an IMRT plan for the T6 and adjacent intrathoracic tumors and obtaining preauthorization for the IMRT although this is a palliative case. Migel Alvarado MD, [facility] Radiation Oncology  [phone] (cell)    I spent 60 minutes with the patient.  >50% of the time was allotted to education, answering questions, and coordinating care.     CC:  Patient Care Team:  Oscar Nguyen MD as Consulting Physician (Hematology and Oncology)  Jelani Brown MD as Consulting Physician (Radiation Oncology)  Pardeep Munguia RN as Nurse Navigator (Oncology)

## 2020-08-13 NOTE — DISCHARGE INSTR - COC
Continuity of Care Form    Patient Name: Whitney Rojas   :  1950  MRN:  [id number]    Admit date:  (Not on file)  Discharge date:  ***    Code Status Order: Prior   Advance Directives:     Admitting Physician:  No admitting provider for patient encounter. PCP: No primary care provider on file. Discharging Nurse: [facility] Unit/Room#: No information available for this encounter. Discharging Unit Phone Number: ***    Emergency Contact:   Extended Emergency Contact Information  Primary Emergency Contact: Consuelo Muñoz   [facility] [address] [facility] Phone: [phone]  Mobile Phone: [phone]  Relation: Child  Secondary Emergency Contact: [facility] [address] Phone: [phone]  Mobile Phone: [phone]  Relation: Child    Past Surgical History:  Past Surgical History:   Procedure Laterality Date    CORONARY ANGIOPLASTY WITH STENT PLACEMENT      BMS to LAD    CT BIOPSY PERCUTANEOUS DEEP BONE  2020    CT BIOPSY PERCUTANEOUS DEEP BONE 2020 [facility] CT SCAN    PACEMAKER PLACEMENT  2019    AICD/ [employer]  MODEL #[id number] CRTD AMPLIA MRI USDF4. LEADS [id number], U140048 AND 8558-98        Immunization History: There is no immunization history on file for this patient.     Active Problems:  Patient Active Problem List   Diagnosis Code    NSTEMI (non-ST elevated myocardial infarction) ([facility] [address] I21.4    HTN (hypertension) I10    CAD (coronary artery disease) I25.10    Ischemic cardiomyopathy I25.5    S/P implantation of automatic cardioverter/defibrillator (AICD) Z95.810    Lung mass R91.8    Enlarged prostate N40.0    CKD (chronic kidney disease) N18.9    CVA (cerebral vascular accident) ([address] I63.9    Brain metastasis ([facility] [address] C79.31    Vasogenic edema (HCC) G93.6    Right hemiparesis ([address] G81.91       Isolation/Infection:   Isolation          No Isolation        Patient Infection Status     Infection Onset Added Last Indicated Last Indicated By Review Planned Expiration Resolved Resolved By    None active    Resolved    COVID-19 Rule Out 08/10/20 08/10/20 08/10/20 COVID-19 (Ordered)   08/10/20 Rule-Out Test Resulted          Nurse Assessment:  Last Vital Signs: There were no vitals taken for this visit. Last documented pain score (0-10 scale):    Last Weight:   Wt Readings from Last 1 Encounters:   08/13/20 129 lb 4 oz (58.6 kg)     Mental Status:  {IP PT MENTAL STATUS:89980}    IV Access:  { LILIANA IV ACCESS:517496367}    Nursing Mobility/ADLs:  Walking   {CHP DME EHPY:623741516}  Transfer  {CHP DME HDKN:911976952}  Bathing  {CHP DME HNYX:099365536}  Dressing  {CHP DME CGFD:422201113}  Toileting  {CHP DME KCAC:777602264}  Feeding  {CHP DME QZZO:339285874}  Med Admin  {P DME HQXS:293968524}  Med Delivery   { LILIANA MED Delivery:420933364}    Wound Care Documentation and Therapy:        Elimination:  Continence:   · Bowel: {YES / BT:02622}  · Bladder: {YES / KH:57806}  Urinary Catheter: {Urinary Catheter:814899825}   Colostomy/Ileostomy/Ileal Conduit: {YES / KE:17823}       Date of Last BM: ***  No intake or output data in the 24 hours ending 08/13/20 1548  No intake/output data recorded.     Safety Concerns:     [facility] Safety Concerns:643332576}    Impairments/Disabilities:      [facility] Impairments/Disabilities:186138359}    Nutrition Therapy:  Current Nutrition Therapy:   [facility] Diet List:086292584}    Routes of Feeding: {P DME Other Feedings:047049430}  Liquids: {Slp liquid thickness:82875}  Daily Fluid Restriction: {CHP DME Yes amt example:501676113}  Last Modified Barium Swallow with Video (Video Swallowing Test): {Done Not Done PALM:227235139}    Treatments at the Time of Hospital Discharge:   Respiratory Treatments: ***  Oxygen Therapy:  {Therapy; copd oxygen:09701}  Ventilator:    { CC Vent WSIM:083914724}    Rehab Therapies: {THERAPEUTIC INTERVENTION:8015012951}  Weight Bearing Status/Restrictions: { CC Weight Bearin}  Other Medical Equipment (for information only, NOT a DME order):  {EQUIPMENT:001298951}  Other Treatments: ***    Patient's personal belongings (please select all that are sent with patient):  {CHP DME Belongings:215926339}    RN SIGNATURE:  {Esignature:441620758}    CASE MANAGEMENT/SOCIAL WORK SECTION    Inpatient Status Date: ***    Readmission Risk Assessment Score:  Readmission Risk              Risk of Unplanned Readmission:        0           Discharging to Facility/ Agency   · Name:   · Address:  · Phone:  · Fax:    Dialysis Facility (if applicable)   · Name:  · Address:  · Dialysis Schedule:  · Phone:  · Fax:    / signature: {Esignature:926451142}    PHYSICIAN SECTION    Prognosis: {Prognosis:5052759308}    Condition at Discharge: [address] [place] [address] Patient Condition:963574305}    Rehab Potential (if transferring to Rehab): {Prognosis:0338463140}    Recommended Labs or Other Treatments After Discharge: ***    Physician Certification: I certify the above information and transfer of Alessandra Frazier  is necessary for the continuing treatment of the diagnosis listed and that he requires {Admit to Appropriate Level of Care:37172} for {GREATER/LESS:322897947} 30 days.      Update Admission H&P: {CHP DME Changes in GQRIA:357775552}    PHYSICIAN SIGNATURE:  {Esignature:260495878}

## 2020-08-13 NOTE — TELEPHONE ENCOUNTER
Name: Sania Kuo  : 1950  MRN: [id number]    Oncology Navigation Follow-Up Note  Navigator received call from Pts. Daughter and confirming pts. MO appt. For 1712:30 SA with Ward Pena. Navigator introducing self and plan to follow. Pt. Requesting \"something to help him relax, like they gave him in the hospital\"? Navigator redirecting daughter to call Triage with medication assistance and number given .   Electronically signed by Pratibha Vargas RN on 2020 at 1:18 PM

## 2020-08-14 NOTE — TELEPHONE ENCOUNTER
Patient's daughter-in-law called asking about prescription for pain for her father in law. IT was called due to issue with printing the prescription, unable to escribe and no pad to write rx. Spoke to Nurse Navigator Lester Huang if Dr. Elizabeth Daley, who patient will see Monday will order the meds? He agreed to send meds to [facility]. Daughter in law updated. Received fax back from cardiology and informed Tomy in [place] to let me know when plan is ready so I can get [employer] on board.

## 2020-08-14 NOTE — DISCHARGE SUMMARY
[facility]     Department of Internal Medicine - Staff Internal Medicine Teaching Service    INPATIENT DISCHARGE SUMMARY      Patient Identification:  Vasiliy Yeager is a 69 y.o. male. :  1950  MRN: [id number]     Acct: [de-identified]   PCP: No primary care provider on file. Admit Date:  2020  Discharge date and time: 2020  6:43 PM   Attending Provider: No att. providers found                                     [address] Problem Lists:  Active Problems:    HTN (hypertension)    CAD (coronary artery disease)    Lung mass    Enlarged prostate    CKD (chronic kidney disease)    CVA (cerebral vascular accident) ([facility] [address]    Brain metastasis ([facility] [address]    Vasogenic edema (HCC)    Right hemiparesis ([facility] [address]  Resolved Problems:    * No resolved hospital problems. [facility] STAY     Brief Inpatient course:   Vasiliy Yeager is a 69 y. o. male presents with known history of CAD, hypertension, NSTEMI, s/p AICD now presented with chief complaint of back pain and pain abdomen. Patient states that last week he had a sudden pop in the back and excruciating back pain after lifting heavy object.  Patient states that he went to a chiropractor and had a back manipulation but it did not help his symptoms.  Patient took some pain medication without any relief. Patient also complains of constipation and difficulty urination during the same period.  Patient complains of weakness in the right upper and lower extremity during this past week. Daughter-in-law reported that patient was losing his balance lately.  No history of any headache, urinary incontinence, nausea vomiting. She did mention about patient having loss of appetite recently but denies any change in weight.     Procedures/ Significant Interventions:    EEG 2020      Consults:     Consults:     Final Specialist Recommendations/Findings:   IP CONSULT TO INTERNAL MEDICINE  IP CONSULT TO NEUROLOGY  IP CONSULT TO CARDIOLOGY  IP CONSULT TO VASCULAR SURGERY  IP CONSULT TO CASE MANAGEMENT  IP CONSULT TO HEM/ONC  IP CONSULT TO PULMONOLOGY      Any Hospital Acquired Infections: none    Discharge Functional Status:  stable    DISCHARGE PLAN     Disposition: home    Patient Instructions:   Discharge Medication List as of 8/12/2020  4:16 PM      START taking these medications    Details   pantoprazole (PROTONIX) 40 MG tablet Take 1 tablet by mouth every morning (before breakfast), Disp-30 tablet,R-3Normal      !! predniSONE (DELTASONE) 20 MG tablet Take 3 tablets by mouth daily for 5 days, Disp-15 tablet,R-0Normal      !! predniSONE (DELTASONE) 10 MG tablet Take 4 tablets by mouth daily for 5 days, Disp-20 tablet,R-0Normal      !! predniSONE (DELTASONE) 10 MG tablet Take 2 tablets by mouth daily for 5 days, Disp-10 tablet,R-0Normal      !! predniSONE (DELTASONE) 5 MG tablet Take 2 tablets by mouth daily for 5 days, Disp-10 tablet,R-0Normal      oxyCODONE (ROXICODONE) 5 MG immediate release tablet Take 1 tablet by mouth every 6 hours as needed for Pain for up to 3 days. Intended supply: 3 days. Take lowest dose possible to manage pain, Disp-12 tablet,R-0Print      collagenase (SANTYL) 250 UNIT/GM ointment Apply topically daily. , Disp-1 Tube,R-1, Print       !! - Potential duplicate medications found. Please discuss with provider. CONTINUE these medications which have NOT CHANGED    Details   carvedilol (COREG) 12.5 MG tablet Take 12.5 mg by mouth 2 times daily (with meals)Historical Med      docusate sodium (COLACE) 100 MG capsule Take 1 capsule by mouth 2 times daily, Disp-15 capsule,R-0Print      cyclobenzaprine (FLEXERIL) 10 MG tablet Take 1 tablet by mouth nightly as needed for Muscle spasms, Disp-10 tablet,R-0Print      aspirin 81 MG chewable tablet Take 1 tablet by mouth daily, Disp-30 tablet, R-3Normal      nitroGLYCERIN (NITROSTAT) 0.4 MG SL tablet up to max of 3 total doses.  If no relief after 1 dose, call 911., Disp-25 tablet, R-3Normal      atorvastatin (LIPITOR) 80 MG tablet Take 1 tablet by mouth nightly, Disp-30 tablet, R-3Normal      lisinopril (PRINIVIL;ZESTRIL) 5 MG tablet Take 1 tablet by mouth daily, Disp-30 tablet, R-3Normal      metoprolol succinate (TOPROL XL) 25 MG extended release tablet Take 1 tablet by mouth nightly, Disp-30 tablet, R-3Normal      furosemide (LASIX) 20 MG tablet Take 1 tablet by mouth daily, Disp-60 tablet, R-3Normal             Activity: activity as tolerated    Diet: cardiac diet and renal diet    Follow-up:    Carey Krueger MD  [facility]  [phone]    Call in 2 days  hospital f/u    Yolande Ramirez MD  [place] [address]  [facility] [phone]    Call in 1 week  hospital f/u    [address]  [phone]    Call in 1 week  hospital f/u      Patient Instructions: Take medications as prescribed. Follow up with Oncology , Follow up with Neurology, Follow up with PCP. Note that over 30 minutes was spent in preparing discharge papers, discussing discharge with patient, medication review, etc.      Bishop Kekio MD,  Internal Medicine Resident, PGY-1  [facility];  [place], [address] [place] [address] Drive  8/14/2020, 3:32 AM

## 2020-08-17 PROBLEM — C34.91 ADENOCARCINOMA OF RIGHT LUNG (HCC): Status: ACTIVE | Noted: 2020-01-01

## 2020-08-17 NOTE — TELEPHONE ENCOUNTER
PT VERY ANXIOUS, DENNIS STATES , WHEN WRITER CALLED TO DISCUSS PICKING UP FOLIC ACID AND DEXAMETHASONE SCRIPTS WHEN APPROVED BY DR Diana Wong. PT ALSO HAS STEROIDS R/T RAD-ONC THERAPY, SO SCRIPTS PENDED TO MD.  AT [address] [facility] FORGOT TO LET MD KNOW AT TODAY'S MD VISIT THAT THE PT HAS BEEN VERY ANXIOUS WITH NEW DIAGNOSIS AND CAUSING DIFFICULTY SLEEPING. WONDERING IF MD COULD PRESCRIBE SOMETHING. NOTE SENT TO DR Diana Wong.

## 2020-08-17 NOTE — PROGRESS NOTES
status: None    Intimate partner violence     Fear of current or ex partner: None     Emotionally abused: None     Physically abused: None     Forced sexual activity: None   Other Topics Concern    None   Social History Narrative    None     Family History   Problem Relation Age of Onset    Cancer Mother     Cancer Father     Prostate Cancer Father        Current Medications:     Current Outpatient Medications   Medication Sig Dispense Refill    dexamethasone (DECADRON) 4 MG tablet Take 4 mg by mouth 3 times daily Patient unsure of mg.  oxyCODONE (ROXICODONE) 5 MG immediate release tablet Take 1 tablet by mouth every 6 hours as needed for Pain for up to 7 days. 28 tablet 0    pantoprazole (PROTONIX) 40 MG tablet Take 1 tablet by mouth every morning (before breakfast) 30 tablet 3    carvedilol (COREG) 12.5 MG tablet Take 12.5 mg by mouth 2 times daily (with meals)      aspirin 81 MG chewable tablet Take 1 tablet by mouth daily 30 tablet 3    morphine (MS CONTIN) 15 MG extended release tablet Take 1 tablet by mouth 2 times daily for 7 days. (Patient not taking: Reported on 8/17/2020) 14 tablet 0    collagenase (SANTYL) 250 UNIT/GM ointment Apply topically daily. (Patient not taking: Reported on 8/17/2020) 1 Tube 1    docusate sodium (COLACE) 100 MG capsule Take 1 capsule by mouth 2 times daily (Patient not taking: Reported on 8/17/2020) 15 capsule 0    nitroGLYCERIN (NITROSTAT) 0.4 MG SL tablet up to max of 3 total doses. If no relief after 1 dose, call 911.  (Patient not taking: Reported on 8/17/2020) 25 tablet 3    atorvastatin (LIPITOR) 80 MG tablet Take 1 tablet by mouth nightly (Patient not taking: Reported on 8/17/2020) 30 tablet 3    lisinopril (PRINIVIL;ZESTRIL) 5 MG tablet Take 1 tablet by mouth daily (Patient not taking: Reported on 8/17/2020) 30 tablet 3    metoprolol succinate (TOPROL XL) 25 MG extended release tablet Take 1 tablet by mouth nightly (Patient not taking: Reported on 8/17/2020) 30 tablet 3    furosemide (LASIX) 20 MG tablet Take 1 tablet by mouth daily (Patient not taking: Reported on 8/17/2020) 60 tablet 3     No current facility-administered medications for this visit. Allergies:   Penicillins    Review of Systems:    Constitutional: No fever or chills. No night sweats, positive weight loss   Eyes: No eye discharge, double vision, or eye pain   HEENT: negative for sore mouth, sore throat, hoarseness and voice change   Respiratory: negative for cough , sputum, dyspnea, wheezing, hemoptysis, chest pain   Cardiovascular: negative for chest pain, dyspnea, palpitations, orthopnea, PND   Gastrointestinal: negative for nausea, vomiting, diarrhea, constipation, abdominal pain, Dysphagia, hematemesis and hematochezia   Genitourinary: negative for frequency, dysuria, nocturia, urinary incontinence, and hematuria   Integument: negative for rash, skin lesions, bruises. Hematologic/Lymphatic: negative for easy bruising, bleeding, lymphadenopathy, or petechiae   Endocrine: negative for heat or cold intolerance,weight changes, change in bowel habits and hair loss   Musculoskeletal: negative for myalgias, arthralgias, pain, joint swelling,and bone pain   Neurological: negative for headaches, dizziness, seizures,  numbness.   Positive right-sided weakness        Physical Exam:    Vitals: BP (!) 140/69   Pulse 60   Temp 97.5 °F (36.4 °C) (Oral)   Wt 131 lb 1.6 oz (59.5 kg)   BMI 22.50 kg/m²   General appearance - well appearing, no in pain or distress  Mental status - AAO X3  Eyes - pupils equal and reactive, extraocular eye movements intact  Mouth - mucous membranes moist, pharynx normal without lesions  Neck - supple, no significant adenopathy  Lymphatics - no palpable lymphadenopathy, no hepatosplenomegaly  Chest - clear to auscultation, no wheezes, rales or rhonchi, symmetric air entry  Heart - normal rate, regular rhythm, normal S1, S2, no murmurs  Abdomen - soft, nontender, nondistended, no masses or organomegaly  Neurological - alert, oriented, normal speech.   Right upper extremity weakness  Extremities - peripheral pulses normal, no pedal edema, no clubbing or cyanosis  Skin - normal coloration and turgor, no rashes, no suspicious skin lesions noted       DATA:    Results for orders placed or performed during the hospital encounter of 08/09/20   CBC Auto Differential   Result Value Ref Range    WBC 8.1 3.5 - 11.3 k/uL    RBC 4.80 4.21 - 5.77 m/uL    Hemoglobin 13.8 13.0 - 17.0 g/dL    Hematocrit 43.7 40.7 - 50.3 %    MCV 91.0 82.6 - 102.9 fL    MCH 28.8 25.2 - 33.5 pg    MCHC 31.6 28.4 - 34.8 g/dL    RDW 14.2 11.8 - 14.4 %    Platelets 611 032 - 327 k/uL    MPV 10.0 8.1 - 13.5 fL    NRBC Automated 0.0 0.0 per 100 WBC    Differential Type NOT REPORTED     Seg Neutrophils 72 (H) 36 - 65 %    Lymphocytes 12 (L) 24 - 43 %    Monocytes 7 3 - 12 %    Eosinophils % 8 (H) 1 - 4 %    Basophils 1 0 - 2 %    Immature Granulocytes 0 0 %    Segs Absolute 5.79 1.50 - 8.10 k/uL    Absolute Lymph # 0.96 (L) 1.10 - 3.70 k/uL    Absolute Mono # 0.59 0.10 - 1.20 k/uL    Absolute Eos # 0.65 (H) 0.00 - 0.44 k/uL    Basophils Absolute 0.05 0.00 - 0.20 k/uL    Absolute Immature Granulocyte 0.03 0.00 - 0.30 k/uL    WBC Morphology NOT REPORTED     RBC Morphology NOT REPORTED     Platelet Estimate NOT REPORTED    Comprehensive Metabolic Panel w/ Reflex to MG   Result Value Ref Range    Glucose 114 (H) 70 - 99 mg/dL    BUN 21 8 - 23 mg/dL    CREATININE 1.43 (H) 0.70 - 1.20 mg/dL    Bun/Cre Ratio NOT REPORTED 9 - 20    Calcium 9.7 8.6 - 10.4 mg/dL    Sodium 136 135 - 144 mmol/L    Potassium 5.1 3.7 - 5.3 mmol/L    Chloride 99 98 - 107 mmol/L    CO2 25 20 - 31 mmol/L    Anion Gap 12 9 - 17 mmol/L    Alkaline Phosphatase 84 40 - 129 U/L    ALT 8 5 - 41 U/L    AST 13 <40 U/L    Total Bilirubin 0.38 0.3 - 1.2 mg/dL    Total Protein 7.0 6.4 - 8.3 g/dL    Alb 3.7 3.5 - 5.2 g/dL    Albumin/Globulin Ratio 1.1 1.0 - 2.5 WBC 11.4 (H) 3.5 - 11.3 k/uL    RBC 4.81 4.21 - 5.77 m/uL    Hemoglobin 13.8 13.0 - 17.0 g/dL    Hematocrit 44.7 40.7 - 50.3 %    MCV 92.9 82.6 - 102.9 fL    MCH 28.7 25.2 - 33.5 pg    MCHC 30.9 28.4 - 34.8 g/dL    RDW 14.2 11.8 - 14.4 %    Platelets 830 739 - 873 k/uL    MPV 9.8 8.1 - 13.5 fL    NRBC Automated 0.0 0.0 per 100 WBC    Differential Type NOT REPORTED     WBC Morphology NOT REPORTED     RBC Morphology NOT REPORTED     Platelet Estimate NOT REPORTED     Seg Neutrophils 92 (H) 36 - 65 %    Lymphocytes 6 (L) 24 - 43 %    Monocytes 2 (L) 3 - 12 %    Eosinophils % 0 (L) 1 - 4 %    Basophils 0 0 - 2 %    Immature Granulocytes 0 0 %    Segs Absolute 10.47 (H) 1.50 - 8.10 k/uL    Absolute Lymph # 0.70 (L) 1.10 - 3.70 k/uL    Absolute Mono # 0.19 0.10 - 1.20 k/uL    Absolute Eos # <0.03 0.00 - 0.44 k/uL    Basophils Absolute <0.03 0.00 - 0.20 k/uL    Absolute Immature Granulocyte 0.05 0.00 - 0.30 k/uL   COVID-19    Specimen: Other   Result Value Ref Range    SARS-CoV-2 Not Detected Not Detected    SARS-CoV-2, Rapid          Source . NASOPHARYNGEAL SWAB     SARS-CoV-2, PCR         CBC WITH AUTO DIFFERENTIAL   Result Value Ref Range    WBC 15.4 (H) 3.5 - 11.3 k/uL    RBC 4.68 4.21 - 5.77 m/uL    Hemoglobin 13.3 13.0 - 17.0 g/dL    Hematocrit 45.6 40.7 - 50.3 %    MCV 97.4 82.6 - 102.9 fL    MCH 28.4 25.2 - 33.5 pg    MCHC 29.2 28.4 - 34.8 g/dL    RDW 14.6 (H) 11.8 - 14.4 %    Platelets 799 184 - 316 k/uL    MPV 10.0 8.1 - 13.5 fL    NRBC Automated 0.0 0.0 per 100 WBC    Differential Type NOT REPORTED     WBC Morphology NOT REPORTED     RBC Morphology ANISOCYTOSIS PRESENT     Platelet Estimate NOT REPORTED     Seg Neutrophils 90 (H) 36 - 65 %    Lymphocytes 6 (L) 24 - 43 %    Monocytes 3 3 - 12 %    Eosinophils % 0 (L) 1 - 4 %    Basophils 0 0 - 2 %    Immature Granulocytes 1 (H) 0 %    Segs Absolute 13.88 (H) 1.50 - 8.10 k/uL    Absolute Lymph # 0.92 (L) 1.10 - 3.70 k/uL    Absolute Mono # 0.50 0.10 - 1.20 k/uL Absolute Eos # <0.03 0.00 - 0.44 k/uL    Basophils Absolute <0.03 0.00 - 0.20 k/uL    Absolute Immature Granulocyte 0.12 0.00 - 0.30 k/uL   APTT   Result Value Ref Range    PTT 28.3 20.5 - 30.5 sec   Protime-INR   Result Value Ref Range    Protime 10.9 9.0 - 12.0 sec    INR 1.0    Basic Metabolic Panel w/ Reflex to MG   Result Value Ref Range    Glucose 113 (H) 70 - 99 mg/dL    BUN 31 (H) 8 - 23 mg/dL    CREATININE 1.28 (H) 0.70 - 1.20 mg/dL    Bun/Cre Ratio NOT REPORTED 9 - 20    Calcium 9.9 8.6 - 10.4 mg/dL    Sodium 137 135 - 144 mmol/L    Potassium 4.9 3.7 - 5.3 mmol/L    Chloride 104 98 - 107 mmol/L    CO2 21 20 - 31 mmol/L    Anion Gap 12 9 - 17 mmol/L    GFR Non-African American 55 (L) >60 mL/min    GFR African American >60 >60 mL/min    GFR Comment          GFR Staging NOT REPORTED    Surgical Pathology   Result Value Ref Range    Surgical Pathology Report       -- Diagnosis --  BONE, BIOPSY (LEFT ILIAC):       - METASTATIC POORLY DIFFERENTIATED NON-SMALL CELL CARCINOMA. -- Diagnosis Comment --  THE HISTOMORPHOLOGY AND IMMUNOPHENOTYPE ARE MOST CONSISTENT WITH  METASTASIS OF LUNG PRIMARY ADENOCARCINOMA. Tyrone Mathur M.D.  **Electronically Signed Out**         [facility]/8/13/2020       Clinical Information  Pre-op Diagnosis:  LUNG CA WITH METS   Operative Findings:  BONE BIOPSY     Source of Specimen  1: BONE BIOPSY    Gross Description  [facility]\" Eight tan-white cores range in length  from 0.4 to 1.3 cm, 0.1 cm in diameter. The cores are split equally  with four cores in each cassettes A and B and submitted after  decalcification. 2 cs. mpb/jms         Intraoperative Diagnosis  1 Episode intraprocedural in CT, 5 passes. Impression:  Adequate.  [facility])    Microscopic Description  Microscopic examination performed disclosing metastatic poorly  differentiated carcinoma.   Further characterization with  control-reactive immuno stains: CK 7 positive; CK 20 negative; TTF-1  positive; P 40 negative ; NKX3.1 negative; GATA3 weak to moderate  positive in about 60% of cells; and mucicarmine negative. The  histomorphology and special stain phenotype are consistent with lung  primary. Representative slides are reviewed by a second Pathologist  who concurs with the diagnosis (KRISTIAN). SURGICAL PATHOLOGY CONSULTATION       Patient Name: Merritt Pena: [id number]  Path Number: [id number]    [address] [place] [address], [place] [address].   [place], [address]  [phone]  Fax: [phone]     CBC WITH AUTO DIFFERENTIAL   Result Value Ref Range    WBC 14.2 (H) 3.5 - 11.3 k/uL    RBC 4.71 4.21 - 5.77 m/uL    Hemoglobin 13.6 13.0 - 17.0 g/dL    Hematocrit 44.0 40.7 - 50.3 %    MCV 93.4 82.6 - 102.9 fL    MCH 28.9 25.2 - 33.5 pg    MCHC 30.9 28.4 - 34.8 g/dL    RDW 14.3 11.8 - 14.4 %    Platelets 144 306 - 606 k/uL    MPV 10.3 8.1 - 13.5 fL    NRBC Automated 0.0 0.0 per 100 WBC    Differential Type NOT REPORTED     Seg Neutrophils 92 (H) 36 - 65 %    Lymphocytes 5 (L) 24 - 43 %    Monocytes 2 (L) 3 - 12 %    Eosinophils % 0 (L) 1 - 4 %    Basophils 0 0 - 2 %    Immature Granulocytes 1 (H) 0 %    Segs Absolute 13.05 (H) 1.50 - 8.10 k/uL    Absolute Lymph # 0.74 (L) 1.10 - 3.70 k/uL    Absolute Mono # 0.33 0.10 - 1.20 k/uL    Absolute Eos # <0.03 0.00 - 0.44 k/uL    Basophils Absolute <0.03 0.00 - 0.20 k/uL    Absolute Immature Granulocyte 0.10 0.00 - 0.30 k/uL    WBC Morphology NOT REPORTED     RBC Morphology NOT REPORTED     Platelet Estimate NOT REPORTED    Basic Metabolic Panel w/ Reflex to MG   Result Value Ref Range    Glucose 124 (H) 70 - 99 mg/dL    BUN 31 (H) 8 - 23 mg/dL    CREATININE 1.18 0.70 - 1.20 mg/dL    Bun/Cre Ratio NOT REPORTED 9 - 20    Calcium 9.6 8.6 - 10.4 mg/dL    Sodium 137 135 - 144 mmol/L    Potassium 5.7 (H) 3.7 - 5.3 mmol/L    Chloride 102 98 - 107 mmol/L    CO2 23 20 - 31 mmol/L    Anion Gap 12 9 - 17 mmol/L    GFR Non-African American >60 >60 mL/min    GFR African American >60 >60 mL/min    GFR Comment          GFR Staging NOT REPORTED    POTASSIUM   Result Value Ref Range    Potassium 4.7 3.7 - 5.3 mmol/L   EKG 12 Lead   Result Value Ref Range    Ventricular Rate 86 BPM    Atrial Rate 86 BPM    P-R Interval 136 ms    QRS Duration 134 ms    Q-T Interval 378 ms    QTc Calculation (Bazett) 452 ms    P Axis 40 degrees    R Axis -92 degrees    T Axis 100 degrees     Ct Abdomen Pelvis Wo Contrast Additional Contrast? None    Result Date: 8/14/2020  EXAMINATION: CT OF THE ABDOMEN AND PELVIS WITHOUT CONTRAST 8/14/2020 3:09 pm TECHNIQUE: CT of the abdomen and pelvis was performed without the administration of intravenous contrast. Multiplanar reformatted images are provided for review. Dose modulation, iterative reconstruction, and/or weight based adjustment of the mA/kV was utilized to reduce the radiation dose to as low as reasonably achievable. COMPARISON: August 9, 2020 HISTORY: ORDERING SYSTEM PROVIDED HISTORY: Secondary malignant neoplasm of bone and bone marrow ([facility] [address] TECHNOLOGIST PROVIDED HISTORY: Patient has lung cancer with brain and bone mets, CT eval for cause of constipation Reason for Exam: Pt has Lung CA metastatic to mid-thoracic spine and brain. Currently has constipation x1 week no BM, eval for constipation cause. 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 Trace right pleural effusion. Organs: Evaluation of the solid organs is limited without intravenous contrast. No liver lesion. No gallstones. No pancreatic lesion. No splenomegaly. No adrenal lesion. No hydronephrosis. No renal calculus. GI/Bowel: No bowel obstruction. No acute inflammatory process. Sigmoid diverticulosis without acute diverticulitis. Pelvis: No free fluid. No bladder calculus.   Prostate gland is enlarged. Peritoneum/Retroperitoneum: Atherosclerotic calcification of the abdominal aorta without aneurysmal dilatation. No adenopathy. Bones/Soft Tissues: No acute soft tissue abnormality. Osseous metastatic disease. Destructive lesions involving the iliac bones. No acute intra-abdominal findings. Osseous metastatic disease. Enlarged prostate gland. Ct Head Wo Contrast    Result Date: 8/9/2020  EXAMINATION: CT OF THE HEAD WITHOUT CONTRAST 8/9/2020 11:47 am TECHNIQUE: CT of the head was performed without the administration of intravenous contrast. Dose modulation, iterative reconstruction, and/or weight based adjustment of the mA/kV was utilized to reduce the radiation dose to as low as reasonably achievable. COMPARISON: None. HISTORY: ORDERING SYSTEM PROVIDED HISTORY: New latonya, one week old TECHNOLOGIST PROVIDED HISTORY: New deficiet, one week old FINDINGS: Large area of hypodensity within the left MCA distribution. Small area of hypodensity within the right posterior parietal lobe. No mass effect. No midline shift. Mild prominence of the ventricles and sulci is consistent with atrophy. No acute soft tissue abnormality. No acute orbital abnormality. No acute osseous abnormality. Opacity throughout the right maxillary sinus with bony overgrowth suggesting chronic paranasal sinus disease. Large subacute left MCA distribution infarct is suspected. Small right posterior parietal subacute infarct is suspected. Follow-up with MRI may be helpful.      Mri Cervical Spine W Wo Contrast    Result Date: 8/10/2020  EXAMINATION: MRI OF THE CERVICAL SPINE WITHOUT AND WITH CONTRAST  8/10/2020 1:07 pm TECHNIQUE: Multiplanar multisequence MRI of the cervical spine was performed without and with the administration of intravenous contrast. COMPARISON: None HISTORY: ORDERING SYSTEM PROVIDED HISTORY: spinal mets, possible compression TECHNOLOGIST PROVIDED HISTORY: spinal mets, possible compression Reason for Exam: spinal mets, possible compression. FINDINGS: BONES/ALIGNMENT: There is normal alignment of the spine. The vertebral body heights are maintained. The bone marrow signal appears unremarkable. SPINAL CORD: No abnormal cord signal is seen. SOFT TISSUES: No abnormal enhancement of the cervical spine. No paraspinal mass identified. C2-C3: There is no significant disc protrusion, spinal canal stenosis or neural foraminal narrowing. C3-C4: Disc osteophyte complex, uncovertebral and facet hypertrophy cause mild thecal sac, severe right foraminal and mild left foraminal stenosis. C4-C5: There is no significant disc protrusion, spinal canal stenosis or neural foraminal narrowing. C5-C6: Disc osteophyte complex, uncovertebral and facet hypertrophy cause mild thecal sac, severe right foraminal, and moderate left foraminal stenosis. C6-C7: Disc osteophyte complex, uncovertebral and facet hypertrophy cause moderate right and severe left foraminal stenosis. C7-T1: There is no significant disc protrusion, spinal canal stenosis or neural foraminal narrowing. No cervical spine metastasis. No evidence of cervical myelopathy. Mri Thoracic Spine W Wo Contrast    Result Date: 8/10/2020  EXAMINATION: MRI OF THE THORACIC SPINE WITHOUT AND WITH CONTRAST  8/10/2020 1:07 pm TECHNIQUE: Multiplanar multisequence MRI of the thoracic spine was performed without and with the administration of intravenous contrast. COMPARISON: 08/09/2020 HISTORY: ORDERING SYSTEM PROVIDED HISTORY: spinal mets, suspected compression TECHNOLOGIST PROVIDED HISTORY: spinal mets, suspected compression Reason for Exam: spinal mets, possible compression FINDINGS: BONES/ALIGNMENT: There is a metastasis within the T6 vertebral body. There is mild pathologic compression fracture with 30% anterior height loss. There is no significant bone retropulsion.   There is no extraosseous tumor invasion or invasion into the spinal canal. Thoracic alignment is maintained. There is a left third rib metastasis in the proximal aspect of the rib near the costovertebral junction. SPINAL CORD: No abnormal cord signal is seen. SOFT TISSUES:  Right lung mass and mediastinal metastatic lymph nodes are better seen on prior chest CT. DEGENERATIVE CHANGES: No significant spinal canal stenosis or neural foraminal narrowing of the thoracic spine. T6 metastasis with pathologic compression fracture. No spinal canal invasion. Xr Chest Portable    Result Date: 8/4/2020  EXAMINATION: ONE XRAY VIEW OF THE CHEST 8/4/2020 2:54 pm COMPARISON: AP chest from 02/08/2019. HISTORY: ORDERING SYSTEM PROVIDED HISTORY: chest pain TECHNOLOGIST PROVIDED HISTORY: chest pain Reason for Exam: chest pain and constipation Acuity: Unknown Type of Exam: Unknown History of cardiomyopathy. FINDINGS: Left-sided AICD via subclavian approach, with unchanged biventricular and right atrial electrodes. Cardiac silhouette unchanged; new rounded right hilar mass, not seen previously. Some fullness right-sided mediastinum suggests possible adenopathy. Remainder upper mediastinum, both lungs and both costophrenic angles clear. Unchanged mild DJD spine. New right hilar mass. The findings were sent to the Radiology Results [id number] at 3:03 p.m. on 8/4/2020to be communicated to a licensed caregiver. RECOMMENDATION: CT scan of the chest recommended, with IV contrast if possible. Cta Chest Abdomen Pelvis W Contrast    Addendum Date: 8/9/2020    ADDENDUM: Right 7th rib, T6 and right iliac bone and several other possible bony metastatic lesions also identified. Result Date: 8/9/2020  EXAMINATION: CTA OF THE CHEST, ABDOMEN AND PELVIS WITH CONTRAST, 8/4/2020 3:09 pm TECHNIQUE: CTA of the chest, abdomen and pelvis was performed after the administration of intravenous contrast.  Multiplanar reformatted images are provided for review. MIP images are provided for review.  Dose modulation, iterative reconstruction, and/or weight based adjustment of the mA/kV was utilized to reduce the radiation dose to as low as reasonably achievable. COMPARISON: CT scan of the abdomen and pelvis from 02/10/2019. HISTORY: ORDERING SYSTEM PROVIDED HISTORY: back pain TECHNOLOGIST PROVIDED HISTORY: back pain Reason for Exam: Mid back pain, right arm numbness. Left abd pain, chest pressure Acuity: Acute Type of Exam: Initial FINDINGS: CTA CHEST: No acute abnormality thoracic aorta; slight calcific plaque aortic arch. Normal patent branch vessels to the head and upper extremities. No aneurysm or dissection. Mildly dilated left ventricle. Left-sided AICD via subclavian approach with electrode leads in the right heart and coronary sinus. Incidental findings: Lobular irregular ~ 4.6 x 4.4 cm RLL mass superior segment abutting the thickened medial pleura and upper major fissure with central low attenuation, and a small high density focus abutting its superolateral border measuring 1.7 x 1.1 cm (slice 52, series 4). 2.0 x 1.6 cm right upper lobe mass (best seen on cuts 45-52, series 4). Extensive right hilar adenopathy and enlarged subcarinal and right paratracheal nodes, largest measuring 2.1 (short axis). Small right pleural effusion. Emphysematous changes upper lung zones and slight posteroinferior dependent or atelectatic changes. No contralateral nodules. No sizable left pleural effusion. Mild kyphoscoliosis and DJD thoracic spine. CTA ABDOMEN: Moderate ASVD with some calcification abdominal aorta, but patent visceral vessels and no aneurysm or dissection. No adrenal mass. No focal hepatic mass. No splenomegaly or focal mass. Mild pancreatic ductal dilatation. Normal biliary tree. No calcified gallstone. Large amount of retained stool throughout large bowel with some diverticular disease but no diverticulitis or obvious mass. Symmetric renal perfusion bilaterally. Probable peripelvic cysts, especially on the left.   No suspicious focal abnormality identified. A few mildly enlarged retroperitoneal nodes but no bulky lymphadenopathy. Small peripancreatic node. Mild-moderate degenerative changes lumbar spine with DDD L4-S1. No destructive or blastic lesion. CTA PELVIS: Elongated moderately diseased iliac arteries, especially external iliac arteries without aneurysm or clear cut dissection. Enlarged prostate measuring 5.1 x 4.4 cm. Urinary bladder WNL. THORACIC/LUMBAR SPINE: Please see separate thoracic/lumbar spine CT report. No aortic dissection or aneurysm. Large right lower lobe mass with additional RLL and RUL nodules, significant right hilar/ipsilateral mediastinal adenopathy and small pleural effusion. Additional findings, as detailed in the body of the report above. RECOMMENDATIONS: Bronchoscopic directed or percutaneous biopsy. Cta Chest Abdomen Pelvis W Contrast    Result Date: 8/9/2020  EXAMINATION: CTA OF THE CHEST, ABDOMEN AND PELVIS WITH CONTRAST 8/9/2020 11:50 am TECHNIQUE: CTA of the chest, abdomen and pelvis was performed after the administration of intravenous contrast.  Multiplanar reformatted images are provided for review. MIP images are provided for review. Dose modulation, iterative reconstruction, and/or weight based adjustment of the mA/kV was utilized to reduce the radiation dose to as low as reasonably achievable. 3D reconstructed images were performed on a separate workstation and provided for review. COMPARISON: August 4, 2020 HISTORY: ORDERING SYSTEM PROVIDED HISTORY: Chest pain, heart disease, persistent back pain, constipation, urinary retention TECHNOLOGIST PROVIDED HISTORY: Chest pain, heart disease, persistent back pain, constipation, urinary retention FINDINGS: CT CHEST: Chest wall: Left chest wall pacemaker. No axillary adenopathy. Mediastinum: Mild cardiomegaly. Trace pericardial effusion. Coronary artery calcifications. Necrotic mediastinal adenopathy.   For example there is a right paratracheal lymph node measuring 2.8 cm. Right hilar mass invading into the mediastinum measures at least 2.6 cm. Lungs: Right hilar mass causes narrowing of the right main pulmonary artery. Right lower lobe mass measures 4.2 x 4.7 cm and extends to the underlying pleural surface. Anteriorly this extends to the fissure. Right upper lobe mass measures 18 mm series 4, image 52. Mild centrilobular emphysema. No left pleural effusion. Trace right pleural effusion. Bones: Osseous metastatic disease involving the right 7th rib. Osseous metastatic disease within a midthoracic vertebral body. CT ABDOMEN-PELVIS: Organs: No liver lesion. No gallstones. No pancreatic lesion. No splenomegaly. No adrenal lesion. No hydronephrosis. Subcentimeter left renal hypodensity is likely a cyst but incompletely characterized. GI/Bowel: No bowel obstruction. No acute inflammatory process. Sigmoid diverticulosis without acute diverticulitis. Pelvis: No free fluid. No bladder calculus. Prostate gland is enlarged and slightly heterogeneous. Peritoneum/Retroperitoneum: No adenopathy. Bones/Soft Tissues: No acute soft tissue abnormality. Osseous metastatic disease. Destructive iliac lesions. VASCULAR: No thoracic aortic aneurysm. No thoracic aortic dissection. Origin of the great vessels are patent. No abdominal aortic aneurysm. No acute abdominal aortic dissection. Questionable area of plaque versus small focal dissection inferior to the left renal artery. This area is unchanged. Celiac axis is patent. SMA is patent. Renal arteries are patent. Atherosclerotic calcification of the infrarenal abdominal aorta. No acute vascular findings. No acute findings within the chest, abdomen, or pelvis. Questionable small remote aortic dissection versus plaque just inferior to the left renal artery. No aortic aneurysm. Metastatic disease. Multiple pulmonary masses with invasion of the right hilum.   Necrotic mediastinal adenopathy. Multiple areas of osseous metastatic disease with destructive lytic lesions. Prostate gland is enlarged and heterogeneous. Correlate with PSA values. Ct Biopsy Deep Bone Percutaneous    Result Date: 8/12/2020  PROCEDURE: CT BX BONE NEEDLE DEEP MODERATE CONSCIOUS SEDATION 8/11/2020 HISTORY: ORDERING SYSTEM PROVIDED HISTORY: rt lower lobe mass with necrotic mediastinal adenopathy with diffuse mets brain and bone TECHNOLOGIST PROVIDED HISTORY: rt lower lobe mass with necrotic mediastinal adenopathy with diffuse mets brain and bone Reason for Exam: rt lower lobe mass with necrotic mediastinal adenopathy with diffuse mets brain and  bone TECHNIQUE: Dose modulation, iterative reconstruction, and/or weight based adjustment of the mA/kV was utilized to reduce the radiation dose to as low as reasonably achievable. CONTRAST: None SEDATION: 1 mgversed and 50 mcg fentanyl were titrated intravenously for moderate sedation monitored under my direction. Total intraservice time of sedation was 15 minutes. The patient's vital signs were monitored throughout the procedure and recorded in the patient's medical record by the nurse. DESCRIPTION OF PROCEDURE: Informed consent was obtained after a detailed explanation of the procedure including risks, benefits, and alternatives. Universal protocol was observed. Patient was placed supine on the table. Scanning through the left iliac anterior spine was performed. Lytic bone lesion was localized. Patient was prepped and draped sterile fashion. 1% lidocaine was infiltrated for local anesthesia. An 18 gauge coaxial needle was advanced under CT guidance into the lesion. 518 gauge core biopsies were obtained and submitted to pathologist at bedside. Adequate sample was confirmed. Needle was removed. A dressing was applied. Patient was returned to holding stable condition. FINDINGS: Lytic bony lesion involving anterior iliac spine.      Successful CT-guided core biopsy of left iliac bone. Mri Brain W Wo Contrast    Result Date: 8/10/2020  EXAMINATION: MRI OF THE BRAIN WITHOUT AND WITH CONTRAST  8/10/2020 1:07 pm TECHNIQUE: Multiplanar multisequence MRI of the head/brain was performed without and with the administration of intravenous contrast. COMPARISON: Unenhanced head CT dated 08/09/2020 HISTORY: ORDERING SYSTEM PROVIDED HISTORY: suspected brain mets TECHNOLOGIST PROVIDED HISTORY: suspected brain mets Reason for Exam: suspected brain mets. Lung mass FINDINGS: INTRACRANIAL STRUCTURES/VENTRICLES:  There is no acute infarct. No acute intracranial hemorrhage is seen. There are multiple ring-enhancing lesions in the supratentorial region bilaterally, consistent with metastatic disease. The largest these is in the high left anterior parietal region measuring 1.4 x 1.1 x 1.7 cm on series 2, image 21 and series 401, image 396. This is associated with massive associated vasogenic edema. There arm multiple other smaller lesions that are also shows some vasogenic edema including a 5 mm enhancing lesion in the left temporal lobe on series 2, image 10, 6 mm in lesion in the right frontal lobe on series 2, image 16 and a 9 mm lesion in the right parietal lobe on image 17. There is no evidence of midline shift. The cortical sulci associated with the left parietal mass are somewhat effaced. There is no evidence of hydrocephalus. ORBITS: The visualized portion of the orbits demonstrate no acute abnormality. SINUSES: The visualized mastoid air cells are well aerated. There is almost complete opacification of the right maxillary sinus, consistent with chronic sinusitis. Minimal mucosal thickening is seen in the ethmoid and frontal sinuses. BONES/SOFT TISSUES: The bone marrow signal intensity appears normal. The soft tissues demonstrate no acute abnormality.  The findings were sent to the Radiology Results [address] at 3:44 pm on 8/10/2020to be communicated to a licensed ability. Patient expressed understanding and was in agreement. Pryor Phalen    I spent more than 40 minutes examining, evaluating, reviewing data, counseling the patient and coordinating care. Greater than 50% of time was spent face-to-face with the patient this note is created with the assistance of a speech recognition program.  While intending to generate a document that actually reflects the content of the visit, the document can still have some errors including those of syntax and sound a like substitutions which may escape proof reading. It such instances, actual meaning can be extrapolated by contextual diversion.

## 2020-08-17 NOTE — TELEPHONE ENCOUNTER
Called [employer] and spoke with Rossy Chavez to have local rep paged for start of radiation for tomorrow 8-18-20. Availability 815a, 1115am, 145pm, 2, 215pm. Local rep called back and stated 815am would work. Called patient and his daughter in law could not do that time tomorrow due to her own appointment conflict, she stated any other time would work. Called [employer] back and 1115am selected. Called daughter in law back and that time works for them tomorrow. Informed Andrew Osman and left note for radiation technicians.

## 2020-08-18 NOTE — PROGRESS NOTES
Elicia Channel  8/18/2020  Wt Readings from Last 3 Encounters:   08/18/20 132 lb (59.9 kg)   08/17/20 131 lb 1.6 oz (59.5 kg)   08/13/20 129 lb 4 oz (58.6 kg)     Body mass index is 22.66 kg/m². Pt here for OTV. Patient states 8/10 pain but being managed with oxycodone, pt afraid to take Morphine due to vomiting from it in the hospital. Aidee Altamirano over skin care again. Patient will have chemo once every 3 weeks once treatment is done with radiation. Dr. Samantha Larson to eval.     Treatment Area:lung, T-spine, brain    Patient was seen today for weekly visit. Comfort Alteration  Fatigue: Mild      Nutritional Alteration  Anorexia: No, pt on steroids. Nausea: No   Vomiting: No       Elimination Alterations  Constipation: yes, getting better  Diarrhea:  no    Skin Alteration   Sensation:none    Radiation Dermatitis:  Intact [x]     Erythema  []     Discoloration  []     Rash []     Dry desquamation  []     Moist desquamation []       Emotional  Coping: effective      Injury, potential bleeding or infection: none    Lab Results   Component Value Date    WBC 14.2 (H) 08/12/2020     08/12/2020         BP (!) 163/96 Comment: asymptomatic. Pulse 76   Temp 97.1 °F (36.2 °C) (Oral)   Resp 16   Wt 132 lb (59.9 kg)   SpO2 99%   BMI 22.66 kg/m²   Patient Currently in Pain: Yes  Pain Assessment: 0-10  Pain Level: 8         Assessment/Plan: Patient was seen today for weekly visit.       Oleg Nuñez

## 2020-08-18 NOTE — TELEPHONE ENCOUNTER
Name: Mike Washington  : 1950  MRN: [id number]    Oncology Navigation Follow-Up Note  Navigator left message for pt. Offering assistance. Plan to follow.    Electronically signed by Jamey Sanders RN on 2020 at 2:32 PM

## 2020-08-18 NOTE — PROGRESS NOTES
Patient: Gary Julien     : 1950      Date of Service: 2020    [facility] Oncology  On Treatment Visit (OTV) Note    Diagnosis: Cancer Staging  No matching staging information was found for the patient. Dose Accrued:Received first treatment today to his brain  And to his lung tumor 300 cGy out of 3000 cGy to both areas, 1 out of 10 treatments. S:S: He has no new symptoms and basically feels well except for the pain over his metastatic T-spine. O: BP (!) 163/96 Comment: asymptomatic. Pulse 76   Temp 97.1 °F (36.2 °C) (Oral)   Resp 16   Wt 132 lb (59.9 kg)   SpO2 99%   BMI 22.66 kg/m² . Well-appearing, in no apparent distress, alert and fully oriented, answers questions appropriately. No signs of acute radiation-induced toxicity on physical exam.    IGRT: Set-up images acquired to ensure daily treatment accuracy and precision were reviewed by the physician. A&P: Continue radiotherapy as planned. Moisturize treated area as instructed. We reviewed reasonably anticipated side effects in the upcoming weeks, as well as expected trajectory of recovery. Patient verbalized a good understanding to everything. Electronically signed by Dilcia Alfaro MD on 2020 at 12:13 PM Patient: Gary Julien. : 1950      Date of Service: 2020    [facility] Oncology  On Treatment Visit (OTV) Note    Diagnosis: Cancer Staging  No matching staging information was found for the patient. Dose Accrued: Received first treatment today to his brain  And to his lung tumor 300 cGy out of 3000 cGy to both areas, 1 out of 10 treatments. S: He has no new symptoms and basically feels well except for the pain over his metastatic T-spine. O: BP (!) 163/96 Comment: asymptomatic. Pulse 76   Temp 97.1 °F (36.2 °C) (Oral)   Resp 16   Wt 132 lb (59.9 kg)   SpO2 99%   BMI 22.66 kg/m² .   Well-appearing, in no apparent distress, alert and fully oriented, answers questions appropriately. No signs of acute radiation-induced toxicity on physical exam.    IGRT: Set-up images acquired to ensure daily treatment accuracy and precision were reviewed by the physician. A&P: Continue radiotherapy as planned. Moisturize treated area as instructed. We reviewed reasonably anticipated side effects in the upcoming weeks, as well as expected trajectory of recovery. Patient verbalized a good understanding to everything.     Electronically signed by Janne Cranker, MD on 8/18/2020 at 12:13 PM

## 2020-08-18 NOTE — PROGRESS NOTES
[facility] Nutrition Note  PG-SGA screening tool reviewed with score of 18    Patient Reports:  1. Weight: decreased (1) - reports weighing 145 lb six months ago  2. Food Intake: less than usual, very little of anything (5)  3. Symptoms: no appetite, nausea, constipation, fatigue, back pain (9)  4. Activities and function: able to do little activity and spend most of the day in bed or chair (3)     *Full assessment for scores > 4. Height: 5' 5\" (165.1 cm)   Current Weight: 132 lb (59.9 kg)  BMI: 21.98 kg/m^2    Will plan to see patient for a full nutrition assessment. Recommend monitoring patient's weight and for potential side effects from CA itself and/or tx.     Kelle Townsend RDN, VINAY  RD Office Phone: [phone]

## 2020-08-21 NOTE — ED PROVIDER NOTES
[facility]  [facility]  Phone: [phone]        Pt Name: Vasiliy Yeager  MRN: [id number]  [place] 1950  Date of evaluation: 8/21/20      CHIEF COMPLAINT       Chief Complaint   Patient presents with    Constipation     1 week         HISTORY OF PRESENT ILLNESS    Vasiliy Yeager is a 69 y.o. male who presents with complaints of constipation he has prostate cancer with bony mets is on pain medication he said it tingling for over a week he did have a CT to evaluate done on the 18th which showed no acute findings other than the bony metastasis. I saw his oncologist and sent him over here for an enema and disimpaction as needed that he has chronic nausea he did vomit on the way over here there is been no fevers or chills      REVIEW OF SYSTEMS         Review of Systems   Constitutional: Negative for chills and fever. HENT: Negative for congestion, dental problem, sore throat and trouble swallowing. Eyes: Negative for visual disturbance. Respiratory: Negative for shortness of breath and wheezing. Cardiovascular: Negative for chest pain, palpitations and leg swelling. Gastrointestinal: Positive for constipation, nausea and vomiting. Negative for abdominal pain, blood in stool and diarrhea. Genitourinary: Negative for difficulty urinating, dysuria and testicular pain. Musculoskeletal: Positive for arthralgias. Negative for back pain, joint swelling and neck pain. Patient has chronic bone pain from his cancer metastases   Skin: Negative for rash. Neurological: Negative for dizziness, syncope, weakness and headaches. Hematological: Negative for adenopathy. Does not bruise/bleed easily. Psychiatric/Behavioral: Negative for confusion and suicidal ideas.           PAST MEDICAL HISTORY    has a past medical history of Cancer [facility]), Cardiac resynchronization therapy defibrillator (CRT-D) in place, Chest pain, Coronary artery disease, Dyspnea on exertion, Elevated troponin, Heart attack ([facility] [address] and Ischemic cardiomyopathy. SURGICAL HISTORY      has a past surgical history that includes Coronary angioplasty with stent (2012); pacemaker placement (02/11/2019); and CT BIOPSY DEEP BONE PERCUTANEOUS (8/11/2020). CURRENT MEDICATIONS       Previous Medications    ALPRAZOLAM (XANAX) 0.5 MG TABLET    Take 1 tablet by mouth nightly as needed for Sleep or Anxiety for up to 15 days. ASPIRIN 81 MG CHEWABLE TABLET    Take 1 tablet by mouth daily    ATORVASTATIN (LIPITOR) 80 MG TABLET    Take 1 tablet by mouth nightly    CARVEDILOL (COREG) 12.5 MG TABLET    Take 12.5 mg by mouth 2 times daily (with meals)    COLLAGENASE (SANTYL) 250 UNIT/GM OINTMENT    Apply topically daily. DEXAMETHASONE (DECADRON) 4 MG TABLET    Take 4 mg by mouth 3 times daily Patient unsure of mg. DEXAMETHASONE (DECADRON) 4 MG TABLET    Take 1 tablet by mouth as needed (TO BE TAKEN WITH PEMETREXED CHEMOTHERAPY , SEE INSTRUCTIONS) DEXAMETHASONE 4 MG TWICE DAILY FOR 3 DAYS , STARTING THE DAY BEFORE SCHEDULED PEMETREXED CHEMOTHERAPY DOSE    DOCUSATE SODIUM (COLACE) 100 MG CAPSULE    Take 1 capsule by mouth 2 times daily    FOLIC ACID (FOLVITE) 1 MG TABLET    Take 1 tablet by mouth daily START 7 DAYS BEFORE FIRST DOSE OF PEMETREXED---TO BE TAKEN DAILY --AND CONTINUE 21 DAYS AFTER LAST DOSE OF PEMETREXED    FUROSEMIDE (LASIX) 20 MG TABLET    Take 1 tablet by mouth daily    LISINOPRIL (PRINIVIL;ZESTRIL) 5 MG TABLET    Take 1 tablet by mouth daily    METOPROLOL SUCCINATE (TOPROL XL) 25 MG EXTENDED RELEASE TABLET    Take 1 tablet by mouth nightly    MORPHINE (MS CONTIN) 15 MG EXTENDED RELEASE TABLET    Take 1 tablet by mouth 2 times daily for 7 days. NITROGLYCERIN (NITROSTAT) 0.4 MG SL TABLET    up to max of 3 total doses. If no relief after 1 dose, call 911. OXYCODONE (ROXICODONE) 5 MG IMMEDIATE RELEASE TABLET    Take 1 tablet by mouth every 6 hours as needed for Pain for up to 7 days. PANTOPRAZOLE (PROTONIX) 40 MG TABLET    Take 1 tablet by mouth every morning (before breakfast)       ALLERGIES     is allergic to penicillins. FAMILY HISTORY     He indicated that his mother is . He indicated that his father is . family history includes Cancer in his father and mother; Prostate Cancer in his father. SOCIAL HISTORY      reports that he quit smoking about 8 years ago. His smoking use included cigarettes. He has never used smokeless tobacco. He reports previous drug use. Drug: Marijuana. He reports that he does not drink alcohol. PHYSICAL EXAM     INITIAL VITALS:  height is 5' 5\" (1.651 m) and weight is 59.9 kg (132 lb). His temperature is 97.9 °F (36.6 °C). His blood pressure is 120/69 and his pulse is 62. Physical Exam  Constitutional:       Appearance: He is well-developed. Comments: Uncomfortable appearing  male, nontoxic   HENT:      Head: Normocephalic and atraumatic. Eyes:      Pupils: Pupils are equal, round, and reactive to light. Neck:      Musculoskeletal: Normal range of motion and neck supple. Cardiovascular:      Rate and Rhythm: Normal rate and regular rhythm. Pulmonary:      Effort: Pulmonary effort is normal.      Breath sounds: Normal breath sounds. Abdominal:      General: Bowel sounds are normal. There is no distension. Palpations: Abdomen is soft. There is no mass. Tenderness: There is no abdominal tenderness. There is no guarding or rebound. Hernia: No hernia is present. Musculoskeletal: Normal range of motion. Skin:     General: Skin is warm and dry. Neurological:      General: No focal deficit present. Mental Status: He is alert and oriented to person, place, and time. Psychiatric:         Behavior: Behavior normal.           DIFFERENTIAL DIAGNOSIS/ MDM:     Constipation with some nausea vomiting he does not want any blood work done or an IV or fluids.   He just wants to have a constipation taking care of we will do a x-ray to evaluate for impaction    DIAGNOSTIC RESULTS     EKG: All EKG's are interpreted by the Emergency Department Physician who either signs or Co-signs this chart in the absence of a cardiologist.        RADIOLOGY:   Non-plain film images such as CT, Ultrasound and MRI are read by the radiologist. [facility] radiographic images are visualized and the radiologist interpretations are reviewed as follows:      ONE SUPINE XRAY VIEW(S) OF THE ABDOMEN         8/21/2020 12:30 pm         COMPARISON:    None.         HISTORY:    ORDERING SYSTEM PROVIDED HISTORY: Unable to have bowel movement    TECHNOLOGIST PROVIDED HISTORY:    Unable to have bowel movement    Reason for Exam: constipation    Acuity: Acute    Type of Exam: Initial    Additional signs and symptoms: nausea and vomiting    Relevant Medical/Surgical History: metastatic lung cancer which spread to    thoracic spine         FINDINGS:    The abdominal bowel gas pattern is nonspecific.  Scattered colonic gas and    stool are noted. Lilibeth Comment is no significant retained stool in the rectosigmoid    colon.  No small bowel distension.  No pathologic calcifications.  Scattered    vascular calcification              Impression    Nonspecific abdominal bowel gas pattern.                 LABS:  No results found for this visit on 08/21/20. EMERGENCY DEPARTMENT COURSE:   Vitals:    Vitals:    08/21/20 1215   BP: 120/69   Pulse: 62   Temp: 97.9 °F (36.6 °C)   Weight: 59.9 kg (132 lb)   Height: 5' 5\" (1.651 m)     -------------------------  BP: 120/69, Temp: 97.9 °F (36.6 °C), Pulse: 62,      Patient had some results after milk of molasses enema, he has a prescription for Colace at home will discharge in stable condition      CONSULTS:      PROCEDURES:  None    FINAL IMPRESSION      1.  Constipation, unspecified constipation type          DISPOSITION/PLAN   Discharged in stable condition    PATIENT REFERRED TO:  Your physician    Schedule an

## 2020-08-21 NOTE — ED NOTES
Pt exited room 12 states \" I am ready to go home now\" Dr Too Rod aware     Connie Valentin, RN  08/21/20

## 2020-08-21 NOTE — TELEPHONE ENCOUNTER
Name: Raf Gar  : 1950  MRN: [id number]    Oncology Navigation Follow-Up Note  Navigator reviewing chart and checking on [employer] testing progress Portion of testing presently in progress.    Electronically signed by Johnna Gore RN on 2020 at 9:25 AM

## 2020-08-21 NOTE — PROGRESS NOTES
SPoke to daughter in law about how patient is doing today. She stated, not good. Patient was not able to drink the bottle of magnesium citrate yesterday. He did try the fleet enema yesterday with little return stool. Patient has been vomiting and feeling nauseous. Dr. Timothy Arreola spoke to daughter in law and suggested she take him to the ER to get constipation relief. Writer spoke to patient and informed him of the physician's recommendations. Patient agreed to go to the ER. Will continue to follow patient.

## 2020-08-21 NOTE — ED NOTES
Pt sitting on BSC , Pt c/o mild cramping. Pt tolerated well. Moderate results.       Julia Lassiter RN  08/21/20 6410

## 2020-08-21 NOTE — ED TRIAGE NOTES
Pt states he had a CT scan last week and continues to c/o constipation. Pt states he has tried Mag Citrate 2 days ago, but does not take miralax or colace, Pt is currently in radiation for [facility].

## 2020-08-24 NOTE — PROCEDURES
Rudolph Galindo is a 69 y.o. male patient. 1. Adenocarcinoma of right lung [facility])      Past Medical History:   Diagnosis Date    Cancer [facility])     right lung    Cardiac resynchronization therapy defibrillator (CRT-D) in place     Chest pain 02/08/2019    Coronary artery disease     Dyspnea on exertion     Elevated troponin 02/08/2019    Heart attack ([facility] [address] 2012    Hyperlipidemia     Hypertension     Ischemic cardiomyopathy      Blood pressure 113/66, pulse 60, temperature 96.8 °F (36 °C), temperature source Temporal, resp. rate 18, height 5' 5\" (1.651 m), weight 131 lb (59.4 kg), SpO2 98 %. Procedures   Port placement. Lung cancer. Right IJ port placed in VIR. Catheter in good position and ok to use. EBL 4cc. No immediate complications. Bedrest x1 hour.     Cinderella Krabbe, MD  8/24/2020

## 2020-08-24 NOTE — TELEPHONE ENCOUNTER
I TALKED TO DR Jayne Nelson ABOUT SCHEDULING DEDE FOR HIS CHEMOTHERAPY. DR BUSTAMANTE WOULD LIKE TO SEE HIM ON 8-31-20  ON HIS LAST DAY OF RADIATION TO SEE HOW HE IS FEELING AND THEN WE WILL SCHEDULE HIS TREATMENT LATER IN THE WEEK. CHEMO EDUCTION IS ALSO SCHEDULED FOR 8-31-20.

## 2020-08-24 NOTE — H&P
History and Physical Update    Pt Name: Whitney Rojas  MRN: [id number]  YOB: 1950  Date of evaluation: 8/24/2020      [x] I have reviewed the hematology and oncology progress note found in Epic by Dr. Sherryle Penton from 08/17/2020 which meets the criteria for an Interval History and Physical note. [x] I have examined  Whitney Rojas a 69 y.o., male who is scheduled for port placement in  by Dr. Pricilla Barnett due to malignant neoplasm of unspecified part of right bronchus or lung. The patient denies health changes since his appointment with Dr. Sherryle Penton on 08/17/2020. Pt went to the ED on 08/21/2020 for constipation and was given a milk of molasses enema. The enema worked \"great\". Last bowel movement was 2 days ago. Pt has a good appetite, but states he is not eating much for fear of constipation. He is taking MiraLax with every meal.  Pt denies fever, chills, productive cough, hemoptysis, SOB, chest pain, open sores, rashes, and wounds. Pt denies history of diabetes. Aspirin was last taken on 08/19/2020. Vital signs: /76   Pulse 67   Temp 96.8 °F (36 °C) (Temporal)   Resp 16   Ht 5' 5\" (1.651 m)   Wt 131 lb (59.4 kg)   SpO2 97%   BMI 21.80 kg/m²      Allergies:  Penicillins    Past medical history, surgical history, social history, and family history were reviewed and updated in EPIC as indicated. Medications:    Prior to Admission medications    Medication Sig Start Date End Date Taking? Authorizing Provider   OXYCODONE HCL ER PO Take 1 tablet by mouth every 8 hours as needed   Yes Historical Provider, MD   ondansetron (ZOFRAN ODT) 4 MG disintegrating tablet Take 1 tablet by mouth every 8 hours as needed for Nausea 8/21/20  Yes Sho García MD   dexamethasone (DECADRON) 4 MG tablet Take 4 mg by mouth 3 times daily Patient unsure of mg.    Yes Historical Provider, MD   pantoprazole (PROTONIX) 40 MG tablet Take 1 tablet by mouth every morning (before breakfast) 8/13/20  Yes Radha Tirado Robyn Gipson MD   carvedilol (COREG) 12.5 MG tablet Take 12.5 mg by mouth 2 times daily (with meals)   Yes Historical Provider, MD   folic acid (FOLVITE) 1 MG tablet Take 1 tablet by mouth daily START 7 DAYS BEFORE FIRST DOSE OF PEMETREXED---TO BE TAKEN DAILY --AND CONTINUE 21 DAYS AFTER LAST DOSE OF PEMETREXED 8/17/20   Michael Manning MD   dexamethasone (DECADRON) 4 MG tablet Take 1 tablet by mouth as needed (TO BE TAKEN WITH PEMETREXED CHEMOTHERAPY , SEE INSTRUCTIONS) DEXAMETHASONE 4 MG TWICE DAILY FOR 3 DAYS , STARTING THE DAY BEFORE SCHEDULED PEMETREXED CHEMOTHERAPY DOSE 8/17/20 9/16/20  Michael Manning MD   ALPRAZolam Rommel Gallo) 0.5 MG tablet Take 1 tablet by mouth nightly as needed for Sleep or Anxiety for up to 15 days. 8/17/20 9/1/20  Corey Pena MD   docusate sodium (COLACE) 100 MG capsule Take 1 capsule by mouth 2 times daily  Patient not taking: Reported on 0/12/4686 9/8/62   Michelle Schrader MD   aspirin 81 MG chewable tablet Take 1 tablet by mouth daily 2/12/19   Flores Avalos MD   nitroGLYCERIN (NITROSTAT) 0.4 MG SL tablet up to max of 3 total doses. If no relief after 1 dose, call 911. Patient not taking: Reported on 8/17/2020 2/11/19   Flores Avalos MD   atorvastatin (LIPITOR) 80 MG tablet Take 1 tablet by mouth nightly  Patient not taking: Reported on 8/17/2020 2/11/19   Flores Avalos MD   lisinopril (PRINIVIL;ZESTRIL) 5 MG tablet Take 1 tablet by mouth daily  Patient not taking: Reported on 8/17/2020 2/12/19   Flores Avalos MD   metoprolol succinate (TOPROL XL) 25 MG extended release tablet Take 1 tablet by mouth nightly  Patient not taking: Reported on 8/17/2020 2/11/19   Flores Avalos MD   furosemide (LASIX) 20 MG tablet Take 1 tablet by mouth daily  Patient not taking: Reported on 8/17/2020 2/12/19   Flores Avalos MD       This is a 69 y.o. male who is pleasant, cooperative, alert and oriented x 3, in no acute distress.      Heart: Pacemaker/defibrillator pain 2019    Coronary artery disease      Dyspnea on exertion      Elevated troponin 2019    Heart attack ([facility] [address]      Ischemic cardiomyopathy             Past Surgical History:     Past Surgical History         Past Surgical History:   Procedure Laterality Date    CORONARY ANGIOPLASTY WITH STENT PLACEMENT        BMS to LAD    CT BIOPSY PERCUTANEOUS DEEP BONE   2020     CT BIOPSY PERCUTANEOUS DEEP BONE 2020 [facility] CT SCAN    PACEMAKER PLACEMENT   2019     AICD/ [employer]  MODEL #[id number] CRTD AMPLIA MRI USDF4.  LEADS [id number], [id number] AND 1622-70            Patient Family Social History:     Social History   Social History            Socioeconomic History    Marital status:        Spouse name: None    Number of children: None    Years of education: None    Highest education level: None   Occupational History    None   Social Needs    Financial resource strain: None    Food insecurity       Worry: None       Inability: None    Transportation needs       Medical: None       Non-medical: None   Tobacco Use    Smoking status: Former Smoker       Types: Cigarettes       Last attempt to quit: 2012       Years since quittin.0    Smokeless tobacco: Never Used    Tobacco comment: 48 yr hx of smoking   Substance and Sexual Activity    Alcohol use: No    Drug use: Not Currently       Types: Marijuana       Comment: last used 2 days    Sexual activity: None   Lifestyle    Physical activity       Days per week: None       Minutes per session: None    Stress: None   Relationships    Social connections       Talks on phone: None       Gets together: None       Attends Temple service: None       Active member of club or organization: None       Attends meetings of clubs or organizations: None       Relationship status: None    Intimate partner violence       Fear of current or ex partner: None       Emotionally abused: None       Physically abused: None Forced sexual activity: None   Other Topics Concern    None   Social History Narrative    None        Family History         Family History   Problem Relation Age of Onset    Cancer Mother      Cancer Father      Prostate Cancer Father             Current Medications:     Current Facility-Administered Medications          Current Outpatient Medications   Medication Sig Dispense Refill    dexamethasone (DECADRON) 4 MG tablet Take 4 mg by mouth 3 times daily Patient unsure of mg.  oxyCODONE (ROXICODONE) 5 MG immediate release tablet Take 1 tablet by mouth every 6 hours as needed for Pain for up to 7 days. 28 tablet 0    pantoprazole (PROTONIX) 40 MG tablet Take 1 tablet by mouth every morning (before breakfast) 30 tablet 3    carvedilol (COREG) 12.5 MG tablet Take 12.5 mg by mouth 2 times daily (with meals)        aspirin 81 MG chewable tablet Take 1 tablet by mouth daily 30 tablet 3    morphine (MS CONTIN) 15 MG extended release tablet Take 1 tablet by mouth 2 times daily for 7 days. (Patient not taking: Reported on 8/17/2020) 14 tablet 0    collagenase (SANTYL) 250 UNIT/GM ointment Apply topically daily. (Patient not taking: Reported on 8/17/2020) 1 Tube 1    docusate sodium (COLACE) 100 MG capsule Take 1 capsule by mouth 2 times daily (Patient not taking: Reported on 8/17/2020) 15 capsule 0    nitroGLYCERIN (NITROSTAT) 0.4 MG SL tablet up to max of 3 total doses. If no relief after 1 dose, call 911.  (Patient not taking: Reported on 8/17/2020) 25 tablet 3    atorvastatin (LIPITOR) 80 MG tablet Take 1 tablet by mouth nightly (Patient not taking: Reported on 8/17/2020) 30 tablet 3    lisinopril (PRINIVIL;ZESTRIL) 5 MG tablet Take 1 tablet by mouth daily (Patient not taking: Reported on 8/17/2020) 30 tablet 3    metoprolol succinate (TOPROL XL) 25 MG extended release tablet Take 1 tablet by mouth nightly (Patient not taking: Reported on 8/17/2020) 30 tablet 3    furosemide (LASIX) 20 MG tablet Take 1 tablet by mouth daily (Patient not taking: Reported on 8/17/2020) 60 tablet 3      No current facility-administered medications for this visit. Allergies:   Penicillins     Review of Systems:    Constitutional: No fever or chills. No night sweats, positive weight loss   Eyes: No eye discharge, double vision, or eye pain   HEENT: negative for sore mouth, sore throat, hoarseness and voice change   Respiratory: negative for cough , sputum, dyspnea, wheezing, hemoptysis, chest pain   Cardiovascular: negative for chest pain, dyspnea, palpitations, orthopnea, PND   Gastrointestinal: negative for nausea, vomiting, diarrhea, constipation, abdominal pain, Dysphagia, hematemesis and hematochezia   Genitourinary: negative for frequency, dysuria, nocturia, urinary incontinence, and hematuria   Integument: negative for rash, skin lesions, bruises. Hematologic/Lymphatic: negative for easy bruising, bleeding, lymphadenopathy, or petechiae   Endocrine: negative for heat or cold intolerance,weight changes, change in bowel habits and hair loss   Musculoskeletal: negative for myalgias, arthralgias, pain, joint swelling,and bone pain   Neurological: negative for headaches, dizziness, seizures,  numbness.   Positive right-sided weakness           Physical Exam:     Vitals: BP (!) 140/69   Pulse 60   Temp 97.5 °F (36.4 °C) (Oral)   Wt 131 lb 1.6 oz (59.5 kg)   BMI 22.50 kg/m²   General appearance - well appearing, no in pain or distress  Mental status - AAO X3  Eyes - pupils equal and reactive, extraocular eye movements intact  Mouth - mucous membranes moist, pharynx normal without lesions  Neck - supple, no significant adenopathy  Lymphatics - no palpable lymphadenopathy, no hepatosplenomegaly  Chest - clear to auscultation, no wheezes, rales or rhonchi, symmetric air entry  Heart - normal rate, regular rhythm, normal S1, S2, no murmurs  Abdomen - soft, nontender, nondistended, no masses or organomegaly  Neurological - alert, oriented, normal speech.   Right upper extremity weakness  Extremities - peripheral pulses normal, no pedal edema, no clubbing or cyanosis  Skin - normal coloration and turgor, no rashes, no suspicious skin lesions noted         DATA:            Results for orders placed or performed during the hospital encounter of 08/09/20   CBC Auto Differential   Result Value Ref Range     WBC 8.1 3.5 - 11.3 k/uL     RBC 4.80 4.21 - 5.77 m/uL     Hemoglobin 13.8 13.0 - 17.0 g/dL     Hematocrit 43.7 40.7 - 50.3 %     MCV 91.0 82.6 - 102.9 fL     MCH 28.8 25.2 - 33.5 pg     MCHC 31.6 28.4 - 34.8 g/dL     RDW 14.2 11.8 - 14.4 %     Platelets 263 484 - 318 k/uL     MPV 10.0 8.1 - 13.5 fL     NRBC Automated 0.0 0.0 per 100 WBC     Differential Type NOT REPORTED       Seg Neutrophils 72 (H) 36 - 65 %     Lymphocytes 12 (L) 24 - 43 %     Monocytes 7 3 - 12 %     Eosinophils % 8 (H) 1 - 4 %     Basophils 1 0 - 2 %     Immature Granulocytes 0 0 %     Segs Absolute 5.79 1.50 - 8.10 k/uL     Absolute Lymph # 0.96 (L) 1.10 - 3.70 k/uL     Absolute Mono # 0.59 0.10 - 1.20 k/uL     Absolute Eos # 0.65 (H) 0.00 - 0.44 k/uL     Basophils Absolute 0.05 0.00 - 0.20 k/uL     Absolute Immature Granulocyte 0.03 0.00 - 0.30 k/uL     WBC Morphology NOT REPORTED       RBC Morphology NOT REPORTED       Platelet Estimate NOT REPORTED     Comprehensive Metabolic Panel w/ Reflex to MG   Result Value Ref Range     Glucose 114 (H) 70 - 99 mg/dL     BUN 21 8 - 23 mg/dL     CREATININE 1.43 (H) 0.70 - 1.20 mg/dL     Bun/Cre Ratio NOT REPORTED 9 - 20     Calcium 9.7 8.6 - 10.4 mg/dL     Sodium 136 135 - 144 mmol/L     Potassium 5.1 3.7 - 5.3 mmol/L     Chloride 99 98 - 107 mmol/L     CO2 25 20 - 31 mmol/L     Anion Gap 12 9 - 17 mmol/L     Alkaline Phosphatase 84 40 - 129 U/L     ALT 8 5 - 41 U/L     AST 13 <40 U/L     Total Bilirubin 0.38 0.3 - 1.2 mg/dL     Total Protein 7.0 6.4 - 8.3 g/dL     Alb 3.7 3.5 - 5.2 g/dL GFR Staging NOT REPORTED     CBC WITH AUTO DIFFERENTIAL   Result Value Ref Range     WBC 11.4 (H) 3.5 - 11.3 k/uL     RBC 4.81 4.21 - 5.77 m/uL     Hemoglobin 13.8 13.0 - 17.0 g/dL     Hematocrit 44.7 40.7 - 50.3 %     MCV 92.9 82.6 - 102.9 fL     MCH 28.7 25.2 - 33.5 pg     MCHC 30.9 28.4 - 34.8 g/dL     RDW 14.2 11.8 - 14.4 %     Platelets 309 451 - 088 k/uL     MPV 9.8 8.1 - 13.5 fL     NRBC Automated 0.0 0.0 per 100 WBC     Differential Type NOT REPORTED       WBC Morphology NOT REPORTED       RBC Morphology NOT REPORTED       Platelet Estimate NOT REPORTED       Seg Neutrophils 92 (H) 36 - 65 %     Lymphocytes 6 (L) 24 - 43 %     Monocytes 2 (L) 3 - 12 %     Eosinophils % 0 (L) 1 - 4 %     Basophils 0 0 - 2 %     Immature Granulocytes 0 0 %     Segs Absolute 10.47 (H) 1.50 - 8.10 k/uL     Absolute Lymph # 0.70 (L) 1.10 - 3.70 k/uL     Absolute Mono # 0.19 0.10 - 1.20 k/uL     Absolute Eos # <0.03 0.00 - 0.44 k/uL     Basophils Absolute <0.03 0.00 - 0.20 k/uL     Absolute Immature Granulocyte 0.05 0.00 - 0.30 k/uL   COVID-19     Specimen: Other   Result Value Ref Range     SARS-CoV-2 Not Detected Not Detected     SARS-CoV-2, Rapid            Source . NASOPHARYNGEAL SWAB       SARS-CoV-2, PCR          CBC WITH AUTO DIFFERENTIAL   Result Value Ref Range     WBC 15.4 (H) 3.5 - 11.3 k/uL     RBC 4.68 4.21 - 5.77 m/uL     Hemoglobin 13.3 13.0 - 17.0 g/dL     Hematocrit 45.6 40.7 - 50.3 %     MCV 97.4 82.6 - 102.9 fL     MCH 28.4 25.2 - 33.5 pg     MCHC 29.2 28.4 - 34.8 g/dL     RDW 14.6 (H) 11.8 - 14.4 %     Platelets 935 547 - 703 k/uL     MPV 10.0 8.1 - 13.5 fL     NRBC Automated 0.0 0.0 per 100 WBC     Differential Type NOT REPORTED       WBC Morphology NOT REPORTED       RBC Morphology ANISOCYTOSIS PRESENT       Platelet Estimate NOT REPORTED       Seg Neutrophils 90 (H) 36 - 65 %     Lymphocytes 6 (L) 24 - 43 %     Monocytes 3 3 - 12 %     Eosinophils % 0 (L) 1 - 4 %     Basophils 0 0 - 2 %     Immature Granulocytes 1 (H) 0 %     Segs Absolute 13.88 (H) 1.50 - 8.10 k/uL     Absolute Lymph # 0.92 (L) 1.10 - 3.70 k/uL     Absolute Mono # 0.50 0.10 - 1.20 k/uL     Absolute Eos # <0.03 0.00 - 0.44 k/uL     Basophils Absolute <0.03 0.00 - 0.20 k/uL     Absolute Immature Granulocyte 0.12 0.00 - 0.30 k/uL   APTT   Result Value Ref Range     PTT 28.3 20.5 - 30.5 sec   Protime-INR   Result Value Ref Range     Protime 10.9 9.0 - 12.0 sec     INR 1.0     Basic Metabolic Panel w/ Reflex to MG   Result Value Ref Range     Glucose 113 (H) 70 - 99 mg/dL     BUN 31 (H) 8 - 23 mg/dL     CREATININE 1.28 (H) 0.70 - 1.20 mg/dL     Bun/Cre Ratio NOT REPORTED 9 - 20     Calcium 9.9 8.6 - 10.4 mg/dL     Sodium 137 135 - 144 mmol/L     Potassium 4.9 3.7 - 5.3 mmol/L     Chloride 104 98 - 107 mmol/L     CO2 21 20 - 31 mmol/L     Anion Gap 12 9 - 17 mmol/L     GFR Non- 55 (L) >60 mL/min     GFR African American >60 >60 mL/min     GFR Comment            GFR Staging NOT REPORTED     Surgical Pathology   Result Value Ref Range     Surgical Pathology Report           -- Diagnosis --  BONE, BIOPSY (LEFT ILIAC):       - METASTATIC POORLY DIFFERENTIATED NON-SMALL CELL CARCINOMA. -- Diagnosis Comment --  THE HISTOMORPHOLOGY AND IMMUNOPHENOTYPE ARE MOST CONSISTENT WITH  METASTASIS OF LUNG PRIMARY ADENOCARCINOMA. Tyrone Mathur M.D.  **Electronically Signed Out**         [facility]/8/13/2020        Clinical Information  Pre-op Diagnosis:  LUNG CA WITH METS   Operative Findings:  BONE BIOPSY      Source of Specimen  1: BONE BIOPSY     Gross Description  \"DEDE RODRIGUEZ BONE BIOPSY\" Eight tan-white cores range in length  from 0.4 to 1.3 cm, 0.1 cm in diameter. The cores are split equally  with four cores in each cassettes A and B and submitted after  decalcification. 2 cs. mpb/jms          Intraoperative Diagnosis  1 Episode intraprocedural in CT, 5 passes.   Impression:  Adequate.  [facility])     Microscopic Description  Microscopic examination performed disclosing metastatic poorly  differentiated carcinoma. Further characterization with  control-reactive immuno stains: CK 7 positive; CK 20 negative; TTF-1  positive; P 40 negative ; NKX3.1 negative; GATA3 weak to moderate  positive in about 60% of cells; and mucicarmine negative. The  histomorphology and special stain phenotype are consistent with lung  primary. Representative slides are reviewed by a second Pathologist  who concurs with the diagnosis (KRISTIAN). SURGICAL PATHOLOGY CONSULTATION        Patient Name: Wayne Kumar: [id number]  Path Number: [id number]     [address] [place] [address], [facility] [address].   [place], [address]  [phone]  Fax: [phone]      CBC WITH AUTO DIFFERENTIAL   Result Value Ref Range     WBC 14.2 (H) 3.5 - 11.3 k/uL     RBC 4.71 4.21 - 5.77 m/uL     Hemoglobin 13.6 13.0 - 17.0 g/dL     Hematocrit 44.0 40.7 - 50.3 %     MCV 93.4 82.6 - 102.9 fL     MCH 28.9 25.2 - 33.5 pg     MCHC 30.9 28.4 - 34.8 g/dL     RDW 14.3 11.8 - 14.4 %     Platelets 070 704 - 337 k/uL     MPV 10.3 8.1 - 13.5 fL     NRBC Automated 0.0 0.0 per 100 WBC     Differential Type NOT REPORTED       Seg Neutrophils 92 (H) 36 - 65 %     Lymphocytes 5 (L) 24 - 43 %     Monocytes 2 (L) 3 - 12 %     Eosinophils % 0 (L) 1 - 4 %     Basophils 0 0 - 2 %     Immature Granulocytes 1 (H) 0 %     Segs Absolute 13.05 (H) 1.50 - 8.10 k/uL     Absolute Lymph # 0.74 (L) 1.10 - 3.70 k/uL     Absolute Mono # 0.33 0.10 - 1.20 k/uL     Absolute Eos # <0.03 0.00 - 0.44 k/uL     Basophils Absolute <0.03 0.00 - 0.20 k/uL     Absolute Immature Granulocyte 0.10 0.00 - 0.30 k/uL     WBC Morphology NOT REPORTED       RBC Morphology NOT REPORTED       Platelet Estimate NOT REPORTED     Basic Metabolic Panel w/ Reflex to MG   Result Value Ref Range     Glucose 124 (H) 70 - 99 mg/dL     BUN 31 (H) 8 - 23 mg/dL     CREATININE 1.18 0.70 - 1.20 mg/dL Bun/Cre Ratio NOT REPORTED 9 - 20     Calcium 9.6 8.6 - 10.4 mg/dL     Sodium 137 135 - 144 mmol/L     Potassium 5.7 (H) 3.7 - 5.3 mmol/L     Chloride 102 98 - 107 mmol/L     CO2 23 20 - 31 mmol/L     Anion Gap 12 9 - 17 mmol/L     GFR Non-African American >60 >60 mL/min     GFR African American >60 >60 mL/min     GFR Comment            GFR Staging NOT REPORTED     POTASSIUM   Result Value Ref Range     Potassium 4.7 3.7 - 5.3 mmol/L   EKG 12 Lead   Result Value Ref Range     Ventricular Rate 86 BPM     Atrial Rate 86 BPM     P-R Interval 136 ms     QRS Duration 134 ms     Q-T Interval 378 ms     QTc Calculation (Bazett) 452 ms     P Axis 40 degrees     R Axis -92 degrees     T Axis 100 degrees     Ct Abdomen Pelvis Wo Contrast Additional Contrast? None     Result Date: 8/14/2020  EXAMINATION: CT OF THE ABDOMEN AND PELVIS WITHOUT CONTRAST 8/14/2020 3:09 pm TECHNIQUE: CT of the abdomen and pelvis was performed without the administration of intravenous contrast. Multiplanar reformatted images are provided for review. Dose modulation, iterative reconstruction, and/or weight based adjustment of the mA/kV was utilized to reduce the radiation dose to as low as reasonably achievable. COMPARISON: August 9, 2020 HISTORY: ORDERING SYSTEM PROVIDED HISTORY: Secondary malignant neoplasm of bone and bone marrow ([facility] [address] TECHNOLOGIST PROVIDED HISTORY: Patient has lung cancer with brain and bone mets, CT eval for cause of constipation Reason for Exam: Pt has Lung CA metastatic to mid-thoracic spine and brain. Currently has constipation x1 week no BM, eval for constipation cause. 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 Trace right pleural effusion. Organs: Evaluation of the solid organs is limited without intravenous contrast. No liver lesion. No gallstones. No pancreatic lesion. the cervical spine was performed without and with the administration of intravenous contrast. COMPARISON: None HISTORY: ORDERING SYSTEM PROVIDED HISTORY: spinal mets, possible compression TECHNOLOGIST PROVIDED HISTORY: spinal mets, possible compression Reason for Exam: spinal mets, possible compression. FINDINGS: BONES/ALIGNMENT: There is normal alignment of the spine. The vertebral body heights are maintained. The bone marrow signal appears unremarkable. SPINAL CORD: No abnormal cord signal is seen. SOFT TISSUES: No abnormal enhancement of the cervical spine. No paraspinal mass identified. C2-C3: There is no significant disc protrusion, spinal canal stenosis or neural foraminal narrowing. C3-C4: Disc osteophyte complex, uncovertebral and facet hypertrophy cause mild thecal sac, severe right foraminal and mild left foraminal stenosis. C4-C5: There is no significant disc protrusion, spinal canal stenosis or neural foraminal narrowing. C5-C6: Disc osteophyte complex, uncovertebral and facet hypertrophy cause mild thecal sac, severe right foraminal, and moderate left foraminal stenosis. C6-C7: Disc osteophyte complex, uncovertebral and facet hypertrophy cause moderate right and severe left foraminal stenosis. C7-T1: There is no significant disc protrusion, spinal canal stenosis or neural foraminal narrowing. No cervical spine metastasis. No evidence of cervical myelopathy.       Mri Thoracic Spine W Wo Contrast     Result Date: 8/10/2020  EXAMINATION: MRI OF THE THORACIC SPINE WITHOUT AND WITH CONTRAST  8/10/2020 1:07 pm TECHNIQUE: Multiplanar multisequence MRI of the thoracic spine was performed without and with the administration of intravenous contrast. COMPARISON: 08/09/2020 HISTORY: ORDERING SYSTEM PROVIDED HISTORY: spinal mets, suspected compression TECHNOLOGIST PROVIDED HISTORY: spinal mets, suspected compression Reason for Exam: spinal mets, possible compression FINDINGS: BONES/ALIGNMENT: There is a metastasis within the T6 vertebral body. There is mild pathologic compression fracture with 30% anterior height loss. There is no significant bone retropulsion. There is no extraosseous tumor invasion or invasion into the spinal canal. Thoracic alignment is maintained. There is a left third rib metastasis in the proximal aspect of the rib near the costovertebral junction. SPINAL CORD: No abnormal cord signal is seen. SOFT TISSUES:  Right lung mass and mediastinal metastatic lymph nodes are better seen on prior chest CT. DEGENERATIVE CHANGES: No significant spinal canal stenosis or neural foraminal narrowing of the thoracic spine. T6 metastasis with pathologic compression fracture. No spinal canal invasion. Xr Chest Portable     Result Date: 8/4/2020  EXAMINATION: ONE XRAY VIEW OF THE CHEST 8/4/2020 2:54 pm COMPARISON: AP chest from 02/08/2019. HISTORY: ORDERING SYSTEM PROVIDED HISTORY: chest pain TECHNOLOGIST PROVIDED HISTORY: chest pain Reason for Exam: chest pain and constipation Acuity: Unknown Type of Exam: Unknown History of cardiomyopathy. FINDINGS: Left-sided AICD via subclavian approach, with unchanged biventricular and right atrial electrodes. Cardiac silhouette unchanged; new rounded right hilar mass, not seen previously. Some fullness right-sided mediastinum suggests possible adenopathy. Remainder upper mediastinum, both lungs and both costophrenic angles clear. Unchanged mild DJD spine. New right hilar mass. The findings were sent to the Radiology Results [id number] at 3:03 p.m. on 8/4/2020to be communicated to a licensed caregiver. RECOMMENDATION: CT scan of the chest recommended, with IV contrast if possible. Cta Chest Abdomen Pelvis W Contrast     Addendum Date: 8/9/2020    ADDENDUM: Right 7th rib, T6 and right iliac bone and several other possible bony metastatic lesions also identified.       Result Date: 8/9/2020  EXAMINATION: CTA OF THE CHEST, ABDOMEN AND PELVIS WITH CONTRAST, 8/4/2020 3:09 pm TECHNIQUE: CTA of the chest, abdomen and pelvis was performed after the administration of intravenous contrast.  Multiplanar reformatted images are provided for review. MIP images are provided for review. Dose modulation, iterative reconstruction, and/or weight based adjustment of the mA/kV was utilized to reduce the radiation dose to as low as reasonably achievable. COMPARISON: CT scan of the abdomen and pelvis from 02/10/2019. HISTORY: ORDERING SYSTEM PROVIDED HISTORY: back pain TECHNOLOGIST PROVIDED HISTORY: back pain Reason for Exam: Mid back pain, right arm numbness. Left abd pain, chest pressure Acuity: Acute Type of Exam: Initial FINDINGS: CTA CHEST: No acute abnormality thoracic aorta; slight calcific plaque aortic arch. Normal patent branch vessels to the head and upper extremities. No aneurysm or dissection. Mildly dilated left ventricle. Left-sided AICD via subclavian approach with electrode leads in the right heart and coronary sinus. Incidental findings: Lobular irregular ~ 4.6 x 4.4 cm RLL mass superior segment abutting the thickened medial pleura and upper major fissure with central low attenuation, and a small high density focus abutting its superolateral border measuring 1.7 x 1.1 cm (slice 52, series 4). 2.0 x 1.6 cm right upper lobe mass (best seen on cuts 45-52, series 4). Extensive right hilar adenopathy and enlarged subcarinal and right paratracheal nodes, largest measuring 2.1 (short axis). Small right pleural effusion. Emphysematous changes upper lung zones and slight posteroinferior dependent or atelectatic changes. No contralateral nodules. No sizable left pleural effusion. Mild kyphoscoliosis and DJD thoracic spine. CTA ABDOMEN: Moderate ASVD with some calcification abdominal aorta, but patent visceral vessels and no aneurysm or dissection. No adrenal mass. No focal hepatic mass. No splenomegaly or focal mass. Mild pancreatic ductal dilatation. Normal biliary tree. No calcified gallstone. Large amount of retained stool throughout large bowel with some diverticular disease but no diverticulitis or obvious mass. Symmetric renal perfusion bilaterally. Probable peripelvic cysts, especially on the left. No suspicious focal abnormality identified. A few mildly enlarged retroperitoneal nodes but no bulky lymphadenopathy. Small peripancreatic node. Mild-moderate degenerative changes lumbar spine with DDD L4-S1. No destructive or blastic lesion. CTA PELVIS: Elongated moderately diseased iliac arteries, especially external iliac arteries without aneurysm or clear cut dissection. Enlarged prostate measuring 5.1 x 4.4 cm. Urinary bladder WNL. THORACIC/LUMBAR SPINE: Please see separate thoracic/lumbar spine CT report. No aortic dissection or aneurysm. Large right lower lobe mass with additional RLL and RUL nodules, significant right hilar/ipsilateral mediastinal adenopathy and small pleural effusion. Additional findings, as detailed in the body of the report above. RECOMMENDATIONS: Bronchoscopic directed or percutaneous biopsy. Cta Chest Abdomen Pelvis W Contrast     Result Date: 8/9/2020  EXAMINATION: CTA OF THE CHEST, ABDOMEN AND PELVIS WITH CONTRAST 8/9/2020 11:50 am TECHNIQUE: CTA of the chest, abdomen and pelvis was performed after the administration of intravenous contrast.  Multiplanar reformatted images are provided for review. MIP images are provided for review. Dose modulation, iterative reconstruction, and/or weight based adjustment of the mA/kV was utilized to reduce the radiation dose to as low as reasonably achievable. 3D reconstructed images were performed on a separate workstation and provided for review.  COMPARISON: August 4, 2020 HISTORY: ORDERING SYSTEM PROVIDED HISTORY: Chest pain, heart disease, persistent back pain, constipation, urinary retention TECHNOLOGIST PROVIDED HISTORY: Chest pain, heart disease, persistent back pain, findings within the chest, abdomen, or pelvis. Questionable small remote aortic dissection versus plaque just inferior to the left renal artery. No aortic aneurysm. Metastatic disease. Multiple pulmonary masses with invasion of the right hilum. Necrotic mediastinal adenopathy. Multiple areas of osseous metastatic disease with destructive lytic lesions. Prostate gland is enlarged and heterogeneous. Correlate with PSA values. Ct Biopsy Deep Bone Percutaneous     Result Date: 8/12/2020  PROCEDURE: CT BX BONE NEEDLE DEEP MODERATE CONSCIOUS SEDATION 8/11/2020 HISTORY: ORDERING SYSTEM PROVIDED HISTORY: rt lower lobe mass with necrotic mediastinal adenopathy with diffuse mets brain and bone TECHNOLOGIST PROVIDED HISTORY: rt lower lobe mass with necrotic mediastinal adenopathy with diffuse mets brain and bone Reason for Exam: rt lower lobe mass with necrotic mediastinal adenopathy with diffuse mets brain and  bone TECHNIQUE: Dose modulation, iterative reconstruction, and/or weight based adjustment of the mA/kV was utilized to reduce the radiation dose to as low as reasonably achievable. CONTRAST: None SEDATION: 1 mgversed and 50 mcg fentanyl were titrated intravenously for moderate sedation monitored under my direction. Total intraservice time of sedation was 15 minutes. The patient's vital signs were monitored throughout the procedure and recorded in the patient's medical record by the nurse. DESCRIPTION OF PROCEDURE: Informed consent was obtained after a detailed explanation of the procedure including risks, benefits, and alternatives. Universal protocol was observed. Patient was placed supine on the table. Scanning through the left iliac anterior spine was performed. Lytic bone lesion was localized. Patient was prepped and draped sterile fashion. 1% lidocaine was infiltrated for local anesthesia. An 18 gauge coaxial needle was advanced under CT guidance into the lesion.   518 gauge core biopsies were obtained and submitted to pathologist at bedside. Adequate sample was confirmed. Needle was removed. A dressing was applied. Patient was returned to holding stable condition. FINDINGS: Lytic bony lesion involving anterior iliac spine. Successful CT-guided core biopsy of left iliac bone. Mri Brain W Wo Contrast     Result Date: 8/10/2020  EXAMINATION: MRI OF THE BRAIN WITHOUT AND WITH CONTRAST  8/10/2020 1:07 pm TECHNIQUE: Multiplanar multisequence MRI of the head/brain was performed without and with the administration of intravenous contrast. COMPARISON: Unenhanced head CT dated 08/09/2020 HISTORY: ORDERING SYSTEM PROVIDED HISTORY: suspected brain mets TECHNOLOGIST PROVIDED HISTORY: suspected brain mets Reason for Exam: suspected brain mets. Lung mass FINDINGS: INTRACRANIAL STRUCTURES/VENTRICLES:  There is no acute infarct. No acute intracranial hemorrhage is seen. There are multiple ring-enhancing lesions in the supratentorial region bilaterally, consistent with metastatic disease. The largest these is in the high left anterior parietal region measuring 1.4 x 1.1 x 1.7 cm on series 2, image 21 and series 401, image 396. This is associated with massive associated vasogenic edema. There arm multiple other smaller lesions that are also shows some vasogenic edema including a 5 mm enhancing lesion in the left temporal lobe on series 2, image 10, 6 mm in lesion in the right frontal lobe on series 2, image 16 and a 9 mm lesion in the right parietal lobe on image 17. There is no evidence of midline shift. The cortical sulci associated with the left parietal mass are somewhat effaced. There is no evidence of hydrocephalus. ORBITS: The visualized portion of the orbits demonstrate no acute abnormality. SINUSES: The visualized mastoid air cells are well aerated. There is almost complete opacification of the right maxillary sinus, consistent with chronic sinusitis.   Minimal mucosal thickening is seen in the ethmoid and frontal sinuses. BONES/SOFT TISSUES: The bone marrow signal intensity appears normal. The soft tissues demonstrate no acute abnormality. The findings were sent to the Radiology Results [address] at 3:44 pm on 8/10/2020to be communicated to a licensed caregiver. 1. Multiple bilateral supratentorial enhancing lesions, consistent with metastatic disease. The largest of these is in the left parietal lobe measuring up to 1.7 cm and associated with extensive vasogenic edema. 2. No evidence of acute infarct or intracranial hemorrhage. 3. Severe chronic right maxillary sinusitis. Impression:  Metastatic poorly differentiated adenocarcinoma of lung primary  Brain metastasis  Bone metastasis  History of tobacco dependence, quit 8 years ago     Plan:  I had a detailed discussion with the patient and personally went over results of lab work-up imaging studies and other relevant clinical data. Reviewed records from hospitalization. Reviewed results of repeat CT abdomen  Reviewed results of pathology report which confirms adenocarcinoma lung primary. Discussed natural history of metastatic lung cancer. Reviewed NCCN guidelines for treatment. Discussed treatment options. Patient this point wishes to proceed with treatment. We will obtain next addition sequencing. We plan to treat patient with combination chemotherapy and immunotherapy using Keytruda Alimta and carboplatin. Follow-up on results of next addition sequencing for actionable mutation  Patient is to start whole brain radiation and palliative radiation to the spine tomorrow. We will start the patient on systemic therapy once he is done with radiation  Patient is currently on Decadron we have would like to wean him off steroids before starting him on immunotherapy  We will also refer patient for port placement. Patient counseled on use of pain medication. Counseled on use of laxative.   Counseled on tobacco cessation  NCCN guidelines were reviewed and discussed with the patient. The diagnosis and care plan were discussed with the patient in detail. I discussed the natural history of the disease, prognosis, risks and goals of therapy and answered all the patients questions to the best of my ability. Patient expressed understanding and was in agreement. Junior Trevino     I spent more than 40 minutes examining, evaluating, reviewing data, counseling the patient and coordinating care. Greater than 50% of time was spent face-to-face with the patient this note is created with the assistance of a speech recognition program.  While intending to generate a document that actually reflects the content of the visit, the document can still have some errors including those of syntax and sound a like substitutions which may escape proof reading. It such instances, actual meaning can be extrapolated by contextual diversion.

## 2020-08-24 NOTE — TELEPHONE ENCOUNTER
I TRIED TO CALL DEDE TO SCHEDULE HIS B 12 INJECTION PRIOR TO HIM STARTING CHEMOTHERAPY AND I HAD TO LEAVE A MESSAGE TO CALL THE OFFICE TO SCHEDULE. I WOULD LIKE TO SCHEDULE HIM ON 8-26-20 @ 11:30 AFTER HIS RADIATION APPT.

## 2020-08-24 NOTE — TELEPHONE ENCOUNTER
Received phone request from [facility] for oxycodone refill 5 mg. Upon review previously 7 day supply given 08/14/2020. Reviewed with Dr. Kolb and ok to increase sig to 15 day supply at this time. Pended to md for review.

## 2020-08-25 NOTE — PROGRESS NOTES
Jennifer Chamberlain  8/25/2020  Wt Readings from Last 3 Encounters:   08/25/20 121 lb 3.2 oz (55 kg)   08/24/20 131 lb (59.4 kg)   08/21/20 132 lb (59.9 kg)     Body mass index is 20.17 kg/m². Treatment Area: brain, spine, lung     Patient was seen today for weekly visit. Comfort Alteration  Fatigue: Mild      Nutritional Alteration  Anorexia: No , improving   Nausea: at times but using antiemetics    Vomiting: No       Elimination Alterations  Constipation: no  Diarrhea:  no    Skin Alteration   Sensation: no concerns     Radiation Dermatitis:  Intact [x]     Erythema  []     Discoloration  []     Rash []     Dry desquamation  []     Moist desquamation []       Emotional  Coping: effective      Injury, potential bleeding or infection:  Reviewed skin care    Lab Results   Component Value Date    WBC 14.2 (H) 08/12/2020     08/12/2020         /84   Pulse 79   Temp 97.4 °F (36.3 °C) (Temporal)   Resp 16   Wt 121 lb 3.2 oz (55 kg)   SpO2 97%   BMI 20.17 kg/m²   Patient Currently in Pain: Yes  Pain Assessment: 0-10  Pain Level: 6         Assessment/Plan: Patient was seen today for weekly visit. Denies major issues at this time. Back pain, tolerable with medication. Dr. Reinier Esquivel updated and examined pt. Continue as planned.      Shanda Hunter

## 2020-08-25 NOTE — TELEPHONE ENCOUNTER
[employer] rep paged to come for mid treatment monitoring tomorrow and end of treatment 8-31-20 at 1115am. Ale called me back and verified date and times.

## 2020-08-25 NOTE — PROGRESS NOTES
Patient: Whitney Rojas     : 1950      Date of Service: 2020    [facility]  On Treatment Visit (OTV) Note    Diagnosis: Cancer Staging  No matching staging information was found for the patient. Dose Accrued:Feeling well completed 1800 out of 3000 cGy to the whole brain, 6 out of 10 treatments    S:better than last week doing well. O: /84   Pulse 79   Temp 97.4 °F (36.3 °C) (Temporal)   Resp 16   Wt 121 lb 3.2 oz (55 kg)   SpO2 97%   BMI 20.17 kg/m² . Well-appearing, in no apparent distress, alert and fully oriented, answers questions appropriately. No signs of acute radiation-induced toxicity on physical exam.    IGRT: Set-up images acquired to ensure daily treatment accuracy and precision were reviewed by the physician. A&P: Continue radiotherapy as planned. Moisturize treated area as instructed. We reviewed reasonably anticipated side effects in the upcoming weeks, as well as expected trajectory of recovery. Patient verbalized a good understanding to everything.     Electronically signed by Alphonso Elder MD on 2020 at 7:49 PM

## 2020-08-25 NOTE — TELEPHONE ENCOUNTER
Called [employer] to set up monitoring for tomorrow and last treatment 8-31-20 at 1115am. Local rep paged to call us back.

## 2020-08-26 NOTE — TELEPHONE ENCOUNTER
Name: Garrick Sender  : 1950  MRN: [id number]    Oncology Navigation Follow-Up Note  Navigator left message offering assistance, plan to follow.   Electronically signed by Pardeep Munguia RN on 2020 at 2:43 PM

## 2020-08-26 NOTE — PROGRESS NOTES
Jocy Boyle arrives ambulatory with wife  Order for B12 injection  B12 given to lt upper arm   Band aid applied  Discharged per ambulatory altered mental status

## 2020-08-31 NOTE — TELEPHONE ENCOUNTER
Catalina Craig MD VISIT'   DR BUSTAMANTE IN TO SEE PATIENT  ORDERS RECEIVED  SCRIPT SENT TO PATIENT 'S PHARMACY  [address] NEXT WEEK  RV 9/21/20 W/TX  [address] 9/10/20 @ 8AM  MD VISIT 9/21/20 @ 8:45 AM TX @ 8AM  AVS PRINTED AND GIVEN TO PATIENT W/ INSTRUCTIONS  PATIENT DISCHARGED TO CHEMO TEACHING APPOINTMENT

## 2020-08-31 NOTE — PROGRESS NOTES
Rinku Chavez                                                                                                                  8/31/2020  MRN:   [id number]  YOB: 1950  PCP:                           No primary care provider on file. Referring Physician: No ref. provider found  Treating Physician Name: Stephanie Alicia MD      Reason for visit:  Chief Complaint   Patient presents with    Follow-up     Review status of disease     Other     would like to over instructions for steroid     Medication Refill   Discussed treatment plan    Current problems:  Metastatic poorly differentiated adenocarcinoma of lung primary  Brain metastasis  Bone metastasis    Active and recent treatments:  Whole brain radiation, 3000 cGy-8/2020  Palliative radiation to thoracic spine  Anticipating systemic therapy using carboplatin Alimta and Keytruda    Summary of Case/History:    Rinku Chavez a 69 y.o.male is a patient with metastatic poorly differentiated adenocarcinoma of lung with brain and bone mets. Patient was initially seen at [facility] with complains of right upper extremity weakness. Work-up in the hospital also showed a lung mass. Initially stroke alert was called but further work-up revealed that patient right-sided weakness was because of brain metastasis. Patient was subsequently started on steroids. Patient MRI spine also showed thoracic spine metastasis. Biopsy from the iliac bone came back consistent with adenocarcinoma lung primary    Interim History:    Patient presents to the clinic for a follow-up visit accompanied by his daughter-in-law to discuss further treatment plan. Patient is currently undergoing whole brain radiation. He is tolerating it well. Pain is improved but not completely gone. Denies hospitalization ER visit. Complains of being weak. Complains of occasional nausea. Denies fever chills. Denies vision changes or headaches.   Continues to be on steroids. During this visit patient's allergy, social, medical, surgical history and medications were reviewed and updated. Past Medical History:   Past Medical History:   Diagnosis Date    Cancer [facility])     right lung    Cardiac resynchronization therapy defibrillator (CRT-D) in place     Chest pain 2019    Coronary artery disease     Dyspnea on exertion     Elevated troponin 2019    Heart attack ([address]     Hyperlipidemia     Hypertension     Ischemic cardiomyopathy        Past Surgical History:     Past Surgical History:   Procedure Laterality Date    CORONARY ANGIOPLASTY WITH STENT PLACEMENT      BMS to LAD    CT BIOPSY PERCUTANEOUS DEEP BONE  2020    CT BIOPSY PERCUTANEOUS DEEP BONE 2020 [facility] CT SCAN    IR PORT PLACEMENT EQUAL OR GREATER THAN 5 YEARS  2020    IR PORT PLACEMENT EQUAL OR GREATER THAN 5 YEARS 2020 Tamera Brennan MD [facility]ED[facility]    PACEMAKER PLACEMENT  2019    AICD/ [employer]  MODEL #ISIL5A9 CRTD AMPLIA MRI USDF4.  LEADS [id number], [id number] AND 8759-77        Patient Family Social History:     Social History     Socioeconomic History    Marital status:      Spouse name: None    Number of children: None    Years of education: None    Highest education level: None   Occupational History    None   Social Needs    Financial resource strain: None    Food insecurity     Worry: None     Inability: None    Transportation needs     Medical: None     Non-medical: None   Tobacco Use    Smoking status: Former Smoker     Types: Cigarettes     Last attempt to quit: 2012     Years since quittin.0    Smokeless tobacco: Never Used    Tobacco comment: 48 yr hx of smoking   Substance and Sexual Activity    Alcohol use: No    Drug use: Not Currently     Types: Marijuana     Comment: last smoked 2020    Sexual activity: None   Lifestyle    Physical activity     Days per week: None     Minutes per session: None auscultation, no wheezes, rales or rhonchi, symmetric air entry  Heart - normal rate, regular rhythm, normal S1, S2, no murmurs  Abdomen - soft, nontender, nondistended, no masses or organomegaly  Neurological - alert, oriented, normal speech. Right upper extremity weakness  Extremities - peripheral pulses normal, no pedal edema, no clubbing or cyanosis  Skin - normal coloration and turgor, no rashes, no suspicious skin lesions noted       DATA:    Results for orders placed or performed during the hospital encounter of 08/20/20   Covid-19 Ambulatory   Result Value Ref Range    SARS-CoV-2, JAMAAL Not Detected Not Detected     Ct Abdomen Pelvis Wo Contrast Additional Contrast? None    Result Date: 8/14/2020  EXAMINATION: CT OF THE ABDOMEN AND PELVIS WITHOUT CONTRAST 8/14/2020 3:09 pm TECHNIQUE: CT of the abdomen and pelvis was performed without the administration of intravenous contrast. Multiplanar reformatted images are provided for review. Dose modulation, iterative reconstruction, and/or weight based adjustment of the mA/kV was utilized to reduce the radiation dose to as low as reasonably achievable. COMPARISON: August 9, 2020 HISTORY: ORDERING SYSTEM PROVIDED HISTORY: Secondary malignant neoplasm of bone and bone marrow ([facility] [address] TECHNOLOGIST PROVIDED HISTORY: Patient has lung cancer with brain and bone mets, CT eval for cause of constipation Reason for Exam: Pt has Lung CA metastatic to mid-thoracic spine and brain. Currently has constipation x1 week no BM, eval for constipation cause. 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 Trace right pleural effusion. Organs: Evaluation of the solid organs is limited without intravenous contrast. No liver lesion. No gallstones. No pancreatic lesion. No splenomegaly. No adrenal lesion. No hydronephrosis. No renal calculus. GI/Bowel: No bowel obstruction. No acute inflammatory process. Sigmoid diverticulosis without acute diverticulitis. Pelvis: No free fluid. No bladder calculus. Prostate gland is enlarged. Peritoneum/Retroperitoneum: Atherosclerotic calcification of the abdominal aorta without aneurysmal dilatation. No adenopathy. Bones/Soft Tissues: No acute soft tissue abnormality. Osseous metastatic disease. Destructive lesions involving the iliac bones. No acute intra-abdominal findings. Osseous metastatic disease. Enlarged prostate gland. Ct Head Wo Contrast    Result Date: 8/9/2020  EXAMINATION: CT OF THE HEAD WITHOUT CONTRAST 8/9/2020 11:47 am TECHNIQUE: CT of the head was performed without the administration of intravenous contrast. Dose modulation, iterative reconstruction, and/or weight based adjustment of the mA/kV was utilized to reduce the radiation dose to as low as reasonably achievable. COMPARISON: None. HISTORY: ORDERING SYSTEM PROVIDED HISTORY: New deficiet, one week old TECHNOLOGIST PROVIDED HISTORY: New deficiet, one week old FINDINGS: Large area of hypodensity within the left MCA distribution. Small area of hypodensity within the right posterior parietal lobe. No mass effect. No midline shift. Mild prominence of the ventricles and sulci is consistent with atrophy. No acute soft tissue abnormality. No acute orbital abnormality. No acute osseous abnormality. Opacity throughout the right maxillary sinus with bony overgrowth suggesting chronic paranasal sinus disease. Large subacute left MCA distribution infarct is suspected. Small right posterior parietal subacute infarct is suspected. Follow-up with MRI may be helpful.      Mri Cervical Spine W Wo Contrast    Result Date: 8/10/2020  EXAMINATION: MRI OF THE CERVICAL SPINE WITHOUT AND WITH CONTRAST  8/10/2020 1:07 pm TECHNIQUE: Multiplanar multisequence MRI of the cervical spine was performed without and with the administration of intravenous contrast. COMPARISON: None HISTORY: ORDERING SYSTEM PROVIDED HISTORY: spinal mets, possible compression TECHNOLOGIST PROVIDED HISTORY: spinal mets, possible compression Reason for Exam: spinal mets, possible compression. FINDINGS: BONES/ALIGNMENT: There is normal alignment of the spine. The vertebral body heights are maintained. The bone marrow signal appears unremarkable. SPINAL CORD: No abnormal cord signal is seen. SOFT TISSUES: No abnormal enhancement of the cervical spine. No paraspinal mass identified. C2-C3: There is no significant disc protrusion, spinal canal stenosis or neural foraminal narrowing. C3-C4: Disc osteophyte complex, uncovertebral and facet hypertrophy cause mild thecal sac, severe right foraminal and mild left foraminal stenosis. C4-C5: There is no significant disc protrusion, spinal canal stenosis or neural foraminal narrowing. C5-C6: Disc osteophyte complex, uncovertebral and facet hypertrophy cause mild thecal sac, severe right foraminal, and moderate left foraminal stenosis. C6-C7: Disc osteophyte complex, uncovertebral and facet hypertrophy cause moderate right and severe left foraminal stenosis. C7-T1: There is no significant disc protrusion, spinal canal stenosis or neural foraminal narrowing. No cervical spine metastasis. No evidence of cervical myelopathy. Mri Thoracic Spine W Wo Contrast    Result Date: 8/10/2020  EXAMINATION: MRI OF THE THORACIC SPINE WITHOUT AND WITH CONTRAST  8/10/2020 1:07 pm TECHNIQUE: Multiplanar multisequence MRI of the thoracic spine was performed without and with the administration of intravenous contrast. COMPARISON: 08/09/2020 HISTORY: ORDERING SYSTEM PROVIDED HISTORY: spinal mets, suspected compression TECHNOLOGIST PROVIDED HISTORY: spinal mets, suspected compression Reason for Exam: spinal mets, possible compression FINDINGS: BONES/ALIGNMENT: There is a metastasis within the T6 vertebral body.   There is mild pathologic compression fracture with 30% anterior height loss. There is no significant bone retropulsion. There is no extraosseous tumor invasion or invasion into the spinal canal. Thoracic alignment is maintained. There is a left third rib metastasis in the proximal aspect of the rib near the costovertebral junction. SPINAL CORD: No abnormal cord signal is seen. SOFT TISSUES:  Right lung mass and mediastinal metastatic lymph nodes are better seen on prior chest CT. DEGENERATIVE CHANGES: No significant spinal canal stenosis or neural foraminal narrowing of the thoracic spine. T6 metastasis with pathologic compression fracture. No spinal canal invasion. Xr Chest Portable    Result Date: 8/4/2020  EXAMINATION: ONE XRAY VIEW OF THE CHEST 8/4/2020 2:54 pm COMPARISON: AP chest from 02/08/2019. HISTORY: ORDERING SYSTEM PROVIDED HISTORY: chest pain TECHNOLOGIST PROVIDED HISTORY: chest pain Reason for Exam: chest pain and constipation Acuity: Unknown Type of Exam: Unknown History of cardiomyopathy. FINDINGS: Left-sided AICD via subclavian approach, with unchanged biventricular and right atrial electrodes. Cardiac silhouette unchanged; new rounded right hilar mass, not seen previously. Some fullness right-sided mediastinum suggests possible adenopathy. Remainder upper mediastinum, both lungs and both costophrenic angles clear. Unchanged mild DJD spine. New right hilar mass. The findings were sent to the Radiology Results [id number] at 3:03 p.m. on 8/4/2020to be communicated to a licensed caregiver. RECOMMENDATION: CT scan of the chest recommended, with IV contrast if possible. Cta Chest Abdomen Pelvis W Contrast    Addendum Date: 8/9/2020    ADDENDUM: Right 7th rib, T6 and right iliac bone and several other possible bony metastatic lesions also identified.      Result Date: 8/9/2020  EXAMINATION: CTA OF THE CHEST, ABDOMEN AND PELVIS WITH CONTRAST, 8/4/2020 3:09 pm TECHNIQUE: CTA of the chest, abdomen and pelvis was performed after the administration of intravenous contrast.  Multiplanar reformatted images are provided for review. MIP images are provided for review. Dose modulation, iterative reconstruction, and/or weight based adjustment of the mA/kV was utilized to reduce the radiation dose to as low as reasonably achievable. COMPARISON: CT scan of the abdomen and pelvis from 02/10/2019. HISTORY: ORDERING SYSTEM PROVIDED HISTORY: back pain TECHNOLOGIST PROVIDED HISTORY: back pain Reason for Exam: Mid back pain, right arm numbness. Left abd pain, chest pressure Acuity: Acute Type of Exam: Initial FINDINGS: CTA CHEST: No acute abnormality thoracic aorta; slight calcific plaque aortic arch. Normal patent branch vessels to the head and upper extremities. No aneurysm or dissection. Mildly dilated left ventricle. Left-sided AICD via subclavian approach with electrode leads in the right heart and coronary sinus. Incidental findings: Lobular irregular ~ 4.6 x 4.4 cm RLL mass superior segment abutting the thickened medial pleura and upper major fissure with central low attenuation, and a small high density focus abutting its superolateral border measuring 1.7 x 1.1 cm (slice 52, series 4). 2.0 x 1.6 cm right upper lobe mass (best seen on cuts 45-52, series 4). Extensive right hilar adenopathy and enlarged subcarinal and right paratracheal nodes, largest measuring 2.1 (short axis). Small right pleural effusion. Emphysematous changes upper lung zones and slight posteroinferior dependent or atelectatic changes. No contralateral nodules. No sizable left pleural effusion. Mild kyphoscoliosis and DJD thoracic spine. CTA ABDOMEN: Moderate ASVD with some calcification abdominal aorta, but patent visceral vessels and no aneurysm or dissection. No adrenal mass. No focal hepatic mass. No splenomegaly or focal mass. Mild pancreatic ductal dilatation. Normal biliary tree. No calcified gallstone.  Large amount of retained stool throughout large bowel with some diverticular disease but no diverticulitis or obvious mass. Symmetric renal perfusion bilaterally. Probable peripelvic cysts, especially on the left. No suspicious focal abnormality identified. A few mildly enlarged retroperitoneal nodes but no bulky lymphadenopathy. Small peripancreatic node. Mild-moderate degenerative changes lumbar spine with DDD L4-S1. No destructive or blastic lesion. CTA PELVIS: Elongated moderately diseased iliac arteries, especially external iliac arteries without aneurysm or clear cut dissection. Enlarged prostate measuring 5.1 x 4.4 cm. Urinary bladder WNL. THORACIC/LUMBAR SPINE: Please see separate thoracic/lumbar spine CT report. No aortic dissection or aneurysm. Large right lower lobe mass with additional RLL and RUL nodules, significant right hilar/ipsilateral mediastinal adenopathy and small pleural effusion. Additional findings, as detailed in the body of the report above. RECOMMENDATIONS: Bronchoscopic directed or percutaneous biopsy. Cta Chest Abdomen Pelvis W Contrast    Result Date: 8/9/2020  EXAMINATION: CTA OF THE CHEST, ABDOMEN AND PELVIS WITH CONTRAST 8/9/2020 11:50 am TECHNIQUE: CTA of the chest, abdomen and pelvis was performed after the administration of intravenous contrast.  Multiplanar reformatted images are provided for review. MIP images are provided for review. Dose modulation, iterative reconstruction, and/or weight based adjustment of the mA/kV was utilized to reduce the radiation dose to as low as reasonably achievable. 3D reconstructed images were performed on a separate workstation and provided for review. COMPARISON: August 4, 2020 HISTORY: ORDERING SYSTEM PROVIDED HISTORY: Chest pain, heart disease, persistent back pain, constipation, urinary retention TECHNOLOGIST PROVIDED HISTORY: Chest pain, heart disease, persistent back pain, constipation, urinary retention FINDINGS: CT CHEST: Chest wall: Left chest wall pacemaker.   No axillary adenopathy. Mediastinum: Mild cardiomegaly. Trace pericardial effusion. Coronary artery calcifications. Necrotic mediastinal adenopathy. For example there is a right paratracheal lymph node measuring 2.8 cm. Right hilar mass invading into the mediastinum measures at least 2.6 cm. Lungs: Right hilar mass causes narrowing of the right main pulmonary artery. Right lower lobe mass measures 4.2 x 4.7 cm and extends to the underlying pleural surface. Anteriorly this extends to the fissure. Right upper lobe mass measures 18 mm series 4, image 52. Mild centrilobular emphysema. No left pleural effusion. Trace right pleural effusion. Bones: Osseous metastatic disease involving the right 7th rib. Osseous metastatic disease within a midthoracic vertebral body. CT ABDOMEN-PELVIS: Organs: No liver lesion. No gallstones. No pancreatic lesion. No splenomegaly. No adrenal lesion. No hydronephrosis. Subcentimeter left renal hypodensity is likely a cyst but incompletely characterized. GI/Bowel: No bowel obstruction. No acute inflammatory process. Sigmoid diverticulosis without acute diverticulitis. Pelvis: No free fluid. No bladder calculus. Prostate gland is enlarged and slightly heterogeneous. Peritoneum/Retroperitoneum: No adenopathy. Bones/Soft Tissues: No acute soft tissue abnormality. Osseous metastatic disease. Destructive iliac lesions. VASCULAR: No thoracic aortic aneurysm. No thoracic aortic dissection. Origin of the great vessels are patent. No abdominal aortic aneurysm. No acute abdominal aortic dissection. Questionable area of plaque versus small focal dissection inferior to the left renal artery. This area is unchanged. Celiac axis is patent. SMA is patent. Renal arteries are patent. Atherosclerotic calcification of the infrarenal abdominal aorta. No acute vascular findings. No acute findings within the chest, abdomen, or pelvis.  Questionable small remote aortic dissection versus plaque just inferior to the left renal artery. No aortic aneurysm. Metastatic disease. Multiple pulmonary masses with invasion of the right hilum. Necrotic mediastinal adenopathy. Multiple areas of osseous metastatic disease with destructive lytic lesions. Prostate gland is enlarged and heterogeneous. Correlate with PSA values. Ct Biopsy Deep Bone Percutaneous    Result Date: 8/12/2020  PROCEDURE: CT BX BONE NEEDLE DEEP MODERATE CONSCIOUS SEDATION 8/11/2020 HISTORY: ORDERING SYSTEM PROVIDED HISTORY: rt lower lobe mass with necrotic mediastinal adenopathy with diffuse mets brain and bone TECHNOLOGIST PROVIDED HISTORY: rt lower lobe mass with necrotic mediastinal adenopathy with diffuse mets brain and bone Reason for Exam: rt lower lobe mass with necrotic mediastinal adenopathy with diffuse mets brain and  bone TECHNIQUE: Dose modulation, iterative reconstruction, and/or weight based adjustment of the mA/kV was utilized to reduce the radiation dose to as low as reasonably achievable. CONTRAST: None SEDATION: 1 mgversed and 50 mcg fentanyl were titrated intravenously for moderate sedation monitored under my direction. Total intraservice time of sedation was 15 minutes. The patient's vital signs were monitored throughout the procedure and recorded in the patient's medical record by the nurse. DESCRIPTION OF PROCEDURE: Informed consent was obtained after a detailed explanation of the procedure including risks, benefits, and alternatives. Universal protocol was observed. Patient was placed supine on the table. Scanning through the left iliac anterior spine was performed. Lytic bone lesion was localized. Patient was prepped and draped sterile fashion. 1% lidocaine was infiltrated for local anesthesia. An 18 gauge coaxial needle was advanced under CT guidance into the lesion. 518 gauge core biopsies were obtained and submitted to pathologist at bedside. Adequate sample was confirmed. Needle was removed.  A tissues demonstrate no acute abnormality. The findings were sent to the Radiology Results [address] at 3:44 pm on 8/10/2020to be communicated to a licensed caregiver. 1. Multiple bilateral supratentorial enhancing lesions, consistent with metastatic disease. The largest of these is in the left parietal lobe measuring up to 1.7 cm and associated with extensive vasogenic edema. 2. No evidence of acute infarct or intracranial hemorrhage. 3. Severe chronic right maxillary sinusitis. Impression:  Metastatic poorly differentiated adenocarcinoma of lung primary  Brain metastasis  Bone metastasis  History of tobacco dependence, quit 8 years ago    Plan:  I had a detailed discussion with the patient and personally went over results of lab work-up imaging studies and other relevant clinical data. Reviewed records from radiation oncology  Reviewed results of available next addition sequencing  Anticipating to start patient on treatment once treatment is approved and patient is completed whole brain radiation and palliative radiation to the thoracic spine  Reiterated treatment plan. We will reassess response after 3-4 cycles  Patient counseled on use of pain medication. Patient was also given a prescription for pain medication. We will see patient back in office on day 1 of cycle 1 of treatment. NCCN guidelines were reviewed and discussed with the patient. The diagnosis and care plan were discussed with the patient in detail. I discussed the natural history of the disease, prognosis, risks and goals of therapy and answered all the patients questions to the best of my ability. Patient expressed understanding and was in agreement.     Christie Goodson    This note is created with the assistance of a speech recognition program.  While intending to generate a document that actually reflects the content of the visit, the document can still have some errors including those of syntax and sound a like substitutions which may escape proof reading. It such instances, actual meaning can be extrapolated by contextual diversion.

## 2020-09-10 NOTE — PROGRESS NOTES
Patient here with daughter for first chemo treatment. No complaints today. Vitals obtained. Port accessed per policy and labs drawn and sent. Labs reviewed. Called Dr. Grace Shore to discuss labs. Platelet count =57. May be related to recent radiation treatments. Order received to give chemo as planned and give 1 unit platelets after chemo. Type and crossmatch sent since he has never had blood products before. Premeds given per [facility]. Chemo hung per MAR. Infusing. No complaints. Completed. Tolerated well. Tubing changed. 1 unit platelets hung per flow sheet. Infusing. No complaints. Vitals obtained and placed on flow sheet. Completed. Tolerated well. Port flushed per policy and gripper removed intact, band aid to site. Has a return visit scheduled. Discharged ambulatory with family.

## 2020-09-10 NOTE — FLOWSHEET NOTE
Situation:  Writer visited with Mr. Jairo Somers and Daughter-in-law in the treatment cubicle of the [facility] clinic. Writer learned that Pt was beginning treatment today. Assessment:  Mr. Jairo Somers was joined by his daughter-in-law, Aaliyah Schilling. He talked about his experience and physical symptoms since he was diagnosed. He acknowledged that he continues to have pain and that his experience of illness and diagnosis was difficult for him. He shared his desire to keep active and engaged with life. 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 DIL affirmed Pt for his strength. Pt reflected that his mother was also strong. Pt is hopeful that the treatment will help. 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 Intervention:  Writer provided supportive presence and introduced herself to Pt and Family; inquired about Pt's coping with illness; asked about Pt's sources of support and meaning; affirmed Pt's strengths; gave Pt information about her services; offered words of support and encouragement. Outcome:  Patient thanked writer for the visit. He gave writer a copy of one of his music CDs that he and his friends made. 09/10/20 1026   Encounter Summary   Services provided to: Patient and family together   Referral/Consult From: Nurse   Support System Family members;Friends/neighbors   Continue Visiting   (9/10/20)   Complexity of Encounter Moderate   Length of Encounter 30 minutes   Spiritual Assessment Completed Yes   Routine   Type Initial   Spiritual/Yarsani   Type Spiritual support   Assessment Calm; Approachable; Hopeful;Coping   Intervention Active listening;Explored feelings, thoughts, concerns;Explored coping resources;Sustaining presence/ Ministry of presence; Discussed meaning/purpose;Discussed belief system/Cheondoism practices/alexis;Discussed illness/injury and it's impact   Outcome Receptive; Hopeful;Encouraged;Coping;Expressed feelings/needs/concerns;Engaged in conversation;Expressed gratitude     Electronically signed by Jennifer Mathis, Oncology Outpatient [place] [id number], [facility]  9/10/2020  10:28 AM

## 2020-09-11 NOTE — PROGRESS NOTES
tablet) every day for 4 days then stop it. Yes Dillon Montgomery MD   omeprazole (PRILOSEC) 10 MG delayed release capsule Take 10 mg by mouth daily OTC Yes Historical Provider, MD   ondansetron (ZOFRAN ODT) 4 MG disintegrating tablet Take 1 tablet by mouth every 8 hours as needed for Nausea Yes Jonna Wang MD   fentaNYL (DURAGESIC) 12 MCG/HR Place 1 patch onto the skin every 72 hours for 30 days. Apply one every 3 days Yes Jonna Wang MD   polyethylene glycol (GLYCOLAX) 17 g packet Take 17 g by mouth daily as needed for Constipation Yes Historical Provider, MD   folic acid (FOLVITE) 1 MG tablet Take 1 tablet by mouth daily START 7 DAYS BEFORE FIRST DOSE OF PEMETREXED---TO BE TAKEN DAILY --AND CONTINUE 21 DAYS AFTER LAST DOSE OF PEMETREXED Yes Jonna Wang MD   dexamethasone (DECADRON) 4 MG tablet Take 1 tablet by mouth as needed (TO BE TAKEN WITH PEMETREXED CHEMOTHERAPY , SEE INSTRUCTIONS) DEXAMETHASONE 4 MG TWICE DAILY FOR 3 DAYS , STARTING THE DAY BEFORE SCHEDULED PEMETREXED CHEMOTHERAPY DOSE Yes Jonna Wang MD   pantoprazole (PROTONIX) 40 MG tablet Take 1 tablet by mouth every morning (before breakfast) Yes Olivia Mendez MD   carvedilol (COREG) 12.5 MG tablet Take 12.5 mg by mouth 2 times daily (with meals) Yes Historical Provider, MD   aspirin 81 MG chewable tablet Take 1 tablet by mouth daily Yes James Taylor MD   nitroGLYCERIN (NITROSTAT) 0.4 MG SL tablet up to max of 3 total doses. If no relief after 1 dose, call 911. Yes James Taylor MD   oxyCODONE (ROXICODONE) 5 MG immediate release tablet Take 1 tablet by mouth every 6 hours as needed for Pain for up to 15 days.   Jonna Wang MD       Social History     Tobacco Use    Smoking status: Former Smoker     Types: Cigarettes     Last attempt to quit: 2012     Years since quittin.0    Smokeless tobacco: Never Used    Tobacco comment: 48 yr hx of smoking   Substance Use Topics    Alcohol use: No    Drug use: Not Currently Types: Marijuana     Comment: last smoked 6/2020            RECORD REVIEW: Previous medical records were reviewed at today's visit.     Wt Readings from Last 3 Encounters:   09/10/20 124 lb (56.2 kg)   08/31/20 120 lb 4.8 oz (54.6 kg)   08/24/20 131 lb (59.4 kg)       Results for orders placed or performed during the hospital encounter of 09/10/20   Lipid Panel   Result Value Ref Range    Cholesterol 162 <200 mg/dL    HDL 49 >40 mg/dL    LDL Cholesterol 93 0 - 130 mg/dL    Chol/HDL Ratio 3.3 <5    Triglycerides 100 <150 mg/dL    VLDL NOT REPORTED 1 - 30 mg/dL   TSH without Reflex   Result Value Ref Range    TSH 0.90 0.30 - 5.00 mIU/L   Comprehensive Metabolic Panel   Result Value Ref Range    Glucose 170 (H) 70 - 99 mg/dL    BUN 19 8 - 23 mg/dL    CREATININE 0.87 0.70 - 1.20 mg/dL    Bun/Cre Ratio 22 (H) 9 - 20    Calcium 8.9 8.6 - 10.4 mg/dL    Sodium 137 135 - 144 mmol/L    Potassium 4.3 3.7 - 5.3 mmol/L    Chloride 99 98 - 107 mmol/L    CO2 25 20 - 31 mmol/L    Anion Gap 13 9 - 17 mmol/L    Alkaline Phosphatase 87 40 - 129 U/L    ALT 19 5 - 41 U/L    AST 12 <40 U/L    Total Bilirubin 0.28 (L) 0.3 - 1.2 mg/dL    Total Protein 5.1 (L) 6.4 - 8.3 g/dL    Alb 2.6 (L) 3.5 - 5.2 g/dL    Albumin/Globulin Ratio NOT REPORTED 1.0 - 2.5    GFR Non-African American >60 >60 mL/min    GFR African American >60 >60 mL/min    GFR Comment          GFR Staging NOT REPORTED    CBC Auto Differential   Result Value Ref Range    WBC 5.4 3.5 - 11.3 k/uL    RBC 4.19 (L) 4.21 - 5.77 m/uL    Hemoglobin 12.5 (L) 13.0 - 17.0 g/dL    Hematocrit 39.1 (L) 40.7 - 50.3 %    MCV 93.3 82.6 - 102.9 fL    MCH 29.8 25.2 - 33.5 pg    MCHC 32.0 28.4 - 34.8 g/dL    RDW 17.2 (H) 11.8 - 14.4 %    Platelets 64 (L) 507 - 453 k/uL    MPV 11.1 8.1 - 13.5 fL    NRBC Automated 0.0 0.0 per 100 WBC    Differential Type NOT REPORTED     WBC Morphology NOT REPORTED     RBC Morphology NOT REPORTED     Platelet Estimate NOT REPORTED     Seg Neutrophils 92 (H) 36 - 66 % Lymphocytes 7 (L) 24 - 44 %    Monocytes 1 1 - 7 %    Eosinophils % 0 (L) 1 - 4 %    Basophils 0 %    Immature Granulocytes 0 0 %    Segs Absolute 4.97 1.8 - 7.7 k/uL    Absolute Lymph # 0.38 (L) 1.0 - 4.8 k/uL    Absolute Mono # 0.05 (L) 0.2 - 0.8 k/uL    Absolute Eos # 0.00 0.0 - 0.4 k/uL    Basophils Absolute 0.00 0.0 - 0.2 k/uL    Absolute Immature Granulocyte 0.00 0.00 - 0.30 k/uL   TYPE AND SCREEN   Result Value Ref Range    Expiration Date 09/13/2020,2359     Arm Band Number [id number]     ABO/Rh A POSITIVE     Antibody Screen NEGATIVE    PREPARE PLATELETS, 1 Product   Result Value Ref Range    Unit Number [id number]     Product Code LkIrPlt PAS     Unit Divison 00     Dispense Status TRANSFUSED     Transfusion Status OK TO TRANSFUSE        Ct Abdomen Pelvis Wo Contrast Additional Contrast? None    Result Date: 8/14/2020  EXAMINATION: CT OF THE ABDOMEN AND PELVIS WITHOUT CONTRAST 8/14/2020 3:09 pm TECHNIQUE: CT of the abdomen and pelvis was performed without the administration of intravenous contrast. Multiplanar reformatted images are provided for review. Dose modulation, iterative reconstruction, and/or weight based adjustment of the mA/kV was utilized to reduce the radiation dose to as low as reasonably achievable. 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 Currently has constipation x1 week no BM, eval for constipation cause. 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 Trace right pleural effusion.  Organs: Evaluation of the solid organs is limited without intravenous contrast. No direction. Total intraservice time of sedation was 15 minutes. The patient's vital signs were monitored throughout the procedure and recorded in the patient's medical record by the nurse. FLUOROSCOPY DOSE AND TYPE OR TIME AND EXPOSURES: 0.2 minutes, 646 mGycm^2, 1 fluoroscopic spot image TECHNIQUE: Informed consent was obtained after a detailed explanation of the procedure including risks, benefits, and alternatives. Universal protocol was observed. Preprocedure ultrasound of the right neck demonstrates a patent, compressible, right internal jugular vein. The right neck and chest were prepped and draped using maximum sterile barrier technique. 1% lidocaine was administered for local anesthesia and a small skin dermatotomy was made. Using ultrasound guidance, a micropuncture needle was placed into the right internal jugular vein. A digital image of the micropuncture needle entering the right internal jugular vein was obtained, and stored in the PACS system. The needle was removed over a wire which was exchanged for a microcatheter sheath which was left for access. 1% lidocaine was then administered over the right chest wall. A small skin incision was made. A subcutaneous port pocket was then created using blunt dissection. The port pocket was irrigated with normal saline and packed dry. 1% lidocaine was then administered over the right upper chest.  A MediPort was then subcutaneously tunneled from the skin incision site to the venotomy site. The port was advanced into the pocket. The catheter was cut to an appropriate length. The microcatheter sheath was removed over a wire and a peel-away sheath was placed. Through the peel-away sheath, the catheter was advanced into the SVC. The port was accessed and Hep-Locked. The skin incision was closed with interrupted 2-0 Vicryl sutures. The skin was then sealed with Dermabond. A sterile dressing was applied.  The patient tolerated the procedure well and there cerebrovascular accident with right hemiparesis        Plan:   1. Patient pulmonary status is stable. I talked to his daughter. He is not having any respiratory distress or coughing or hemoptysis. 2.   3. She does not think he needs any bronchodilators at this time. 4.   5. He has not noted any fever. 6.   7. He is the headache is not a problem anymore he still has bone pains and is taking pain control medications. 8.   9. He was advised to get influenza vaccine. He was also advised to get Pneumovax if he has not done so. 10.   11. He will continue follow-up with oncology. 12.   13. I will see them in about  14. An  electronic signature was used to authenticate this note. --Garrick Moss MD on 9/11/2020 at 12:12 PM    9}    Pursuant to the emergency declaration under the [address] [place] [address] waiver authority and the [employer] Act, this Virtual  Visit was conducted, with patient's consent, to reduce the patient's risk of exposure to COVID-19 and provide continuity of care for an established patient.     Services were provided through a video synchronous discussion virtually to substitute for in-person clinic visit.     _____________________________________________________________________________________________

## 2020-09-14 NOTE — PLAN OF CARE
Problem: Safety:  Goal: Free from accidental physical injury  Description: Free from accidental physical injury  9/14/2020 1516 by Nasir Diez RN  Outcome: Completed  9/14/2020 1516 by Nasir Diez RN  Outcome: Met This Shift  Goal: Free from intentional harm  Description: Free from intentional harm  9/14/2020 1516 by Nasir Diez RN  Outcome: Completed  9/14/2020 1516 by Nasir Diez RN  Outcome: Met This Shift

## 2020-09-14 NOTE — PROGRESS NOTES
Filomena Fuentes arrives per wheelchair with daughter from MD visit  Order for one liter of hydration  Rt chest port has been accessed per John Brown RN   Good blood return noted  Hydration initiated and completed over two hours  Port flushed and caraballo needle removed with needle intact  Band aid applied  Discharged per wheelchair

## 2020-09-14 NOTE — TELEPHONE ENCOUNTER
Job Felix MD VISIT  DR BUSTAMANTE IN TO SEE PATIENT  ORDERS RECEIVED  LABS TODAY  IVF 1 LIT TODAY  RV 1 WEEK W/CDP BMP & IVF 1 LIT  MD VISIT 09/21/20 @2:30PM  [email or web address]  SCRIPT SENT TO PATIENTS PHARMACY  AVS PRINTED AND GIVEN TO PATIENT WITH INSTRUCTIONS  PATIENT DISCHARGED TO [address] [place] [address]

## 2020-09-14 NOTE — PROGRESS NOTES
Rinku Chavez                                                                                                                  9/14/2020  MRN:   [id number]  YOB: 1950  PCP:                           No primary care provider on file. Referring Physician: No ref. provider found  Treating Physician Name: Stephanie Alicia MD      Reason for visit:  Chief Complaint   Patient presents with    Follow-up    Other     would like a stronger pain medication    Other     discuss getting oxygen / inhaler     Other     discuss Xanax/ taking with pain medication     Other     would like something to increase appetite   toxicity check    Current problems:  Metastatic poorly differentiated adenocarcinoma of lung primary  Brain metastasis  Bone metastasis    Active and recent treatments:  Whole brain radiation, 3000 cGy-8/2020  Palliative radiation to thoracic spine  Systemic therapy using carboplatin Alimta and Keytruda    Summary of Case/History:    Rinku Chavez a 69 y.o.male is a patient with metastatic poorly differentiated adenocarcinoma of lung with brain and bone mets. Patient was initially seen at [address] [place] [address] with complains of right upper extremity weakness. Work-up in the hospital also showed a lung mass. Initially stroke alert was called but further work-up revealed that patient right-sided weakness was because of brain metastasis. Patient was subsequently started on steroids. Patient MRI spine also showed thoracic spine metastasis. Biopsy from the iliac bone came back consistent with adenocarcinoma lung primary    Interim History:    Patient presents to the clinic for a follow-up visit for metastatic lung cancer, toxicity check, and to discuss further treatment plan. His back pain radiating down to his ankles is severe and inhibiting mobility, he increased Oxycodone as per orders and there was improvement within a few hours.  He had some anxiety over the dose increase affecting his None   Lifestyle    Physical activity     Days per week: None     Minutes per session: None    Stress: None   Relationships    Social connections     Talks on phone: None     Gets together: None     Attends Gnosticist service: None     Active member of club or organization: None     Attends meetings of clubs or organizations: None     Relationship status: None    Intimate partner violence     Fear of current or ex partner: None     Emotionally abused: None     Physically abused: None     Forced sexual activity: None   Other Topics Concern    None   Social History Narrative    None     Family History   Problem Relation Age of Onset    Cancer Mother     Cancer Father     Prostate Cancer Father        Current Medications:     Current Outpatient Medications   Medication Sig Dispense Refill    oxyCODONE (ROXICODONE) 5 MG immediate release tablet Take 1 tablet by mouth every 6 hours as needed for Pain for up to 15 days. 60 tablet 0    omeprazole (PRILOSEC) 10 MG delayed release capsule Take 10 mg by mouth daily OTC      ondansetron (ZOFRAN ODT) 4 MG disintegrating tablet Take 1 tablet by mouth every 8 hours as needed for Nausea 20 tablet 0    fentaNYL (DURAGESIC) 12 MCG/HR Place 1 patch onto the skin every 72 hours for 30 days.  Apply one every 3 days 10 patch 0    polyethylene glycol (GLYCOLAX) 17 g packet Take 17 g by mouth daily as needed for Constipation      folic acid (FOLVITE) 1 MG tablet Take 1 tablet by mouth daily START 7 DAYS BEFORE FIRST DOSE OF PEMETREXED---TO BE TAKEN DAILY --AND CONTINUE 21 DAYS AFTER LAST DOSE OF PEMETREXED 30 tablet 2    pantoprazole (PROTONIX) 40 MG tablet Take 1 tablet by mouth every morning (before breakfast) 30 tablet 3    carvedilol (COREG) 12.5 MG tablet Take 12.5 mg by mouth 2 times daily (with meals)      aspirin 81 MG chewable tablet Take 1 tablet by mouth daily 30 tablet 3    dexamethasone (DECADRON) 4 MG tablet Take 1 tablet by mouth as needed (TO BE TAKEN WITH PEMETREXED CHEMOTHERAPY , SEE INSTRUCTIONS) DEXAMETHASONE 4 MG TWICE DAILY FOR 3 DAYS , STARTING THE DAY BEFORE SCHEDULED PEMETREXED CHEMOTHERAPY DOSE (Patient not taking: Reported on 9/14/2020) 30 tablet 0    nitroGLYCERIN (NITROSTAT) 0.4 MG SL tablet up to max of 3 total doses. If no relief after 1 dose, call 911. (Patient not taking: Reported on 9/14/2020) 25 tablet 3     No current facility-administered medications for this visit. Allergies:   Penicillins    Review of Systems:    Constitutional: No fever or chills. No night sweats, positive weight loss, poor appetite  Eyes: No eye discharge, double vision, or eye pain   HEENT: negative for sore mouth, sore throat, hoarseness and voice change   Respiratory: negative for cough , sputum, dyspnea, wheezing, hemoptysis, chest pain   Cardiovascular: negative for chest pain, dyspnea, palpitations, orthopnea, PND   Gastrointestinal: negative for nausea, vomiting, diarrhea, constipation, abdominal pain, Dysphagia, hematemesis and hematochezia   Genitourinary: negative for frequency, dysuria, nocturia, urinary incontinence, and hematuria   Integument: negative for rash, skin lesions, bruises. Hematologic/Lymphatic: negative for easy bruising, bleeding, lymphadenopathy, or petechiae   Endocrine: negative for heat or cold intolerance,weight changes, change in bowel habits and hair loss   Musculoskeletal: negative for myalgias, arthralgias, pain, joint swelling,and bone pain +severe back pain radiating down legs  Neurological: negative for headaches, dizziness, seizures,  numbness.   Positive right-sided weakness        Physical Exam:    Vitals: BP (!) 74/50   Pulse 93   Temp 97.7 °F (36.5 °C) (Temporal)   Wt 122 lb 3.2 oz (55.4 kg)   BMI 20.34 kg/m²   General appearance - well appearing, no in pain or distress  Mental status - AAO X3  Eyes - pupils equal and reactive, extraocular eye movements intact  Mouth - mucous membranes moist, pharynx normal without lesions  Neck - supple, no significant adenopathy  Lymphatics - no palpable lymphadenopathy, no hepatosplenomegaly  Chest - clear to auscultation, no wheezes, rales or rhonchi, symmetric air entry  Heart - normal rate, regular rhythm, normal S1, S2, no murmurs  Abdomen - soft, nontender, nondistended, no masses or organomegaly  Neurological - alert, oriented, normal speech.   Right upper extremity weakness  Extremities - peripheral pulses normal, no pedal edema, no clubbing or cyanosis  Skin - normal coloration and turgor, no rashes, no suspicious skin lesions noted       DATA:    Results for orders placed or performed during the hospital encounter of 09/10/20   Lipid Panel   Result Value Ref Range    Cholesterol 162 <200 mg/dL    HDL 49 >40 mg/dL    LDL Cholesterol 93 0 - 130 mg/dL    Chol/HDL Ratio 3.3 <5    Triglycerides 100 <150 mg/dL    VLDL NOT REPORTED 1 - 30 mg/dL   TSH without Reflex   Result Value Ref Range    TSH 0.90 0.30 - 5.00 mIU/L   Comprehensive Metabolic Panel   Result Value Ref Range    Glucose 170 (H) 70 - 99 mg/dL    BUN 19 8 - 23 mg/dL    CREATININE 0.87 0.70 - 1.20 mg/dL    Bun/Cre Ratio 22 (H) 9 - 20    Calcium 8.9 8.6 - 10.4 mg/dL    Sodium 137 135 - 144 mmol/L    Potassium 4.3 3.7 - 5.3 mmol/L    Chloride 99 98 - 107 mmol/L    CO2 25 20 - 31 mmol/L    Anion Gap 13 9 - 17 mmol/L    Alkaline Phosphatase 87 40 - 129 U/L    ALT 19 5 - 41 U/L    AST 12 <40 U/L    Total Bilirubin 0.28 (L) 0.3 - 1.2 mg/dL    Total Protein 5.1 (L) 6.4 - 8.3 g/dL    Alb 2.6 (L) 3.5 - 5.2 g/dL    Albumin/Globulin Ratio NOT REPORTED 1.0 - 2.5    GFR Non-African American >60 >60 mL/min    GFR African American >60 >60 mL/min    GFR Comment          GFR Staging NOT REPORTED    CBC Auto Differential   Result Value Ref Range    WBC 5.4 3.5 - 11.3 k/uL    RBC 4.19 (L) 4.21 - 5.77 m/uL    Hemoglobin 12.5 (L) 13.0 - 17.0 g/dL    Hematocrit 39.1 (L) 40.7 - 50.3 %    MCV 93.3 82.6 - 102.9 fL    MCH 29.8 25.2 - 33.5 pg    MCHC ULTRASOUND GUIDED VASCULAR ACCESS. FLUOROSCOPY GUIDED PLACEMENT OF A SUBCUTANEOUS PORT-A-CATH. MODERATE CONSCIOUS SEDATION 8/24/2020. HISTORY: ORDERING SYSTEM PROVIDED HISTORY: Adenocarcinoma of right lung ([address] Lung cancer with need for chemotherapy. Port placement has been requested. SEDATION: 1 mgversed and 50 mcg fentanyl were titrated intravenously for moderate sedation monitored under my direction. Total intraservice time of sedation was 15 minutes. The patient's vital signs were monitored throughout the procedure and recorded in the patient's medical record by the nurse. FLUOROSCOPY DOSE AND TYPE OR TIME AND EXPOSURES: 0.2 minutes, 646 mGycm^2, 1 fluoroscopic spot image TECHNIQUE: Informed consent was obtained after a detailed explanation of the procedure including risks, benefits, and alternatives. Universal protocol was observed. Preprocedure ultrasound of the right neck demonstrates a patent, compressible, right internal jugular vein. The right neck and chest were prepped and draped using maximum sterile barrier technique. 1% lidocaine was administered for local anesthesia and a small skin dermatotomy was made. Using ultrasound guidance, a micropuncture needle was placed into the right internal jugular vein. A digital image of the micropuncture needle entering the right internal jugular vein was obtained, and stored in the PACS system. The needle was removed over a wire which was exchanged for a microcatheter sheath which was left for access. 1% lidocaine was then administered over the right chest wall. A small skin incision was made. A subcutaneous port pocket was then created using blunt dissection. The port pocket was irrigated with normal saline and packed dry. 1% lidocaine was then administered over the right upper chest.  A MediPort was then subcutaneously tunneled from the skin incision site to the venotomy site. The port was advanced into the pocket.   The catheter was cut to an appropriate length. The microcatheter sheath was removed over a wire and a peel-away sheath was placed. Through the peel-away sheath, the catheter was advanced into the SVC. The port was accessed and Hep-Locked. The skin incision was closed with interrupted 2-0 Vicryl sutures. The skin was then sealed with Dermabond. A sterile dressing was applied. The patient tolerated the procedure well and there were no immediate complications. FINDINGS: Fluoroscopic image demonstrates the tip of the port at the cavo-atrial junction . Successful ultrasound and fluoroscopy guided Port-A-Cath placement           Impression:  Metastatic poorly differentiated adenocarcinoma of lung primary  Brain metastasis  Bone metastasis  History of tobacco dependence, quit 8 years ago  Thrombocytopenia     Plan:  We discussed in detail managing his pain, I addressed his concerns about increased dosing of pain medication and I explained his pain should be controlled, I asked him to double Fentanyl patches to make 25 mcg starting today and continue with 1 to 1.5 Oxycodone every 6 hours or as needed. I cautioned him about avoiding constipation with opiates and I asked him to focus on increasing nutrition. We discussed hypotension, I asked him to hold Jin Drummer until he has increased eating and take half at night before bed, and explained some of his poor energy is related. I discussed plan for repeat lab work today and IV hydration. We will plan for weekly IV hydration with treatment. His anxiety persists and we discussed dosing Xanax appropriately with his pain medication. . I discussed plan for dose adjustment for treatment to improve tolerance. I am writing for 25 mcg Fentanyl and Megace. I reviewed plan for imaging after 4 cycles to assess response. May have to adjust dose of chemo to improve tolerance. Return in 1 week. NCCN guidelines were reviewed and discussed with the patient.   The diagnosis and care plan were discussed with the patient in detail. I discussed the natural history of the disease, prognosis, risks and goals of therapy and answered all the patients questions to the best of my ability. Patient expressed understanding and was in agreement. Shruti Ontiveros    This note is created with the assistance of a speech recognition program.  While intending to generate a document that actually reflects the content of the visit, the document can still have some errors including those of syntax and sound a like substitutions which may escape proof reading. It such instances, actual meaning can be extrapolated by contextual diversion.

## 2020-09-17 NOTE — TELEPHONE ENCOUNTER
Writer discussed with Dr. Logan Bullard and orders to send script for 1 q 4 hours ( 6 tabs per day) for 2 week supply.  Social Service to inquire if assist is possible since out of pocket paying for scripts. Bennett Carter is going to inquire. I was able to speak with Ethel to update as well. She is very appreciative to everyone's efforts.

## 2020-09-17 NOTE — TELEPHONE ENCOUNTER
received referral from Jeff Mancia stating patient had been paying for prescriptions out of pocket and was looking for assistance.  called patient's daughter who reports they have been utilizing GoodRx and other resources but costs have added up throughout treatment.  made referral to [employer] for assistance and will update patient when referral is answered.

## 2020-09-17 NOTE — TELEPHONE ENCOUNTER
Writer spoke with Uli Perea and Shruthi Lovett via phone. Hansa Singer is using his Fentanyl patches 25 mcg and then the Oxycodone 5 mg tabs. Taking 2 tabs @ HS and in AM then 1 tab every 4 to 6 hours during the day. On average he is using 6 tablets/ day. Has 36 tablets left at this time, would run out before afternoon visit on Monday 09/21/2020. Uli Perea states they are paying out of pocket for scripts as he has Medicare Part A and B. Will discuss with Dr. Shayla Browning for direction.

## 2020-09-18 NOTE — TELEPHONE ENCOUNTER
Per conversation 09/17/2020 script pended to md.  Did not pend last evening as waiting on response from Social Service to assist in coverage. Confirmed with [facility], SW this am script will be covered at [address].   Script pended to md

## 2020-09-18 NOTE — TELEPHONE ENCOUNTER
received approval from [employer] for assistance with prescription.  [facility] [address] [facility] and daughter.  called pharmacy to confirm coverage.

## 2020-09-19 PROBLEM — D69.6 THROMBOCYTOPENIA (HCC): Status: ACTIVE | Noted: 2020-01-01

## 2020-09-20 PROBLEM — D61.810 PANCYTOPENIA DUE TO ANTINEOPLASTIC CHEMOTHERAPY (HCC): Status: ACTIVE | Noted: 2020-01-01

## 2020-09-20 PROBLEM — T45.1X5A PANCYTOPENIA DUE TO ANTINEOPLASTIC CHEMOTHERAPY (HCC): Status: ACTIVE | Noted: 2020-01-01

## 2020-09-20 PROBLEM — C79.51 BONE METASTASIS (HCC): Status: ACTIVE | Noted: 2020-01-01

## 2020-09-20 PROBLEM — E86.0 DEHYDRATION: Status: ACTIVE | Noted: 2020-01-01

## 2020-09-20 PROBLEM — E43 SEVERE MALNUTRITION (HCC): Chronic | Status: ACTIVE | Noted: 2020-01-01

## 2020-09-20 NOTE — ED NOTES
Pt states that it is ok to access his port for his visit today. Pt states that he has had increased in fatigue in the last few days. Daughter at bedside states that he started chemo on or around the 4th of this month. Daughter states that since then he has needed a blood transfusion and IV fluids. Pt states he has had a decrease in appetite as well.  Pt is A&O x [place], RN  09/19/20 2042

## 2020-09-20 NOTE — PROGRESS NOTES
Writer called to pt's room. Writer noted that pt was holding port access covered in dressing connected to IV tubing. Pt stated, \"I really had to go to the bathroom, so I just got up and went, somebody told me that was okay before, and then there was just stuff shooting around everywhere. \" Pt educated about safety and to call when he needs to use the restroom or to use the urinal at bedside; further education is needed. RT chest port was re-accessed using sterile technique. All new IV tubing was placed and IVF were restarted per orders.    Electronically signed by Bernie Blake RN on 9/20/2020 at 5:27 PM

## 2020-09-20 NOTE — CARE COORDINATION
Case Management Initial Discharge Plan  Shahnaz Aparicio,         Readmission Risk              Risk of Unplanned Readmission:        25             Met with:patient to discuss discharge plans. Information verified: address, contacts, phone number, , insurance Yes  PCP: Dewey Hurtado MD  Date of last visit:     Insurance Provider: medicare A&B     Discharge Planning  Current Residence:  Private home   Living Arrangements:  Family Members       Home has 2 stories/3 stairs to climb to enter the home. Patient has bed and bath on main floor. Support Systems:  Children, Family Members, Friends/Neighbors       Current Services PTA:  Chemo   Agency: [facility]      Patient able to perform ADL's:Independent  DME in home:  Has walker, cane, shower chair all  If needed, does not use. DME used to aid ambulation prior to admission:   None   DME used during admission:  none    Potential Assistance Needed:  (unsure)    Pharmacy: [facility] in [facility] Medications:  No  Does patient want to participate in local refill/ meds to beds program?  No    Patient agreeable to home care: No  [place] of choice provided:  no      Type of Home Care Services:  None  Patient expects to be discharged to:  home    Prior SNF/Rehab Placement and Facility: none  Agreeable to SNF/Rehab: No  [place] of choice provided: n/a   Evaluation: n/a    Expected Discharge date:  20  Follow Up Appointment: Best Day/ Time: Monday AM    Transportation provider: per family  Transportation arrangements needed for discharge: No    Discharge Plan:   Patient lives with son Drake Mejia and Wava Landau. They assist him with anything needed. [facility] with mets to bone and brain. Patient states he has access to DME if needed but does not use anything at this time. He just started chemo and had first visit on  and is to go every 3 weeks.  He completed 10 visits

## 2020-09-20 NOTE — FLOWSHEET NOTE
Patient states well. No major needs, prayers. Patient declines visit at this time. Follow up as needed.      09/20/20 1723   Encounter Summary   Services provided to: Patient   Referral/Consult From: [facility]   Support System Unknown   Continue Visiting   (9-20-20)   Complexity of Encounter Low   Length of Encounter 15 minutes   Spiritual Assessment Completed Yes   Routine   Type Initial   Assessment Passive   Intervention Explored feelings, thoughts, concerns   Outcome Refused/declined

## 2020-09-20 NOTE — PLAN OF CARE
Nutrition Problem #1: Severe malnutrition, In context of chronic illness  Intervention: Food and/or Nutrient Delivery: Continue Current Diet, Start Oral Nutrition Supplement  Nutritional Goals: PO intake to meet >75% estimated energy and protein needs

## 2020-09-20 NOTE — PROGRESS NOTES
Comprehensive Nutrition Assessment    Type and Reason for Visit:  Initial, Positive Nutrition Screen    Nutrition Recommendations/Plan:   1. Continue General Diet  2. Avoid raw fruits/vegetables and yogurt d/t neutropenic precautions (notes made in [facility])  3. Start Ensure Enlive BID  4. Monitor PO intake/tolerance, labs    Nutrition Assessment:  Patient meets criteria for severe malnutriton in the context of chronic illness as evidenced by unintentional weight loss (7.6% in one month) and suboptimal energy intakes for greater than one month likely related to side effects from stage IV lung cancer with mets to brain and bone and previous radiation/current chemotherapy tx. Pt. currently on neutropenic precautions (WBC = 0.4) and appears very fatigued upon visit. Provided nutrition handouts for patient to take home on \"Food Safety,\" \"Making the Most of Each Bite,\" and \"High-protein, High-Calorie beverages. \" Patient agreeable to trying Ensure Enlive BID. Will monitor PO intakes and nutrition status. Malnutrition Assessment:  Malnutrition Status:  Severe malnutrition    Context:  Chronic Illness     Findings of the 6 clinical characteristics of malnutrition:  Energy Intake:  7 - 75% or less estimated energy requirements for 1 month or longer  Weight Loss:  7 - 5% over 1 month     Body Fat Loss:  1 - Mild body fat loss Fat Overlying Ribs, Orbital   Muscle Mass Loss:  Unable to assess    Fluid Accumulation:  1 - Mild Extremities   Strength:  Not Performed    Estimated Daily Nutrient Needs:  Energy (kcal):  4017-7895 kcal; Weight Used for Energy Requirements:  Admission     Protein (g):  77-88 g (1.4-1.6 g/kg); Weight Used for Protein Requirements:  Admission        Fluid (ml/day):  1 mL/kcal; Weight Used for Fluid Requirements:  Admission      Nutrition Related Findings:  GI: hypoactive bowel sounds, slightly firm (per flowsheet). [place]: +1 pitting BLE.  WBC: 0.4      Wounds:  None       Current Nutrition Therapies:    DIET GENERAL; Anthropometric Measures:  · Height: 5' 5\" (165.1 cm)  · Current Body Weight: 122 lb (55.3 kg)   · Admission Body Weight: 122 lb (55.3 kg)(stated)    · Usual Body Weight: 132 lb 0.9 oz (59.9 kg)(standing on 8/18/2020)     · Ideal Body Weight: 136 lbs; % Ideal Body Weight  89.7%   · BMI: 20.3  · Adjusted Body Weight:  ; No Adjustment   · BMI Categories: Underweight (BMI less than 22) age over 72       Nutrition Diagnosis:   · Severe malnutrition, In context of chronic illness related to catabolic illness, inadequate protein-energy intake as evidenced by poor intake prior to admission, weight loss greater than or equal to 5% in 1 month, mild loss of subcutaneous fat      Nutrition Interventions:   Food and/or Nutrient Delivery:  Continue Current Diet, Start Oral Nutrition Supplement  Nutrition Education/Counseling:  Counseling completed   Coordination of Nutrition Care:  Continued Inpatient Monitoring    Goals:  PO intake to meet >75% estimated energy and protein needs       Nutrition Monitoring and Evaluation:   Food/Nutrient Intake Outcomes:  Food and Nutrient Intake, Supplement Intake  Physical Signs/Symptoms Outcomes:  Biochemical Data, GI Status, Fluid Status or Edema, Skin, Weight     Discharge Planning:    Continue Oral Nutrition Supplement     Some areas of assessment may be incomplete due to COVID-19 precautions.     Shahid Dominguez RDN, VINAY  RD Office Phone: [phone]

## 2020-09-20 NOTE — PROGRESS NOTES
Pt arrived from ED approx 2330. Able to to ambulate short distance to bedside with x 1 assist as pt with general weakness. Tele applied, vitals done. Nursing hx completed with daughter at bedside. Oriented to room, writer, tv and bed controls. Call light placed in reach and explained. . will continue to monitor closely

## 2020-09-20 NOTE — PLAN OF CARE
Problem: Falls - Risk of:  Goal: Will remain free from falls  Description: Will remain free from falls  9/20/2020 1140 by Ryan Mukherjee RN  Outcome: Ongoing  9/20/2020 0108 by Eva Kebede RN  Outcome: Ongoing  Goal: Absence of physical injury  Description: Absence of physical injury  Outcome: Ongoing

## 2020-09-20 NOTE — ED PROVIDER NOTES
[address] [place] [address] ED  eMERGENCY dEPARTMENT eNCOUnter      Pt Name: Josie De Souza  MRN: [id number]  [place] 1950  Date of evaluation: 9/19/2020  Provider: Justina Orellana NP, REYNALDO - [place] [address]       Chief Complaint   Patient presents with    Fatigue         HISTORY OF PRESENT ILLNESS  (Location/Symptom, Timing/Onset, Context/Setting, Quality, Duration, Modifying Factors, Severity.)   Josie De Souza is a 69 y.o. male who presents to the emergency department by private vehicle with his daughter at bedside for evaluation of fatigue and weakness. She has a history of stage IV lung cancer and follows with Dr. Eller Kanner. He just started chemotherapy last week. He had laboratory studies drawn on Wednesday and his platelet count was 69. The daughter states that his hemoglobin was fine at that time. He has had progressive weakness and fatigue. He is had a decreased appetite and oral intake. He states that he just does not feel like eating or drinking. He denies any cough or shortness of breath. Call the on-call oncologist who told him to come to the emergency room for evaluation as his hemoglobin could be low. Nursing Notes were reviewed. ALLERGIES     Penicillins    CURRENT MEDICATIONS       Previous Medications    ASPIRIN 81 MG CHEWABLE TABLET    Take 1 tablet by mouth daily    CARVEDILOL (COREG) 12.5 MG TABLET    Take 12.5 mg by mouth 2 times daily (with meals)    FENTANYL (DURAGESIC) 12 MCG/HR    Place 1 patch onto the skin every 72 hours for 30 days. Apply one every 3 days    FENTANYL (DURAGESIC) 25 MCG/HR    Place 1 patch onto the skin every 72 hours for 30 days.  Apply one every 3 days    FOLIC ACID (FOLVITE) 1 MG TABLET    Take 1 tablet by mouth daily START 7 DAYS BEFORE FIRST DOSE OF PEMETREXED---TO BE TAKEN DAILY --AND CONTINUE 21 DAYS AFTER LAST DOSE OF PEMETREXED    MEGESTROL (MEGACE ES) 625 MG/5ML SUSPENSION    Take 5 mLs by mouth daily    NITROGLYCERIN (NITROSTAT) 0.4 MG SL does not drink alcohol. REVIEW OF SYSTEMS    (2-9 systems for level 4, 10 or more for level 5)     Review of Systems   Constitutional: Negative for chills, fever and unexpected weight change. HENT: Negative for congestion, rhinorrhea, sinus pressure and sore throat. Respiratory: Negative for cough, shortness of breath and wheezing. Cardiovascular: Negative for chest pain and palpitations. Gastrointestinal: Negative for abdominal pain, constipation, diarrhea, nausea and vomiting. Genitourinary: Negative for dysuria and hematuria. Musculoskeletal: Negative for arthralgias and myalgias. Skin: Negative for color change and rash. Neurological: Negative for dizziness, weakness and headaches. Hematological: Negative for adenopathy. All other systems reviewed and are negative. Except as noted above the remainder of the review of systems was reviewed and negative. PHYSICAL EXAM    (up to 7 for level 4, 8 or more for level 5)     ED Triage Vitals   BP Temp Temp Source Pulse Resp SpO2 Height Weight   09/19/20 1945 09/19/20 1945 09/19/20 1945 09/19/20 1945 09/19/20 1945 09/19/20 2039 09/19/20 1945 09/19/20 1945   101/63 97.8 °F (36.6 °C) Oral 134 20 96 % 5' 5\" (1.651 m) 122 lb (55.3 kg)       Physical Exam  Vitals signs reviewed. Constitutional:       Appearance: He is well-developed. HENT:      Head: Normocephalic and atraumatic. Eyes:      Conjunctiva/sclera: Conjunctivae normal.      Pupils: Pupils are equal, round, and reactive to light. Neck:      Musculoskeletal: Normal range of motion and neck supple. Cardiovascular:      Rate and Rhythm: Normal rate and regular rhythm. Pulmonary:      Effort: Pulmonary effort is normal. No respiratory distress. Breath sounds: Normal breath sounds. No stridor. Abdominal:      General: Bowel sounds are normal.      Palpations: Abdomen is soft. Musculoskeletal: Normal range of motion.    Lymphadenopathy:      Cervical: No cervical adenopathy. Skin:     General: Skin is warm and dry. Findings: No rash. Neurological:      Mental Status: He is alert and oriented to person, place, and time. RADIOLOGY:   Non-plain film images such as CT, Ultrasound and MRI are read by the radiologist. Plain radiographic images are visualized and preliminarily interpreted by the emergency physician with the below findings:    Xr Abdomen (kub) (single Ap View)    Result Date: 8/21/2020  EXAMINATION: ONE SUPINE XRAY VIEW(S) OF THE ABDOMEN 8/21/2020 12:30 pm COMPARISON: None. HISTORY: ORDERING SYSTEM PROVIDED HISTORY: Unable to have bowel movement TECHNOLOGIST PROVIDED HISTORY: Unable to have bowel movement Reason for Exam: constipation Acuity: Acute Type of Exam: Initial Additional signs and symptoms: nausea and vomiting Relevant Medical/Surgical History: metastatic lung cancer which spread to thoracic spine FINDINGS: The abdominal bowel gas pattern is nonspecific. Scattered colonic gas and stool are noted. There is no significant retained stool in the rectosigmoid colon. No small bowel distension. No pathologic calcifications. Scattered vascular calcification     Nonspecific abdominal bowel gas pattern. Xr Chest Portable    Result Date: 9/19/2020  EXAMINATION: ONE XRAY VIEW OF THE CHEST 9/19/2020 8:43 pm COMPARISON: Prior studies including 08/20/2020. HISTORY: ORDERING SYSTEM PROVIDED HISTORY: Chest Pain TECHNOLOGIST PROVIDED HISTORY: Chest Pain Reason for Exam: fatigue Acuity: Unknown Type of Exam: Unknown FINDINGS: Mass in the right infrahilar region has moderately decreased in size in the interval.  No acute infiltrate, pneumothorax or pleural effusion. Heart size is normal.  There is an unchanged 3 lead left subclavian AICD. There is been interval placement of a right IJ MediPort catheter with the tip in good position in the region of the superior vena cava. There is a lytic lesion involving the lateral right 7th rib.      Right normal saline and packed dry. 1% lidocaine was then administered over the right upper chest.  A MediPort was then subcutaneously tunneled from the skin incision site to the venotomy site. The port was advanced into the pocket. The catheter was cut to an appropriate length. The microcatheter sheath was removed over a wire and a peel-away sheath was placed. Through the peel-away sheath, the catheter was advanced into the SVC. The port was accessed and Hep-Locked. The skin incision was closed with interrupted 2-0 Vicryl sutures. The skin was then sealed with Dermabond. A sterile dressing was applied. The patient tolerated the procedure well and there were no immediate complications. FINDINGS: Fluoroscopic image demonstrates the tip of the port at the cavo-atrial junction . Successful ultrasound and fluoroscopy guided Port-A-Cath placement     Interpretation per the Radiologist below, if available at the time of this note:    XR CHEST PORTABLE   Final Result   Right hilar mass has moderately decreased in size consistent with response to   therapy. No acute infiltrate, pneumothorax or pleural fluid. Lytic metastatic lesion involving the lateral right 7th rib.                  LABS:  Labs Reviewed   CBC WITH AUTO DIFFERENTIAL - Abnormal; Notable for the following components:       Result Value    WBC 0.4 (*)     RBC 3.54 (*)     Hemoglobin 10.3 (*)     Hematocrit 31.9 (*)     RDW 16.7 (*)     Lymphocytes 21 (*)     Monocytes 14 (*)     Immature Granulocytes 9 (*)     Segs Absolute 0.21 (*)     Absolute Lymph # 0.08 (*)     Absolute Mono # 0.06 (*)     All other components within normal limits   BASIC METABOLIC PANEL - Abnormal; Notable for the following components:    Glucose 113 (*)     BUN 27 (*)     Bun/Cre Ratio 25 (*)     Calcium 8.5 (*)     All other components within normal limits   IMMATURE PLATELET FRACTION - Abnormal; Notable for the following components:    Platelet, Fluorescence 11 (*) All other components within normal limits   URINE RT REFLEX TO CULTURE   TYPE AND SCREEN   PREPARE PLATELETS       All other labs were within normal range or not returned as of this dictation. EMERGENCY DEPARTMENT COURSE and DIFFERENTIAL DIAGNOSIS/MDM:   Vitals:    Vitals:    09/19/20 1945 09/19/20 2039 09/19/20 2124   BP: 101/63 97/68 114/71   Pulse: 134 121 115   Resp: 20 19 20   Temp: 97.8 °F (36.6 °C)     TempSrc: Oral     SpO2:  96% 98%   Weight: 122 lb (55.3 kg)     Height: 5' 5\" (1.651 m)         Medical Decision Making: The patient has a platelet count of 11 with a white blood cell count of 0.4. He appears to be dehydrated. Will be placed on IV fluids. Was discussed with internal medicine. I also discussed this case with oncology Dr. Maria R Tran. I discussed all laboratory findings. He would like the patient to get 1 unit of platelets. The order was placed in the computer. Pt and family updated    CONSULTS:  IP CONSULT TO ONCOLOGY    FINAL IMPRESSION      1.  Thrombocytopenia ([address]    2. Other fatigue          DISPOSITION/PLAN   DISPOSITION Admitted 09/19/2020 09:33:48 PM      PATIENT REFERRED TO:   Richard Merrill MD  [address] [place] [address]  [phone]            DISCHARGE MEDICATIONS:     New Prescriptions    No medications on file           (Please note that portions of this note were completed with a voice recognition program.  Efforts were made to edit the dictations but occasionally words are mis-transcribed.)    [facility] NP, APRN - [facility]  Certified Nurse Practitioner          REYNALDO Quiñones CNP  09/19/20 0811

## 2020-09-20 NOTE — H&P
his platelet count was noted 69. He states that he has become progressively more tired, and pain, anxious, unable to sleep, and has had decreased oral intake. He called the oncologist on-call who directed him to come to the emergency department for evaluation and follow-up of the abnormal labs, as they may have continued to decrease. Pertinent labs include WBC 8.4, RBC 3.54, hemoglobin 10.3, hematocrit 31.9, platelets 11. Oncology was consulted by the emergency department. He is being admitted for further management of thrombocytopenia. Past Medical History:     Past Medical History:   Diagnosis Date    Cancer [facility])     right lung    Cardiac resynchronization therapy defibrillator (CRT-D) in place     Chest pain 02/08/2019    Coronary artery disease     Dyspnea on exertion     Elevated troponin 02/08/2019    Heart attack ([facility] 2012    Hyperlipidemia     Hypertension     Ischemic cardiomyopathy         Past Surgical History:     Past Surgical History:   Procedure Laterality Date    CORONARY ANGIOPLASTY WITH STENT PLACEMENT  2012    BMS to LAD    CT BIOPSY PERCUTANEOUS DEEP BONE  8/11/2020    CT BIOPSY PERCUTANEOUS DEEP BONE 8/11/2020 [facility] CT SCAN    IR PORT PLACEMENT EQUAL OR GREATER THAN 5 YEARS  8/24/2020    IR PORT PLACEMENT EQUAL OR GREATER THAN 5 YEARS 8/24/2020 MD NAOMI Fraser SPECIAL PROCEDURES    PACEMAKER PLACEMENT  02/11/2019    AICD/ [employer]  MODEL #[id number] CRTD AMPLIA MRI USDF4. LEADS [id number], [id number] AND 6802-33         Medications Prior to Admission:     Prior to Admission medications    Medication Sig Start Date End Date Taking? Authorizing Provider   oxyCODONE (ROXICODONE) 5 MG immediate release tablet Take 1 tablet by mouth every 4 hours as needed for Pain for up to 15 days. 9/18/20 10/3/20  Corey Pena MD   fentaNYL (DURAGESIC) 25 MCG/HR Place 1 patch onto the skin every 72 hours for 30 days.  Apply one every 3 days 9/14/20 10/14/20  Maury Krishnan MD megestrol (MEGACE ES) 625 MG/5ML suspension Take 5 mLs by mouth daily 9/14/20   Kelvin Duverney, MD   omeprazole (PRILOSEC) 10 MG delayed release capsule Take 10 mg by mouth daily OTC    Historical Provider, MD   ondansetron (ZOFRAN ODT) 4 MG disintegrating tablet Take 1 tablet by mouth every 8 hours as needed for Nausea 8/31/20   Kelvin Duverney, MD   fentaNYL (DURAGESIC) 12 MCG/HR Place 1 patch onto the skin every 72 hours for 30 days. Apply one every 3 days 8/31/20 9/30/20  Kelvin Duverney, MD   polyethylene glycol (GLYCOLAX) 17 g packet Take 17 g by mouth daily as needed for Constipation    Historical Provider, MD   folic acid (FOLVITE) 1 MG tablet Take 1 tablet by mouth daily START 7 DAYS BEFORE FIRST DOSE OF PEMETREXED---TO BE TAKEN DAILY --AND CONTINUE 21 DAYS AFTER LAST DOSE OF PEMETREXED 8/17/20   Kelvin Duverney, MD   pantoprazole (PROTONIX) 40 MG tablet Take 1 tablet by mouth every morning (before breakfast) 8/13/20   Valerie Emmanuel MD   carvedilol (COREG) 12.5 MG tablet Take 12.5 mg by mouth 2 times daily (with meals)    Historical Provider, MD   aspirin 81 MG chewable tablet Take 1 tablet by mouth daily 2/12/19   Sharonda Mart MD   nitroGLYCERIN (NITROSTAT) 0.4 MG SL tablet up to max of 3 total doses. If no relief after 1 dose, call 911. Patient not taking: Reported on 9/14/2020 2/11/19   Sharonda Mart MD        Allergies:     Penicillins    Social History:     Tobacco:    reports that he quit smoking about 8 years ago. His smoking use included cigarettes. He has never used smokeless tobacco.  Alcohol:      reports no history of alcohol use. Drug Use:  reports previous drug use. Drug: Marijuana. Family History:     Family History   Problem Relation Age of Onset    Cancer Mother     Cancer Father     Prostate Cancer Father        Review of Systems:     Positive and Negative as described in HPI. Review of Systems   Constitutional: Positive for appetite change and fatigue.  Negative for activity change, chills, diaphoresis, fever and unexpected weight change. HENT: Negative. Eyes: Negative. Respiratory: Negative. Cardiovascular: Negative. Gastrointestinal: Negative. Endocrine: Negative. Genitourinary: Negative. Musculoskeletal: Positive for arthralgias and myalgias. Negative for back pain, gait problem, joint swelling, neck pain and neck stiffness. Skin: Negative. Allergic/Immunologic: Negative. Neurological: Negative. Hematological: Negative. Psychiatric/Behavioral: Positive for sleep disturbance. Negative for agitation, behavioral problems, confusion, decreased concentration, dysphoric mood, hallucinations, self-injury and suicidal ideas. The patient is nervous/anxious. The patient is not hyperactive. Physical Exam:   BP (!) 96/54   Pulse 111   Temp 99.5 °F (37.5 °C) (Oral)   Resp 16   Ht 5' 5\" (1.651 m)   Wt 122 lb (55.3 kg)   SpO2 94%   BMI 20.30 kg/m²   Temp (24hrs), Av.1 °F (37.3 °C), Min:97.8 °F (36.6 °C), Max:99.5 °F (37.5 °C)    No results for input(s): POCGLU in the last 72 hours. Intake/Output Summary (Last 24 hours) at 2020 0428  Last data filed at 2020 0305  Gross per 24 hour   Intake 1222 ml   Output --   Net 1222 ml       Physical Exam  Vitals signs and nursing note reviewed. Constitutional:       General: He is not in acute distress. Appearance: He is normal weight. He is ill-appearing. He is not toxic-appearing or diaphoretic. HENT:      Head: Normocephalic and atraumatic. Right Ear: External ear normal.      Left Ear: External ear normal.      Nose: Nose normal. No congestion or rhinorrhea. Mouth/Throat:      Mouth: Mucous membranes are dry. Eyes:      General: No scleral icterus. Right eye: No discharge. Left eye: No discharge. Extraocular Movements: Extraocular movements intact.       Conjunctiva/sclera: Conjunctivae normal.      Pupils: Pupils are equal, round, and reactive to light. Neck:      Musculoskeletal: Normal range of motion and neck supple. No neck rigidity or muscular tenderness. Vascular: No carotid bruit. Cardiovascular:      Rate and Rhythm: Regular rhythm. Tachycardia present. Pulses: Normal pulses. Heart sounds: Normal heart sounds. No murmur. No friction rub. No gallop. Comments: Pacemaker/AICD present left chest  Port present right chest  Pulmonary:      Effort: Pulmonary effort is normal. No respiratory distress. Breath sounds: No stridor. Wheezing (Right lung) present. No rhonchi or rales. Chest:      Chest wall: No tenderness. Abdominal:      General: Abdomen is flat. Bowel sounds are normal. There is no distension. Palpations: Abdomen is soft. There is no mass. Tenderness: There is no abdominal tenderness. There is no guarding or rebound. Hernia: No hernia is present. Musculoskeletal:         General: No swelling, tenderness, deformity or signs of injury. Right lower leg: No edema. Left lower leg: No edema. Lymphadenopathy:      Cervical: No cervical adenopathy. Skin:     General: Skin is warm and dry. Capillary Refill: Capillary refill takes less than 2 seconds. Coloration: Skin is pale. Skin is not jaundiced. Findings: No bruising, erythema, lesion or rash. Neurological:      General: No focal deficit present. Mental Status: He is alert and oriented to person, place, and time. Sensory: No sensory deficit. Motor: No weakness.       Coordination: Coordination normal.   Psychiatric:         Mood and Affect: Mood normal.         Behavior: Behavior normal.         Investigations:      Laboratory Testing:  Recent Results (from the past 24 hour(s))   CBC Auto Differential    Collection Time: 09/19/20  8:34 PM   Result Value Ref Range    WBC 0.4 (LL) 3.5 - 11.3 k/uL    RBC 3.54 (L) 4.21 - 5.77 m/uL    Hemoglobin 10.3 (L) 13.0 - 17.0 g/dL    Hematocrit 31.9 (L) 40.7 - 50.3 %    MCV 90.1 82.6 - 102.9 fL    MCH 29.1 25.2 - 33.5 pg    MCHC 32.3 28.4 - 34.8 g/dL    RDW 16.7 (H) 11.8 - 14.4 %    Platelets See Reflexed IPF Result 138 - 453 k/uL    MPV NOT REPORTED 8.1 - 13.5 fL    NRBC Automated 0.0 0.0 per 100 WBC    Differential Type NOT REPORTED     WBC Morphology NOT REPORTED     RBC Morphology NOT REPORTED     Platelet Estimate NOT REPORTED     Seg Neutrophils 52 36 - 66 %    Lymphocytes 21 (L) 24 - 44 %    Monocytes 14 (H) 1 - 7 %    Eosinophils % 3 1 - 4 %    Basophils 1 %    Immature Granulocytes 9 (H) 0 %    Segs Absolute 0.21 (L) 1.8 - 7.7 k/uL    Absolute Lymph # 0.08 (L) 1.0 - 4.8 k/uL    Absolute Mono # 0.06 (L) 0.2 - 0.8 k/uL    Absolute Eos # 0.01 0.0 - 0.4 k/uL    Basophils Absolute 0.00 0.0 - 0.2 k/uL    Absolute Immature Granulocyte 0.04 0.00 - 0.30 k/uL   Basic Metabolic Panel    Collection Time: 09/19/20  8:34 PM   Result Value Ref Range    Glucose 113 (H) 70 - 99 mg/dL    BUN 27 (H) 8 - 23 mg/dL    CREATININE 1.10 0.70 - 1.20 mg/dL    Bun/Cre Ratio 25 (H) 9 - 20    Calcium 8.5 (L) 8.6 - 10.4 mg/dL    Sodium 139 135 - 144 mmol/L    Potassium 4.6 3.7 - 5.3 mmol/L    Chloride 103 98 - 107 mmol/L    CO2 23 20 - 31 mmol/L    Anion Gap 13 9 - 17 mmol/L    GFR Non-African American >60 >60 mL/min    GFR African American >60 >60 mL/min    GFR Comment          GFR Staging NOT REPORTED    Immature Platelet Fraction    Collection Time: 09/19/20  8:34 PM   Result Value Ref Range    Platelet, Immature Fraction 8.9 1.1 - 10.3 %    Platelet, Fluorescence 11 (LL) 138 - 453 k/uL   PREPARE PLATELETS, 1 Product    Collection Time: 09/19/20 10:30 PM   Result Value Ref Range    Unit Number [id number]     Product Code Leukocyte Reduced Irradiated Plateletpheresis     Unit Divison 00     Dispense Status ISSUED     Transfusion Status OK TO TRANSFUSE    TYPE AND SCREEN    Collection Time: 09/19/20 10:35 PM   Result Value Ref Range    Expiration Date 09/22/2020,8824     Arm Band Number BE [id number]     ABO/Rh A POSITIVE     Antibody Screen NEGATIVE    Urinalysis Reflex to Culture    Collection Time: 09/19/20 11:23 PM    Specimen: Urine, clean catch   Result Value Ref Range    Color, UA YELLOW YELLOW    Turbidity UA CLEAR CLEAR    Glucose, Ur NEGATIVE NEGATIVE    Bilirubin Urine NEGATIVE NEGATIVE    Ketones, Urine NEGATIVE NEGATIVE    Specific Gravity, UA 1.020 1.005 - 1.030    Urine Hgb NEGATIVE NEGATIVE    pH, UA 5.5 5.0 - 8.0    Protein, UA TRACE (A) NEGATIVE    Urobilinogen, Urine Normal Normal    Nitrite, Urine NEGATIVE NEGATIVE    Leukocyte Esterase, Urine NEGATIVE NEGATIVE    Urinalysis Comments NOT REPORTED    Microscopic Urinalysis    Collection Time: 09/19/20 11:23 PM   Result Value Ref Range    -          WBC, UA 0 TO 2 0 - 5 /HPF    RBC, UA 0 TO 2 0 - 2 /HPF    Casts UA 0 TO 2 /LPF    Casts UA FINE GRANULAR /LPF    Casts UA 0 TO 2 /LPF    Casts UA COARSELY GRANULAR /LPF    Crystals, UA 10 TO 20 URIC ACID (A) None /HPF    Epithelial Cells UA 0 TO 2 0 - 5 /HPF    Renal Epithelial, UA NOT REPORTED 0 /HPF    Bacteria, UA FEW (A) None    Mucus, UA 1+ (A) None    Trichomonas, UA NOT REPORTED None    Amorphous, UA NOT REPORTED None    Other Observations UA NOT REPORTED NOT REQ. Yeast, UA NOT REPORTED None       Imaging/Diagnostics:  Xr Chest Portable    Result Date: 9/19/2020  Right hilar mass has moderately decreased in size consistent with response to therapy. No acute infiltrate, pneumothorax or pleural fluid. Lytic metastatic lesion involving the lateral right 7th rib.        Assessment :      Hospital Problems           Last Modified POA    * (Principal) Thrombocytopenia ([address] 9/20/2020 Yes    HTN (hypertension) 9/20/2020 Yes    CAD (coronary artery disease) 9/20/2020 Yes    Ischemic cardiomyopathy 9/20/2020 Yes    AICD (automatic cardioverter/defibrillator) present 9/20/2020 Yes    Brain metastasis ([address] 9/20/2020 Yes    Adenocarcinoma of right lung ([address] 9/20/2020 Yes    [facility]ancy[facility] 9/20/2020 Yes    Bone metastasis ([facility] [address] 9/20/2020 Yes          Plan:     Patient status inpatient in the  Progressive Unit/Step down    1. Admit as inpatient to the progressive unit under the internal medicine service  2. Consult oncology  3. Transfuse platelets  4. Monitor CBC, BMP, PT/INR  5. IV fluids 75 mL/h  6. Replete electrolytes as needed  7. Neutropenic isolation  8. GI prophylaxis: Protonix  9. DVT prophylaxis: Lovenox  10. Hypertension: Continue home Coreg  11. Pain management: Roxicodone  12. Supplemental oxygen as needed  13. Continue telemetry monitoring    Consultations:   IP CONSULT TO ONCOLOGY     Patient is admitted as inpatient status because of co-morbidities listed above, severity of signs and symptoms as outlined, requirement for current medical therapies and most importantly because of direct risk to patient if care not provided in a hospital setting. Expected length of stay > 48 hours.     Su Boas, APRN - CNP  9/20/2020  4:28 AM    Copy sent to Dr. Amanda Byrne MD

## 2020-09-20 NOTE — PROGRESS NOTES
Physical Therapy    Facility/Department: [facility] PROGRESSIVE CARE  Initial Assessment    NAME: Lele Martínez  : 1950  MRN: [id number]    Date of Service: 2020    Discharge Recommendations:  Home with Home health PT, Home with assist PRN       Lele Martínez is a 69 y.o. male who presents to the emergency department by private vehicle with his daughter at bedside for evaluation of fatigue and weakness. She has a history of stage IV lung cancer and follows with Dr. Nilam Sanches. He just started chemotherapy last week. He had laboratory studies drawn on Wednesday and his platelet count was 69. The daughter states that his hemoglobin was fine at that time. He has had progressive weakness and fatigue. He is had a decreased appetite and oral intake. He states that he just does not feel like eating or drinking. He denies any cough or shortness of breath. Call the on-call oncologist who told him to come to the emergency room for evaluation as his hemoglobin could be low. RN reports patient is medically stable for therapy treatment this date. Chart reviewed prior to treatment and patient is agreeable for therapy. All lines intact and patient positioned comfortably at end of treatment. All patient needs addressed prior to ending therapy session. Assessment   Body structures, Functions, Activity limitations: Decreased functional mobility ; Decreased posture;Decreased endurance;Decreased strength;Decreased balance;Decreased safe awareness; Increased pain  Assessment: Pt cooperative to work with PT this date. Limited due to chemotherapy and secondary complications from it. Generalized weakness, decreased endurance and strength. Prognosis: Fair  Decision Making: Medium Complexity  Clinical Presentation: evolving  PT Education: Goals;Transfer Training;Equipment;PT Role;Energy Conservation; Functional Mobility Training;Plan of Care;General Safety;Home Exercise Program;Gait Training  Patient Education: Educated pt on posture control while sitting up in chair, breathing techniques with correct posture, exercises while sitting in chair or in bed, safety with mobility. REQUIRES PT FOLLOW UP: Yes  Activity Tolerance  Activity Tolerance: Patient limited by endurance; Patient limited by fatigue;Treatment limited secondary to medical complications (free text)  Activity Tolerance: Pt limited this date due to chemotherapy and generalized weakness. Patient Diagnosis(es): The primary encounter diagnosis was Thrombocytopenia ([facility] [address] A diagnosis of Other fatigue was also pertinent to this visit. has a past medical history of Cancer [facility]), Cardiac resynchronization therapy defibrillator (CRT-D) in place, Chest pain, Coronary artery disease, Dyspnea on exertion, Elevated troponin, Heart attack ([facility] [address] Hyperlipidemia, Hypertension, and Ischemic cardiomyopathy. has a past surgical history that includes Coronary angioplasty with stent (2012); pacemaker placement (02/11/2019); CT BIOPSY DEEP BONE PERCUTANEOUS (8/11/2020); and IR PORT PLACEMENT > 5 YEARS (8/24/2020). Restrictions  Restrictions/Precautions  Restrictions/Precautions: Fall Risk, Up as Tolerated, Contact Precautions  Position Activity Restriction  Other position/activity restrictions: IV RUE, telemetry, neutropenic, chemotherapy  Vision/Hearing  Vision: Impaired  Vision Exceptions: Wears glasses at all times  Hearing: Within functional limits     Subjective  General  Chart Reviewed: Yes  Patient assessed for rehabilitation services?: Yes  Response To Previous Treatment: Not applicable  Family / Caregiver Present: Yes(dtr-in-law, Glory Sebastian)  Follows Commands: Within Functional Limits  General Comment  Comments: fallen down basement steps, due to weakness and fatigue. Subjective  Subjective: Pt states back is in pain, 8/10.   Pain Screening  Patient Currently in Pain: Yes          Orientation  Orientation  Overall Orientation Status: Within Functional Limits  Social/Functional History  Social/Functional History  Lives With: Son(and dtr-in-law)  Type of Home: House  Home Layout: Two level, Able to Live on Main level with bedroom/bathroom  Home Access: Stairs to enter with rails  Entrance Stairs - Number of Steps: 4  Entrance Stairs - Rails: Both  Bathroom Shower/Tub: Tub/Shower unit  Bathroom Toilet: Standard(use sink for support and window sill)  Bathroom Equipment: Shower chair  Home Equipment: Rolling walker, Cane  Receives Help From: Family  ADL Assistance: Needs assistance(assist with showers and dressing)  Homemaking Assistance: Needs assistance(family does all cooking, cleaning, laundry)  Ambulation Assistance: Independent  Transfer Assistance: Independent  Active : No(family does all transportation)  Occupation: Retired  Type of occupation: [employer]  Leisure & Hobbies: play music  Cognition   Cognition  Overall Cognitive Status: Exceptions  Arousal/Alertness: Appropriate responses to stimuli  Following Commands: Follows all commands without difficulty  Attention Span: Appears intact  Memory: Appears intact  Safety Judgement: Decreased awareness of need for assistance;Decreased awareness of need for safety  Problem Solving: Assistance required to generate solutions;Assistance required to identify errors made;Assistance required to correct errors made;Assistance required to implement solutions;Decreased awareness of errors  Insights: Fully aware of deficits  Initiation: Requires cues for some  Sequencing: Requires cues for some    Objective     Observation/Palpation  Posture: Poor(Pt demo's a kyphotic posture while sitting in chair due to back pain.)  Observation: Pt sitting in chair upon PT arrival, agreeable to PT.   Edema: none    AROM RLE (degrees)  RLE AROM: WFL  AROM LLE (degrees)  LLE AROM : WFL  Strength RLE  Comment: grossly 3+/5 MMT  Strength LLE  Comment: grossly 3+/5 MMT  Strength Other  Other: demo'd shakiness while testing MMT  Tone RLE  RLE Tone: Normotonic  Tone LLE  LLE Tone: Normotonic  Sensation  Overall Sensation Status: WFL  Bed mobility  Comment: Did not assess this date as pt was sitting in chair upon PT arrival and returned to sitting in chair. Transfers  Sit to Stand: Contact guard assistance  Stand to sit: Contact guard assistance  Comment: Verbal cueing for proper hand placement with all transfers. Ambulation  Ambulation?: Yes  Ambulation 1  Surface: level tile  Device: Rolling Walker  Assistance: Minimal assistance  Quality of Gait: Pt demo'd quick steps and turns with ambulation, educated to take time as going fast will increase fall risk. Per dtr-in-law, pt tends to move fast to get it over with. Gait Deviations: Decreased step length;Decreased step height  Distance: 30ft within room  Comments: Pt required max cueing for safety with ambulation and speed. Pt impulsive when it came to gait training, rushing and not fully turning before sitting. Required max encouragement for pt to take side steps in order to line self up with chair before sitting down. Stairs/Curb  Stairs?: No     Balance  Posture: Poor  Sitting - Static: Good  Sitting - Dynamic: Good  Standing - Static: Fair;-  Standing - Dynamic: Poor(RW)  Comments: Pt demo'd moderate swaying upon standing, requiring assist from the wall for support. Pt then grabbed onto RW with max cues. Plan   Plan  Times per week: 1-2 x day / 5-6 days per week  Current Treatment Recommendations: Strengthening, Transfer Training, Endurance Training, Balance Training, Gait Training, Home Exercise Program, Functional Mobility Training, Stair training, Safety Education & Training  Safety Devices  Type of devices:  All fall risk precautions in place, Call light within reach, Gait belt, Nurse notified, Left in chair, Chair alarm in place      OutComes Score  AM-PAC Score  AM-PAC Inpatient Mobility Raw Score : 19 (09/20/20 1437)  -PAC Inpatient T-Scale Score : 45.44 (09/20/20 1437)  Mobility Inpatient CMS 0-100% Score: 41.77 (09/20/20 1437)  Mobility Inpatient CMS G-Code Modifier : CK (09/20/20 1437)          Goals  Short term goals  Time Frame for Short term goals: 12 visits  Short term goal 1: Pt will be indep with bed mobility  Short term goal 2: Pt will perform all transfer indep  Short term goal 3: Pt will ambulate 50ft with RW and SBA  Short term goal 4: Pt will go up 4 steps to enter/exit home with [place]  Short term goal 5: Pt will tolerate 25mins of PT to improve strength, balance, activity tolerance, and functional mobility. Patient Goals   Patient goals :  To go home       Therapy Time   Individual Concurrent Group Co-treatment   Time In 1308+10min chart review         Time Out 1332         Minutes 34          Tx time: 24mins       Indiana Floyd PT

## 2020-09-20 NOTE — CONSULTS
_                         Today's Date: 9/20/2020  Patient Name: Rudolph Galindo  Date of admission: 9/19/2020  7:47 PM  Patient's age: 69 y.o., 1950  Admission Dx: Thrombocytopenia ([facility] [address] [D69.6]      Requesting Physician: Micky Pate MD    CHIEF COMPLAINT: Excessive weakness and fatigue. Nausea. Consult for lung cancer with recent treatment    History Obtained From:  patient, electronic medical record    HISTORY OF PRESENT ILLNESS:      The patient is a 69 y.o.  male who is admitted to the hospital for further management of increasing weakness and fatigue and nausea after recent chemotherapy treatment. Patient is known to our practice. He had stage IV lung cancer with lung and spine metastasis. He had radiation therapy and he was started on systemic treatment with Keytruda, carboplatin and Alimta. First treatment was September 10, 2020. For the last few days he is having increasing weakness and fatigue and decreased appetite. Poor nutrition. Poor performance. He was evaluated in the emergency room and was noted to have severe thrombocytopenia and severe leukopenia. Patient is admitted for rehydration and further management. He received platelet transfusions. Patient has no active bleeding. No clear fever. Temperature is almost upper limit of normal.    Past Medical History:   has a past medical history of Cancer [facility]), Cardiac resynchronization therapy defibrillator (CRT-D) in place, Chest pain, Coronary artery disease, Dyspnea on exertion, Elevated troponin, Heart attack ([facility] [address] Hyperlipidemia, Hypertension, and Ischemic cardiomyopathy. Past Surgical History:   has a past surgical history that includes Coronary angioplasty with stent (2012); pacemaker placement (02/11/2019); CT BIOPSY DEEP BONE PERCUTANEOUS (8/11/2020); and IR PORT PLACEMENT > 5 YEARS (8/24/2020).    Family History: family history includes Cancer in his father and mother; Prostate Cancer in his father. Social History:   reports that he quit smoking about 8 years ago. His smoking use included cigarettes. He has never used smokeless tobacco. He reports previous drug use. Drug: Marijuana. He reports that he does not drink alcohol. Medications:    Prior to Admission medications    Medication Sig Start Date End Date Taking? Authorizing Provider   oxyCODONE (ROXICODONE) 5 MG immediate release tablet Take 1 tablet by mouth every 4 hours as needed for Pain for up to 15 days. 9/18/20 10/3/20  Corey Pena MD   fentaNYL (DURAGESIC) 25 MCG/HR Place 1 patch onto the skin every 72 hours for 30 days. Apply one every 3 days 9/14/20 10/14/20  Chayito Page MD   megestrol (MEGACE ES) 625 MG/5ML suspension Take 5 mLs by mouth daily 9/14/20   Chayito Page MD   omeprazole (PRILOSEC) 10 MG delayed release capsule Take 10 mg by mouth daily OTC    Historical Provider, MD   ondansetron (ZOFRAN ODT) 4 MG disintegrating tablet Take 1 tablet by mouth every 8 hours as needed for Nausea 8/31/20   Chayito Page MD   fentaNYL (DURAGESIC) 12 MCG/HR Place 1 patch onto the skin every 72 hours for 30 days. Apply one every 3 days 8/31/20 9/30/20  Chayito Page MD   polyethylene glycol (GLYCOLAX) 17 g packet Take 17 g by mouth daily as needed for Constipation    Historical Provider, MD   folic acid (FOLVITE) 1 MG tablet Take 1 tablet by mouth daily START 7 DAYS BEFORE FIRST DOSE OF PEMETREXED---TO BE TAKEN DAILY --AND CONTINUE 21 DAYS AFTER LAST DOSE OF PEMETREXED 8/17/20   Chayito Page MD   pantoprazole (PROTONIX) 40 MG tablet Take 1 tablet by mouth every morning (before breakfast) 8/13/20   Mae Bond MD   carvedilol (COREG) 12.5 MG tablet Take 12.5 mg by mouth 2 times daily (with meals)    Historical Provider, MD   aspirin 81 MG chewable tablet Take 1 tablet by mouth daily 2/12/19   Francis Conn MD   nitroGLYCERIN (NITROSTAT) 0.4 MG SL tablet up to max of 3 total doses.  If no relief after 1 dose, call 911.   Patient not taking: Reported on 9/14/2020 2/11/19   Court Alfaro MD     Current Facility-Administered Medications   Medication Dose Route Frequency Provider Last Rate Last Dose    oxyCODONE (ROXICODONE) immediate release tablet 5 mg  5 mg Oral Q4H PRN Grazyna Plevna, APRN - CNP        Or    oxyCODONE (ROXICODONE) immediate release tablet 10 mg  10 mg Oral Q4H PRN Grazyna Plevna, APRN - CNP   10 mg at 09/20/20 0815    Tbo-Filgrastim (GRANIX) injection 300 mcg  300 mcg Subcutaneous Daily Allison Swan MD   300 mcg at 09/20/20 1026    aspirin chewable tablet 81 mg  81 mg Oral Daily Grazyna Plevna, APRN - CNP        [Held by provider] carvedilol (COREG) tablet 12.5 mg  12.5 mg Oral BID WC Grazyna Benjamin, APRN - CNP        pantoprazole (PROTONIX) tablet 40 mg  40 mg Oral QAM AC Grazyna Plevna, APRN - CNP   40 mg at 09/20/20 0547    polyethylene glycol (GLYCOLAX) packet 17 g  17 g Oral Daily PRN Grazyna Benjamin, APRN - CNP        sodium chloride flush 0.9 % injection 10 mL  10 mL Intravenous 2 times per day Grazyna Benjamin, APRN - CNP   10 mL at 09/20/20 0815    sodium chloride flush 0.9 % injection 10 mL  10 mL Intravenous PRN Grazyna Plevna, APRN - CNP        potassium chloride (KLOR-CON M) extended release tablet 40 mEq  40 mEq Oral PRN Grazyna Plevna, APRN - CNP        Or    potassium bicarb-citric acid (EFFER-K) effervescent tablet 40 mEq  40 mEq Oral PRN Grazyna Plevna, APRN - CNP        Or    potassium chloride 10 mEq/100 mL IVPB (Peripheral Line)  10 mEq Intravenous PRN Grazyna Plevna, APRN - CNP        magnesium sulfate 1 g in dextrose 5% 100 mL IVPB  1 g Intravenous PRN Grazyna Benjamin, APRN - CNP        acetaminophen (TYLENOL) tablet 650 mg  650 mg Oral Q6H PRN Grazyna Benjamin, APRN - CNP        Or    acetaminophen (TYLENOL) suppository 650 mg  650 mg Rectal Q6H PRN REYNALDO Rm CNP        promethazine (PHENERGAN) tablet 12.5 mg  12.5 mg Oral Q6H PRN REYNALDO Rm CNP        Or    ondansetron [facility]) injection 4 mg  4 mg Intravenous Q6H PRN REYNALDO Rm CNP        nicotine (NICODERM CQ) 21 MG/24HR 1 patch  1 patch Transdermal Daily PRN REYNALDO Rm CNP        0.9 % sodium chloride infusion   Intravenous Continuous Juana Castellanos  mL/hr at 09/20/20 1024      senna (SENOKOT) tablet 8.6 mg  1 tablet Oral Daily PRN REYNALDO Rm - CNP        0.9 % sodium chloride bolus  1,000 mL Intravenous Once REYNALDO Vega CNP           Allergies:  Penicillins    REVIEW OF SYSTEMS:      · General: Positive for weakness and fatigue. Positive for weight loss and decreased appetite. No fever or chills. · Eyes: No blurred vision, eye pain or double vision. · Ears: No hearing problems or drainage. No tinnitus. · Throat: No sore throat, problems with swallowing or dysphagia. · Respiratory: Occasional cough, no sputum or hemoptysis. Positive for shortness of breath. No pleuritic chest pain. · Cardiovascular: No chest pain, orthopnea or PND. No lower extremity edema. No palpitation. · Gastrointestinal: As above. · Genitourinary: No dysuria, hematuria, frequency or urgency. · Musculoskeletal: No muscle aches or pains. No limitation of movement. No back pain. No gait disturbance, No joint complaints. · Dermatologic: No skin rashes or pruritus. No skin lesions or discolorations. · Psychiatric: No depression, anxiety, or stress or signs of schizophrenia. No change in mood or affect. · Hematologic: No history of bleeding tendency. No bruises or ecchymosis. No history of clotting problems. · Infectious disease: No fever, chills or frequent infections. · Endocrine: No polydipsia or polyuria. No temperature intolerance. · Neurologic: No headaches or dizziness.  No weakness or numbness of the extremities. No changes in balance, coordination,  memory, mentation, behavior. · Allergic/Immunologic: No nasal congestion or hives. No repeated infections. PHYSICAL EXAM:      /61   Pulse 120   Temp 99.3 °F (37.4 °C) (Oral)   Resp 16   Ht 5' 5\" (1.651 m)   Wt 122 lb (55.3 kg)   SpO2 100%   BMI 20.30 kg/m²    Temp (24hrs), Av.1 °F (37.3 °C), Min:97.8 °F (36.6 °C), Max:99.5 °F (37.5 °C)      General appearance -patient looks chronically ill. Not in pain or distress  Mental status - alert and oriented  Eyes - pupils equal and reactive, extraocular eye movements intact  Ears - bilateral TM's and external ear canals normal  Nose - normal and patent, no erythema, discharge or polyps  Mouth - mucous membranes moist, pharynx normal without lesions  Neck - supple, no significant adenopathy  Lymphatics - no palpable lymphadenopathy, no hepatosplenomegaly  Chest -decreased air entry bilaterally.   Clear to auscultation, no wheezes, rales or rhonchi,   Heart - normal rate, regular rhythm, normal S1, S2, no murmurs, rubs, clicks or gallops  Abdomen - soft, nontender, nondistended, no masses or organomegaly  Neurological - alert, oriented, normal speech, no focal findings or movement disorder noted  Musculoskeletal - no joint tenderness, deformity or swelling  Extremities - peripheral pulses normal, no pedal edema, no clubbing or cyanosis  Skin - normal coloration and turgor, no rashes, no suspicious skin lesions noted           DATA:      Labs:       CBC:   Recent Labs     20   WBC 0.4* 0.4*   HGB 10.3* 9.1*   HCT 31.9* 28.4*   PLT See Reflexed IPF Result See Reflexed IPF Result     BMP:   Recent Labs     20    137   K 4.6 4.6   CO2 23 22   BUN 27* 21   CREATININE 1.10 0.95   LABGLOM >60 >60   GLUCOSE 113* 110*     PT/INR:   Recent Labs     20   PROTIME 16.9*   INR 1.4     APTT:No results for input(s): APTT in the last 72 hours. LIVER PROFILE:No results for input(s): AST, ALT, LABALBU in the last 72 hours. IMPRESSION:    Primary Problem  Thrombocytopenia [facility])    Active Hospital Problems    Diagnosis Date Noted    Pancytopenia due to antineoplastic chemotherapy ([facility] [address] [D61.810, T45.1X5A] 09/20/2020    Bone metastasis ([facility] [address] [C79.51] 09/20/2020    Thrombocytopenia ([facility] [address] [D69.6] 09/19/2020    Adenocarcinoma of right lung ([facility] [address] [C34.91] 08/17/2020    Brain metastasis ([facility] [address] [C79.31] 08/09/2020    HTN (hypertension) [I10] 02/08/2019    CAD (coronary artery disease) [I25.10] 02/08/2019    Ischemic cardiomyopathy [I25.5] 02/08/2019    AICD (automatic cardioverter/defibrillator) present [Z95.810] 02/08/2019       RECOMMENDATIONS:  1. Records and labs and images were reviewed and discussed with the patient and his family. 2. Patient started on chemo immunotherapy on September 10, 2020 for stage IV lung cancer. 3. Obviously he is having multiple side effects related to treatment. May need to adjust doses of chemotherapy on the second cycle which is due on October 1, 2020. 4. Reviewing his labs, patient has severe thrombocytopenia and he received platelet transfusion. Platelets today are 13. We will watch. 5. Started on Granix. I agree. Continue to watch for fever. 6. Neutropenic precautions. 7. IV fluids for rehydration. 8. We will follow with you. 9. Patient's questions were answered to the best of his satisfaction and he verbalized full understanding and agreement. 10. Thank you for allowing us to participate in the care of this pleasant patient. Discussed with patient and Nurse.     Clovis Barrientos MD                            [address] [place] [address] [facility]                          Cell: [phone]    This note is created with the assistance of a speech recognition program.  While intending to generate a document that actually reflects the content of the visit, the document can still have some errors including those of syntax and sound a like substitutions which may escape proof reading. It such instances, actual meaning can be extrapolated by contextual diversion.

## 2020-09-20 NOTE — PROGRESS NOTES
Pt's daughter in law, Liss Cervantes, would like to be called if needed at [phone]. Liss Cervantes stated she would be calling routinely to check on her father in law.    Electronically signed by Blanco Barahona RN on 9/20/2020

## 2020-09-20 NOTE — ED PROVIDER NOTES
eMERGENCY dEPARTMENT eNCOUnter   Independent Attestation     Pt Name: Rudolph Galindo  MRN: [id number]  [place] 1950  Date of evaluation: 9/19/20     Rudolph Galindo is a 69 y.o. male with CC: Fatigue      Based on the medical record the care appears appropriate. I was personally available for consultation in the Emergency Department.     Clarence Dominguez MD  Attending Emergency Physician                    Gabriela Mejia MD  09/19/20 9333

## 2020-09-20 NOTE — PLAN OF CARE
Problem: Falls - Risk of:  Goal: Will remain free from falls  Description: Will remain free from falls  Outcome: Ongoing   Fall risk assessment done, bed locked in low position, call light in reach at all times and encouraged to use for assist.falling star in place,  Pathway to bathroom clutter-free and non skid socks on. Continue with hourly rounding, monitor for change.  Pt remains free of falls/injury Pt with significant general weakness

## 2020-09-20 NOTE — PROGRESS NOTES
[facility]  Office: [address], DO, Juan Lenora, DO, Anthony Franco, DO, Deatrice Oppenheim Blood, DO, Liz Henning MD, Valerio Ramsey MD, Juan Clay MD, Kirk Sanches MD, Kris Mitchell MD, Tyler Dukse MD, Faheem Walker MD, Jahaira Casper MD, Ollie Mccartney MD, Lily Scott DO, Chris Oscar MD, Tatiana Gandhi MD, Joslyn Hannon, DO, Mari Medina MD,  Izzy Graham, DO, Chuyita Carlton MD, Johnson Dixon MD, Aidee Rosado, [facility], [address], CNP, Anatoly Castrejon, CNP, Alicia Woodruff, CNS, Mena Wheeler, CNP, Robert Quinonez, CNP, Julieth Mckee, CNP, Ivett Hendrickson, CNP, Emma Dangelo, CNP, Valerio Baires PA-C, Alessandro Fritz [facility], Koki Garcia, CNP, Shani Leary, CNP, Van Tuttle, CNP, Lolita Cartagena, CNP, [facility]    Progress Note    9/20/2020    2:54 PM    Name:   Aidee Lainez  MRN:     [id number]     Acct:      [de-identified]   Room:   1002/1002-02  IP Day:  1  Admit Date:  9/19/2020  7:47 PM    PCP:   Zora Mota MD  Code Status:  Full Code    Subjective:     C/C:   Chief Complaint   Patient presents with    Fatigue     Interval History Status: not changed. Patient seen and examined at bedside, no acute events overnight. T-max 99.5 point   patient denies any chest pain, shortness of breath, chills, fevers, nausea or vomiting. Patient vitals, labs and all providers notes were reviewed,from overnight shift and morning updates were noted and discussed with the nurse    Brief History:     Aidee Lainez is a 69 y.o. Non-/non  male who presents with Fatigue   and is admitted to the hospital for the management of Thrombocytopenia ([facility] [address]    Patient presented to the emergency department with fatigue and pain which he attributed to his cancer.   He has primary lung cancer with bone and brain metastasis and sees Dr. Sascha Cabrera is his oncologist.  Other medical history includes hypertension, CAD, cardiomyopathy, prior MI, and pacemaker/AICD present. He states he underwent 10 previous radiation treatments which are now completed. He began chemotherapy on September 3. He stated he had labs done on Wednesday and his platelet count was noted 69. He states that he has become progressively more tired, and pain, anxious, unable to sleep, and has had decreased oral intake. He called the oncologist on-call who directed him to come to the emergency department for evaluation and follow-up of the abnormal labs, as they may have continued to decrease. Pertinent labs include WBC 8.4, RBC 3.54, hemoglobin 10.3, hematocrit 31.9, platelets 11. Oncology was consulted by the emergency department. He is being admitted for further management of thrombocytopenia. Review of Systems:     Review of Systems   Constitutional: Positive for activity change, appetite change, chills and fatigue. Negative for diaphoresis and fever. HENT: Negative for congestion. Eyes: Negative for visual disturbance. Respiratory: Negative for cough, chest tightness, shortness of breath and wheezing. Cardiovascular: Negative for chest pain, palpitations and leg swelling. Gastrointestinal: Negative for abdominal pain, blood in stool, constipation, diarrhea, nausea and vomiting. Genitourinary: Negative for difficulty urinating. Musculoskeletal: Positive for myalgias. Neurological: Positive for weakness and light-headedness. Negative for dizziness, numbness and headaches. All other systems reviewed and are negative. Medications: Allergies:     Allergies   Allergen Reactions    Penicillins      Swollen hand with itching       Current Meds:   Scheduled Meds:    tbo-filgrastim  300 mcg Subcutaneous Daily    aspirin  81 mg Oral Daily    [Held by provider] carvedilol  12.5 mg Oral BID WC    pantoprazole  40 mg Oral QAM AC    sodium chloride flush  10 mL Intravenous 2 times per day    sodium chloride 1,000 mL Intravenous Once     Continuous Infusions:    sodium chloride 125 mL/hr at 20 1353     PRN Meds: oxyCODONE **OR** oxyCODONE, polyethylene glycol, sodium chloride flush, potassium chloride **OR** potassium alternative oral replacement **OR** potassium chloride, magnesium sulfate, acetaminophen **OR** acetaminophen, promethazine **OR** ondansetron, nicotine, senna    Data:     Past Medical History:   has a past medical history of Cancer [facility]), Cardiac resynchronization therapy defibrillator (CRT-D) in place, Chest pain, Coronary artery disease, Dyspnea on exertion, Elevated troponin, Heart attack ([address] Hyperlipidemia, Hypertension, and Ischemic cardiomyopathy. Social History:   reports that he quit smoking about 8 years ago. His smoking use included cigarettes. He has never used smokeless tobacco. He reports previous drug use. Drug: Marijuana. He reports that he does not drink alcohol. Family History:   Family History   Problem Relation Age of Onset   23 [facility] Cancer Mother     Cancer Father     Prostate Cancer Father        Vitals:  /60   Pulse 106   Temp 98.8 °F (37.1 °C) (Oral)   Resp 16   Ht 5' 5\" (1.651 m)   Wt 122 lb (55.3 kg)   SpO2 95%   BMI 20.30 kg/m²   Temp (24hrs), Av.1 °F (37.3 °C), Min:97.8 °F (36.6 °C), Max:99.5 °F (37.5 °C)    No results for input(s): POCGLU in the last 72 hours. I/O (24Hr):     Intake/Output Summary (Last 24 hours) at 2020 1454  Last data filed at 2020 0521  Gross per 24 hour   Intake 1399 ml   Output --   Net 1399 ml       Labs:  Hematology:  Recent Labs     20  0623   WBC 0.4* 0.4*   RBC 3.54* 3.06*   HGB 10.3* 9.1*   HCT 31.9* 28.4*   MCV 90.1 92.8   MCH 29.1 29.7   MCHC 32.3 32.0   RDW 16.7* 17.1*   PLT See Reflexed IPF Result See Reflexed IPF Result   MPV NOT REPORTED NOT REPORTED   INR  --  1.4     Chemistry:  Recent Labs     20  0623    137   K 4.6 4.6    104   CO2 23 22 GLUCOSE 113* 110*   BUN 27* 21   CREATININE 1.10 0.95   ANIONGAP 13 11   LABGLOM >60 >60   GFRAA >60 >60   CALCIUM 8.5* 8.3*   No results for input(s): PROT, LABALBU, LABA1C, W7RRWMK, W2TZFDP, FT4, TSH, AST, ALT, LDH, GGT, ALKPHOS, LABGGT, BILITOT, BILIDIR, AMMONIA, AMYLASE, LIPASE, LACTATE, CHOL, HDL, LDLCHOLESTEROL, CHOLHDLRATIO, TRIG, VLDL, WAM81VL, PHENYTOIN, PHENYF, URICACID, POCGLU in the last 72 hours. ABG:No results found for: POCPH, PHART, PH, POCPCO2, OUE4WYU, PCO2, POCPO2, PO2ART, PO2, POCHCO3, TVS9KRG, HCO3, NBEA, PBEA, BEART, BE, THGBART, THB, SGK4WNT, JZJY5XFS, R4AFCEXD, O2SAT, FIO2  No results found for: SPECIAL  No results found for: CULTURE    Radiology:  Xr Chest Portable    Result Date: 9/19/2020  Right hilar mass has moderately decreased in size consistent with response to therapy. No acute infiltrate, pneumothorax or pleural fluid. Lytic metastatic lesion involving the lateral right 7th rib. Physical Examination:        Physical Exam  Vitals signs and nursing note reviewed. Constitutional:       General: He is not in acute distress. Appearance: He is ill-appearing. HENT:      Head: Normocephalic and atraumatic. Eyes:      Conjunctiva/sclera: Conjunctivae normal.      Pupils: Pupils are equal, round, and reactive to light. Cardiovascular:      Rate and Rhythm: Regular rhythm. Tachycardia present. Heart sounds: No murmur. Pulmonary:      Effort: Pulmonary effort is normal. No accessory muscle usage or respiratory distress. Breath sounds: No stridor. Examination of the right-lower field reveals decreased breath sounds. Examination of the left-lower field reveals decreased breath sounds. Decreased breath sounds present. No wheezing, rhonchi or rales. Abdominal:      General: Bowel sounds are normal. There is no distension. Palpations: Abdomen is soft. Abdomen is not rigid. Tenderness: There is no abdominal tenderness. There is no guarding. Musculoskeletal:         General: No tenderness. Skin:     General: Skin is warm and dry. Findings: No erythema, lesion or rash. Neurological:      Mental Status: He is alert and oriented to person, place, and time. Cranial Nerves: No cranial nerve deficit. Motor: No seizure activity. Psychiatric:         Speech: Speech normal.         Behavior: Behavior normal. Behavior is cooperative. Assessment:        Hospital Problems           Last Modified POA    * (Principal) Pancytopenia due to antineoplastic chemotherapy ([address] 9/20/2020 Yes    HTN (hypertension) 9/20/2020 Yes    CAD (coronary artery disease) 9/20/2020 Yes    Ischemic cardiomyopathy 9/20/2020 Yes    AICD (automatic cardioverter/defibrillator) present 9/20/2020 Yes    Brain metastasis ([address] 9/20/2020 Yes    Adenocarcinoma of right lung ([address] 9/20/2020 Yes    Thrombocytopenia ([address] 9/20/2020 Yes    Bone metastasis ([address] 9/20/2020 Yes    Other fatigue 9/20/2020 Yes          Plan:        Principal Problem:    Pancytopenia due to antineoplastic chemotherapy ([address]  Active Problems:    CAD (coronary artery disease)    HTN (hypertension)    Ischemic cardiomyopathy    AICD (automatic cardioverter/defibrillator) present    Brain metastasis ([address]    Adenocarcinoma of right lung ([address]    Thrombocytopenia (HCC)    Bone metastasis ([address]    Other fatigue    Dehydration  Resolved Problems:    * No resolved hospital problems.  *        Gentle hydration  Pain control  Start Granix  Neutropenic precautions  Monitor very closely for any fever   Transfused platelets yesterday   No pharmacologic DVT PPx   BP control, his pressure is on the soft side,  Hold BB and add midodrine  Discussed with Dr Oksana Martell  Discussed with the patient and the nurse       Cass Thapa MD  9/20/2020  2:54 PM

## 2020-09-21 NOTE — PLAN OF CARE
Problem: Falls - Risk of:  Goal: Will remain free from falls  Description: Will remain free from falls  9/21/2020 1255 by Williams Akhtar RN  Outcome: Ongoing  Note: Pt fall risk, fall band present, falling star, safety alarm activated and in use as needed. Bed in lowest position, call light within reach. Hourly rounding performed. Pt encouraged to use call light. See Usama Miranda fall risk assessment. Patient offered toileting assistance during rounding. Hourly rounds performed.            Problem: Pain:  Goal: Pain level will decrease  Description: Pain level will decrease  9/21/2020 1255 by Williams Akhtar RN  Outcome: Ongoing

## 2020-09-21 NOTE — PROGRESS NOTES
[facility]  Office: [phone]  Elizabeth Valverde DO, Juan Cooley DO, Anthony Franco DO, Deatrice Oppenheim Blood, DO, Liz Henning MD, Valerio Ramsey MD, Juan Clay MD, Kirk Sanches MD, Kris Mitchell MD, Tyler Dukes MD, Faheem Walker MD, Jahaira Casper MD, Ollie Mccartney MD, Lily Scott DO, Chris Oscar MD, Tatiana Gandhi MD, Joslyn Hannon DO, Mari Medina MD,  Izzy Graham DO, Chuyita Carlton MD, Johnson Dixon MD, Aidee Rosado, [facility], [facility], CNP, Anatoly Castrejon, CNP, Alicia Woodruff, CNS, Mena Wheeler, CNP, Robert Quinonez, CNP, Julieth Mckee, CNP, Ivett Hendrickson, CNP, Emma Dangelo, CNP, JERRY SterlingC, Alessandro Fritz, [facility], Koki Garcia, CNP, Shani Leary, CNP, Van Tuttle, CNP, Lolita Cartagena, CNP, Juan David Velasquez [facility]    Progress Note    9/21/2020    7:48 AM    Name:   Aidee Lainez  MRN:     [id number]     Acct:      [de-identified]   Room:   1002/1002-02   Day:  2  Admit Date:  9/19/2020  7:47 PM    PCP:   Zora Mota MD  Code Status:  Full Code    Subjective:     C/C:   Chief Complaint   Patient presents with    Fatigue     Interval History Status:   Still remains fatigued  Hemoglobin 8.8  Platelets 22  WBC count 0.9 home  Granix has been given by oncology yesterday  Currently under neutropenic precautions  Brief History:   Chichi Bustamante a 69 y.o. Non-/non  male who presents with Fatigue   and is admitted to the hospital for the management of Thrombocytopenia ([facility] [address]      Patient presented to the emergency department with fatigue and pain which he attributed to his cancer. Russell Mcgill has primary lung cancer with bone and brain metastasis and sees Dr. Sascha Cabrera is his oncologist.  Other medical history includes hypertension, CAD, cardiomyopathy, prior MI, and pacemaker/AICD present. Russell Mcgill states he underwent 10 previous radiation treatments which are now completed. Russell Mcgill began chemotherapy on September 3.  He stated he had labs done on Wednesday and his platelet count was noted 69.  He states that he has become progressively more tired, and pain, anxious, unable to sleep, and has had decreased oral intake.  He called the oncologist on-call who directed him to come to the emergency department for evaluation and follow-up of the abnormal labs, as they may have continued to decrease.  Pertinent labs include WBC 8.4, RBC 3.54, hemoglobin 10.3, hematocrit 31.9, platelets 11.  Oncology was consulted by the emergency department. Erling Baumgarten is being admitted for further management of thrombocytopenia. Review of Systems:     Constitutional:  negative for chills, fevers, sweats,+ fatigue + myalgias  Respiratory:  negative for cough, dyspnea on exertion, shortness of breath, wheezing  Cardiovascular:  negative for chest pain, chest pressure/discomfort, lower extremity edema, palpitations  Gastrointestinal:  negative for abdominal pain, constipation, diarrhea, nausea, vomiting  Neurological:  negative for dizziness, headache    Medications: Allergies:     Allergies   Allergen Reactions    Penicillins      Swollen hand with itching       Current Meds:   Scheduled Meds:    tbo-filgrastim  300 mcg Subcutaneous Daily    aspirin  81 mg Oral Daily    [Held by provider] carvedilol  12.5 mg Oral BID WC    pantoprazole  40 mg Oral QAM AC    sodium chloride flush  10 mL Intravenous 2 times per day    sodium chloride  1,000 mL Intravenous Once     Continuous Infusions:    sodium chloride 125 mL/hr at 09/21/20 0651     PRN Meds: oxyCODONE **OR** oxyCODONE, polyethylene glycol, sodium chloride flush, potassium chloride **OR** potassium alternative oral replacement **OR** potassium chloride, magnesium sulfate, acetaminophen **OR** acetaminophen, promethazine **OR** ondansetron, nicotine, senna    Data:     Past Medical History:   has a past medical history of Cancer [facility]), Cardiac resynchronization therapy defibrillator (CRT-D) in place, Chest pain, Coronary artery disease, Dyspnea on exertion, Elevated troponin, Heart attack ([address] Hyperlipidemia, Hypertension, and Ischemic cardiomyopathy. Social History:   reports that he quit smoking about 8 years ago. His smoking use included cigarettes. He has never used smokeless tobacco. He reports previous drug use. Drug: Marijuana. He reports that he does not drink alcohol. Family History:   Family History   Problem Relation Age of Onset   [employer] Cancer Mother     Cancer Father     Prostate Cancer Father        Vitals:  /61   Pulse 102   Temp 98.2 °F (36.8 °C) (Oral)   Resp 18   Ht 5' 5\" (1.651 m)   Wt 126 lb 1.6 oz (57.2 kg)   SpO2 99%   BMI 20.98 kg/m²   Temp (24hrs), Av °F (37.2 °C), Min:98.2 °F (36.8 °C), Max:99.7 °F (37.6 °C)    No results for input(s): POCGLU in the last 72 hours. I/O (24Hr):     Intake/Output Summary (Last 24 hours) at 2020 0748  Last data filed at 2020 8357  Gross per 24 hour   Intake 2749.89 ml   Output 300 ml   Net 2449.89 ml       Labs:  Hematology:  Recent Labs     20  0623 20  0249   WBC 0.4* 0.4* 0.9*   RBC 3.54* 3.06* 2.98*   HGB 10.3* 9.1* 8.8*   HCT 31.9* 28.4* 28.3*   MCV 90.1 92.8 95.0   MCH 29.1 29.7 29.5   MCHC 32.3 32.0 31.1   RDW 16.7* 17.1* 17.3*   PLT See Reflexed IPF Result See Reflexed IPF Result See Reflexed IPF Result   MPV NOT REPORTED NOT REPORTED NOT REPORTED   INR  --  1.4  --      Chemistry:  Recent Labs     20  0623    137   K 4.6 4.6    104   CO2 23 22   GLUCOSE 113* 110*   BUN 27* 21   CREATININE 1.10 0.95   ANIONGAP 13 11   LABGLOM >60 >60   GFRAA >60 >60   CALCIUM 8.5* 8.3*   No results for input(s): PROT, LABALBU, LABA1C, C2DYCSE, J5ECCZM, FT4, TSH, AST, ALT, LDH, GGT, ALKPHOS, LABGGT, BILITOT, BILIDIR, AMMONIA, AMYLASE, LIPASE, LACTATE, CHOL, HDL, LDLCHOLESTEROL, CHOLHDLRATIO, TRIG, VLDL, IUP23FC, PHENYTOIN, PHENYF, URICACID,

## 2020-09-21 NOTE — PROGRESS NOTES
Physical Therapy     09/21/20 1244   Restrictions/Precautions   Restrictions/Precautions Contact Precautions; Up as Tolerated; Fall Risk   Required Braces or Orthoses? No   General   Chart Reviewed Yes   Response To Previous Treatment Patient with no complaints from previous session. Family / Caregiver Present No   Pain Screening   Patient Currently in Pain Yes   Pain Assessment   Pain Assessment 0-10   Pain Level 6   Pain Type Chronic pain   Pain Location Back   Pain Orientation Mid   Orientation   Overall Orientation Status WNL   Transfers   Sit to Stand Contact guard assistance   Stand to sit Contact guard assistance   Stand Pivot Transfers Contact guard assistance   Ambulation   Ambulation? Yes   Ambulation 1   Surface level tile   Device Rolling Walker   Assistance Contact guard assistance   Gait Deviations Decreased step length;Decreased step height   Distance 35' x 1   Comments To chair   Balance   Posture Poor   Sitting - Static Good   Sitting - Dynamic Good   Standing - Static Fair;+   Standing - Dynamic Fair   Exercises   Comments LE HEP handout and instruction   Patient Goals    Patient goals  To go home   Short term goals   Time Frame for Short term goals 12 visits   Short term goal 1 Pt will be indep with bed mobility   Short term goal 2 Pt will perform all transfer indep   Short term goal 3 Pt will ambulate 50ft with RW and SBA   Short term goal 4 Pt will go up 4 steps to enter/exit home with [place]   Short term goal 5 Pt will tolerate 25mins of PT to improve strength, balance, activity tolerance, and functional mobility. Conditions Requiring Skilled Therapeutic Intervention   Body structures, Functions, Activity limitations Decreased functional mobility ; Decreased posture;Decreased endurance;Decreased strength;Decreased balance;Decreased safe awareness; Increased pain   Prognosis Fair   Decision Making Medium Complexity   REQUIRES PT FOLLOW UP Yes   Discharge Recommendations Home with Home health PT;Home with assist PRN   Activity Tolerance   Activity Tolerance Patient limited by pain; Patient limited by endurance   Plan   Times per week 1-2 x day / 5-6 days per week   Current Treatment Recommendations Strengthening;Transfer Training; Endurance Training;Balance Training;Gait Training;Home Exercise Program;Functional Mobility Training;Stair training; Safety Education & Training   Safety Devices   Type of devices Nurse notified; Left in chair;Call light within reach;Gait belt   Restraints   Initially in place No   Progress Note   See Progress Note Yes     08:54-09:18

## 2020-09-21 NOTE — PROGRESS NOTES
_                         Today's Date: 9/21/2020  Patient Name: Tung Leung  Date of admission: 9/19/2020  7:47 PM  Patient's age: 69 y.o., 1950  Admission Dx: Thrombocytopenia [facility]) [D69.6]      Requesting Physician: Sharlene Lesches, MD    CHIEF COMPLAINT: Excessive weakness and fatigue. Nausea. Consult for lung cancer with recent treatment    SUBJECTIVE:  . The patient was seen and examined. Clinically he feels better. Less weakness and fatigue. No shortness of breath. No fever or chills. No new events. BRIEF CASE HISTORY:    The patient is a 69 y.o.  male who is admitted to the hospital for further management of increasing weakness and fatigue and nausea after recent chemotherapy treatment. Patient is known to our practice. He had stage IV lung cancer with lung and spine metastasis. He had radiation therapy and he was started on systemic treatment with Keytruda, carboplatin and Alimta. First treatment was September 10, 2020. For the last few days he is having increasing weakness and fatigue and decreased appetite. Poor nutrition. Poor performance. He was evaluated in the emergency room and was noted to have severe thrombocytopenia and severe leukopenia. Patient is admitted for rehydration and further management. He received platelet transfusions. Patient has no active bleeding. No clear fever. Temperature is almost upper limit of normal.    Past Medical History:   has a past medical history of Cancer [facility]), Cardiac resynchronization therapy defibrillator (CRT-D) in place, Chest pain, Coronary artery disease, Dyspnea on exertion, Elevated troponin, Heart attack ([address] Hyperlipidemia, Hypertension, and Ischemic cardiomyopathy.     Past Surgical History:   has a past surgical history that includes Coronary angioplasty with stent (2012); pacemaker placement (02/11/2019); CT BIOPSY DEEP BONE PERCUTANEOUS (8/11/2020); and IR PORT PLACEMENT > 5 YEARS (8/24/2020). Family History: family history includes Cancer in his father and mother; Prostate Cancer in his father. Social History:   reports that he quit smoking about 8 years ago. His smoking use included cigarettes. He has never used smokeless tobacco. He reports previous drug use. Drug: Marijuana. He reports that he does not drink alcohol. Medications:    Prior to Admission medications    Medication Sig Start Date End Date Taking? Authorizing Provider   oxyCODONE (ROXICODONE) 5 MG immediate release tablet Take 1 tablet by mouth every 4 hours as needed for Pain for up to 15 days. 9/18/20 10/3/20  Corey Pena MD   fentaNYL (DURAGESIC) 25 MCG/HR Place 1 patch onto the skin every 72 hours for 30 days. Apply one every 3 days 9/14/20 10/14/20  Chayito Page MD   megestrol (MEGACE ES) 625 MG/5ML suspension Take 5 mLs by mouth daily 9/14/20   Chayito Page MD   omeprazole (PRILOSEC) 10 MG delayed release capsule Take 10 mg by mouth daily OTC    Historical Provider, MD   ondansetron (ZOFRAN ODT) 4 MG disintegrating tablet Take 1 tablet by mouth every 8 hours as needed for Nausea 8/31/20   Chayito Page MD   fentaNYL (DURAGESIC) 12 MCG/HR Place 1 patch onto the skin every 72 hours for 30 days.  Apply one every 3 days 8/31/20 9/30/20  Chayito Page MD   polyethylene glycol (GLYCOLAX) 17 g packet Take 17 g by mouth daily as needed for Constipation    Historical Provider, MD   folic acid (FOLVITE) 1 MG tablet Take 1 tablet by mouth daily START 7 DAYS BEFORE FIRST DOSE OF PEMETREXED---TO BE TAKEN DAILY --AND CONTINUE 21 DAYS AFTER LAST DOSE OF PEMETREXED 8/17/20   Corey ePna MD   pantoprazole (PROTONIX) 40 MG tablet Take 1 tablet by mouth every morning (before breakfast) 8/13/20   Mae Bond MD   carvedilol (COREG) 12.5 MG tablet Take 12.5 mg by mouth 2 times daily (with meals)    Historical Provider, MD   aspirin 81 MG chewable tablet Take 1 tablet by mouth daily 2/12/19   [facility] 650 mg Oral Q6H PRN Davina Rein, APRN - CNP        Or    acetaminophen (TYLENOL) suppository 650 mg  650 mg Rectal Q6H PRN Advina Rein, APRN - CNP        promethazine (PHENERGAN) tablet 12.5 mg  12.5 mg Oral Q6H PRN Davina Rein, APRN - CNP        Or    ondansetron (ZOFRAN) injection 4 mg  4 mg Intravenous Q6H PRN Davina Rein, APRN - CNP        nicotine (NICODERM CQ) 21 MG/24HR 1 patch  1 patch Transdermal Daily PRN Davina Rein, APRN - CNP        0.9 % sodium chloride infusion   Intravenous Continuous Ra Montaño  mL/hr at 09/21/20 1304      senna (SENOKOT) tablet 8.6 mg  1 tablet Oral Daily PRN Davina Rein, APRN - CNP        0.9 % sodium chloride bolus  1,000 mL Intravenous Once Gearld Overcast, APRN - CNP           Allergies:  Penicillins    REVIEW OF SYSTEMS:      · General: Positive for weakness and fatigue. Positive for weight loss and decreased appetite. No fever or chills. · Eyes: No blurred vision, eye pain or double vision. · Ears: No hearing problems or drainage. No tinnitus. · Throat: No sore throat, problems with swallowing or dysphagia. · Respiratory: Occasional cough, no sputum or hemoptysis. Positive for shortness of breath. No pleuritic chest pain. · Cardiovascular: No chest pain, orthopnea or PND. No lower extremity edema. No palpitation. · Gastrointestinal: As above. · Genitourinary: No dysuria, hematuria, frequency or urgency. · Musculoskeletal: No muscle aches or pains. No limitation of movement. No back pain. No gait disturbance, No joint complaints. · Dermatologic: No skin rashes or pruritus. No skin lesions or discolorations. · Psychiatric: No depression, anxiety, or stress or signs of schizophrenia. No change in mood or affect. · Hematologic: No history of bleeding tendency. No bruises or ecchymosis. No history of clotting problems.   · Infectious disease: No fever, chills or frequent content of the visit, the document can still have some errors including those of syntax and sound a like substitutions which may escape proof reading. It such instances, actual meaning can be extrapolated by contextual diversion.

## 2020-09-21 NOTE — CARE COORDINATION
Discharge Planning    Spoke with pt and his dtr in law regarding therapy's recommendation for home care and pt is requesting home care and would like to use [facility]. Referral to Issac Nino at [facility]. Pt will also need a front wheeled walker. Perfect serve to Dr Elizabeth Betancourt for an order.

## 2020-09-21 NOTE — DISCHARGE INSTR - COC
Continuity of Care Form    Patient Name: Rudolph Galindo   :    MRN:  [id number]    Admit date:  2020  Discharge date:  20    Code Status Order: Full Code   Advance Directives:   [facility] Documentation       Date/Time Healthcare Directive Type of Healthcare Directive Copy in [facility] [address] Agent's Name Healthcare Agent's Phone Number    [phone]  No, patient does not have an advance directive for healthcare treatment -- -- -- -- --            Admitting Physician:  Bryan Christopher MD  PCP: Bee Goodman MD    Discharging Nurse: [address] [place] [address] Unit/Room#: 1002/1002-02  Discharging Unit Phone [phone]    Emergency Contact:   Extended Emergency Contact Information  Primary Emergency Contact: Jignesh Ramez of [address] [place] [address] Phone: [phone]  Mobile Phone: [phone]  Relation: Child  Secondary Emergency Contact: Shyanne Morton of [address] [place] [address] Phone: [phone]  Mobile Phone: [phone]  Relation: Child    Past Surgical History:  Past Surgical History:   Procedure Laterality Date    CORONARY ANGIOPLASTY WITH STENT PLACEMENT      BMS to LAD    CT BIOPSY PERCUTANEOUS DEEP BONE  2020    CT BIOPSY PERCUTANEOUS DEEP BONE 2020 [facility] CT SCAN    IR PORT PLACEMENT EQUAL OR GREATER THAN 5 YEARS  2020    IR PORT PLACEMENT EQUAL OR GREATER THAN 5 YEARS 2020 Cinderella Krabbe, MD [facility] SPECIAL PROCEDURES    PACEMAKER PLACEMENT  2019    AICD/ MED[employer]ON[employer]  MODEL #[id number] CRTD AMPLIA MRI USDF4. LEADS [id number], [id number] AND 3412-43        Immunization History: There is no immunization history on file for this patient.     Active Problems:  Patient Active Problem List   Diagnosis Code    NSTEMI (non-ST elevated myocardial infarction) ([facility] I21.4    HTN (hypertension) I10    CAD (coronary artery disease) I25.10    Ischemic cardiomyopathy I25.5    AICD (automatic cardioverter/defibrillator) present Z95.810    Lung mass R91.8    Enlarged prostate N40.0    CKD (chronic kidney disease) N18.9    CVA (cerebral vascular accident) ([facility] I63.9    Brain metastasis (HCC) C79.31    Vasogenic edema (HCC) G93.6    Right hemiparesis (HCC) G81.91    Adenocarcinoma of right lung (HCC) C34.91    Thrombocytopenia (HCC) D69.6    Pancytopenia due to antineoplastic chemotherapy ([facility] D61.810, T45.1X5A    Bone metastasis (HCC) C79.51    Other fatigue R53.83    Dehydration E86.0    Severe malnutrition ([facility] E43       Isolation/Infection:   Isolation            Neutropenic          Patient Infection Status       Infection Onset Added Last Indicated Last Indicated By Review Planned Expiration Resolved Resolved By    None active    Resolved    COVID-19 Rule Out 08/19/20 08/19/20 08/20/20 Covid-19 Ambulatory (Ordered)   08/22/20 Rule-Out Test Resulted    COVID-19 Rule Out 08/10/20 08/10/20 08/10/20 COVID-19 (Ordered)   08/10/20 Rule-Out Test Resulted            Nurse Assessment:  Last Vital Signs: BP (!) 99/49   Pulse 111   Temp 98.2 °F (36.8 °C) (Oral)   Resp 20   Ht 5' 5\" (1.651 m)   Wt 126 lb 1.6 oz (57.2 kg)   SpO2 99%   BMI 20.98 kg/m²     Last documented pain score (0-10 scale): Pain Level: 6  Last Weight:   Wt Readings from Last 1 Encounters:   09/21/20 126 lb 1.6 oz (57.2 kg)     Mental Status:  oriented    IV Access:  - None    Nursing Mobility/ADLs:  Walking   Independent  Transfer  Independent  Bathing  Assisted  Dressing  Assisted  Toileting  Independent  Feeding  Independent  Med Admin  Independent  Med Delivery   none    Wound Care Documentation and Therapy:        Elimination:  Continence:   · Bowel: No  · Bladder: No  Urinary Catheter: None   Colostomy/Ileostomy/Ileal Conduit: No       Date of Last BM: 9/21/20    Intake/Output Summary (Last 24 hours) at 9/21/2020 1319  Last data filed at 9/21/2020 1139  Gross per 24 hour   Intake 2749.89 ml   Output 500 ml   Net 2249.89 ml     I/O last 3 completed shifts: In: 2749.9 [I.V.:2749.9]  Out: 300 [Urine:300]    Safety Concerns:     None    Impairments/Disabilities:      None    Nutrition Therapy:  Current Nutrition Therapy:   - Oral Diet:  General    Routes of Feeding: Oral  Liquids: No Restrictions  Daily Fluid Restriction: no  Last Modified Barium Swallow with Video (Video Swallowing Test): not done    Treatments at the Time of Hospital Discharge:   Respiratory Treatments: ***  Oxygen Therapy:  is not on home oxygen therapy. Ventilator:    - No ventilator support    Rehab Therapies: Physical Therapy and Occupational Therapy  Weight Bearing Status/Restrictions: No weight bearing restirctions  Other Medical Equipment (for information only, NOT a DME order):  walker  Other Treatments:   Skilled nurse assessment  Medication teaching and compliance  Sent script for walker to [facility] ( [facility] ) they will have to run your benefits to see if eligible. . please call them at [phone] to set up delivery if approved. Patient's personal belongings (please select all that are sent with patient):  Wendy    RN SIGNATURE:  Electronically signed by Musa Beal RN on 9/22/20 at 10:17 AM EDT    CASE MANAGEMENT/SOCIAL WORK SECTION    Inpatient Status Date: 9/19    Readmission Risk Assessment Score:  Readmission Risk              Risk of Unplanned Readmission:        28           Discharging to Facility/ Agency   · Name: [facility]  · Address:  · Phone: [phone]  · Fax:[phone]    Dialysis Facility (if applicable)   · Name:  · Address:  · Dialysis Schedule:  · Phone:  · Fax:    / signature: Electronically signed by William Jackson on 9/21/20 at 1:20 PM EDT    PHYSICIAN SECTION    Prognosis: Fair    Condition at Discharge: Stable    Rehab Potential (if transferring to Rehab):  Fair    Recommended Labs or Other Treatments After Discharge:     Physician Certification: I certify the above information and transfer of Lamine Camilo  is necessary for the continuing treatment of the diagnosis listed and that he requires Home Care for greater 30 days.      Update Admission H&P: No change in H&P    PHYSICIAN SIGNATURE:  Electronically signed by Maura Durbin DO on 9/22/20 at 9:47 AM EDT

## 2020-09-21 NOTE — PLAN OF CARE
Problem: Falls - Risk of:  Goal: Will remain free from falls  Description: Will remain free from falls  9/21/2020 4148 by Glenny lEias RN  Outcome: Ongoing  Note: Bed in lowest position and locked. Problem: Falls - Risk of:  Goal: Absence of physical injury  Description: Absence of physical injury  9/21/2020 5496 by Glenny Elias RN  Outcome: Ongoing  Note: Call light within reach. Problem: Pain:  Goal: Pain level will decrease  Description: Pain level will decrease  Outcome: Ongoing  Note: Patient is able to call out appropriately for pain and discomfort.

## 2020-09-21 NOTE — PROGRESS NOTES
Patient referred to [facility] by Katie Al, RN Navigator, for [employer] samples and coupons at his appointment today. [facility] notes that patient is inpatient at [facility] currently and has been seen several times by Liam Alexandra.  [facility]'s have also addressed patient's paying out-of-pocket for medications and he was referred to the [facility] to see if he can get scripts covered there or to apply for Medicare Part D prescription coverage. Gercia Mckeon, did approve a one-time month supply of meds to give patient more time to explore these options. Rufino Valderrama.  Ciro Carlos

## 2020-09-21 NOTE — PROGRESS NOTES
Occupational Therapy   Occupational Therapy Initial Assessment  Date: 2020   Patient Name: Ghazal Cuevas  MRN: [id number]     : 1950    RN Ale reports patient is medically stable for therapy treatment this date. Chart reviewed prior to treatment and patient is agreeable for therapy. All lines intact and patient positioned comfortably at end of treatment. All patient needs addressed prior to ending therapy session. Date of Service: 2020    Discharge Recommendations:  Home with assist PRN, Home with Home health OT  OT Equipment Recommendations  Equipment Needed: Yes  Mobility Devices: ADL Assistive Devices  ADL Assistive Devices: Sock-Aid Soft;Reacher;Long-handled Shoe Horn;Long-handled Sponge;Emergency Alert System;Grab Bars - shower    Assessment   Performance deficits / Impairments: Decreased functional mobility ; Decreased ADL status; Decreased strength;Decreased safe awareness;Decreased cognition;Decreased endurance;Decreased balance;Decreased high-level IADLs;Decreased posture  Assessment: Pt would benefit from continued skilled OT services to increase I and safety during functional tasks to return home at [facility] with assist as needed. Prognosis: Fair  Decision Making: Medium Complexity  OT Education: OT Role;Plan of Care;Energy Conservation;Transfer Training  Patient Education: safety in function, fall prevention/call light use, recommendation for continued therapy, benefits of being OOB  REQUIRES OT FOLLOW UP: Yes  Activity Tolerance  Activity Tolerance: Patient Tolerated treatment well;Patient limited by pain; Patient limited by fatigue  Activity Tolerance: fair minus  Safety Devices  Safety Devices in place: Yes  Type of devices: Call light within reach;Gait belt;Patient at risk for falls; Left in chair;Nurse notified           Patient Diagnosis(es): The primary encounter diagnosis was Thrombocytopenia ([facility] [address] A diagnosis of Other fatigue was also pertinent to this visit.      has a past medical history of Cancer [facility]), Cardiac resynchronization therapy defibrillator (CRT-D) in place, Chest pain, Coronary artery disease, Dyspnea on exertion, Elevated troponin, Heart attack ([facility] [address] Hyperlipidemia, Hypertension, and Ischemic cardiomyopathy. has a past surgical history that includes Coronary angioplasty with stent (2012); pacemaker placement (02/11/2019); CT BIOPSY DEEP BONE PERCUTANEOUS (8/11/2020); and IR PORT PLACEMENT > 5 YEARS (8/24/2020). Per H&P: Alessandra Frazier is a 69 y.o. Non-/non  male who presents with Fatigue   and is admitted to the hospital for the management of Thrombocytopenia ([facility] [address]    Patient presented to the emergency department with fatigue and pain which he attributed to his cancer. He has primary lung cancer with bone and brain metastasis and sees Dr. Gustabo Veliz is his oncologist.  Other medical history includes hypertension, CAD, cardiomyopathy, prior MI, and pacemaker/AICD present. He states he underwent 10 previous radiation treatments which are now completed. He began chemotherapy on September 3. He stated he had labs done on Wednesday and his platelet count was noted 69. He states that he has become progressively more tired, and pain, anxious, unable to sleep, and has had decreased oral intake. He called the oncologist on-call who directed him to come to the emergency department for evaluation and follow-up of the abnormal labs, as they may have continued to decrease. Pertinent labs include WBC 8.4, RBC 3.54, hemoglobin 10.3, hematocrit 31.9, platelets 11. Oncology was consulted by the emergency department. He is being admitted for further management of thrombocytopenia.       Restrictions  Restrictions/Precautions  Restrictions/Precautions: Fall Risk, Up as Tolerated, Contact Precautions  Required Braces or Orthoses?: No  Position Activity Restriction  Other position/activity restrictions: IV RUE, telemetry, neutropenic precautions, current chemotherapy, R chest port, up with assist    Subjective   General  Chart Reviewed: Yes  Patient assessed for rehabilitation services?: Yes  Family / Caregiver Present: No  Patient Currently in Pain: Yes  Comments / Details: Pt rated pain 6/10 in back, pt stated RN gave medication prior to writers arrival    Social/Functional History  Social/Functional History  Lives With: Son(Pt states he lives with son and daughter in law)  Type of Home: House  Home Layout: Two level, Able to Live on Main level with bedroom/bathroom  Home Access: Stairs to enter with rails  Entrance Stairs - Number of Steps: 4  Entrance Stairs - Rails: Both  Bathroom Shower/Tub: Tub/Shower unit  Bathroom Toilet: Standard(Pt states he uses sink for support and window sill)  Bathroom Equipment: Shower chair  Home Equipment: Rolling walker, Cane  Receives Help From: Family(Pt states family is supportive)  ADL Assistance: Needs assistance(Pt states he does ADLs himself, family helps with showers and dressing as needed.)  Homemaking Assistance: Needs assistance(Pt states family does all cooking, cleaning, laundry)  Homemaking Responsibilities: No  Ambulation Assistance: Independent(Pt states he does not use AD unless he feels weak/fatigued)  Transfer Assistance: Independent  Active : No(family does all transportation)  Occupation: Retired  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  Additional Comments: Pt denied recent falls       Objective   Vision: Impaired(Pt denies recent visual changes)  Vision Exceptions: Wears glasses at all times  Hearing: Within functional limits    Orientation  Overall Orientation Status: Within Functional Limits  Observation/Palpation  Posture: Fair(with RW)  Observation: R chest port, neutropenic isolation, current chemo treatments  Edema: none  Balance  Sitting Balance: Stand by assistance  Standing Balance: Contact guard assistance(with RW)  Standing Balance  Time: standing chaparrita < 1.5 min for functional mobility  Functional Mobility  Functional - Mobility Device: Rolling Walker  Activity: (bed to door, door to bedside chair)  Assist Level: Contact guard assistance  Functional Mobility Comments: MOD verbal instruction for RW safety, upright posture/scanning room, awareness/assist with lines, pacing self, slowing down movements all to increase safety and reduce fall risk. Toilet Transfers  Toilet Transfer: Unable to assess  Toilet Transfers Comments: N/T pt with no needs at this time  ADL  Feeding: Setup  Grooming: Setup;Stand by assistance(seated)  UE Bathing: Setup;Stand by assistance  LE Bathing: Setup;Minimal assistance  UE Dressing: Setup;Minimal assistance(with hosp gown)  LE Dressing: Setup;Minimal assistance(Pt was able to onofre B socks seated EOB required assistance to fully pull up sock d/t increased fatigue)  Toileting: Setup;Minimal assistance(for gown management)  Tone RUE  RUE Tone: Normotonic  Tone LUE  LUE Tone: Normotonic  Coordination  Movements Are Fluid And Coordinated: Yes     Bed mobility  Supine to Sit: Stand by assistance  Sit to Supine: Unable to assess(Pt agreed to sit in bedside chair)  Scooting: Stand by assistance  Comment: Mod verbal cues for hand placement on bed rail, and pacing self all to increase safety. Transfers  Stand Step Transfers: Contact guard assistance(with RW)  Sit to stand: Contact guard assistance  Stand to sit: Contact guard assistance  Transfer Comments: MOD verbal cues for RW safety, upright posture, controlled stand to sit, B hand placement on surface prior to sitting, awareness/assist with lines, pacing self and slowing down movements all to increase safety. Cognition  Overall Cognitive Status: Exceptions  Arousal/Alertness: Appropriate responses to stimuli  Following Commands: Follows multistep commands with repitition; Follows multistep commands with increased time  Attention Span: Appears intact  Memory: Appears intact  Safety Judgement: Decreased awareness of need for assistance;Decreased awareness of need for safety  Problem Solving: Assistance required to generate solutions;Assistance required to identify errors made;Assistance required to correct errors made;Assistance required to implement solutions;Decreased awareness of errors  Insights: Decreased awareness of deficits  Initiation: Requires cues for some  Sequencing: Requires cues for some  Perception  Overall Perceptual Status: WFL     Sensation  Overall Sensation Status: WFL        LUE AROM (degrees)  LUE AROM : WFL  RUE AROM (degrees)  RUE AROM : WFL  LUE Strength  Gross LUE Strength: Exceptions to [facility]  LUE Strength Comment: Grossly 4/5  RUE Strength  Gross RUE Strength: Exceptions to [facility] [place]  RUE Strength Comment: Grossly 4/5                   Plan   Plan  Times per week: 4-5x/wk 1x/day as chaparrita  Current Treatment Recommendations: Strengthening, Functional Mobility Training, Endurance Training, Safety Education & Training, Pain Management, Patient/Caregiver Education & Training, Equipment Evaluation, Education, & procurement, Home Management Training, Self-Care / ADL                        AM-PAC Score: 19              Goals  Short term goals  Time Frame for Short term goals: By discharge, pt to demo  Short term goal 1: bed mobility to MOD I using bed rails as needed. Short term goal 2: UB ADLs to Set up and LB ADLs to SBA with use of AD/AE as needed. Short term goal 3: increased BUE strength by 1/2 grade/I with simple BUE HEP with use of handouts as needed to assist with selfcare. Short term goal 4: ADL transfers and functional mobility to SUP with use of AD as needed. Short term goal 5: toileting task to SBA with use of grab bar/AD as needed. Long term goals  Long term goal 1: Pt to demo increased standing chpaarrita > 8 min with SBA using AD as needed to reduce fall risk during selfcare tasks.   Long term goal 2: Pt to be I with fall prevention/EC/WS tech and recommendations for AE with use of handouts as needed. Patient Goals   Patient goals : To go home!        Therapy Time   Individual Concurrent Group Co-treatment   Time In 0853(Plus 10 min chart review/RN communication)         Time Out 0922         Minutes [address] [place] [address]

## 2020-09-22 NOTE — PROGRESS NOTES
Occupational Therapy  [facility]  Occupational Therapy Not Seen Note    Patient not available for Occupational Therapy due to:    [] Testing:    [] Hemodialysis    [] Cancelled by RN:    [x]Refusal by Patient: 9/22 - Cx;  Pt refused tx stating he wants to wait for his daughter in law to get here to do anything.    [] Surgery:     [] Intubation:     [] Pain Medication:    [] Sedation:     [] Spine Precautions :    [] Medical Instability:    [] Other:

## 2020-09-22 NOTE — TELEPHONE ENCOUNTER
Name: Lele Martínez  : 1950  MRN: [id number]    Oncology Navigation Follow-Up Note  Navigator reviewing chart and pt.  D/C'd and to return for CTX 10/5, writer letting [address] [place] [address] know to add MD F/U per Dr. Logan Bullard does not need to come in next week(chemotherapy moved to a Monday)                                                                   tronically signed by Dennise Javed RN on 2020 at 3:31 PM

## 2020-09-22 NOTE — PROGRESS NOTES
[facility]  Office: [address], DO, Julia Dines, DO, Maura Mellisa, DO, Maci Elizabeth Blood, DO, Vinay Giles MD, Bennett Orr MD, Ellyn Yancey MD, Adi Shafer MD, Francisco Farnsworth MD, Sandie Ochoa MD, Richa Sylvester MD, Sulema Kim MD, Ollie Talavera MD, Dianelys Anderson, DO, Breana Cast MD, Amy Conrad MD, Olga Lidia Joiner DO, Mayi Zafar MD,  Delmi Scruggs, DO, Nupur Robles MD, China Barrientos MD, [place], [facility], [address], CNP, Renny Amas, CNP, Rukhsana Pulse, CNS, Ester Haled, CNP, Loreta Hidalgo, CNP, Andrez Bae, CNP, Samanta Pulse, CNP, Pricilla Bob, CNP, Rosalba Dahl PA-C, [place] , [facility], Dilshad Juarez, CNP, Riley Sr, CNP, Jennifer Hinton, CNP, Scooby Vargas, CNP, Ryan Mike, [facility]    Progress Note    9/22/2020    9:45 AM    Name:   Maryellen Rasmussen  MRN:     [id number]     Acct:      [de-identified]   Room:   1002/1002-02   Day:  3  Admit Date:  9/19/2020  7:47 PM    PCP:   Jerica Munoz MD  Code Status:  Full Code    Subjective:     C/C:   Chief Complaint   Patient presents with    Fatigue     Interval History Status: improved. Feels better today  Denies cp/sob/n/v/f/c    Brief History:     Per my partner:  \"Mazin Muñoz is a 69 y.o. Non-/non  male who presents with Fatigue   and is admitted to the hospital for the management of Thrombocytopenia ([facility] [address]      Patient presented to the emergency department with fatigue and pain which he attributed to his cancer. Heaven Redmond has primary lung cancer with bone and brain metastasis and sees Dr. Shayla Browning is his oncologist.  Other medical history includes hypertension, CAD, cardiomyopathy, prior MI, and pacemaker/AICD present. Heaven Redmond states he underwent 10 previous radiation treatments which are now completed. Heaven Redmond began chemotherapy on September 3.  He stated he had labs done on Wednesday and his platelet count was noted 69.  He states that he has become progressively more tired, and pain, anxious, unable to sleep, and has had decreased oral intake.  He called the oncologist on-call who directed him to come to the emergency department for evaluation and follow-up of the abnormal labs, as they may have continued to decrease.  Pertinent labs include WBC 8.4, RBC 3.54, hemoglobin 10.3, hematocrit 31.9, platelets 11.  Oncology was consulted by the emergency department. Shabbir ePtty is being admitted for further management of thrombocytopenia. \"    Review of Systems:     Constitutional:  negative for chills, fevers, sweats  Respiratory:  negative for cough, dyspnea on exertion, shortness of breath, wheezing  Cardiovascular:  negative for chest pain, chest pressure/discomfort, lower extremity edema, palpitations  Gastrointestinal:  negative for abdominal pain, constipation, diarrhea, nausea, vomiting  Neurological:  negative for dizziness, headache    Medications: Allergies:     Allergies   Allergen Reactions    Penicillins      Swollen hand with itching       Current Meds:   Scheduled Meds:    tbo-filgrastim  300 mcg Subcutaneous Daily    aspirin  81 mg Oral Daily    carvedilol  12.5 mg Oral BID WC    pantoprazole  40 mg Oral QAM AC    sodium chloride flush  10 mL Intravenous 2 times per day    sodium chloride  1,000 mL Intravenous Once     Continuous Infusions:    sodium chloride 125 mL/hr at 09/22/20 0433     PRN Meds: oxyCODONE **OR** oxyCODONE, polyethylene glycol, sodium chloride flush, potassium chloride **OR** potassium alternative oral replacement **OR** potassium chloride, magnesium sulfate, acetaminophen **OR** acetaminophen, promethazine **OR** ondansetron, nicotine, senna    Data:     Past Medical History:   has a past medical history of Cancer [facility]), Cardiac resynchronization therapy defibrillator (CRT-D) in place, Chest pain, Coronary artery disease, Dyspnea on exertion, Elevated troponin, Heart attack ([facility] [address] Hyperlipidemia, Hypertension, and Ischemic cardiomyopathy. Social History:   reports that he quit smoking about 8 years ago. His smoking use included cigarettes. He has never used smokeless tobacco. He reports previous drug use. Drug: Marijuana. He reports that he does not drink alcohol. Family History:   Family History   Problem Relation Age of Onset   Felix Dolan Cancer Mother     Cancer Father     Prostate Cancer Father        Vitals:  BP (!) 106/55   Pulse 96   Temp 98.6 °F (37 °C) (Oral)   Resp 20   Ht 5' 5\" (1.651 m)   Wt 136 lb (61.7 kg)   SpO2 96%   BMI 22.63 kg/m²   Temp (24hrs), Av.6 °F (37 °C), Min:98.2 °F (36.8 °C), Max:99.1 °F (37.3 °C)    No results for input(s): POCGLU in the last 72 hours. I/O (24Hr): Intake/Output Summary (Last 24 hours) at 2020 0945  Last data filed at 2020 0431  Gross per 24 hour   Intake 3048 ml   Output 675 ml   Net 2373 ml       Labs:  Hematology:  Recent Labs     20  0623 20  0249 20  0531   WBC 0.4* 0.9* 3.8   RBC 3.06* 2.98* 2.95*   HGB 9.1* 8.8* 8.6*   HCT 28.4* 28.3* 27.9*   MCV 92.8 95.0 94.6   MCH 29.7 29.5 29.2   MCHC 32.0 31.1 30.8   RDW 17.1* 17.3* 17.6*   PLT See Reflexed IPF Result See Reflexed IPF Result See Reflexed IPF Result   MPV NOT REPORTED NOT REPORTED NOT REPORTED   INR 1.4  --   --      Chemistry:  Recent Labs     20  0623    137   K 4.6 4.6    104   CO2 23 22   GLUCOSE 113* 110*   BUN 27* 21   CREATININE 1.10 0.95   ANIONGAP 13 11   LABGLOM >60 >60   GFRAA >60 >60   CALCIUM 8.5* 8.3*   No results for input(s): PROT, LABALBU, LABA1C, P9YURCQ, P0FLNGO, FT4, TSH, AST, ALT, LDH, GGT, ALKPHOS, LABGGT, BILITOT, BILIDIR, AMMONIA, AMYLASE, LIPASE, LACTATE, CHOL, HDL, LDLCHOLESTEROL, CHOLHDLRATIO, TRIG, VLDL, [id number], PHENYTOIN, PHENYF, URICACID, POCGLU in the last 72 hours.   ABG:No results found for: POCPH, PHART, PH, POCPCO2, NCU6NTJ, PCO2, POCPO2, PO2ART, PO2, POCHCO3, EVI8KQA, HCO3, NBEA, PBEA, BEART, BE, THGBART, THB, RJS0JSL, BRKU8AIS, O9CTLSYI, O2SAT, FIO2  Lab Results   Component Value Date/Time    SPECIAL L AC 10 ML 09/21/2020 08:14 AM    SPECIAL R AC 10 ML 09/21/2020 08:14 AM     Lab Results   Component Value Date/Time    CULTURE NO GROWTH 17 HOURS 09/21/2020 08:14 AM    CULTURE NO GROWTH 17 HOURS 09/21/2020 08:14 AM       Radiology:  [facility] Chest Portable    Result Date: 9/19/2020  Right hilar mass has moderately decreased in size consistent with response to therapy. No acute infiltrate, pneumothorax or pleural fluid. Lytic metastatic lesion involving the lateral right 7th rib. Physical Examination:        General appearance:  alert, cooperative and no distress  Mental Status:  oriented to person, place and time and normal affect  Lungs:  clear to auscultation bilaterally, normal effort  Heart:  regular rate and rhythm, no murmur  Abdomen:  soft, nontender, nondistended, normal bowel sounds, no masses, hepatomegaly, splenomegaly  Extremities:  no edema, redness, tenderness in the calves  Skin:  no gross lesions, rashes, induration    Assessment:        Hospital Problems           Last Modified POA    * (Principal) Pancytopenia due to antineoplastic chemotherapy ([address] 9/20/2020 Yes    HTN (hypertension) 9/20/2020 Yes    CAD (coronary artery disease) 9/20/2020 Yes    Ischemic cardiomyopathy 9/20/2020 Yes    AICD (automatic cardioverter/defibrillator) present 9/20/2020 Yes    Brain metastasis ([address] 9/20/2020 Yes    Adenocarcinoma of right lung ([address] 9/20/2020 Yes    Thrombocytopenia ([address] 9/20/2020 Yes    Bone metastasis ([address] 9/20/2020 Yes    Other fatigue 9/20/2020 Yes    Dehydration 9/20/2020 Yes    Severe malnutrition ([address] (Chronic) 9/20/2020 Yes          Plan:        1. Dc home today  2.  Will follow blood cultures as outpatient, but doubt infection    Fran Durbin, DO  9/22/2020  9:45 AM

## 2020-09-22 NOTE — PROGRESS NOTES
_                         Today's Date: 9/22/2020  Patient Name: Frederick Sneed  Date of admission: 9/19/2020  7:47 PM  Patient's age: 69 y.o., 1950  Admission Dx: Thrombocytopenia [facility]) [D69.6]      Requesting Physician: Steff Howard MD    CHIEF COMPLAINT: Excessive weakness and fatigue. Nausea. Consult for lung cancer with recent treatment    SUBJECTIVE:  . The patient was seen and examined. Clinically he feels better. Less weakness and fatigue. No shortness of breath. No fever or chills. No new events. BRIEF CASE HISTORY:    The patient is a 69 y.o.  male who is admitted to the hospital for further management of increasing weakness and fatigue and nausea after recent chemotherapy treatment. Patient is known to our practice. He had stage IV lung cancer with lung and spine metastasis. He had radiation therapy and he was started on systemic treatment with Keytruda, carboplatin and Alimta. First treatment was September 10, 2020. For the last few days he is having increasing weakness and fatigue and decreased appetite. Poor nutrition. Poor performance. He was evaluated in the emergency room and was noted to have severe thrombocytopenia and severe leukopenia. Patient is admitted for rehydration and further management. He received platelet transfusions. Patient has no active bleeding. No clear fever. Temperature is almost upper limit of normal.    Past Medical History:   has a past medical history of Cancer [facility]), Cardiac resynchronization therapy defibrillator (CRT-D) in place, Chest pain, Coronary artery disease, Dyspnea on exertion, Elevated troponin, Heart attack ([address] Hyperlipidemia, Hypertension, and Ischemic cardiomyopathy.     Past Surgical History:   has a past surgical history that includes Coronary angioplasty with stent (2012); pacemaker placement (02/11/2019); CT BIOPSY DEEP BONE PERCUTANEOUS (8/11/2020); and IR PORT PLACEMENT > 5 YEARS (8/24/2020). Family History: family history includes Cancer in his father and mother; Prostate Cancer in his father. Social History:   reports that he quit smoking about 8 years ago. His smoking use included cigarettes. He has never used smokeless tobacco. He reports previous drug use. Drug: Marijuana. He reports that he does not drink alcohol. Medications:    Prior to Admission medications    Medication Sig Start Date End Date Taking? Authorizing Provider   oxyCODONE (ROXICODONE) 5 MG immediate release tablet Take 1 tablet by mouth every 4 hours as needed for Pain for up to 15 days. 9/18/20 10/3/20  Corey Pena MD   fentaNYL (DURAGESIC) 25 MCG/HR Place 1 patch onto the skin every 72 hours for 30 days. Apply one every 3 days 9/14/20 10/14/20  Yuvonne Councilman, MD   megestrol (MEGACE ES) 625 MG/5ML suspension Take 5 mLs by mouth daily 9/14/20   Yuvonne Councilman, MD   ondansetron (ZOFRAN ODT) 4 MG disintegrating tablet Take 1 tablet by mouth every 8 hours as needed for Nausea 8/31/20   Yuvonne Councilman, MD   fentaNYL (DURAGESIC) 12 MCG/HR Place 1 patch onto the skin every 72 hours for 30 days. Apply one every 3 days 8/31/20 9/30/20  Yuvonne Councilman, MD   polyethylene glycol (GLYCOLAX) 17 g packet Take 17 g by mouth daily as needed for Constipation    Historical Provider, MD   folic acid (FOLVITE) 1 MG tablet Take 1 tablet by mouth daily START 7 DAYS BEFORE FIRST DOSE OF PEMETREXED---TO BE TAKEN DAILY --AND CONTINUE 21 DAYS AFTER LAST DOSE OF PEMETREXED 8/17/20   Yuvonne Councilman, MD   pantoprazole (PROTONIX) 40 MG tablet Take 1 tablet by mouth every morning (before breakfast) 8/13/20   Deejay Alvarado MD   carvedilol (COREG) 12.5 MG tablet Take 12.5 mg by mouth 2 times daily (with meals)    Historical Provider, MD   aspirin 81 MG chewable tablet Take 1 tablet by mouth daily 2/12/19   Balaji Marley MD   nitroGLYCERIN (NITROSTAT) 0.4 MG SL tablet up to max of 3 total doses.  If no relief after 1 dose, call 911. Patient not taking: Reported on 9/14/2020 2/11/19   Flores Avalos MD     No current facility-administered medications for this encounter. Current Outpatient Medications   Medication Sig Dispense Refill    oxyCODONE (ROXICODONE) 5 MG immediate release tablet Take 1 tablet by mouth every 4 hours as needed for Pain for up to 15 days. 60 tablet 0    fentaNYL (DURAGESIC) 25 MCG/HR Place 1 patch onto the skin every 72 hours for 30 days. Apply one every 3 days 10 patch 0    megestrol (MEGACE ES) 625 MG/5ML suspension Take 5 mLs by mouth daily 150 mL 3    ondansetron (ZOFRAN ODT) 4 MG disintegrating tablet Take 1 tablet by mouth every 8 hours as needed for Nausea 20 tablet 0    fentaNYL (DURAGESIC) 12 MCG/HR Place 1 patch onto the skin every 72 hours for 30 days. Apply one every 3 days 10 patch 0    polyethylene glycol (GLYCOLAX) 17 g packet Take 17 g by mouth daily as needed for Constipation      folic acid (FOLVITE) 1 MG tablet Take 1 tablet by mouth daily START 7 DAYS BEFORE FIRST DOSE OF PEMETREXED---TO BE TAKEN DAILY --AND CONTINUE 21 DAYS AFTER LAST DOSE OF PEMETREXED 30 tablet 2    pantoprazole (PROTONIX) 40 MG tablet Take 1 tablet by mouth every morning (before breakfast) 30 tablet 3    carvedilol (COREG) 12.5 MG tablet Take 12.5 mg by mouth 2 times daily (with meals)      aspirin 81 MG chewable tablet Take 1 tablet by mouth daily 30 tablet 3    nitroGLYCERIN (NITROSTAT) 0.4 MG SL tablet up to max of 3 total doses. If no relief after 1 dose, call 911. (Patient not taking: Reported on 9/14/2020) 25 tablet 3       Allergies:  Penicillins    REVIEW OF SYSTEMS:      · General: Positive for weakness and fatigue. Positive for weight loss and decreased appetite. No fever or chills. · Eyes: No blurred vision, eye pain or double vision. · Ears: No hearing problems or drainage. No tinnitus. · Throat: No sore throat, problems with swallowing or dysphagia.    · Respiratory: Occasional need to adjust doses of chemotherapy on the second cycle which is due on October 1, 2020.  3. Status post platelet transfusion. Platelets are recovering. 4. Started on Granix. White blood cells showed full recovery. Granix will be discontinued. 5. IV fluids for rehydration. 6. Probable discharge home today. Discussed with patient and Nurse. Kavon Cuellar MD                            [address] [place] [address] [facility]                          Cell: [phone]    This note is created with the assistance of a speech recognition program.  While intending to generate a document that actually reflects the content of the visit, the document can still have some errors including those of syntax and sound a like substitutions which may escape proof reading. It such instances, actual meaning can be extrapolated by contextual diversion.

## 2020-09-22 NOTE — PLAN OF CARE
Problem: Falls - Risk of:  Goal: Will remain free from falls  Description: Will remain free from falls  9/22/2020 8212 by Glenny Elias RN  Outcome: Ongoing  Note: Bed in lowest position and locked     Problem: Falls - Risk of:  Goal: Absence of physical injury  Description: Absence of physical injury  Outcome: Ongoing  Note: Call light within reach and walker next to bed.      Problem: Pain:  Goal: Pain level will decrease  Description: Pain level will decrease  9/21/2020 1255 by Jackie Hunter RN  Outcome: Ongoing

## 2020-09-22 NOTE — PLAN OF CARE
Sania Kuo was evaluated today and a DME order was entered for a wheeled walker because he requires this to successfully complete daily living tasks of toileting, personal cares and ambulating. A wheeled walker is necessary due to the patient's unsteady gait, upper body weakness, and inability to  an ambulation device; and he can ambulate only by pushing a walker instead of a lesser assistive device such as a cane, crutch, or standard walker. The need for this equipment was discussed with the patient and he understands and is in agreement.     Fran Durbin, DO

## 2020-09-22 NOTE — DISCHARGE SUMMARY
[facility]  Office: [address], DO, John Del Rio, DO, Sierra Caruso, DO, [address] [facility], DO, Jennie Reese MD, Kierra Ruelas MD, Deisy Tejeda MD, Karyn Ayala MD, Og Leggett MD, Katya Couch MD, Shai Watts MD, Mary Frausto MD, Ollie Hansen MD, Griselda Khan, DO, Maria E Anderson MD, Apryl Rivers MD, Brigette [place], DO, Jin Hager MD,  Svetlana Sanchez, DO, Mandi Arroyo MD, Karrie Reilly MD, Maci Coley [facility], [facility], CNP, Rehana Parks, CNP, Aidan Villatoro, CNS, Randy Harkins, CNP, Marianna Lefort, CNP, Amina Olivas, CNP, Courtney Ferrell, CNP, Melissa Givens, CNP, Laura Hernandez PA-C, Irma Gaxiola, [facility], Lincoln Bazan, CNP, Dash Garcia, CNP, Karen Tovar, CNP, Caty Cummings, CNP, Niesha Reina, [facility]    Discharge Summary     Patient ID: Rachel Camp  :     MRN: [id number]     ACCOUNT:  [de-identified]   Patient's PCP: Breanna Mitchell MD  Admit Date: 2020   Discharge Date: 2020     Length of Stay: 3  Code Status:  Full Code  Admitting Physician: Catalina Perales MD  Discharge Physician: Reshma Durbin DO     Active Discharge Diagnoses:     Hospital Problem Lists:  Principal Problem:    Pancytopenia due to antineoplastic chemotherapy [facility])  Active Problems:    HTN (hypertension)    CAD (coronary artery disease)    Ischemic cardiomyopathy    AICD (automatic cardioverter/defibrillator) present    Brain metastasis [address]    Adenocarcinoma of right lung ([facility]    Thrombocytopenia ([facility]    Bone metastasis ([facility]    Other fatigue    Dehydration    Severe malnutrition ([facility]  Resolved Problems:    * No resolved hospital problems.  *      Admission Condition:  poor     Discharged Condition: stable    Hospital Stay:     Hospital Course:  Rachel Camp is a 69 y.o. male who was admitted for the management of  Pancytopenia due to antineoplastic chemotherapy ([facility] , delayed release capsule  Commonly known as:  PRILOSEC            No discharge procedures on file. Time Spent on discharge is  33 mins in patient examination, evaluation, counseling as well as medication reconciliation, prescriptions for required medications, discharge plan and follow up. Electronically signed by   Gavin Durbin DO  9/22/2020  9:48 AM      Thank you Dr. Rick Thayer MD for the opportunity to be involved in this patient's care.

## 2020-09-22 NOTE — PROGRESS NOTES
Regarding granulocyte colony stimulating factor (G-CSF) therapy:  Your patients ANC= 2.69. As this value is greater than 2 and counts are recovering, please consider discontinuation of TBO-filgrastim (Granix). Thank you. Madhavi Hobson, [facility] 9/22/2020  8:57 AM    Notes: ANC=Segs absolute (In the absence of bands)              ANC= [(% neutrophils + % bands) x WBC]                                 100                                   ------------------------------------  Pharmacy may place a \"per protocol\" order of: CBC with differential every other day while on colony stimulating factors.

## 2020-09-22 NOTE — PROGRESS NOTES
Pt given discharge instructions and all questions answered. Pt has all belongings and vitals stable.

## 2020-09-24 PROBLEM — R41.82 AMS (ALTERED MENTAL STATUS): Status: ACTIVE | Noted: 2020-01-01

## 2020-09-24 NOTE — ED NOTES
Made call to lab, states they do not have an labs, possibility of tube being stuck, will call back.       Kashif Serrato RN  09/24/20 1932

## 2020-09-24 NOTE — ED NOTES
Call in Vitamin D 50,000 units twice weekly for 1 year Pt brought to ED by daughter, states pt has lung cancer and mets to brain. Pt had first chemo treatment the other day and isn't acting like normal. Pt seen at [facility] the other day as well. Pt alert and answering some questions, denies pain. Pt placed on monitor, ekg completed, full septic work up completed, iv established.       Jodi Alvarado RN  09/24/20 [id number]

## 2020-09-24 NOTE — ED NOTES
Dr Smiley Gill at bedside     Caitlin Marva, [facility]  09/24/20 [phone]

## 2020-09-24 NOTE — ED PROVIDER NOTES
Implantable cardiac device leads overlying the right atrium and ventricle. Mild generalized interstitial prominence without superimposed focal airspace consolidation, sizeable pleural effusion or pneumothorax. Stable appearing right hilar mass. Osseous structures and soft tissues are grossly intact. No evidence for acute cardiopulmonary pathology. Stable right hilar mass. Xr Chest Portable    Result Date: 9/19/2020  EXAMINATION: ONE XRAY VIEW OF THE CHEST 9/19/2020 8:43 pm COMPARISON: Prior studies including 08/20/2020. HISTORY: ORDERING SYSTEM PROVIDED HISTORY: Chest Pain TECHNOLOGIST PROVIDED HISTORY: Chest Pain Reason for Exam: fatigue Acuity: Unknown Type of Exam: Unknown FINDINGS: Mass in the right infrahilar region has moderately decreased in size in the interval.  No acute infiltrate, pneumothorax or pleural effusion. Heart size is normal.  There is an unchanged 3 lead left subclavian AICD. There is been interval placement of a right IJ MediPort catheter with the tip in good position in the region of the superior vena cava. There is a lytic lesion involving the lateral right 7th rib. Right hilar mass has moderately decreased in size consistent with response to therapy. No acute infiltrate, pneumothorax or pleural fluid. Lytic metastatic lesion involving the lateral right 7th rib. RECENT VITALS:     Temp: 98.2 °F (36.8 °C),  Pulse: 116, Resp: 16, BP: 123/76, SpO2: 92 %    This patient is a 69 y.o. Male with history of lung cancer with metastasis to the brain and thrombocytopenia who presents for evaluation of worsening altered mental status over the past 5 days. The patient was recently admitted to [facility] for similar symptoms and was noted to have thrombocytopenia of 29. At time of signout sepsis labs, imaging, pacer interrogation, and EKG are pending.     EKG 1818 paced rhythm, rate 113 bpm, left axis deviation, prolonged QTC of 513 ms, normal WV interval, prolonged  ms, similar to EKG on 8/9/2020 except for prolonged QTc, will treat with magnesium    EKG 2038 paced rhythm, rate 110 bpm, left axis deviation, normal NC interval, QTC improved to 495 ms, QRS prolonged at 166 ms, similar to first EKG with improved QTC    OUTSTANDING TASKS / RECOMMENDATIONS:    1. CBC shows a leukocytosis of 15 with normocytic anemia and hemoglobin of 9.4. CMP is unremarkable except for calcium of 7.8 and bicarb of 18. Will order ionized calcium. Coags unremarkable. BNP is significantly elevated from 1 month ago at 3856. Troponin elevated at 39. EKG shows paced rhythm and prolonged QTC but improved with magnesium. Uric acid, phosphorus and TSH are unremarkable. CK and myoglobin are improved from baseline. VBG grossly unremarkable. Chest x-ray shows a stable right hilar mass without any other acute process. 2. Plan for admission.       Melba Smith DO  Attending Emergency Physician  [facility]  ED       Melba Smith, [place]  09/24/20 2409

## 2020-09-24 NOTE — TELEPHONE ENCOUNTER
Writer spoke with daughter in law Mykel Beverly who reports \"Mazin is not doing well\"    She describes,\"mental changes in last 24 hours, mean at times that is not like him, incont of stool, swelling to hands, feet and legs and really bad color\"    Denies fever and has been checking intermittently during the day. Reports having little sleep last night. Writer phoned and updated Dr. Gustabo Veliz. Post in house stay and [address] 09/10/2020. Orders received to either go to ER this evening or report to clinic tomorrow am for RN draw CDP, COMP and eval.    Writer spoke with Mykel Beverly will pencil in on our schedule @ 0900,  however Mykel Beverly is thinking ER. .she will phone office in am if need to cancel the appt here. [place] slip to front office.

## 2020-09-24 NOTE — ED PROVIDER NOTES
[address] [place] [address] ED     Emergency Department     Faculty Attestation        I performed a history and physical examination of the patient and discussed management with the resident. I reviewed the residents note and agree with the documented findings and plan of care. Any areas of disagreement are noted on the chart. I was personally present for the key portions of any procedures. I have documented in the chart those procedures where I was not present during the key portions. I have reviewed the emergency nurses triage note. I agree with the chief complaint, past medical history, past surgical history, allergies, medications, social and family history as documented unless otherwise noted below. For Physician Assistant/ Nurse Practitioner cases/documentation I have personally evaluated this patient and have completed at least one if not all key elements of the E/M (history, physical exam, and MDM). Additional findings are as noted. Vital Signs: BP: 123/76  Pulse: 116  Resp: 16  Temp: 98.2 °F (36.8 °C) SpO2: 92 %  PCP:  Adelso Ervin MD    Pertinent Comments:     Patient is a 65-year-old male with history of CAD as well as metastatic lung disease to brain as well as several other organs. Patient was admitted 1 week ago [facility] and was improved and discharged at the beginning of the week. Patient has slowly been having worsening mental status and minimally responsive until they brought him here. Here slightly more awake but initially hypoxic at 88 percent however once awakened comes up to mid 90s and on oxygen is 98% nasal cannula. Patient has chronic leg swelling bilateral up to the knees but intact distal pulses. Also of note 2 days ago labs were checked and platelets were 29. Assessment/plan: Altered mental status in a patient actively undergoing chemotherapy with thrombocytopenia and metastases to the brain.    Will check several laboratories including CT head and CT chest.    Obviously chest x-ray and infectious work-up as well. Admission after    Critical Care  None    This patient was evaluated in the Emergency Department for symptoms described in the history of present illness. He/she was evaluated in the context of the global COVID-19 pandemic, which necessitated consideration that the patient might be at risk for infection with the SARS-CoV-2 virus that causes COVID-19. Institutional protocols and algorithms that pertain to the evaluation of patients at risk for COVID-19 are in a state of rapid change based on information released by regulatory bodies including the CDC and federal and state organizations. These policies and algorithms were followed during the patient's care in the ED. (Please note that portions of this note were completed with a voice recognition program. Efforts were made to edit the dictations but occasionally words are mis-transcribed.  Whenever words are used in this note in any gender, they shall be construed as though they were used in the gender appropriate to the circumstances; and whenever words are used in this note in the singular or plural form, they shall be construed as though they were used in the form appropriate to the circumstances.)    MD Zuleyka Baptiste  Attending Emergency Medicine Physician             Ander Albarado MD  09/24/20 9705       Ander Albarado MD  09/24/20 3621

## 2020-09-24 NOTE — ED PROVIDER NOTES
[address]  Emergency Department Encounter  Emergency Medicine Resident     Pt Name: Saintclair Seidel  MRN: [id number]  [place] 1950  Date of evaluation: 9/24/20  PCP:  Sotero Arias MD    [address] [place] [address]       Chief Complaint   Patient presents with    Other     hasn't been doing good after starting chemo two weeks ago       HISTORY OF PRESENT ILLNESS  (Location/Symptom, Timing/Onset, Context/Setting, Quality, Duration, Modifying Factors, Severity.)    Saintclair Seidel is a 69 y.o. male who presents with history of metastatic lung cancer with known mets to the brain and spine currently on chemotherapy, prior MI, hypertension, cardiomyopathy with AICD in place who presents to the emergency department with generalized weakness, altered mental status, decreased p.o. intake per daughter who is the primary caretaker. She states that he has not been acting himself and is extremely fatigued and has not wanted anything to eat or drink. He was recently admitted [facility] at the beginning of the month for his chemotherapy. Has not had any falls or head trauma that she is aware of. No open sores on his body. PAST MEDICAL / SURGICAL / SOCIAL / FAMILY HISTORY    has a past medical history of Cancer [facility]), Cardiac resynchronization therapy defibrillator (CRT-D) in place, Chest pain, Coronary artery disease, Dyspnea on exertion, Elevated troponin, Heart attack ([address] Hyperlipidemia, Hypertension, and Ischemic cardiomyopathy. has a past surgical history that includes Coronary angioplasty with stent (2012); pacemaker placement (02/11/2019); CT BIOPSY DEEP BONE PERCUTANEOUS (8/11/2020); and IR PORT PLACEMENT > 5 YEARS (8/24/2020).     Social History     Socioeconomic History    Marital status:      Spouse name: Not on file    Number of children: Not on file    Years of education: Not on file    Highest education level: Not on file   Occupational History    Not on file   Social Needs  Financial resource strain: Not on file    Food insecurity     Worry: Not on file     Inability: Not on file   Everyday.me needs     Medical: Not on file     Non-medical: Not on file   Tobacco Use    Smoking status: Former Smoker     Types: Cigarettes     Last attempt to quit: 2012     Years since quittin.1    Smokeless tobacco: Never Used    Tobacco comment: 48 yr hx of smoking   Substance and Sexual Activity    Alcohol use: No    Drug use: Not Currently     Types: Marijuana     Comment: last smoked 2020    Sexual activity: Not on file   Lifestyle    Physical activity     Days per week: Not on file     Minutes per session: Not on file    Stress: Not on file   Relationships    Social connections     Talks on phone: Not on file     Gets together: Not on file     Attends [employer] service: Not on file     Active member of club or organization: Not on file     Attends meetings of clubs or organizations: Not on file     Relationship status: Not on file    Intimate partner violence     Fear of current or ex partner: Not on file     Emotionally abused: Not on file     Physically abused: Not on file     Forced sexual activity: Not on file   Other Topics Concern    Not on file   Social History Narrative    Not on file       Family History   Problem Relation Age of Onset    Cancer Mother     Cancer Father     Prostate Cancer Father        Allergies:    Penicillins    Home Medications:  Prior to Admission medications    Medication Sig Start Date End Date Taking? Authorizing Provider   oxyCODONE (ROXICODONE) 5 MG immediate release tablet Take 1 tablet by mouth every 4 hours as needed for Pain for up to 15 days. 9/18/20 10/3/20  Corey Pena MD   fentaNYL (DURAGESIC) 25 MCG/HR Place 1 patch onto the skin every 72 hours for 30 days.  Apply one every 3 days 9/14/20 10/14/20  Geri Hannah MD   megestrol (MEGACE ES) 625 MG/5ML suspension Take 5 mLs by mouth daily 20   Geri Hannah MD ondansetron (ZOFRAN ODT) 4 MG disintegrating tablet Take 1 tablet by mouth every 8 hours as needed for Nausea 8/31/20   [facility] MD LITO   fentaNYL (DURAGESIC) 12 MCG/HR Place 1 patch onto the skin every 72 hours for 30 days. Apply one every 3 days 8/31/20 9/30/20  [facility], MD   polyethylene glycol (GLYCOLAX) 17 g packet Take 17 g by mouth daily as needed for Constipation    Historical Provider, MD   folic acid (FOLVITE) 1 MG tablet Take 1 tablet by mouth daily START 7 DAYS BEFORE FIRST DOSE OF PEMETREXED---TO BE TAKEN DAILY --AND CONTINUE 21 DAYS AFTER LAST DOSE OF PEMETREXED 8/17/20   [facility], MD   pantoprazole (PROTONIX) 40 MG tablet Take 1 tablet by mouth every morning (before breakfast) 8/13/20   Sola Gee MD   carvedilol (COREG) 12.5 MG tablet Take 12.5 mg by mouth 2 times daily (with meals)    Historical Provider, MD   aspirin 81 MG chewable tablet Take 1 tablet by mouth daily 2/12/19   Heather Enriquez MD   nitroGLYCERIN (NITROSTAT) 0.4 MG SL tablet up to max of 3 total doses. If no relief after 1 dose, call 911. Patient not taking: Reported on 9/14/2020 2/11/19   Heather Enriquez MD       REVIEW OF SYSTEMS    (2-9 systems for level 4, 10 or more for level 5)    Review of Systems   Unable to perform ROS: Mental status change (information per daughter)   Constitutional: Positive for activity change, appetite change and fatigue. Negative for fever. HENT: Negative for congestion, rhinorrhea and sore throat. Respiratory: Negative for cough. Cardiovascular: Negative for chest pain. Gastrointestinal: Negative for abdominal pain, diarrhea, nausea and vomiting. Genitourinary: Positive for decreased urine volume. Musculoskeletal: Negative for myalgias. Skin: Negative for rash and wound. Allergic/Immunologic: Positive for immunocompromised state. Neurological: Positive for weakness. Negative for syncope. Psychiatric/Behavioral: Positive for confusion.        PHYSICAL EXAM   (up to 7 for level 4, 8 or more for level 5)    VITALS:   Vitals:    20 1738 20 1939 20 2146 20 2201   BP: 123/76  126/67 111/67   Pulse: 116 116 107 109   Resp: 16      Temp: 98.2 °F (36.8 °C)      TempSrc: Oral      SpO2: 92% 97%  94%   Weight: 120 lb (54.4 kg)      Height: 5' 5\" (1.651 m)          Physical Exam  Vitals signs and nursing note reviewed. Constitutional:       General: He is not in acute distress. Appearance: He is cachectic. He is ill-appearing. He is not diaphoretic. HENT:      Head: Normocephalic and atraumatic. Nose: Nose normal.      Mouth/Throat:      Mouth: Mucous membranes are dry. Eyes:      Extraocular Movements: Extraocular movements intact. Conjunctiva/sclera: Conjunctivae normal.      Pupils: Pupils are equal, round, and reactive to light. Neck:      Musculoskeletal: Normal range of motion. Cardiovascular:      Rate and Rhythm: Regular rhythm. Tachycardia present. Heart sounds: Normal heart sounds. No murmur. Pulmonary:      Effort: Pulmonary effort is normal. No respiratory distress. Breath sounds: Examination of the right-lower field reveals rhonchi. Examination of the left-lower field reveals rhonchi. Rhonchi present. No wheezing or rales. Abdominal:      General: Bowel sounds are normal. There is no distension. Palpations: Abdomen is soft. Tenderness: There is no abdominal tenderness. There is no guarding or rebound. Musculoskeletal: Normal range of motion. Right lower le+ Pitting Edema present. Left lower le+ Pitting Edema present. Skin:     General: Skin is warm and dry. Coloration: Skin is pale. Findings: No rash. Neurological:      Mental Status: He is easily aroused. GCS: GCS eye subscore is 3. GCS verbal subscore is 4. GCS motor subscore is 6. Psychiatric:         Attention and Perception: He is inattentive. Mood and Affect: Affect is flat. disease, sepsis, head bleed, stroke, hypothyroidism, rhabdomyolysis, tumor lysis syndrome, cerebral edema, midline shift due to known metastatic lesions to the brain    DIAGNOSTIC RESULTS / EMERGENCYDEPARTMENT COURSE / MDM   LABS:  Labs Reviewed   CBC WITH AUTO DIFFERENTIAL - Abnormal; Notable for the following components:       Result Value    WBC 15.0 (*)     RBC 3.22 (*)     Hemoglobin 9.4 (*)     Hematocrit 30.0 (*)     RDW 17.9 (*)     Platelets 62 (*)     NRBC Automated 0.3 (*)     Immature Granulocytes 21 (*)     Lymphocytes 8 (*)     Eosinophils % 0 (*)     Absolute Immature Granulocyte 3.15 (*)     Segs Absolute 9.75 (*)     Absolute Mono # 0.90 (*)     All other components within normal limits   COMPREHENSIVE METABOLIC PANEL - Abnormal; Notable for the following components:    Glucose 101 (*)     CREATININE 0.59 (*)     Calcium 7.8 (*)     CO2 18 (*)     Total Protein 4.5 (*)     Alb 2.0 (*)     Albumin/Globulin Ratio 0.8 (*)     All other components within normal limits   PROTIME-INR - Abnormal; Notable for the following components:    Protime 12.4 (*)     All other components within normal limits   BRAIN NATRIURETIC PEPTIDE - Abnormal; Notable for the following components:    Pro-BNP 3,856 (*)     All other components within normal limits   TROPONIN - Abnormal; Notable for the following components:    Troponin, High Sensitivity 39 (*)     All other components within normal limits   MAGNESIUM - Abnormal; Notable for the following components:    Magnesium 4.4 (*)     All other components within normal limits   HGB/HCT - Abnormal; Notable for the following components:    POC Hemoglobin 9.6 (*)     POC Hematocrit 28 (*)     All other components within normal limits   POTASSIUM (POC) - Abnormal; Notable for the following components:    POC Potassium 4.9 (*)     All other components within normal limits   CHLORIDE (POC) - Abnormal; Notable for the following components:    POC Chloride 110 (*)     All other components within normal limits   CALCIUM, IONIC (POC) - Abnormal; Notable for the following components:    POC Ionized Calcium 1.08 (*)     All other components within normal limits   CK - Abnormal; Notable for the following components: Total  (*)     All other components within normal limits   MYOGLOBIN, SERUM - Abnormal; Notable for the following components:    Myoglobin 710 (*)     All other components within normal limits   VENOUS BLOOD GAS, POINT OF CARE - Abnormal; Notable for the following components:    pCO2, Alfonzo 34.6 (*)     HCO3, Venous 20.1 (*)     Total CO2, Venous 21 (*)     Negative Base Excess, Alfonzo 5 (*)     O2 Sat, Alfonzo 57 (*)     All other components within normal limits   LACTIC ACID,POINT OF CARE - Abnormal; Notable for the following components:    POC Lactic Acid 1.84 (*)     All other components within normal limits   POCT GLUCOSE - Normal   CULTURE, URINE   CULTURE, BLOOD 1   CULTURE, BLOOD 1   APTT   URIC ACID   PHOSPHORUS   TSH WITH REFLEX   LACTIC ACID, WHOLE BLOOD   SODIUM (POC)   URINALYSIS WITH MICROSCOPIC   LACTIC ACID, WHOLE BLOOD   TROPONIN   CALCIUM, IONIZED   POC BLOOD GAS AND CHEMISTRY   CREATININE W/GFR POINT OF CARE   ANION GAP (CALC) POC       RADIOLOGY:  Ct Head Wo Contrast    Result Date: 9/24/2020  EXAMINATION: CT OF THE HEAD WITHOUT CONTRAST  9/24/2020 9:38 pm TECHNIQUE: CT of the head was performed without the administration of intravenous contrast. Dose modulation, iterative reconstruction, and/or weight based adjustment of the mA/kV was utilized to reduce the radiation dose to as low as reasonably achievable. COMPARISON: Unenhanced head CT dated 08/09/2020 and MRI brain dated 08/10/2020 HISTORY: ORDERING SYSTEM PROVIDED HISTORY: AMS, hx thrombocytopenia TECHNOLOGIST PROVIDED HISTORY: AMS, hx thrombocytopenia Reason for Exam: ams Acuity: Acute Type of Exam: Initial FINDINGS: BRAIN/VENTRICLES: There is no evidence of an acute infarct or intracranial hemorrhage. There has been marked improvement of vasogenic edema seen previously, most prominent in the left parietal lobe but also in the right parietal lobe. Subtle residual hypodensity is seen in the left frontoparietal centrum semiovale on series 2 images 43 through 51. The findings are suggestive of improvement of known intracranial metastatic disease. Mild atrophic changes are again noted. There is no evidence of midline shift. ORBITS: The visualized portion of the orbits demonstrate no acute abnormality. SINUSES: The visualized paranasal sinuses and mastoid air cells demonstrate no acute abnormality. SOFT TISSUES/SKULL:  No acute abnormality of the visualized skull or soft tissues. 1. No evidence of acute intracranial hemorrhage or infarct. 2. Marked improvement of intracranial metastatic disease. Slight residual vasogenic edema seen in the left frontoparietal centrum semiovale. If clinically indicated, follow-up MRI of the brain could be done for further evaluation. Xr Chest Portable    Result Date: 9/24/2020  EXAMINATION: ONE XRAY VIEW OF THE CHEST 9/24/2020 6:10 pm COMPARISON: 09/19/2020 HISTORY: ORDERING SYSTEM PROVIDED HISTORY: hypoxic, tachycardic, metastatic lung CA, on chemo TECHNOLOGIST PROVIDED HISTORY: hypoxic, tachycardic, metastatic lung CA, on chemo Reason for Exam: hypoxic, tachycardic, metastic lung CA, on chemo Acuity: Unknown Type of Exam: Unknown FINDINGS: Cardiac silhouette is normal in size. Vascular port terminates overlying the expected location of the SVC. Implantable cardiac device leads overlying the right atrium and ventricle. Mild generalized interstitial prominence without superimposed focal airspace consolidation, sizeable pleural effusion or pneumothorax. Stable appearing right hilar mass. Osseous structures and soft tissues are grossly intact. No evidence for acute cardiopulmonary pathology. Stable right hilar mass.        EKG    EKG Interpretation    Interpreted by emergency department physician    Rhythm: paced  Rate: 113  Axis: left  Ectopy: none  Conduction: QTc 513  ST Segments: no acute change  T Waves: inversion in  v2 and aVl  Q Waves: none    Clinical Impression: non-specific EKG    All EKG's are interpreted by the Emergency Department Physician whoeither signs or Co-signs this chart in the absence of a cardiologist.    EMERGENCY DEPARTMENT COURSE:  ED Course as of Sep 24 2237   Thu Sep 24, 2020   1822 XR CHEST PORTABLE [AM]   4073 Speaking to [employer] rep. 1 nonsustained vtach on 9/17. 's today per ventricular sensor. Intrathoracic fluid accumulation.    [AM]   1922 Calling lab to see what delay is in formal labs processing    [AM]   1932 Calling lab again, on hold.    [AM]   2012 CBC WITH AUTO DIFFERENTIAL(!):    WBC PENDING   RBC 3.22(!)   Hemoglobin Quant 9.4(!)   Hematocrit 30. 0(!)   MCV 93.2   MCH 29.2   MCHC 31.3   RDW 17.9(!)   Platelet Count PENDING   MPV PENDING   NRBC Automated PENDING   Differential Type NOT REPORTED   Seg Neutrophils PENDING   Lymphocytes PENDING   Monocytes PENDING   Eosinophils % PENDING   Basophils PENDING   Immature Granulocytes PENDING   Segs Absolute PENDING   Absolute Lymph # PENDING   Absolute Mono # PENDING   Absolute Eos # PENDING   Basophils Ab. .. [AM]   2012 LACTIC ACID, WHOLE BLOOD:    Lactic Acid, Whole Blood 2.0 [AM]   2019 INR: 1.2 [AM]   2019 PTT: 29.4 [AM]   2030 Repeat troponin and EKG ordered   Troponin, High Sensitivity(!): 39 [AM]   2030 TSH: 1.47 [AM]   2030 Elevated past baseline   Pro-BNP(!): 3,856 [AM]   2030 Myoglobin(!): 710 [AM]   2043 WBC(!): 15.0 [AM]   2049 Magnesium(!): 4.4 [AM]   2049 Phosphorus: 3.4 [AM]   2049 Uric Acid: 4.9 [AM]   2049 Total CK(!): 413 [AM]   2049 Calcium(!): 7.8 [AM]   2102 Intermed to admit    [AM]   2120 POC reviewed with daughter    [AM]   2145 Platelet Count(!): 62 [AM]   2147 Repeat ekg unchanged, Qtc improved     [AM]   3890 CT HEAD WO CONTRAST [AM]      ED Course User Index  [AM] Nelson Aaron DO       MDM  Number of Diagnoses or Management Options  Altered mental status, unspecified altered mental status type:   Failure to thrive in adult:      Amount and/or Complexity of Data Reviewed  Clinical lab tests: ordered and reviewed  Tests in the radiology section of CPT®: ordered and reviewed  Decide to obtain previous medical records or to obtain history from someone other than the patient: yes  Obtain history from someone other than the patient: yes  Review and summarize past medical records: yes  Discuss the patient with other providers: yes  Independent visualization of images, tracings, or specimens: yes    Patient Progress  Patient progress: stable        CONSULTS:  IP CONSULT TO HOSPITALIST  PHARMACY TO DOSE VANCOMYCIN    CRITICAL CARE:  Please see attending note    FINAL IMPRESSION     1. Altered mental status, unspecified altered mental status type    2. Failure to thrive in adult    3. Acute on chronic systolic CHF (congestive heart failure) ([facility] [address]    4. Leukocytosis, unspecified type    5. Elevated troponin       DISPOSITION / PLAN   DISPOSITION      ADMIT    Carleen Jansen  Emergency Medicine Resident Physician, PGY-3    (Please note that portions of this note were completed with a voice recognition program.  Efforts were made to edit the dictations but occasionally words are mis-transcribed.)        Nelson Aaron DO  Resident  09/24/20 [id number]

## 2020-09-25 PROBLEM — E44.0 MODERATE MALNUTRITION (HCC): Status: ACTIVE | Noted: 2020-01-01

## 2020-09-25 PROBLEM — B37.0 THRUSH: Status: ACTIVE | Noted: 2020-01-01

## 2020-09-25 PROBLEM — I21.4 NSTEMI (NON-ST ELEVATED MYOCARDIAL INFARCTION) (HCC): Status: RESOLVED | Noted: 2019-02-08 | Resolved: 2020-01-01

## 2020-09-25 PROBLEM — E83.51 HYPOCALCEMIA: Status: ACTIVE | Noted: 2020-01-01

## 2020-09-25 PROBLEM — A41.9 SEPSIS (HCC): Status: ACTIVE | Noted: 2020-01-01

## 2020-09-25 PROBLEM — G93.41 METABOLIC ENCEPHALOPATHY: Status: ACTIVE | Noted: 2020-01-01

## 2020-09-25 PROBLEM — M48.50XA: Status: ACTIVE | Noted: 2020-01-01

## 2020-09-25 PROBLEM — R94.31 PROLONGED Q-T INTERVAL ON ECG: Status: ACTIVE | Noted: 2020-01-01

## 2020-09-25 PROBLEM — R62.7 FAILURE TO THRIVE IN ADULT: Status: ACTIVE | Noted: 2020-01-01

## 2020-09-25 NOTE — PROGRESS NOTES
Comprehensive Nutrition Assessment    Type and Reason for Visit:  Initial, Positive Nutrition Screen, Consult(Poor Intake/Appetite; Weight Loss, Malnutrition, Supplement Recommendations)    Nutrition Recommendations/Plan:   - Continue current Cardiac diet - encourage intakes as tolerated. - Will provide Ensure Enlive ONS in chocolate or strawberry x 3 per day and provide frozen Magic Cups x 2 per day to boost intakes. Nutrition Assessment:  Pt admitted d/t AMS, weakness, and decreased PO intakes. Consulted for poor intakes/appetite, weight loss, and supplement recommendations. Pt has stage 4 lung cancer with brain and spine mets - recieved radiation and started chemotherapy the beginning of September. Since start of chemo pt's appetite has greatly decreased. Family reports pt was drinking some of Ensure supplements but he really just sips on them throughout the day. Family states pt likes chocolate milkshakes and they have been mixing Ensure in. Pt with thrush present and reports some painful swallowing. UBW of 145-150 lb reported - pt was down to 120 lb. Observed lunch tray which pt took bites of chicken pot pie and sips of chocolate Ensure Enlive. Labs reviewed: K+3.4 mmol/L. Malnutrition Assessment:  Malnutrition Status:   Moderate malnutrition    Context:  Chronic Illness     Findings of the 6 clinical characteristics of malnutrition:  Energy Intake:  7 - 75% or less estimated energy requirements for 1 month or longer  Weight Loss:  1 - Mild weight loss (specify amount and time period)(4% x 1 month based on current weight)     Body Fat Loss:  1 - Mild body fat loss Orbital   Muscle Mass Loss:  1 - Mild muscle mass loss Clavicles (pectoralis & deltoids)  Fluid Accumulation:  (Moderate) Extremities, Generalized   Strength:  Not Performed    Estimated Daily Nutrient Needs:  Energy (kcal):  28-30 kcal/kg = 8027-2088 kcals/day; Weight Used for Energy Requirements:  Admission   Protein (g): 1.5-1.8 gm/kg =  gm pro/day; Weight Used for Protein Requirements:  Ideal          Nutrition Related Findings:  Labs reviewed: K+3.4 mmol/L - 10 mEq KCl replacement given. Meds reviewed. +2 RUE, +2 pitting LUE, and +3 pitting b/l LE edema. +Thrush. Stage 4 lung cancer with brain and spine mets. Wounds:  Pressure Injury, Stage I(to buttocks)       Current Nutrition Therapies:    DIET CARDIAC; Dietary Nutrition Supplements: Standard High Calorie Oral Supplement    Anthropometric Measures:  · Height: 5' 5\" (165.1 cm)  · Current Body Weight: 134 lb 4.2 oz (60.9 kg)   · Admission Body Weight: 134 lb 4.2 oz (60.9 kg)    · Usual Body Weight: (145-150 lb pt pt/family)     · Ideal Body Weight: 136 lbs; % Ideal Body Weight 98.7 %   · BMI: 22.3  · BMI Categories: Normal Weight (BMI 22.0 to 24.9) age over 72       Nutrition Diagnosis:   · Inadequate oral intake related to catabolic illness(decreased appetite) as evidenced by poor intake prior to admission, weight loss, intake 0-25%(need for ONS)    Nutrition Interventions:   Food and/or Nutrient Delivery:  Continue Current Diet, Modify Oral Nutrition Supplement  Nutrition Education/Counseling:  Education not indicated   Coordination of Nutrition Care:  Continued Inpatient Monitoring    Goals: Goal Set  Oral intakes to meet at least 75% of estimated nutrition needs. Nutrition Monitoring and Evaluation:   Behavioral-Environmental Outcomes:  (N/A)   Food/Nutrient Intake Outcomes:  Food and Nutrient Intake, Supplement Intake  Physical Signs/Symptoms Outcomes:  Biochemical Data, GI Status, Hemodynamic Status, Weight, Skin, Nutrition Focused Physical Findings     Discharge Planning:     Too soon to determine     Electronically signed by Kathi Scott RD, LD on 9/25/20 at 2:29 PM EDT    Contact: [phone]

## 2020-09-25 NOTE — ED PROVIDER NOTES
[facility] [facility]  Emergency Department  Emergency Medicine Resident Sign-out     Care of Vandana Gorman was assumed from Dr. Jared Mathew and is being seen for Other (hasn't been doing good after starting chemo two weeks ago)  . The patient's initial evaluation and plan have been discussed with the prior provider who initially evaluated the patient.      EMERGENCY DEPARTMENT COURSE / MEDICAL DECISION MAKING:       MEDICATIONS GIVEN:  Orders Placed This Encounter   Medications    magnesium sulfate 2 g in 50 mL IVPB premix    vancomycin (VANCOCIN) 1000 mg in dextrose 5% 200 mL IVPB     Order Specific Question:   Antimicrobial Indications     Answer:   Sepsis of Unknown Etiology    meropenem (MERREM) 1 g in sodium chloride 0.9 % 100 mL IVPB (mini-bag)     Order Specific Question:   Antimicrobial Indications     Answer:   Sepsis of Unknown Etiology    DISCONTD: calcium gluconate 1 g in dextrose 5 % 100 mL IVPB    calcium gluconate 1 g in sodium chloride 50 mL    iohexol (OMNIPAQUE 350) solution 75 mL    oxyCODONE (ROXICODONE) immediate release tablet 10 mg       LABS / RADIOLOGY:     Labs Reviewed   CBC WITH AUTO DIFFERENTIAL - Abnormal; Notable for the following components:       Result Value    WBC 15.0 (*)     RBC 3.22 (*)     Hemoglobin 9.4 (*)     Hematocrit 30.0 (*)     RDW 17.9 (*)     Platelets 62 (*)     NRBC Automated 0.3 (*)     Immature Granulocytes 21 (*)     Lymphocytes 8 (*)     Eosinophils % 0 (*)     Absolute Immature Granulocyte 3.15 (*)     Segs Absolute 9.75 (*)     Absolute Mono # 0.90 (*)     All other components within normal limits   COMPREHENSIVE METABOLIC PANEL - Abnormal; Notable for the following components:    Glucose 101 (*)     CREATININE 0.59 (*)     Calcium 7.8 (*)     CO2 18 (*)     Total Protein 4.5 (*)     Alb 2.0 (*)     Albumin/Globulin Ratio 0.8 (*)     All other components within normal limits   PROTIME-INR - Abnormal; Notable for the following components: LACTIC ACID, WHOLE BLOOD   TROPONIN   CALCIUM, IONIZED   POC BLOOD GAS AND CHEMISTRY   CREATININE W/GFR POINT OF CARE   ANION GAP (CALC) POC       Ct Head Wo Contrast    Result Date: 9/24/2020  EXAMINATION: CT OF THE HEAD WITHOUT CONTRAST  9/24/2020 9:38 pm TECHNIQUE: CT of the head was performed without the administration of intravenous contrast. Dose modulation, iterative reconstruction, and/or weight based adjustment of the mA/kV was utilized to reduce the radiation dose to as low as reasonably achievable. COMPARISON: Unenhanced head CT dated 08/09/2020 and MRI brain dated 08/10/2020 HISTORY: ORDERING SYSTEM PROVIDED HISTORY: AMS, hx thrombocytopenia TECHNOLOGIST PROVIDED HISTORY: AMS, hx thrombocytopenia Reason for Exam: ams Acuity: Acute Type of Exam: Initial FINDINGS: BRAIN/VENTRICLES: There is no evidence of an acute infarct or intracranial hemorrhage. There has been marked improvement of vasogenic edema seen previously, most prominent in the left parietal lobe but also in the right parietal lobe. Subtle residual hypodensity is seen in the left frontoparietal centrum semiovale on series 2 images 43 through 51. The findings are suggestive of improvement of known intracranial metastatic disease. Mild atrophic changes are again noted. There is no evidence of midline shift. ORBITS: The visualized portion of the orbits demonstrate no acute abnormality. SINUSES: The visualized paranasal sinuses and mastoid air cells demonstrate no acute abnormality. SOFT TISSUES/SKULL:  No acute abnormality of the visualized skull or soft tissues. 1. No evidence of acute intracranial hemorrhage or infarct. 2. Marked improvement of intracranial metastatic disease. Slight residual vasogenic edema seen in the left frontoparietal centrum semiovale. If clinically indicated, follow-up MRI of the brain could be done for further evaluation.      Xr Chest Portable    Result Date: 9/24/2020  EXAMINATION: ONE XRAY VIEW OF THE CHEST 9/24/2020 6:10 pm COMPARISON: 09/19/2020 HISTORY: ORDERING SYSTEM PROVIDED HISTORY: hypoxic, tachycardic, metastatic lung CA, on chemo TECHNOLOGIST PROVIDED HISTORY: hypoxic, tachycardic, metastatic lung CA, on chemo Reason for Exam: hypoxic, tachycardic, metastic lung CA, on chemo Acuity: Unknown Type of Exam: Unknown FINDINGS: Cardiac silhouette is normal in size. Vascular port terminates overlying the expected location of the SVC. Implantable cardiac device leads overlying the right atrium and ventricle. Mild generalized interstitial prominence without superimposed focal airspace consolidation, sizeable pleural effusion or pneumothorax. Stable appearing right hilar mass. Osseous structures and soft tissues are grossly intact. No evidence for acute cardiopulmonary pathology. Stable right hilar mass. Xr Chest Portable    Result Date: 9/19/2020  EXAMINATION: ONE XRAY VIEW OF THE CHEST 9/19/2020 8:43 pm COMPARISON: Prior studies including 08/20/2020. HISTORY: ORDERING SYSTEM PROVIDED HISTORY: Chest Pain TECHNOLOGIST PROVIDED HISTORY: Chest Pain Reason for Exam: fatigue Acuity: Unknown Type of Exam: Unknown FINDINGS: Mass in the right infrahilar region has moderately decreased in size in the interval.  No acute infiltrate, pneumothorax or pleural effusion. Heart size is normal.  There is an unchanged 3 lead left subclavian AICD. There is been interval placement of a right IJ MediPort catheter with the tip in good position in the region of the superior vena cava. There is a lytic lesion involving the lateral right 7th rib. Right hilar mass has moderately decreased in size consistent with response to therapy. No acute infiltrate, pneumothorax or pleural fluid. Lytic metastatic lesion involving the lateral right 7th rib. RECENT VITALS:     Temp: 98.2 °F (36.8 °C),  Pulse: 109, Resp: 16, BP: 111/67, SpO2: 94 %    This patient is a 69 y.o.  Male, history of lung cancers with metastasis to the spine and brain on chemotherapy, CKD, CHF S/P AICD, with failure to thrive. The past week patient is having getting progressively worse in terms of altered mental status, decreased p.o. intake and generalized weakness. Lab work reveals hypocalcemia, acute on chronic CHF, elevated troponin, patient did get magnesium and replacement of his calcium due to prolonged QTC of 513. In addition for septic work-up patient did receive meropenem and vancomycin due to anaphylactic with penicillin. Imaging shows improved edema or round areas of metastases however no other acute process found. Await CT-PE results    CT PE revealed no evidence for PE however there is a pathologic T6 compression fracture and neurosurgery was consulted. They plan to get an MRI of the spine and transfer patient to the floor. OUTSTANDING TASKS / RECOMMENDATIONS:    1. Await CT-PE   2. Await transfer to the floor     FINAL IMPRESSION:     1. Altered mental status, unspecified altered mental status type    2. Failure to thrive in adult    3. Acute on chronic systolic CHF (congestive heart failure) ([facility] [address]    4. Leukocytosis, unspecified type    5. Elevated troponin        DISPOSITION:         DISPOSITION:  []  Discharge   []  Transfer -    [x]  Admission -  Intermed   []  Against Medical Advice   []  Eloped   FOLLOW-UP: No follow-up provider specified.    DISCHARGE MEDICATIONS: New Prescriptions    No medications on file           Anthony Finney DO  Emergency Medicine Resident  [facility]       Anthony Finney [place]  Resident  09/25/20 [id number]

## 2020-09-25 NOTE — PLAN OF CARE
Surgery cancelled due to platelets 58V. Need platelets at least 584T for surgery. Primary team notified.

## 2020-09-25 NOTE — ED NOTES
Lab found blood, lactic no good, ruben entire labs, labeled and sent.       Bony Carvalho RN  09/24/20 2003

## 2020-09-25 NOTE — PROGRESS NOTES
[facility]  Office: [address], DO, Hemanth Cazaressar, DO, Johnnie Narrow, DO, Alesha Jimenez Blood, DO, Melvina Vasquez MD, Walter Gaming MD, Jared Mixon MD, Milena Dior MD, Florence Triplett MD, Graham Lomas MD, Charlie Nieves MD, Michelle Christensen MD, Ollie Packer MD, Bo Rodriguez, DO, Regino Ramirez MD, Ta Rose MD, Ulises Kothari, DO, Salomón Hickey MD,  Vanda Decker, DO, Holli Haro MD, Yoel Allen MD, Katie Loaiza, [facility], [address], [facility], Alex Dukes, CNP, Laura Councilman, CNS, Carol Durbin, CNP, Edna Guerin, CNP, Maryanne Jones, CNP, Svetlana Robins, CNP, Yolanda Mina, CNP, Dayron Campos PA-C, Sina Mcclelland, [facility], Kel Erwin, CNP, Rosaura Child, CNP, Trinda Starch, CNP, Angie Copper, CNP, Flores Justyna, [address] [facility]      Daily Progress Note     Admit Date: 9/24/2020  Bed/Room No.  1596/0510-26  Admitting Physician : Jared Mixon MD  Code Status :Full Code  Hospital Day:  LOS: 1 day   Chief Complaint:     Chief Complaint   Patient presents with    Other     hasn't been doing good after starting chemo two weeks ago     Principal Problem:    Sepsis [facility])  Active Problems: Thrush    HTN (hypertension)    CAD (coronary artery disease)    Ischemic cardiomyopathy    Elevated troponin    CKD (chronic kidney disease)    Brain metastasis (HCC)    Adenocarcinoma of right lung (HCC)    Severe malnutrition (HCC)    AMS (altered mental status)    Metabolic encephalopathy    Failure to thrive in adult    Prolonged Q-T interval on ECG    Hypocalcemia    Pathological compression fracture of spine (HCC)  Resolved Problems:    * No resolved hospital problems. *    Subjective : Interval History/Significant events :  09/25/20    Patient continues to complain of loss of appetite, decreased energy, generalized weakness. He denies any difficulty breathing at rest, cough, expectoration, abdominal pain, nausea, vomiting, dysuria, diarrhea. Patient denies any headaches, sinus congestion, sore throat. He does have oral ulcers and is having some pain on eating. Vitals - Stable afebrile  Labs -normal kidney function, hypokalemia 3.2. Low calcium    Nursing notes , Consults notes reviewed. Overnight events and updates discussed with Nursing staff . Background History:         Kenneth Carpenter is 69 y.o. male  Who was admitted to the hospital on 9/24/2020 for treatment of Sepsis ([facility] [address] Patient was brought to hospital by family for meningitis, decreased oral intake, confusion, generalized weakness. He has been complaining of fatigue. Patient has history of stage IV lung cancer with bone and brain mets and was started recently on chemotherapy and immunotherapy. He was recently admitted in the hospital for pancytopenia and received Granix at that time. Evaluation emergency room showed temperature 98.2 heart rate 116, respiratory 16, blood pressure 123/76, SPO2 92%. Lab evaluation showed sodium 138, potassium 4.1, chloride 107, bicarb 18, creatinine 0.59, BUN 18. CK was elevated at 413, hemoglobin 710, proBNP 3856. EKG showed QTC prolongation without any acute ST changes but had T wave inversion in V2 and aVL. Device interrogation emergency room showed nonsustained V. tach on 9/17/2020 and heart rate 130's. Chest x-ray was negative for any cardiopulmonary pathology. Patient has underlying history of CAD, hypertension, hyperlipidemia, ischemic cardiomyopathy S/P ICD placement. Patient is on long-term opioid medication for cancer pain, Megace for loss of appetite.   He was found to have some oral ulcers with white plaques    PMH:  Past Medical History:   Diagnosis Date    Cancer [facility])     right lung    Cardiac resynchronization therapy defibrillator (CRT-D) in place     Chest pain 02/08/2019    Coronary artery disease     Dyspnea on exertion     Elevated troponin 02/08/2019    Heart attack ([facility] [address] 2012    Hyperlipidemia     Hypertension     Ischemic cardiomyopathy     NSTEMI (non-ST elevated myocardial infarction) ([facility] [address] 2/8/2019      Allergies: Allergies   Allergen Reactions    Penicillins      Swollen hand with itching      Medications :  aspirin, 81 mg, Oral, Daily  carvedilol, 12.5 mg, Oral, BID WC  folic acid, 1 mg, Oral, Daily  pantoprazole, 40 mg, Oral, QAM AC  sodium chloride flush, 10 mL, Intravenous, 2 times per day  enoxaparin, 40 mg, Subcutaneous, Daily  meropenem, 2 g, Intravenous, Q8H  vancomycin, 15 mg/kg, Intravenous, Q12H  vancomycin (VANCOCIN) intermittent dosing (placeholder), , Other, RX Placeholder  nystatin, 5 mL, Oral, 4x Daily        Review of Systems   Review of Systems   Constitutional: Positive for activity change, appetite change and fatigue. Negative for fever and unexpected weight change. HENT: Negative for congestion, nosebleeds, rhinorrhea, sinus pressure, sneezing and voice change. Respiratory: Negative for cough, choking, chest tightness, shortness of breath and wheezing. Cardiovascular: Negative for chest pain, palpitations and leg swelling. Gastrointestinal: Negative for abdominal pain, constipation, diarrhea, nausea and vomiting. Genitourinary: Negative for difficulty urinating, discharge, dysuria, frequency and testicular pain. Musculoskeletal: Positive for back pain. Skin: Negative for rash. Neurological: Negative for dizziness, weakness, light-headedness and headaches. Hematological: Does not bruise/bleed easily. Psychiatric/Behavioral: Negative for agitation, behavioral problems, confusion, self-injury, sleep disturbance and suicidal ideas.      Objective :      Current Vitals : Temp: 97.7 °F (36.5 °C),  Pulse: 112, Resp: 14, BP: 104/73, SpO2: 96 %  Last 24 Hrs Vitals   Patient Vitals for the past 24 hrs:   BP Temp Temp src Pulse Resp SpO2 Height Weight   09/25/20 0415 104/73 97.7 °F (36.5 °C) Oral 112 14 -- -- --   09/25/20 0400 -- -- -- -- -- -- -- 134 lb 4.2 oz (60.9 kg)   09/25/20 Behavior is cooperative. Laboratory findings:    Recent Labs     09/24/20 1953 09/25/20 0110 09/25/20  0304   WBC 15.0*  --  16.4*   HGB 9.4*  --  9.8*   HCT 30.0*  --  30.9*   PLT 62*  --  70*   SEDRATE  --  20  --    INR 1.2  --  1.3     Recent Labs     09/24/20 1917 09/24/20 1953 09/25/20 0110 09/25/20  0304   NA  --  138  --  139   K  --  4.1  --  3.4*   CL  --  107  --  107   CO2  --  18*  --  21   GLUCOSE 99 101*  --  105*   BUN  --  18  --  16   CREATININE 0.90 0.59*  --  0.92   MG  --  4.4* 2.2 2.1   CALCIUM  --  7.8*  --  7.8*   CAION  --   --  1.19 1.18   PHOS  --  3.4  --  3.2     Recent Labs     09/24/20 1953 09/25/20 0110 09/25/20 0304 09/25/20  0556   PROT 4.5*  --  4.5*  --    LABALBU 2.0*  --  2.2*  --    TSH 1.47  --   --   --    AST 37  --  30  --    ALT 30  --  31  --    ALKPHOS 109  --  117  --    BILITOT 0.55  --  0.57  --    CKTOTAL 413* 465*  --  451*   CKMB  --  8.3  --  7.5          Specific Gravity, UA   Date Value Ref Range Status   09/25/2020 1.064 (H) 1.005 - 1.030 Final     Protein, UA   Date Value Ref Range Status   09/25/2020 TRACE (A) NEGATIVE Final     RBC, UA   Date Value Ref Range Status   09/25/2020 2 TO 5 0 - 2 /HPF Final     Bacteria, UA   Date Value Ref Range Status   09/25/2020 NOT REPORTED None Final     Nitrite, Urine   Date Value Ref Range Status   09/25/2020 NEGATIVE NEGATIVE Final     WBC, UA   Date Value Ref Range Status   09/25/2020 2 TO 5 0 - 5 /HPF Final     Leukocyte Esterase, Urine   Date Value Ref Range Status   09/25/2020 NEGATIVE NEGATIVE Final       Imaging / Clinical Data :-   Ct Head Wo Contrast    Result Date: 9/24/2020  1. No evidence of acute intracranial hemorrhage or infarct. 2. Marked improvement of intracranial metastatic disease. Slight residual vasogenic edema seen in the left frontoparietal centrum semiovale. If clinically indicated, follow-up MRI of the brain could be done for further evaluation.      Xr Chest Portable    Result Date: 9/24/2020  No evidence for acute cardiopulmonary pathology. Stable right hilar mass. Xr Chest Portable    Result Date: 9/19/2020  Right hilar mass has moderately decreased in size consistent with response to therapy. No acute infiltrate, pneumothorax or pleural fluid. Lytic metastatic lesion involving the lateral right 7th rib. Ct Chest Pulmonary Embolism W Contrast    Result Date: 9/24/2020  1. No definite scan evidence for pulmonary embolus. 2. Pathologic T6 compression fracture with enlarging right 7th rib metastasis and new sternal metastasis. 3. Improving right upper lobe nodule, right lower lobe mass and, right hilar and mediastinal adenopathy. 4. Low-density lesion in the spleen could represent a metastasis or a hemangioma. Clinical Course : unchanged  Assessment and Plan  :        1. SIRS - possible sepsis - unknown source. Continue antibiotics for now. Consult ID. ? Leucocytosis from recent Granix. 2. Dehydration due to poor intake and failure to thrive  - continue IV fluids. 3. Thrombocytopenia secondary to chemotherapy   4. 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 5. Failure to thrive -   6. Severe malnutrition - protein supplement . Dietary consult  7. CAD S/P stents -okay to hold aspirin. Continue Coreg  8. Ischemic cardiomyopathy S/P ICD CRT  9. Pathological fracture T6 -neurosurgery consulted. Plan for kyphoplasty tomorrow. Follow culture sensitivity. Check procalcitonin. .  Continue to monitor vitals , Intake / output ,  Cell count , HGB , Kidney function, oxygenation  as indicated . Plan and updates discussed with patient ,  answers  explained to satisfaction.    Plan discussed with Staff Mikala Starr RN     (Please note that portions of this note were completed with a voice recognition program. Efforts were made to edit the dictations but occasionally words are mis-transcribed.)      Ana Adams MD  9/25/2020

## 2020-09-25 NOTE — PLAN OF CARE
Problem: Falls - Risk of:  Goal: Will remain free from falls  Description: Will remain free from falls  Outcome: Met This Shift   Pt remains free of falls at this time. Bed locked in lowest position, siderails x2, call light in reach. Non-skid footwear applied, bed alarm on. Encouraged pt to call for assistance as needed for safety. Will continue to monitor.    Problem: Injury - Risk of, Physical Injury:  Goal: Will remain free from falls  Description: Will remain free from falls  Outcome: Met This Shift     Problem: Pain:  Goal: Pain level will decrease  Description: Pain level will decrease  Outcome: Ongoing  Goal: Control of chronic pain  Description: Control of chronic pain  Outcome: Ongoing     Problem: Skin Integrity:  Goal: Will show no infection signs and symptoms  Description: Will show no infection signs and symptoms  Outcome: Ongoing     Problem: Confusion - Acute:  Goal: Absence of continued neurological deterioration signs and symptoms  Description: Absence of continued neurological deterioration signs and symptoms  Outcome: Ongoing

## 2020-09-25 NOTE — PLAN OF CARE
Nutrition Problem #1: Inadequate oral intake  Intervention: Food and/or Nutrient Delivery: Continue Current Diet, Modify Oral Nutrition Supplement  Nutritional Goals: Oral intakes to meet at least 75% of estimated nutrition needs.

## 2020-09-25 NOTE — CONSULTS
_                         Today's Date: 9/25/2020  Patient Name: Larry Casillas  Date of admission: 9/24/2020  5:26 PM  Patient's age: 69 y.o., 1950  Admission Dx: AMS (altered mental status) [R41.82]      Requesting Physician: Aguilar Dueñas MD    CHIEF COMPLAINT: Excessive weakness and fatigue. Nausea. Consult for lung cancer with recent treatment    History Obtained From:  patient, electronic medical record    HISTORY OF PRESENT ILLNESS:      The patient is a 69 y.o.  male who is admitted to the hospital for further management of increasing weakness and fatigue and nausea after recent chemotherapy treatment. Patient is known to our practice. He had stage IV lung cancer with lung and spine metastasis. He had radiation therapy and he was started on systemic treatment with Keytruda, carboplatin and Alimta. First treatment was September 10, 2020. For the last few days he is having increasing weakness and fatigue and decreased appetite. Poor nutrition. Poor performance. He was evaluated in the emergency room and was subsequently admitted. Patient was hospitalized 1 week ago with similar conditions. At that time he had thrombocytopenia and leukopenia. He was given Granix with good recovery of the white blood cells. Currently there is no fever or chills. No signs of infections. Past Medical History:   has a past medical history of Cancer [facility]), Cardiac resynchronization therapy defibrillator (CRT-D) in place, Chest pain, Coronary artery disease, Dyspnea on exertion, Elevated troponin, Heart attack ([facility] [address] Hyperlipidemia, Hypertension, Ischemic cardiomyopathy, and NSTEMI (non-ST elevated myocardial infarction) ([facility] [address]     Past Surgical History:   has a past surgical history that includes Coronary angioplasty with stent (2012); pacemaker placement (02/11/2019); CT BIOPSY DEEP BONE PERCUTANEOUS (8/11/2020); and IR PORT PLACEMENT > 5 YEARS (8/24/2020). Family History: family history includes Cancer in his father and mother; Prostate Cancer in his father. Social History:   reports that he quit smoking about 8 years ago. His smoking use included cigarettes. He has never used smokeless tobacco. He reports previous drug use. Drug: Marijuana. He reports that he does not drink alcohol. Medications:    Prior to Admission medications    Medication Sig Start Date End Date Taking? Authorizing Provider   oxyCODONE (ROXICODONE) 5 MG immediate release tablet Take 1 tablet by mouth every 4 hours as needed for Pain for up to 15 days. 9/18/20 10/3/20 Yes Ghada Almeida MD   fentaNYL (DURAGESIC) 25 MCG/HR Place 1 patch onto the skin every 72 hours for 30 days. Apply one every 3 days 9/14/20 10/14/20 Yes Ghada Almeida MD   ondansetron (ZOFRAN ODT) 4 MG disintegrating tablet Take 1 tablet by mouth every 8 hours as needed for Nausea 8/31/20  Yes Ghada Almeida MD   folic acid (FOLVITE) 1 MG tablet Take 1 tablet by mouth daily START 7 DAYS BEFORE FIRST DOSE OF PEMETREXED---TO BE TAKEN DAILY --AND CONTINUE 21 DAYS AFTER LAST DOSE OF PEMETREXED 8/17/20  Yes Ghada Almeida MD   pantoprazole (PROTONIX) 40 MG tablet Take 1 tablet by mouth every morning (before breakfast) 8/13/20  Yes Shama Manzanares MD   aspirin 81 MG chewable tablet Take 1 tablet by mouth daily 2/12/19  Yes Lewis Gar MD   megestrol (MEGACE ES) 625 MG/5ML suspension Take 5 mLs by mouth daily 9/14/20   Ghada Almeida MD   fentaNYL (DURAGESIC) 12 MCG/HR Place 1 patch onto the skin every 72 hours for 30 days. Apply one every 3 days 8/31/20 9/30/20  Ghada Almeida MD   polyethylene glycol (GLYCOLAX) 17 g packet Take 17 g by mouth daily as needed for Constipation    Historical Provider, MD   carvedilol (COREG) 12.5 MG tablet Take 12.5 mg by mouth 2 times daily (with meals)    Historical Provider, MD   nitroGLYCERIN (NITROSTAT) 0.4 MG SL tablet up to max of 3 total doses.  If no relief after 1 dose, call 911.   Patient not taking: Reported on 9/14/2020 2/11/19   Laura Weldon MD     Current Facility-Administered Medications   Medication Dose Route Frequency Provider Last Rate Last Dose    [Held by provider] aspirin chewable tablet 81 mg  81 mg Oral Daily Polo Emelia, APRN - CNP        carvedilol (COREG) tablet 12.5 mg  12.5 mg Oral BID WC Polo Emelia, APRN - CNP   12.5 mg at 73/64/46 4706    folic acid (FOLVITE) tablet 1 mg  1 mg Oral Daily Polo Emelia, APRN - CNP   1 mg at 09/25/20 1008    pantoprazole (PROTONIX) tablet 40 mg  40 mg Oral QAM AC Polo Emelia, APRN - CNP   40 mg at 09/25/20 6910    sodium chloride flush 0.9 % injection 10 mL  10 mL Intravenous 2 times per day Polo Emelia, APRN - CNP   10 mL at 09/25/20 1010    sodium chloride flush 0.9 % injection 10 mL  10 mL Intravenous PRN Polo Emelia, APRN - CNP        acetaminophen (TYLENOL) tablet 650 mg  650 mg Oral Q6H PRN Polo Emelia, APRN - CNP        Or    acetaminophen (TYLENOL) suppository 650 mg  650 mg Rectal Q6H PRN Polo Emelia, APRN - CNP        enoxaparin (LOVENOX) injection 40 mg  40 mg Subcutaneous Daily Polo Emelia, APRN - CNP   40 mg at 09/25/20 1008    vancomycin (VANCOCIN) intermittent dosing (placeholder)   Other RX Placeholder Deryl Ing, DO        nystatin (MYCOSTATIN) 337425 UNIT/ML suspension 500,000 Units  5 mL Oral 4x Daily Sergio Negro APRN - CNP   500,000 Units at 09/25/20 1628    potassium chloride 10 mEq/100 mL IVPB (Peripheral Line)  10 mEq Intravenous PRN Valery TRAVIS Melendezgrosso, APRN -  mL/hr at 09/25/20 0810 10 mEq at 09/25/20 0810    oxyCODONE (ROXICODONE) immediate release tablet 5 mg  5 mg Oral Q4H PRN Valery TRAVIS Melendezgrossguido, APRN - CNP   5 mg at 09/25/20 1402    potassium chloride (KLOR-CON M) extended release tablet 40 mEq  40 mEq Oral PRN Junior Cuellar MD        Or    potassium bicarb-citric acid (EFFER-K) effervescent tablet 40 mEq  40 mEq Oral PRN Nilsa Garcia MD           Allergies:  Penicillins    REVIEW OF SYSTEMS:      · General: Positive for weakness and fatigue. Positive for weight loss and decreased appetite. No fever or chills. · Eyes: No blurred vision, eye pain or double vision. · Ears: No hearing problems or drainage. No tinnitus. · Throat: No sore throat, problems with swallowing or dysphagia. · Respiratory: Occasional cough, no sputum or hemoptysis. Positive for shortness of breath. No pleuritic chest pain. · Cardiovascular: No chest pain, orthopnea or PND. No lower extremity edema. No palpitation. · Gastrointestinal: As above. · Genitourinary: No dysuria, hematuria, frequency or urgency. · Musculoskeletal: No muscle aches or pains. No limitation of movement. No back pain. No gait disturbance, No joint complaints. · Dermatologic: No skin rashes or pruritus. No skin lesions or discolorations. · Psychiatric: No depression, anxiety, or stress or signs of schizophrenia. No change in mood or affect. · Hematologic: No history of bleeding tendency. No bruises or ecchymosis. No history of clotting problems. · Infectious disease: No fever, chills or frequent infections. · Endocrine: No polydipsia or polyuria. No temperature intolerance. · Neurologic: No headaches or dizziness. No weakness or numbness of the extremities. No changes in balance, coordination,  memory, mentation, behavior. · Allergic/Immunologic: No nasal congestion or hives. No repeated infections. PHYSICAL EXAM:      /71   Pulse 100   Temp 98.7 °F (37.1 °C) (Oral)   Resp 14   Ht 5' 5\" (1.651 m)   Wt 134 lb 4.2 oz (60.9 kg)   SpO2 97%   BMI 22.34 kg/m²    Temp (24hrs), Av °F (36.7 °C), Min:97.4 °F (36.3 °C), Max:98.7 °F (37.1 °C)      General appearance -patient looks chronically ill.   Not in pain or distress  Mental status - alert and oriented  Eyes - pupils equal and reactive, extraocular eye movements intact  Ears - bilateral TM's and external ear canals normal  Nose - normal and patent, no erythema, discharge or polyps  Mouth - mucous membranes moist, pharynx normal without lesions  Neck - supple, no significant adenopathy  Lymphatics - no palpable lymphadenopathy, no hepatosplenomegaly  Chest -decreased air entry bilaterally.   Clear to auscultation, no wheezes, rales or rhonchi,   Heart - normal rate, regular rhythm, normal S1, S2, no murmurs, rubs, clicks or gallops  Abdomen - soft, nontender, nondistended, no masses or organomegaly  Neurological - alert, oriented, normal speech, no focal findings or movement disorder noted  Musculoskeletal - no joint tenderness, deformity or swelling  Extremities - peripheral pulses normal, no pedal edema, no clubbing or cyanosis  Skin - normal coloration and turgor, no rashes, no suspicious skin lesions noted           DATA:      Labs:       CBC:   Recent Labs     09/24/20 1953 09/25/20  0304   WBC 15.0* 16.4*   HGB 9.4* 9.8*   HCT 30.0* 30.9*   PLT 62* 70*     BMP:   Recent Labs     09/24/20 1953 09/25/20  0304    139   K 4.1 3.4*   CO2 18* 21   BUN 18 16   CREATININE 0.59* 0.92   LABGLOM >60 >60   GLUCOSE 101* 105*     PT/INR:   Recent Labs     09/24/20 1953 09/25/20  0304   PROTIME 12.4* 13.5*   INR 1.2 1.3     APTT:  Recent Labs     09/24/20 1953   APTT 29.4     LIVER PROFILE:  Recent Labs     09/24/20 1953 09/25/20  0304   AST 37 30   ALT 30 31   LABALBU 2.0* 2.2*               IMPRESSION:    Primary Problem  Sepsis [facility])    Active Hospital Problems    Diagnosis Date Noted    Thrush [B37.0] 09/25/2020     Priority: Medium    Sepsis ([address] [A41.9] 00/69/1934    Metabolic encephalopathy [S70.23] 09/25/2020    Failure to thrive in adult [R62.7] 09/25/2020    Prolonged Q-T interval on ECG [R94.31] 09/25/2020    Hypocalcemia [E83.51] 09/25/2020    Pathological compression fracture of spine ([facility] [address] Matheus Garcia 09/25/2020    AMS (altered mental status) [R41.82] 09/24/2020    Moderate malnutrition ([facility] [address] [E44.0] 09/20/2020    Adenocarcinoma of right lung ([facility] [address] [C34.91] 08/17/2020    Brain metastasis ([facility] [address] [C79.31] 08/09/2020    CKD (chronic kidney disease) [N18.9] 08/09/2020    Elevated troponin [R79.89]     HTN (hypertension) [I10] 02/08/2019    CAD (coronary artery disease) [I25.10] 02/08/2019    Ischemic cardiomyopathy [I25.5] 02/08/2019       RECOMMENDATIONS:  1. Records and labs and images were reviewed and discussed with the patient and his family. 2. Patient started on chemo immunotherapy on September 10, 2020 for stage IV lung cancer. 3. Obviously he is having multiple side effects related to treatment. May need to adjust doses of chemotherapy on the second cycle which is due on October 1, 2020. 4. Reviewing his labs, platelets are recovering. White blood cells are higher than normal due to recent treatment with Granix. Doubt infections. 5. CT scan was reviewed by neurosurgery. They are planned for kyphoplasty and nerve blocking tomorrow. We will repeat CBC in the morning. 6. IV fluids for rehydration. 7. We will follow with you. 8. Patient's questions were answered to the best of his satisfaction and he verbalized full understanding and agreement. 9. Thank you for allowing us to participate in the care of this pleasant patient. Discussed with patient and Nurse. Vinicio Galindo MD                            [address] [place] [address] [facility]                          Cell: [phone]    This note is created with the assistance of a speech recognition program.  While intending to generate a document that actually reflects the content of the visit, the document can still have some errors including those of syntax and sound a like substitutions which may escape proof reading.   It such instances, actual meaning can be extrapolated by contextual

## 2020-09-25 NOTE — PROGRESS NOTES
Occupational Therapy Not Seen Note    DATE: 2020  Name: Paola Munguia  : 1950  MRN: [id number]    Patient not available for Occupational Therapy due to: Other: Plan for kyphoplasty with NS tomorrow.  Chk post op    Next Scheduled Treatment: 2020    Electronically signed by LALIT Hill on 2020 at 11:57 AM

## 2020-09-25 NOTE — CONSULTS
pain, Megace for loss of appetite. Evaluation in the ER showed temperature 98.2, heart rate 116, respiratory 16, blood pressure 123/76, SPO2 92%. Lab evaluation showed sodium 138, potassium 4.1, chloride 107, bicarb 18, creatinine 0.59, BUN 18. CK was elevated at 413, hemoglobin 710, proBNP 3856. EKG showed QTC prolongation without any acute ST changes but had T wave inversion in V2 and aVL. Device interrogation emergency room showed nonsustained V. tach on 9/17/2020 and heart rate 130's. During this admission,  CT head was done due to altered mental status. No evidence of acute intracranial hemorrhage or infarct. Marked improvement of intracranial metastatic disease  CT chest PE was done with no evidence of pulmonary embolus. Pathologic T6 compression fracture with enlarging right seventh rib metastasis and new sternal metastasis noted    He was found to have some oral ulcers with white plaques       CURRENT EVALUATION: 09/25/20     Pt examined and charts reviewed. Pt c/o back pain. Plan for ablation of T6 with Kyphoplasty tomorrow 9/26 as per Neurosurgery. Pt using Nystatin swish and swallow for oral lesions. Pt is getting Meropenum 2 g every 8 hrs start date 9/25 and Vancomycin 750 mg every 12 hrs.start date 9/25 because of concern for sepsis and leucocytosis. However, leukocytosis could be a response to Granix. Afebrile , HR: 101, RR: 18, BP: 110/65  Saturating 93% on room air. Labs:    BUN:18>16  Creatinine:0.59> 0.92  Glucose: 101> 105  Calcium: 7.8>7.8    CK: 413>451  CRP: 81.4  Myoglobin: 710> 206  Pro BNP: 3,856  Trop: 39> 45  Pro flori 0.49    Hgb:9.4> 9.8  Platelet:62> 70  WBC: 15.0> 16.4    Cultures:  Urine:  ·   Blood:  · 9/21: no growth  · 9/24: no growth  Sputum :  ·   Wound:     CSF     IMAGING:  CT Chest PE W Contrast- 9/24/2020    1. No definite scan evidence for pulmonary embolus.     2. Pathologic T6 compression fracture with enlarging right 7th rib metastasis    and new sternal metastasis. 3. Improving right upper lobe nodule, right lower lobe mass and, right hilar    and mediastinal adenopathy. 4. Low-density lesion in the spleen could represent a metastasis or a    hemangioma. CT Head wo Contrast-9/24/2020    1. No evidence of acute intracranial hemorrhage or infarct. 2. Marked improvement of intracranial metastatic disease.  Slight residual    vasogenic edema seen in the left frontoparietal centrum semiovale.  If    clinically indicated, follow-up MRI of the brain could be done for further    evaluation. 9-25-20:          Discussed with patient, RN, family. I have personally reviewed the past medical history, past surgical history, medications, social history, and family history, and I have updated the database accordingly. Past Medical History:     Past Medical History:   Diagnosis Date    Cancer [facility])     right lung    Cardiac resynchronization therapy defibrillator (CRT-D) in place     Chest pain 02/08/2019    Coronary artery disease     Dyspnea on exertion     Elevated troponin 02/08/2019    Heart attack ([facility] [address] 2012    Hyperlipidemia     Hypertension     Ischemic cardiomyopathy     NSTEMI (non-ST elevated myocardial infarction) ([facility] [address] 2/8/2019       Past Surgical  History:     Past Surgical History:   Procedure Laterality Date    CORONARY ANGIOPLASTY WITH STENT PLACEMENT  2012    BMS to LAD    CT BIOPSY PERCUTANEOUS DEEP BONE  8/11/2020    CT BIOPSY PERCUTANEOUS DEEP BONE 8/11/2020 [facility] CT SCAN    IR PORT PLACEMENT EQUAL OR GREATER THAN 5 YEARS  8/24/2020    IR PORT PLACEMENT EQUAL OR GREATER THAN 5 YEARS 8/24/2020 MD NAOMI Agee SPECIAL PROCEDURES    PACEMAKER PLACEMENT  02/11/2019    AICD/ MED[employer]  MODEL #[id number] CRTD AMPLIA MRI USDF4.  LEADS [id number], [id number] AND 0736-58        Medications:      [Held by provider] aspirin  81 mg Oral Daily    carvedilol  12.5 mg Oral BID WC    folic acid  1 mg Oral Daily    pantoprazole  40 mg Oral QAM AC    sodium chloride flush  10 mL Intravenous 2 times per day    enoxaparin  40 mg Subcutaneous Daily    meropenem  2 g Intravenous Q8H    vancomycin  15 mg/kg Intravenous Q12H    vancomycin (VANCOCIN) intermittent dosing (placeholder)   Other RX Placeholder    nystatin  5 mL Oral 4x Daily       Social History:     Social History     Socioeconomic History    Marital status:      Spouse name: Not on file    Number of children: Not on file    Years of education: Not on file    Highest education level: Not on file   Occupational History    Not on file   Social Needs    Financial resource strain: Not on file    Food insecurity     Worry: Not on file     Inability: Not on file    Transportation needs     Medical: Not on file     Non-medical: Not on file   Tobacco Use    Smoking status: Former Smoker     Types: Cigarettes     Last attempt to quit: 2012     Years since quittin.1    Smokeless tobacco: Never Used    Tobacco comment: 48 yr hx of smoking   Substance and Sexual Activity    Alcohol use: No    Drug use: Not Currently     Types: Marijuana     Comment: last smoked 2020    Sexual activity: Not on file   Lifestyle    Physical activity     Days per week: Not on file     Minutes per session: Not on file    Stress: Not on file   Relationships    Social connections     Talks on phone: Not on file     Gets together: Not on file     Attends [employer] service: Not on file     Active member of club or organization: Not on file     Attends meetings of clubs or organizations: Not on file     Relationship status: Not on file    Intimate partner violence     Fear of current or ex partner: Not on file     Emotionally abused: Not on file     Physically abused: Not on file     Forced sexual activity: Not on file   Other Topics Concern    Not on file   Social History Narrative    Not on file       Family History:     Family History   Problem Relation Age of Onset    Cancer Mother     Cancer Father     Prostate Cancer Father         Allergies:   Penicillins     Review of Systems:   Constitutional: No fevers or chills. No systemic complaints  Head: No headaches  Eyes: No double vision or blurry vision. No conjunctival inflammation. ENT: No sore throat or runny nose. . No hearing loss, tinnitus or vertigo. Cardiovascular: No chest pain or palpitations. No shortness of breath. No GRIFFITHS  Lung: No shortness of breath or cough. No sputum production  Abdomen: No nausea, vomiting, diarrhea, or abdominal pain. Crystal Sandi No cramps. Genitourinary: No increased urinary frequency, or dysuria. No hematuria. No suprapubic or CVA pain. Musculoskeletal:C/O back pain  Hematologic: No bleeding or bruising. Neurologic: No headache, weakness, numbness, or tingling. Integument: No rash, no ulcers. Psychiatric: No depression. Endocrine: No polyuria, no polydipsia, no polyphagia. Physical Examination :     Patient Vitals for the past 8 hrs:   BP Temp Temp src Pulse Resp SpO2   09/25/20 1245 110/65 97.4 °F (36.3 °C) Oral 101 18 --   09/25/20 0851 -- -- -- -- 22 93 %   09/25/20 0830 -- 98.2 °F (36.8 °C) Oral -- -- --     General Appearance: Awake, alert,Cachetic, ill appearing and in apparent distress  Head:  Normocephalic, no trauma  ENT: Pt has lesions on posterior tongue and palate. Neck:Supple, without lymphadenopathy. Thyroid normal, No bruits. Pulmonary/Chest: Clear to auscultation, without wheezes, rales, or rhonchi. Pt has ICD on the left upper chest and Port for chemotherapy on right upper chest.  Cardiovascular: Regular rate and rhythm without murmurs, rubs, or gallops. Abdomen: Soft, non tender. Bowel sounds normal. No organomegaly  All four Extremities: Pt has b/l lower extremities edema. Neurologic: No gross sensory or motor deficits. Skin: Warm and dry with good turgor. No signs of peripheral arterial or venous insufficiency. No ulcerations. FINDINGS:    Pulmonary Arteries: There is mild motion artifact.  No definite evidence of    intraluminal filling defect to suggest pulmonary embolism.  Main pulmonary    artery is normal in caliber.         Mediastinum: Mediastinal and right hilar adenopathy has mildly improved.  The    heart and pericardium demonstrate no acute abnormality.  The left ventricle    is dilated.  There is no acute abnormality of the thoracic aorta.         Lungs/pleura: There is a small right pleural effusion, increased in volume. No effusion is seen on the left.  There is no pneumothorax.  The    low-attenuation subpleural right lower lobe mass has decreased in size and    now measures 3.0 x 3.5 cm.  The right upper lobe nodule adjacent to the right    hilum is decreased in size and now measures 9 x 13 mm.         Upper Abdomen: There is a 1.6 x 1.7 cm low-density lesion anteriorly in the    spleen.  The visualized upper abdomen is otherwise unremarkable.         Soft Tissues/Bones: There is a pathologic compression fracture at T6 with    approximately 70% loss of vertebral body height and minimal retropulsion. There is increased destruction and associated soft tissue involving the right    7th rib.  There are subtle lytic lesions in the sternum.              Impression    1. No definite scan evidence for pulmonary embolus. 2. Pathologic T6 compression fracture with enlarging right 7th rib metastasis    and new sternal metastasis. 3. Improving right upper lobe nodule, right lower lobe mass and, right hilar    and mediastinal adenopathy.     4. Low-density lesion in the spleen could represent a metastasis or a    hemangioma.           EXAMINATION:    CT OF THE HEAD WITHOUT CONTRAST  9/24/2020 9:38 pm         TECHNIQUE:    CT of the head was performed without the administration of intravenous    contrast. Dose modulation, iterative reconstruction, and/or weight based    adjustment of the mA/kV was utilized to reduce the radiation dose to as low    as reasonably achievable.         COMPARISON:    Unenhanced head CT dated 08/09/2020 and MRI brain dated 08/10/2020         HISTORY:    ORDERING SYSTEM PROVIDED HISTORY: AMS, hx thrombocytopenia    TECHNOLOGIST PROVIDED HISTORY:    AMS, hx thrombocytopenia         Reason for Exam: ams    Acuity: Acute    Type of Exam: Initial         FINDINGS:    BRAIN/VENTRICLES: There is no evidence of an acute infarct or intracranial    hemorrhage. Dewitte Peto has been marked improvement of vasogenic edema seen    previously, most prominent in the left parietal lobe but also in the right    parietal lobe.  Subtle residual hypodensity is seen in the left    frontoparietal centrum semiovale on series 2 images 43 through 51.  The    findings are suggestive of improvement of known intracranial metastatic    disease.  Mild atrophic changes are again noted.  There is no evidence of    midline shift.         ORBITS: The visualized portion of the orbits demonstrate no acute abnormality.         SINUSES: The visualized paranasal sinuses and mastoid air cells demonstrate    no acute abnormality.         SOFT TISSUES/SKULL:  No acute abnormality of the visualized skull or soft    tissues.              Impression    1. No evidence of acute intracranial hemorrhage or infarct. 2. Marked improvement of intracranial metastatic disease.  Slight residual    vasogenic edema seen in the left frontoparietal centrum semiovale.  If    clinically indicated, follow-up MRI of the brain could be done for further    evaluation.                    Medical Decision Making-Other: Thank you for allowing us to participate in the care of this patient. Please call with questions. Melia Polanco MD     ATTESTATION:    I have discussed the case, including pertinent history and exam findings with the residents and students. I have seen and examined the patient and the key elements of the encounter have been performed by me.  I was present when the student obtained his information or examined the patient. I have reviewed the laboratory data, other diagnostic studies and discussed them with the residents. I have updated the medical record where necessary. I agree with the assessment, plan and orders as documented by the resident/ student.     Jocelyne Tompkins MD.

## 2020-09-25 NOTE — ED NOTES
Pt asking for water, informed that he could not drink at this time. Pt provided mouth swabs.       Akash Jacinto RN  09/24/20 2025

## 2020-09-25 NOTE — H&P
[facility]  Office: [address], DO, Ba Yang, DO, Zaina Shah, DO, Alicia Georgedarius Blood, DO, Dawit Mullen MD, Elvia Lopez MD, Aguilar Dueñas MD, Chelsea Benitez MD, Bill Guzman MD, Rafael Smith MD, Nyla Smallwood MD, Mykel Garcia MD, Ollie Betancourt MD, Enrique Lopez, DO, Marisel Vaughn MD, Siobhan Hill MD, Jazmin Ray DO, Zoltan Rogers MD,  Arnold Bruce, DO, Sola Tran MD, Derrick Elias MD, Staci Stallworth, [facility], [facility], CNP, Lex Amor, CNP, Charly Holcomb, CNS, Noah Barthel, CNP, Miya Michelle, CNP, Jenn Alcantar, CNP, Nancy Palomino, CNP, Elma Montes De Oca, CNP, Dre Yancey PA-C, [facility], [facility], Rosaura Sin, CNP, Rosa Loya, CNP, Rakesh Long, CNP, Chava Dow, CNP, Lynda Montes, CNP         [facility]   [address] [facility]    HISTORY AND PHYSICAL EXAMINATION            Date:   9/25/2020  Patient name:  Larry Casillas  Date of admission:  9/24/2020  5:26 PM  MRN:   [id number]  Account:  [de-identified]  YOB: 1950  PCP:    Renate Lorenz MD  Room:   7432/7795-03  Code Status:    Full Code    Chief Complaint:     Chief Complaint   Patient presents with    Other     hasn't been doing good after starting chemo two weeks ago       History Obtained From:     family member - daughter, electronic medical record    History of Present Illness:     Larry Casillas is a 69 y.o. Non-/non  male with primary stage IV lung cancer with bone and brain metastasis, hypertension, CAD, cardiomyopathy, prior MI, and pacemaker/AICD present who presents with general decline and chills and is admitted to the hospital for the management of sepsis, failure to thrive, elevated troponin. Patient returns to the hospital for the concern of \" general decline\" per daughter at bedside.   Daughter reports that since discharge patient has not been able to maintain nutrition support, with adequate oral intake of food or liquids, and he has even stopped drinking his ENSURE. She states that he has been having increased confusion for the past 2 days and then today he was not speaking to anyone, \" just grunting\". She reports generalized weakness, without focal deficit, and the lack of ability to ambulate. Daughter has been checking his temp and it has been \" normal\" despite patient being chilled. Patient voices a sore throat and difficulty/ painful swallowing. He admits to chills, fatigue and weakness when asked. He participates in the ROS, but not the HPI. He denies chest pain or shortness of breath, n/v/d. Patient had radiation therapy and he was started on systemic treatment with Keytruda, carboplatin and Alimta. He started  Chemoimmunotherapy  on September 10  for the lung cancer and then was recently hospitalization for very similar complaints, and was admitted for severe leukopenia and thrombocytopenia. At that time his wbc was 0.4 and platelet was 11      He was transfused platelets infusion and He was started on GRANIX and had full recovery of WBC with WBC of 3.8 and his platelets increased to 29 at time of discharge. His second cycle of chemo is due on October 1, 2020. Work up in the emergency room      Vitals:    Temp.   98 2 HR. 116      RR. 16    BP.    123/76     SPO2. 92% room air    Laboratories:   Metabolic panel. -Sodium 353 potassium 4.1 chloride 107 CO2 18 creatinine 0.59 BUN 18. Magnesium 4.4  GI/Liver Panel-Albumin 2.0, alk phos 109, ALT 30, AST 37, bilirubin 0.55  Hematology. -WBC 15.0, hemoglobin 9.4 hematocrit 30 RDW 17.9 platelets 62  Coagulation-PT 12.4 INR 1.2 PTT 29.4  Cardiac Markers-total , myoglobin 710, proBNP 3856, troponin high-sensitivity 39  EKG- paced, QTc 513 ST Segments: no acute change, T Waves: inversion in  v2 and aVl  Urine-not done  Device was interrogated and showed-1 nonsustained vtach on 9/17. 's today per ventricular sensor.  Intrathoracic fluid accumulation-Per [employer] rep    Imaging and Diagnostics:     Xr Chest Portable    Result Date: 9/24/2020  No evidence for acute cardiopulmonary pathology. Stable right hilar mass. Xr Chest Portable    Result Date: 9/19/2020  Right hilar mass has moderately decreased in size consistent with response to therapy. No acute infiltrate, pneumothorax or pleural fluid. Lytic metastatic lesion involving the lateral right 7th rib. Ct Head Wo Contrast  1. No evidence of acute intracranial hemorrhage or infarct. 2. Marked improvement of intracranial metastatic disease. Slight residual vasogenic edema seen in the left frontoparietal centrum semiovale. If clinically indicated, follow-up MRI of the brain could be done for further evaluation. CTA OF THE CHEST   1. No definite scan evidence for pulmonary embolus. 2. Pathologic T6 compression fracture with enlarging right 7th rib metastasis    and new sternal metastasis. 3. Improving right upper lobe nodule, right lower lobe mass and, right hilar    and mediastinal adenopathy. 4. Low-density lesion in the spleen could represent a metastasis or a    hemangioma.           Past Medical History:     Past Medical History:   Diagnosis Date    Cancer [facility])     right lung    Cardiac resynchronization therapy defibrillator (CRT-D) in place     Chest pain 02/08/2019    Coronary artery disease     Dyspnea on exertion     Elevated troponin 02/08/2019    Heart attack ([facility] [address] 2012    Hyperlipidemia     Hypertension     Ischemic cardiomyopathy     NSTEMI (non-ST elevated myocardial infarction) ([facility] [address] 2/8/2019        Past Surgical History:     Past Surgical History:   Procedure Laterality Date    CORONARY ANGIOPLASTY WITH STENT PLACEMENT  2012    BMS to LAD    CT BIOPSY PERCUTANEOUS DEEP BONE  8/11/2020    CT BIOPSY PERCUTANEOUS DEEP BONE 8/11/2020 [facility] CT SCAN    IR PORT PLACEMENT EQUAL OR GREATER THAN 5 YEARS  8/24/2020    IR PORT PLACEMENT EQUAL OR GREATER Penicillins    Social History:     Tobacco:    reports that he quit smoking about 8 years ago. His smoking use included cigarettes. He has never used smokeless tobacco.  Alcohol:      reports no history of alcohol use. Drug Use:  reports previous drug use. Drug: Marijuana. Family History:     Family History   Problem Relation Age of Onset    Cancer Mother     Cancer Father     Prostate Cancer Father        Review of Systems:     Positive and Negative as described in HPI. Review of Systems   Constitutional: Positive for activity change, appetite change, chills and fatigue. Negative for diaphoresis and fever. HENT: Positive for sore throat and trouble swallowing (occasional painful ). Negative for congestion and hearing loss. Respiratory: Negative for cough, shortness of breath, wheezing and stridor. Cardiovascular: Negative for chest pain, palpitations and leg swelling. Gastrointestinal: Negative for abdominal pain, blood in stool, constipation, diarrhea, nausea and vomiting. Genitourinary: Negative for dysuria and frequency. Musculoskeletal: Negative for myalgias. Skin: Negative for rash. Allergic/Immunologic: Positive for immunocompromised state (on chemo). Neurological: Positive for speech difficulty, weakness and light-headedness. Negative for dizziness, seizures, facial asymmetry, numbness and headaches. Psychiatric/Behavioral: Positive for confusion and decreased concentration. The patient is not nervous/anxious. Physical Exam:   /75   Pulse 108   Temp 98 °F (36.7 °C) (Oral)   Resp 14   Ht 5' 5\" (1.651 m)   Wt 120 lb (54.4 kg)   SpO2 96%   BMI 19.97 kg/m²   Temp (24hrs), Av.1 °F (36.7 °C), Min:98 °F (36.7 °C), Max:98.2 °F (36.8 °C)    No results for input(s): POCGLU in the last 72 hours. No intake or output data in the 24 hours ending 20 0059    Physical Exam  Vitals signs and nursing note reviewed.    Constitutional:       General: He is not in General: No focal deficit present. Mental Status: He is alert and easily aroused. He is confused. He is not disoriented. GCS: GCS eye subscore is 4. GCS verbal subscore is 5. GCS motor subscore is 6. Cranial Nerves: No cranial nerve deficit or dysarthria. Sensory: No sensory deficit. Motor: No weakness. Comments: Difficulty with complex task or serial processes. Recall was poor 1/3,     Psychiatric:         Speech: Speech normal.         Behavior: Behavior is slowed. Behavior is cooperative. Cognition and Memory: He exhibits impaired recent memory. Investigations:      Laboratory Testing:  Recent Results (from the past 24 hour(s))   Culture, Blood 1    Collection Time: 09/24/20  5:50 PM    Specimen: Blood   Result Value Ref Range    Specimen Description . BLOOD     Special Requests LAC 20ML     Culture NO GROWTH 2 HOURS    EKG 12 Lead    Collection Time: 09/24/20  6:18 PM   Result Value Ref Range    Ventricular Rate 113 BPM    Atrial Rate 113 BPM    P-R Interval 124 ms    QRS Duration 162 ms    Q-T Interval 374 ms    QTc Calculation (Bazett) 513 ms    P Axis 55 degrees    R Axis -75 degrees    T Axis 100 degrees   Venous Blood Gas, POC    Collection Time: 09/24/20  7:17 PM   Result Value Ref Range    pH, Alfonzo 7.373 7.320 - 7.430    pCO2, Alfonzo 34.6 (L) 41.0 - 51.0 mm Hg    pO2, Alfonzo 30.2 30.0 - 50.0 mm Hg    HCO3, Venous 20.1 (L) 22.0 - 29.0 mmol/L    Total CO2, Venous 21 (L) 23.0 - 30.0 mmol/L    Negative Base Excess, Alfonzo 5 (H) 0.0 - 2.0    Positive Base Excess, Alfonzo NOT REPORTED 0.0 - 3.0    O2 Sat, Alfonzo 57 (L) 60.0 - 85.0 %    O2 Device/Flow/% NOT REPORTED     Cristino Test NOT REPORTED     Sample Site NOT REPORTED     Mode NOT REPORTED     FIO2 NOT REPORTED     Pt Temp NOT REPORTED     POC pH Temp NOT REPORTED     POC pCO2 Temp NOT REPORTED mm Hg    POC pO2 Temp NOT REPORTED mm Hg   Hemoglobin and hematocrit, blood    Collection Time: 09/24/20  7:17 PM   Result Value Ref Range POC Hemoglobin 9.6 (L) 13.5 - 17.5 g/dL    POC Hematocrit 28 (L) 41 - 53 %   Creatinine W/GFR Point of Care    Collection Time: 09/24/20  7:17 PM   Result Value Ref Range    POC Creatinine 0.90 0.51 - 1.19 mg/dL    GFR Comment >60 >60 mL/min    GFR Non-African American >60 >60 mL/min    GFR Comment         SODIUM (POC)    Collection Time: 09/24/20  7:17 PM   Result Value Ref Range    POC Sodium 139 138 - 146 mmol/L   POTASSIUM (POC)    Collection Time: 09/24/20  7:17 PM   Result Value Ref Range    POC Potassium 4.9 (H) 3.5 - 4.5 mmol/L   CHLORIDE (POC)    Collection Time: 09/24/20  7:17 PM   Result Value Ref Range    POC Chloride 110 (H) 98 - 107 mmol/L   CALCIUM, IONIC (POC)    Collection Time: 09/24/20  7:17 PM   Result Value Ref Range    POC Ionized Calcium 1.08 (L) 1.15 - 1.33 mmol/L   Lactic Acid, POC    Collection Time: 09/24/20  7:17 PM   Result Value Ref Range    POC Lactic Acid 1.84 (H) 0.56 - 1.39 mmol/L   POCT Glucose    Collection Time: 09/24/20  7:17 PM   Result Value Ref Range    Glucose 99 mg/dL    QC OK? ok    Anion Gap (Calc) POC    Collection Time: 09/24/20  7:17 PM   Result Value Ref Range    Anion Gap 9 7 - 16 mmol/L   CBC WITH AUTO DIFFERENTIAL    Collection Time: 09/24/20  7:53 PM   Result Value Ref Range    WBC 15.0 (H) 3.5 - 11.3 k/uL    RBC 3.22 (L) 4.21 - 5.77 m/uL    Hemoglobin 9.4 (L) 13.0 - 17.0 g/dL    Hematocrit 30.0 (L) 40.7 - 50.3 %    MCV 93.2 82.6 - 102.9 fL    MCH 29.2 25.2 - 33.5 pg    MCHC 31.3 28.4 - 34.8 g/dL    RDW 17.9 (H) 11.8 - 14.4 %    Platelets 62 (L) 181 - 453 k/uL    MPV 12.3 8.1 - 13.5 fL    NRBC Automated 0.3 (H) 0.0 per 100 WBC    Differential Type NOT REPORTED     WBC Morphology NOT REPORTED     RBC Morphology NOT REPORTED     Platelet Estimate NOT REPORTED     Immature Granulocytes 21 (H) 0 %    Seg Neutrophils 65 36 - 66 %    Lymphocytes 8 (L) 24 - 44 %    Monocytes 6 1 - 7 %    Eosinophils % 0 (L) 1 - 4 %    Basophils 0 0 - 2 %    Absolute Immature Granulocyte 3.15 (H) 0.00 - 0.30 k/uL    Segs Absolute 9.75 (H) 1.8 - 7.7 k/uL    Absolute Lymph # 1.20 1.0 - 4.8 k/uL    Absolute Mono # 0.90 (H) 0.1 - 0.8 k/uL    Absolute Eos # 0.00 0.0 - 0.4 k/uL    Basophils Absolute 0.00 0.0 - 0.2 k/uL    Morphology ANISOCYTOSIS PRESENT    COMPREHENSIVE METABOLIC PANEL    Collection Time: 09/24/20  7:53 PM   Result Value Ref Range    Glucose 101 (H) 70 - 99 mg/dL    BUN 18 8 - 23 mg/dL    CREATININE 0.59 (L) 0.70 - 1.20 mg/dL    Bun/Cre Ratio NOT REPORTED 9 - 20    Calcium 7.8 (L) 8.6 - 10.4 mg/dL    Sodium 138 135 - 144 mmol/L    Potassium 4.1 3.7 - 5.3 mmol/L    Chloride 107 98 - 107 mmol/L    CO2 18 (L) 20 - 31 mmol/L    Anion Gap 13 9 - 17 mmol/L    Alkaline Phosphatase 109 40 - 129 U/L    ALT 30 5 - 41 U/L    AST 37 <40 U/L    Total Bilirubin 0.55 0.3 - 1.2 mg/dL    Total Protein 4.5 (L) 6.4 - 8.3 g/dL    Alb 2.0 (L) 3.5 - 5.2 g/dL    Albumin/Globulin Ratio 0.8 (L) 1.0 - 2.5    GFR Non-African American >60 >60 mL/min    GFR African American >60 >60 mL/min    GFR Comment          GFR Staging NOT REPORTED    APTT    Collection Time: 09/24/20  7:53 PM   Result Value Ref Range    PTT 29.4 20.5 - 30.5 sec   PROTIME-INR    Collection Time: 09/24/20  7:53 PM   Result Value Ref Range    Protime 12.4 (H) 9.0 - 12.0 sec    INR 1.2    Brain Natriuretic Peptide    Collection Time: 09/24/20  7:53 PM   Result Value Ref Range    Pro-BNP 3,856 (H) <300 pg/mL    BNP Interpretation Pro-BNP Reference Range:    Troponin    Collection Time: 09/24/20  7:53 PM   Result Value Ref Range    Troponin, High Sensitivity 39 (H) 0 - 22 ng/L    Troponin T NOT REPORTED <0.03 ng/mL    Troponin Interp NOT REPORTED    URIC ACID    Collection Time: 09/24/20  7:53 PM   Result Value Ref Range    Uric Acid 4.9 3.4 - 7.0 mg/dL   MAGNESIUM    Collection Time: 09/24/20  7:53 PM   Result Value Ref Range    Magnesium 4.4 (H) 1.6 - 2.6 mg/dL   PHOSPHORUS    Collection Time: 09/24/20  7:53 PM   Result Value Ref Range Phosphorus 3.4 2.5 - 4.5 mg/dL   TSH with Reflex    Collection Time: 09/24/20  7:53 PM   Result Value Ref Range    TSH 1.47 0.30 - 5.00 mIU/L   LACTIC ACID, WHOLE BLOOD    Collection Time: 09/24/20  7:53 PM   Result Value Ref Range    Lactic Acid, Whole Blood 2.0 0.7 - 2.1 mmol/L   CK    Collection Time: 09/24/20  7:53 PM   Result Value Ref Range    Total  (H) 39 - 308 U/L   Myoglobin, Serum    Collection Time: 09/24/20  7:53 PM   Result Value Ref Range    Myoglobin 710 (H) 28 - 72 ng/mL   Culture, Blood 1    Collection Time: 09/24/20  9:35 PM    Specimen: Blood   Result Value Ref Range    Specimen Description . BLOOD     Special Requests NOT REPORTED     Culture NO GROWTH 2 HOURS        Imaging/Diagnostics:  Xr Chest Portable    Result Date: 9/24/2020  No evidence for acute cardiopulmonary pathology. Stable right hilar mass. Xr Chest Portable    Result Date: 9/19/2020  Right hilar mass has moderately decreased in size consistent with response to therapy. No acute infiltrate, pneumothorax or pleural fluid. Lytic metastatic lesion involving the lateral right 7th rib. Assessment :      Hospital Problems           Last Modified POA    * (Principal) Sepsis ([address] 0/82/5813 Yes    Metabolic encephalopathy 1/83/4582 Yes    Failure to thrive in adult 9/25/2020 Yes    Prolonged Q-T interval on ECG 9/25/2020 Yes    Elevated troponin 9/25/2020 Yes    Hypocalcemia 9/25/2020 Yes    Pathological compression fracture of spine ([address] 9/25/2020 Yes    HTN (hypertension) 9/25/2020 Yes    CAD (coronary artery disease) 9/25/2020 Yes    Ischemic cardiomyopathy 9/25/2020 Yes    CKD (chronic kidney disease) 9/25/2020 Yes    Brain metastasis ([address] 9/25/2020 Yes    Adenocarcinoma of right lung ([address] 9/25/2020 Yes    Severe malnutrition ([address] (Chronic) 9/25/2020 Yes    AMS (altered mental status) 9/24/2020 Yes          Plan:     Patient status inpatient in the Progressive Unit/Step down    1.  Given the leukocytosis the need for further infectious work-up is indicated. Will obtain urinalysis, monitor blood cultures. Patient got  Granix 4 neutropenia last week was discontinued on the 21st.  We will prophylactically give antibiotics given his immunosuppressed state, will check pro-Chacho sed rate CRP. Patient has implanted port. Blood cultures drawn. 2. Alteration in mentation /metabolic encephalopathy would be related to the infection versus metastasis. 3. Check pre-albumin, will need dietitian consult  4. Recheck QT intervals on EKG and monitor daily for further QTC prolongation, he did have a nonsustained run of VT earlier in the week  5. Monitor magnesium and calcium  6. Continue to trend out troponin  7. Nystatin swish and swallow for the thrush  8. Consultation to neurosurgery for compression fracture vs orthopedic evaluation  9. Monitor blood pressures and prevent hypotension  10. GI prophylaxis  11. DVT prophylaxis  12. PT OT Case management  13. Consider palliative care consult    Consultations:   IP CONSULT TO HOSPITALIST  IP CONSULT TO NEUROSURGERY  PHARMACY TO DOSE VANCOMYCIN     Patient is admitted as inpatient status because of co-morbidities listed above, severity of signs and symptoms as outlined, requirement for current medical therapies and most importantly because of direct risk to patient if care not provided in a hospital setting. Expected length of stay > 48 hours.     REYNALDO Mtz - CNP  9/25/2020  12:59 AM    Copy sent to Dr. Manasa Pipre MD

## 2020-09-25 NOTE — ED NOTES
Pt resting on cot, no change in status. Vanco infusing per orders. Awaiting ct results and bed to be cleaned. Family at bedside.       Theo Flores RN  09/24/20 1459

## 2020-09-25 NOTE — PROGRESS NOTES
Pharmacy Note  Vancomycin Consult    Larry Casillas is a 69 y.o. male started on Vancomycin for sepsis of CNS origin ; consult received from Dr. Priscila Montes    to manage therapy. Also receiving the following antibiotics: meropenem. Patient Active Problem List   Diagnosis    HTN (hypertension)    CAD (coronary artery disease)    Ischemic cardiomyopathy    AICD (automatic cardioverter/defibrillator) present    Elevated troponin    Lung mass    Enlarged prostate    CKD (chronic kidney disease)    CVA (cerebral vascular accident) ([address]    Brain metastasis ([address]    Vasogenic edema ([address]    Right hemiparesis ([address]    Adenocarcinoma of right lung ([address]    Thrombocytopenia ([address]    Pancytopenia due to antineoplastic chemotherapy ([address]    Bone metastasis ([address]    Other fatigue    Dehydration    Severe malnutrition ([address]    AMS (altered mental status)    Sepsis ([address]    Metabolic encephalopathy    Failure to thrive in adult    Prolonged Q-T interval on ECG    Hypocalcemia       Allergies:  Penicillins     Temp max: 98 F  (36.7 C)    Recent Labs     09/24/20 1953   BUN 18       Recent Labs     09/24/20  1917 09/24/20 1953   CREATININE 0.90 0.59*       Recent Labs     09/22/20  0531 09/24/20 1953   WBC 3.8 15.0*       No intake or output data in the 24 hours ending 09/25/20 0052    Culture Date      Source                       Results       Ht Readings from Last 1 Encounters:   09/24/20 5' 5\" (1.651 m)        Wt Readings from Last 1 Encounters:   09/24/20 120 lb (54.4 kg)         Body mass index is 19.97 kg/m². Estimated Creatinine Clearance: 88 mL/min (A) (based on SCr of 0.59 mg/dL (L)). Goal Trough Level: 15-20 mcg/mL    Assessment/Plan:  Will initiate Vancomycin with a one time loading dose of 1000 mg x1, followed by 750 mg IV every 12 hours. Timing of trough level will be determined based on culture results, renal function, and clinical response. Thank you for the consult.   Will continue to follow.

## 2020-09-25 NOTE — PROGRESS NOTES
Physical Therapy  DATE: 2020    NAME: Delora Heimlich  MRN: [id number]   : 1950    Patient not seen this date for Physical Therapy due to:  [] Blood transfusion in progress  [] Hemodialysis  []  Patient Declined  [] Spine Precautions   [] Strict Bedrest  [] Surgery/ Procedure  [] Testing      [x] Other: plan for kyphoplasty with NS per RN. PT will check back following post-op 20. [] PT being discontinued at this time. Patient independent. No further needs. [] PT being discontinued at this time as the patient has been transferred to palliative care. No further needs.     Lynda Rosas, PT

## 2020-09-25 NOTE — ED NOTES
Report called to [facility].  All questions answered, wait to transport until neuro surg sees pt.      Shellie Butt RN  09/24/20 8409

## 2020-09-25 NOTE — CONSULTS
Reilly  21.    Department of Neurosurgery  Resident Consult Note      Reason for Consult:  Pathologic T6 fracture  Requesting Physician:  Dr. Frank Mccain  Neurosurgeon:   []Dr. Ivy June  []Dr. Maddy Rea  [x]Dr. Jose Ramon Hanna  []Dr. David Wilson    History Obtained From:  patient, electronic medical record, Daughter-in-law at bedside    CHIEF COMPLAINT:         Malaise, fatigue, AMS, back pain    HISTORY OF PRESENT ILLNESS:       The patient is a 69 y.o. male who presents with daughter-in-law to the ED. patient has a known history of stage IV lung cancer with spinal metastasis. Had radiation therapy and underwent systemic treatment starting on September 10. Patient daughter-in-law reports that he has been increasingly fatigued and decreased appetite. Patient also complains of some bone pain/back pain. MRI thoracic spine from August 10, 2020 reveals T6 metastasis with pathologic compression fracture. During this admission,  CT head was done due to altered mental status. No evidence of acute intracranial hemorrhage or infarct. Marked improvement of intracranial metastatic disease  CT chest PE was done with no evidence of pulmonary embolus. Pathologic T6 compression fracture with enlarging right seventh rib metastasis and new sternal metastasis noted    Patient follows oncology and radiation oncology. Reports pain controlled on Roxicodone and fentanyl patch  No new trauma.     PAST MEDICAL HISTORY :       Past Medical History:        Diagnosis Date    Cancer [facility])     right lung    Cardiac resynchronization therapy defibrillator (CRT-D) in place     Chest pain 02/08/2019    Coronary artery disease     Dyspnea on exertion     Elevated troponin 02/08/2019    Heart attack ([facility] [address] 2012    Hyperlipidemia     Hypertension     Ischemic cardiomyopathy        Past Surgical History:        Procedure Laterality Date    CORONARY ANGIOPLASTY WITH STENT PLACEMENT  2012    BMS to LAD    CT BIOPSY PERCUTANEOUS DEEP BONE  2020    CT BIOPSY PERCUTANEOUS DEEP BONE 2020 [facility] CT SCAN    IR PORT PLACEMENT EQUAL OR GREATER THAN 5 YEARS  2020    IR PORT PLACEMENT EQUAL OR GREATER THAN 5 YEARS 2020 MD NAOMI Li [facility] PROCEDURES    PACEMAKER PLACEMENT  2019    AICD/ [employer]  MODEL #[id number] CRTD AMPLIA MRI USDF4.  LEADS [id number], [id number] AND 3430-66        Social History:   Social History     Socioeconomic History    Marital status:      Spouse name: Not on file    Number of children: Not on file    Years of education: Not on file    Highest education level: Not on file   Occupational History    Not on file   Social Needs    Financial resource strain: Not on file    Food insecurity     Worry: Not on file     Inability: Not on file    Transportation needs     Medical: Not on file     Non-medical: Not on file   Tobacco Use    Smoking status: Former Smoker     Types: Cigarettes     Last attempt to quit: 2012     Years since quittin.1    Smokeless tobacco: Never Used    Tobacco comment: 48 yr hx of smoking   Substance and Sexual Activity    Alcohol use: No    Drug use: Not Currently     Types: Marijuana     Comment: last smoked 2020    Sexual activity: Not on file   Lifestyle    Physical activity     Days per week: Not on file     Minutes per session: Not on file    Stress: Not on file   Relationships    Social connections     Talks on phone: Not on file     Gets together: Not on file     Attends Taoist service: Not on file     Active member of club or organization: Not on file     Attends meetings of clubs or organizations: Not on file     Relationship status: Not on file    Intimate partner violence     Fear of current or ex partner: Not on file     Emotionally abused: Not on file     Physically abused: Not on file     Forced sexual activity: Not on file   Other Topics Concern    Not on file   Social History Narrative    Not on file       Family History:       Problem Relation Age of Onset   [facility] Cancer Mother     Cancer Father     Prostate Cancer Father        Allergies:   Penicillins    Home Medications:  Prior to Admission medications    Medication Sig Start Date End Date Taking? Authorizing Provider   oxyCODONE (ROXICODONE) 5 MG immediate release tablet Take 1 tablet by mouth every 4 hours as needed for Pain for up to 15 days. 9/18/20 10/3/20  Corey Pena MD   fentaNYL (DURAGESIC) 25 MCG/HR Place 1 patch onto the skin every 72 hours for 30 days. Apply one every 3 days 9/14/20 10/14/20  Kat Galeano MD   megestrol (MEGACE ES) 625 MG/5ML suspension Take 5 mLs by mouth daily 9/14/20   Kat Galeano MD   ondansetron (ZOFRAN ODT) 4 MG disintegrating tablet Take 1 tablet by mouth every 8 hours as needed for Nausea 8/31/20   Kat Galeano MD   fentaNYL (DURAGESIC) 12 MCG/HR Place 1 patch onto the skin every 72 hours for 30 days. Apply one every 3 days 8/31/20 9/30/20  Kat Galeano MD   polyethylene glycol (GLYCOLAX) 17 g packet Take 17 g by mouth daily as needed for Constipation    Historical Provider, MD   folic acid (FOLVITE) 1 MG tablet Take 1 tablet by mouth daily START 7 DAYS BEFORE FIRST DOSE OF PEMETREXED---TO BE TAKEN DAILY --AND CONTINUE 21 DAYS AFTER LAST DOSE OF PEMETREXED 8/17/20   Kat Galeano MD   pantoprazole (PROTONIX) 40 MG tablet Take 1 tablet by mouth every morning (before breakfast) 8/13/20   Paulo Brower MD   carvedilol (COREG) 12.5 MG tablet Take 12.5 mg by mouth 2 times daily (with meals)    Historical Provider, MD   aspirin 81 MG chewable tablet Take 1 tablet by mouth daily 2/12/19   Shantel Israel MD   nitroGLYCERIN (NITROSTAT) 0.4 MG SL tablet up to max of 3 total doses. If no relief after 1 dose, call 911.   Patient not taking: Reported on 9/14/2020 2/11/19   Shantel Israel MD       Current Medications:   Current Facility-Administered Medications: calcium gluconate 1 g in sodium chloride 50 mL, 1 g, Intravenous, Once    REVIEW OF SYSTEMS:       General ROS - No fevers, no chills, + fatigue  Ophthalmic ROS - No changes in vision from baseline  ENT ROS -  No sore throat, no rhinorrhea  Respiratory ROS - no cough, no shortness of breath  Cardiovascular ROS - No chest pain  Gastrointestinal ROS - No abdominal pain, no nausea, no vomiting  Genito-Urinary ROS - No dysuria  Musculoskeletal ROS - No neck pain, + back pain  Neurological ROS - No focal weakness or loss of sensation, no headache  Dermatological ROS - No lesions, no rash  Vascular/Lymphatic ROS - No edema    PHYSICAL EXAM:       Vitals:    09/24/20 2201   BP: 111/67   Pulse: 109   Resp:    Temp:    SpO2: 94%       CONSTITUTIONAL:  Well developed, well nourished, alert and oriented x 2, in no acute distress, nontoxic. No dysarthria, no aphasia. EOMI.     HEAD:  normocephalic, atraumatic    EYES:  PERRLA, EOMI   ENT:  moist mucous membranes, no stridor, no tracheal deviation   NECK:  no midline tenderness to palpation, no step-offs or deformities   BACK:  Mild midline tenderness to palpation over thoracic spine, no step-offs or deformities    LUNGS:  CTAB, equal air entry bilaterally, + rhonchi     CARDIOVASCULAR:  tachycardic, no murmur   ABDOMEN:  Soft, no rigidity   NEUROLOGIC:  EYE OPENING     Spontaneous - 4 []       To voice - 3 [x]       To pain - 2 []       None - 1 []    VERBAL RESPONSE     Appropriate, oriented - 5 []       Dazed or confused - 4 [x]       Syllables, expletives - 3 []       Grunts - 2 []       None - 1 []    MOTOR RESPONSE     Spontaneous, command - 6 [x]       Localizes pain - 5 []       Withdraws pain - 4 []       Abnormal flexion - 3 []       Abnormal extension - 2 []       None - 1 []            Total GCS: 13    MENTAL STATUS:  Not oriented to date, lethargic             BRAINSTEM:  Pupillary equal round and reactive to light    Cranial Nerves:    cranial nerves II-XII are grossly intact    MOTOR EXAM:    Drift:  absent  Tone: normal    Motor exam is 4 out of 5 right upper and right lower extremities  Motor exam is 5 out of 5 left upper and left lower extremities    5/5 plantar flexion of the right ankle  5/5 dorsi flexion of the right ankle  5/5 plantar flexion of the left ankle  5/5 dorsi flexion of the left ankle  4/5 flexion of the right knee  5/5 flexion of the left knee  5/5  strength on the right  5/5  strength on the left  5/5 flexion of the right elbow  5/5 flexion of the left elbow  Shoulder elevation 5/5 bilaterally    SENSORY:    Touch:    Right Upper Extremity:  normal  Left Upper Extremity:  normal  Right Lower Extremity:  normal  Left Lower Extremity:  normal    Deep Tendon Reflexes:    Right Bicep:  2+  Left Bicep:  2+  Right Knee:  2+  Left Knee:  2+    Plantar Response:    Right:  downgoing  Left:  downgoing    Clonus:  absent  Hallman's:  absent    Gait:  Not tested   SKIN:  no rash      LABS AND IMAGING:     Labs:  CBC with Differential:    Lab Results   Component Value Date    WBC 15.0 09/24/2020    RBC 3.22 09/24/2020    HGB 9.4 09/24/2020    HCT 30.0 09/24/2020    PLT 62 09/24/2020    MCV 93.2 09/24/2020    MCH 29.2 09/24/2020    MCHC 31.3 09/24/2020    RDW 17.9 09/24/2020    NRBC 1 09/22/2020    LYMPHOPCT 8 09/24/2020    MONOPCT 6 09/24/2020    BASOPCT 0 09/24/2020    MONOSABS 0.90 09/24/2020    LYMPHSABS 1.20 09/24/2020    EOSABS 0.00 09/24/2020    BASOSABS 0.00 09/24/2020    DIFFTYPE NOT REPORTED 09/24/2020     BMP:    Lab Results   Component Value Date     09/24/2020    K 4.1 09/24/2020     09/24/2020    CO2 18 09/24/2020    BUN 18 09/24/2020    LABALBU 2.0 09/24/2020    CREATININE 0.59 09/24/2020    CALCIUM 7.8 09/24/2020    GFRAA >60 09/24/2020    LABGLOM >60 09/24/2020    GLUCOSE 101 09/24/2020       Radiology Review:  CT Chest PE  Impression    1. No definite scan evidence for pulmonary embolus.     2. Pathologic T6 compression fracture with enlarging right 7th rib metastasis    and new sternal metastasis. 3. Improving right upper lobe nodule, right lower lobe mass and, right hilar    and mediastinal adenopathy. 4. Low-density lesion in the spleen could represent a metastasis or a    hemangioma. CT Head WO Contrast  Impression    1. No evidence of acute intracranial hemorrhage or infarct. 2. Marked improvement of intracranial metastatic disease.  Slight residual    vasogenic edema seen in the left frontoparietal centrum semiovale.  If    clinically indicated, follow-up MRI of the brain could be done for further    evaluation. ASSESSMENT AND PLAN:       Patient Active Problem List   Diagnosis    NSTEMI (non-ST elevated myocardial infarction) ([address]    HTN (hypertension)    CAD (coronary artery disease)    Ischemic cardiomyopathy    AICD (automatic cardioverter/defibrillator) present    Lung mass    Enlarged prostate    CKD (chronic kidney disease)    CVA (cerebral vascular accident) ([address]    Brain metastasis ([address]    Vasogenic edema ([address]    Right hemiparesis ([address]    Adenocarcinoma of right lung (HCC)    Thrombocytopenia (HCC)    Pancytopenia due to antineoplastic chemotherapy ([address]    Bone metastasis ([address]    Other fatigue    Dehydration    Severe malnutrition ([address]    AMS (altered mental status)       A/P:  Donna Mccartney is a 69 y.o. male who presents with increased fatigue, malaise, generalized weakness. CT chest PE revealed pathologic T6 fracture. MRI thoracic spine from August 10, 2000 2020 also revealed T6 metastases with pathologic compression fracture    Patient care will be discussed with attending, will reevaluate patient along with attending     - No neurosurgical interventions planned for now  - Pain control per primary  - Continue follow up with oncology/rad onc for metastatic lesions      Additional recommendations may follow    Please contact neurosurgery with any changes in patient's neurologic status. Thank you for your consult.

## 2020-09-26 NOTE — PLAN OF CARE
Problem: Pain:  Goal: Pain level will decrease  Description: Pain level will decrease  9/26/2020 0419 by Yoav Mccann RN  Outcome: Ongoing     Problem: Pain:  Goal: Control of acute pain  Description: Control of acute pain  9/26/2020 0419 by Yoav Mccann RN  Outcome: Ongoing     Problem: Pain:  Goal: Control of chronic pain  Description: Control of chronic pain  9/26/2020 0419 by Yoav Mccann RN  Outcome: Ongoing     Problem: Skin Integrity:  Goal: Will show no infection signs and symptoms  Description: Will show no infection signs and symptoms  9/26/2020 0419 by Yoav Mccann RN  Outcome: Ongoing     Problem: Skin Integrity:  Goal: Absence of new skin breakdown  Description: Absence of new skin breakdown  9/26/2020 0419 by Yoav Mccann RN  Outcome: Ongoing     Problem: Falls - Risk of:  Goal: Will remain free from falls  Description: Will remain free from falls  9/26/2020 0419 by Yoav Mccann RN  Outcome: Ongoing     Problem: Falls - Risk of:  Goal: Absence of physical injury  Description: Absence of physical injury  9/26/2020 0419 by Yoav Mccann RN  Outcome: Ongoing     Problem: Confusion - Acute:  Goal: Mental status will be restored to baseline  Description: Mental status will be restored to baseline  9/26/2020 0419 by Yoav Mccann RN  Outcome: Ongoing     Problem: Discharge Planning:  Goal: Ability to perform activities of daily living will improve  Description: Ability to perform activities of daily living will improve  9/26/2020 0419 by Yoav Mccann RN  Outcome: Ongoing     Problem: Discharge Planning:  Goal: Participates in care planning  Description: Participates in care planning  9/26/2020 0419 by Yoav Mccann RN  Outcome: Ongoing

## 2020-09-26 NOTE — PROGRESS NOTES
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 · HTN  · CAD  · CKD  · Pathological compression fracture of spine. · Superficial Lt gluteal abrasion    Recommendations:   · Nystatin swish for oral lesions. · Blood cultures show no growth 16 hrs- Monitor off Antibiotics. Medical Decision Making/Summary/Discussion:   ·   Infection Control Recommendations   · [place] Precautions  · Central line discontinuation    Antimicrobial Stewardship Recommendations     · Simplification of therapy    Coordination of Outpatient Care:   · Estimated Length of IV antimicrobials: 9/25/2020  · Patient will need Midline Catheter Insertion: No  · Patient will need PICC line Insertion:No  · Patient will need: Home IV , [place],  SNF,  LTAC: TBD  · Patient will need outpatient wound care: Yes    Chief complaint/reason for consultation:   · SIRS, on Chemotherapy. History of Present Illness:   Vandana Parsons is a 69y.o.-year-old  male who was initially admitted on 9/24/2020. Patient seen at the request of Dr. Ksenia Treadwell:    Patient was brought to hospital by family for decreased oral intake, confusion, generalized weakness. He has been complaining of fatigue. Patient has history of stage IV lung cancer with bone and brain metastases and was started recently on chemotherapy and immunotherapy. He was recently admitted to [facility] for pancytopenia and received Granix at that time. MRI thoracic spine from August 10, 2020 reveals T6 metastasis with pathologic compression fracture. Patient has underlying history of CAD, hypertension, hyperlipidemia, ischemic cardiomyopathy. S/P ICD placement.       Patient is on long-term opioid medication for cancer pain, Megace for loss of appetite. Evaluation in the ER showed temperature 98.2, heart rate 116, respiratory 16, blood pressure 123/76, SPO2 92%. Lab evaluation showed sodium 138, potassium 4.1, chloride 107, bicarb 18, creatinine 0.59, BUN 18. CK was elevated at 413, hemoglobin 710, proBNP 3856. EKG showed QTC prolongation without any acute ST changes but had T wave inversion in V2 and aVL. Device interrogation emergency room showed nonsustained V. tach on 9/17/2020 and heart rate 130's. During this admission,  CT head was done due to altered mental status. No evidence of acute intracranial hemorrhage or infarct. Marked improvement of intracranial metastatic disease  CT chest PE was done with no evidence of pulmonary embolus. Pathologic T6 compression fracture with enlarging right seventh rib metastasis and new sternal metastasis noted    He was found to have some oral ulcers with white plaques       CURRENT EVALUATION: 09/26/20     Mentation has improved but has not recovered fully according to the wife. He has no fever, his white count is in the same range of 15-17,  Blood cultures and urine cultures are negative,  CT of the chest does not have any pneumonia,  Clinic and on exam he has no cellulitis. The patient was planned for kyphoplasty for T6, it is been deferred till Monday due to low platelets. He had a neutropenia after chemotherapy September 10, white count has recovered to a range of the teens after Neupogen      Pt using Nystatin swish and swallow for oral lesions. Afebrile , HR: 101, RR: 18, BP: 110/65  Saturating 93% on room air.     Labs:    BUN:18>16  Creatinine:0.59> 0.92  Glucose: 101> 105  Calcium: 7.8>7.8    CK: 413>451  CRP: 81.4  Myoglobin: 710> 206  Pro BNP: 3,856  Trop: 39> 45  Pro flori 0.49    Hgb:9.4> 9.8  Platelet:62> 70  WBC: 15.0> 16.4 > 17  Cultures:  Urine:  ·   Blood:  · 9/21: no growth  · 9/24: no growth  Sputum [place] ·   Wound:     CSF     IMAGING:  CT Chest PE W Contrast- 9/24/2020    1. No definite scan evidence for pulmonary embolus. 2. Pathologic T6 compression fracture with enlarging right 7th rib metastasis    and new sternal metastasis. 3. Improving right upper lobe nodule, right lower lobe mass and, right hilar    and mediastinal adenopathy. 4. Low-density lesion in the spleen could represent a metastasis or a    hemangioma. CT Head wo Contrast-9/24/2020    1. No evidence of acute intracranial hemorrhage or infarct. 2. Marked improvement of intracranial metastatic disease.  Slight residual    vasogenic edema seen in the left frontoparietal centrum semiovale.  If    clinically indicated, follow-up MRI of the brain could be done for further    evaluation. 9-25-20:          Discussed with patient, RN, family. I have personally reviewed the past medical history, past surgical history, medications, social history, and family history, and I have updated the database accordingly.   Past Medical History:     Past Medical History:   Diagnosis Date    Cancer [facility])     right lung    Cardiac resynchronization therapy defibrillator (CRT-D) in place     Chest pain 02/08/2019    Coronary artery disease     Dyspnea on exertion     Elevated troponin 02/08/2019    Heart attack ([address] 2012    Hyperlipidemia     Hypertension     Ischemic cardiomyopathy     NSTEMI (non-ST elevated myocardial infarction) ([address] 2/8/2019       Past Surgical  History:     Past Surgical History:   Procedure Laterality Date    CORONARY ANGIOPLASTY WITH STENT PLACEMENT  2012    BMS to LAD    CT BIOPSY PERCUTANEOUS DEEP BONE  8/11/2020    CT BIOPSY PERCUTANEOUS DEEP BONE 8/11/2020 [facility] CT SCAN    IR PORT PLACEMENT EQUAL OR GREATER THAN 5 YEARS  8/24/2020    IR PORT PLACEMENT EQUAL OR GREATER THAN 5 YEARS 8/24/2020 MD NAOMI Delacruz SPECIAL PROCEDURES    PACEMAKER PLACEMENT  02/11/2019    AICD/ MEDTRONIC  MODEL #[id number] CRTD AMPLIA MRI USDF4.  LEADS [id number], [id number] AND 2732-10        Medications:      [Held by provider] aspirin  81 mg Oral Daily    carvedilol  12.5 mg Oral BID WC    folic acid  1 mg Oral Daily    pantoprazole  40 mg Oral QAM AC    sodium chloride flush  10 mL Intravenous 2 times per day    enoxaparin  40 mg Subcutaneous Daily    nystatin  5 mL Oral 4x Daily       Social History:     Social History     Socioeconomic History    Marital status:      Spouse name: Not on file    Number of children: Not on file    Years of education: Not on file    Highest education level: Not on file   Occupational History    Not on file   Social Needs    Financial resource strain: Not on file    Food insecurity     Worry: Not on file     Inability: Not on file    Transportation needs     Medical: Not on file     Non-medical: Not on file   Tobacco Use    Smoking status: Former Smoker     Types: Cigarettes     Last attempt to quit: 2012     Years since quittin.1    Smokeless tobacco: Never Used    Tobacco comment: 48 yr hx of smoking   Substance and Sexual Activity    Alcohol use: No    Drug use: Not Currently     Types: Marijuana     Comment: last smoked 2020    Sexual activity: Not on file   Lifestyle    Physical activity     Days per week: Not on file     Minutes per session: Not on file    Stress: Not on file   Relationships    Social connections     Talks on phone: Not on file     Gets together: Not on file     Attends [employer] service: Not on file     Active member of club or organization: Not on file     Attends meetings of clubs or organizations: Not on file     Relationship status: Not on file    Intimate partner violence     Fear of current or ex partner: Not on file     Emotionally abused: Not on file     Physically abused: Not on file     Forced sexual activity: Not on file   Other Topics Concern    Not on file   Social History Narrative    Not on file       Family History:     Family History   Problem Relation Age of Onset   [employer] Cancer Mother     Cancer Father     Prostate Cancer Father         Allergies:   Penicillins     Review of Systems:   Constitutional: No fever chills  Head: No headaches  Eyes: No double vision or blurry vision. No conjunctival inflammation. ENT: No oral thrush at this time. Cardiovascular: No chest pain or palpitations. No shortness of breath. No GRIFFITHS  Lung: No shortness of breath or cough. No sputum production  Abdomen: No nausea, vomiting, diarrhea, or abdominal pain. [place] No cramps. Genitourinary: No increased urinary frequency, or dysuria. No hematuria. No suprapubic or CVA pain. Musculoskeletal:C/O back pain  Hematologic: No bleeding or bruising. Neurologic: No headache, alert responsive  Integument: No rash, no ulcers. Psychiatric: No depression. Endocrine: No polyuria, no polydipsia, no polyphagia. Physical Examination :     Patient Vitals for the past 8 hrs:   BP Temp Temp src Pulse Resp SpO2   09/26/20 2000 -- -- -- 99 -- --   09/26/20 1916 106/61 99 °F (37.2 °C) Temporal 96 18 93 %   09/26/20 1605 113/64 99.4 °F (37.4 °C) Oral 103 16 97 %     General Appearance: Awake, alert,Cachetic, ill appearing and in apparent distress  Head:  Normocephalic, no trauma  ENT: Pt has lesions on posterior tongue and palate. Neck:Supple, without lymphadenopathy. Thyroid normal, No bruits. Pulmonary/Chest: Clear to auscultation, without wheezes, rales, or rhonchi. Pt has ICD on the left upper chest and Port for chemotherapy on right upper chest.  Cardiovascular: Regular rate and rhythm without murmurs, rubs, or gallops. Abdomen: Soft, non tender. Bowel sounds normal. No organomegaly  All four Extremities: Pt has b/l lower extremities edema. Neurologic: No gross sensory or motor deficits. Skin: Warm and dry with good turgor. No signs of peripheral arterial or venous insufficiency. No ulcerations.  Wound on left buttocks. Medical Decision Making -Laboratory:   I have independently reviewed/ordered the following labs:    CBC with Differential:   Recent Labs     09/24/20 1953 09/25/20  0304 09/26/20  0733   WBC 15.0* 16.4* 17.8*   HGB 9.4* 9.8* 8.7*   HCT 30.0* 30.9* 26.2*   PLT 62* 70* 72*   LYMPHOPCT 8*  --  5*   MONOPCT 6  --  6     BMP:   Recent Labs     09/24/20 1953 09/25/20  0110 09/25/20  0304     --  139   K 4.1  --  3.4*     --  107   CO2 18*  --  21   BUN 18  --  16   CREATININE 0.59*  --  0.92   MG 4.4* 2.2 2.1     Hepatic Function Panel:   Recent Labs     09/24/20 1953 09/25/20  0304   PROT 4.5* 4.5*   LABALBU 2.0* 2.2*   BILITOT 0.55 0.57   ALKPHOS 109 117   ALT 30 31   AST 37 30     No results for input(s): RPR in the last 72 hours. No results for input(s): HIV in the last 72 hours. No results for input(s): BC in the last 72 hours. Lab Results   Component Value Date    MUCUS NOT REPORTED 09/25/2020    RBC 2.89 09/26/2020    TRICHOMONAS NOT REPORTED 09/25/2020    WBC 17.8 09/26/2020    YEAST NOT REPORTED 09/25/2020    TURBIDITY CLEAR 09/25/2020     Lab Results   Component Value Date    CREATININE 0.92 09/25/2020    GLUCOSE 105 09/25/2020       Medical Decision Making-Imaging:     EXAMINATION:    CTA OF THE CHEST 9/24/2020 9:16 pm         TECHNIQUE:    CTA of the chest was performed after the administration of intravenous    contrast.  Multiplanar reformatted images are provided for review.  MIP    images are provided for review.  Dose modulation, iterative reconstruction,    and/or weight based adjustment of the mA/kV was utilized to reduce the    radiation dose to as low as reasonably achievable.         COMPARISON:    08/09/2020         HISTORY:    ORDERING SYSTEM PROVIDED HISTORY: hx metastatic lung CA, tachycardic and    hypoxic    TECHNOLOGIST PROVIDED HISTORY:    hx metastatic lung CA, tachycardic and hypoxic    Reason for Exam: pt currently on chemo,    Acuity: Acute    Type of Exam: Initial         FINDINGS:    Pulmonary Arteries: There is mild motion artifact.  No definite evidence of    intraluminal filling defect to suggest pulmonary embolism.  Main pulmonary    artery is normal in caliber.         Mediastinum: Mediastinal and right hilar adenopathy has mildly improved.  The    heart and pericardium demonstrate no acute abnormality.  The left ventricle    is dilated.  There is no acute abnormality of the thoracic aorta.         Lungs/pleura: There is a small right pleural effusion, increased in volume. No effusion is seen on the left.  There is no pneumothorax.  The    low-attenuation subpleural right lower lobe mass has decreased in size and    now measures 3.0 x 3.5 cm.  The right upper lobe nodule adjacent to the right    hilum is decreased in size and now measures 9 x 13 mm.         Upper Abdomen: There is a 1.6 x 1.7 cm low-density lesion anteriorly in the    spleen.  The visualized upper abdomen is otherwise unremarkable.         Soft Tissues/Bones: There is a pathologic compression fracture at T6 with    approximately 70% loss of vertebral body height and minimal retropulsion. There is increased destruction and associated soft tissue involving the right    7th rib.  There are subtle lytic lesions in the sternum.              Impression    1. No definite scan evidence for pulmonary embolus. 2. Pathologic T6 compression fracture with enlarging right 7th rib metastasis    and new sternal metastasis. 3. Improving right upper lobe nodule, right lower lobe mass and, right hilar    and mediastinal adenopathy.     4. Low-density lesion in the spleen could represent a metastasis or a    hemangioma.           EXAMINATION:    CT OF THE HEAD WITHOUT CONTRAST  9/24/2020 9:38 pm         TECHNIQUE:    CT of the head was performed without the administration of intravenous    contrast. Dose modulation, iterative reconstruction, and/or weight based    adjustment of the mA/kV was utilized to reduce the radiation dose to as low    as reasonably achievable.         COMPARISON:    Unenhanced head CT dated 08/09/2020 and MRI brain dated 08/10/2020         HISTORY:    ORDERING SYSTEM PROVIDED HISTORY: AMS, hx thrombocytopenia    TECHNOLOGIST PROVIDED HISTORY:    AMS, hx thrombocytopenia         Reason for Exam: ams    Acuity: Acute    Type of Exam: Initial         FINDINGS:    BRAIN/VENTRICLES: There is no evidence of an acute infarct or intracranial    hemorrhage. Sanam Cheeks has been marked improvement of vasogenic edema seen    previously, most prominent in the left parietal lobe but also in the right    parietal lobe.  Subtle residual hypodensity is seen in the left    frontoparietal centrum semiovale on series 2 images 43 through 51.  The    findings are suggestive of improvement of known intracranial metastatic    disease.  Mild atrophic changes are again noted.  There is no evidence of    midline shift.         ORBITS: The visualized portion of the orbits demonstrate no acute abnormality.         SINUSES: The visualized paranasal sinuses and mastoid air cells demonstrate    no acute abnormality.         SOFT TISSUES/SKULL:  No acute abnormality of the visualized skull or soft    tissues.              Impression    1. No evidence of acute intracranial hemorrhage or infarct. 2. Marked improvement of intracranial metastatic disease.  Slight residual    vasogenic edema seen in the left frontoparietal centrum semiovale.  If    clinically indicated, follow-up MRI of the brain could be done for further    evaluation.                    Medical Decision Making-Other: Thank you for allowing us to participate in the care of this patient. Please call with questions.     Allyne Babinski, MD

## 2020-09-26 NOTE — PROGRESS NOTES
Neurosurgery JUAN ALBERTO/Resident    Daily Progress Note   Chief Complaint   Patient presents with    Other     hasn't been doing good after starting chemo two weeks ago     9/26/2020  2:39 PM    Chart reviewed. No acute events overnight. Continues to have back pain that \"somewhat\" improves with pain medication. Vitals:    09/26/20 0549 09/26/20 0700 09/26/20 0730 09/26/20 1230   BP:   116/65 106/68   Pulse:  94 92 99   Resp:   18 17   Temp:   98 °F (36.7 °C)    TempSrc:   Oral    SpO2:   96%    Weight: 130 lb 1.1 oz (59 kg)  121 lb 14.6 oz (55.3 kg)    Height:   5' 5\" (1.651 m)          PE:   AOx3   Motor   L deltoid 5/5; R deltoid 5/5  L biceps 5/5; R biceps 5/5  L triceps 5/5; R triceps 5/5  L intrinsics 5/5; R intrinsics 5/5      L iliopsoas 5/5 , R iliopsoas 5/5  L quadriceps 5/5; R quadriceps 5/5  L Dorsiflexion 5/5; R dorsiflexion 5/5  L Plantarflexion 5/5; R plantarflexion 5/5        Lab Results   Component Value Date    WBC 17.8 (H) 09/26/2020    HGB 8.7 (L) 09/26/2020    HCT 26.2 (L) 09/26/2020    PLT 72 (L) 09/26/2020    CHOL 162 09/10/2020    TRIG 100 09/10/2020    HDL 49 09/10/2020    ALT 31 09/25/2020    AST 30 09/25/2020     09/25/2020    K 3.4 (L) 09/25/2020     09/25/2020    CREATININE 0.92 09/25/2020    BUN 16 09/25/2020    CO2 21 09/25/2020    TSH 1.47 09/24/2020    PSA 5.38 (H) 08/09/2020    INR 1.3 09/25/2020    LABA1C 5.5 02/08/2019    CRP 81.4 (H) 09/25/2020    SEDRATE 20 09/25/2020         A/P  69 y.o. male who presents with pathologic T6 compression fracture    - plan for ablation of T6 as well as kyphoplasty once platelets >077N  - will discuss with hem/onc if patient would benefit from platelet transfusion vs waiting for platelets to improve then do the surgery      Please contact neurosurgery with any changes in patients neurologic status.        Gabi Cabral CNP  9/26/20  2:39 PM

## 2020-09-26 NOTE — PROGRESS NOTES
[facility]  Office: [phone]  Elyssa Mayer, DO, Brad Wheeler, DO, Kavin Munguia, DO, Sindhu Durbin, DO, Veronica Vidal MD, Beatriz Valdovinos MD, Gibran Cisneros MD, Myriam Olivarez MD, Missael Vera MD, Brenda Scruggs MD, Rhiannon Zapata MD, Mckay Sifuentes MD, Ollie Avilez MD, Tri Sifuentes DO, Rhina Hill MD, Ignacio Sarkar MD, Media Sensor, DO, Rohan Murray MD,  Leann Vásquez DO, José Evans MD, Wisam Jorge MD, Sina Rico, CNP, [facility], CNP, Rosie Reynolds, CNP, Haley Nascimento, CNS, Eamon Ascencio, CNP, Rubi Nance, CNP, Hyun Chin, CNP, Merlinda Goring, CNP, Danny Nicholas, CNP, LUCILA Mario-C, Nessa Velásquez, [facility], Rolando Jesus, CNP, Yvonne Nuno, CNP, Jessica Patel, CNP, Veronica House, CNP, Jany Lopez, [facility]    Progress Note    9/26/2020    7:59 AM    Name:   Miladys Brown  MRN:     [id number]     Acct:      [de-identified]   Room:   [address] [place] [address] Day:  2  Admit Date:  9/24/2020  5:26 PM    PCP:   Cliff Paul MD  Code Status:  Full Code    Subjective:     C/C:   Chief Complaint   Patient presents with   Rivera Other     hasn't been doing good after starting chemo two weeks ago     Interval History Status:   Neurosurgery canceled the procedure of T6 ablation with kyphoplasty since platelets are still 70, requirement is of platelets greater than 100  Still complains of loss of appetite, decreased energy and generalized weakness as well as mid back pain  Brief History:   Miladys Brown is 69 y.o. male  Who was admitted to the hospital on 9/24/2020 for treatment of Sepsis ([facility] [address] Patient was brought to hospital by family for meningitis, decreased oral intake, confusion, generalized weakness. He has been complaining of fatigue. Patient has history of stage IV lung cancer with bone and brain mets and was started recently on chemotherapy and immunotherapy.   He was recently admitted in the hospital for pancytopenia and received Granix at that time. Evaluation emergency room showed temperature 98.2 heart rate 116, respiratory 16, blood pressure 123/76, SPO2 92%. Lab evaluation showed sodium 138, potassium 4.1, chloride 107, bicarb 18, creatinine 0.59, BUN 18. CK was elevated at 413, hemoglobin 710, proBNP 3856. EKG showed QTC prolongation without any acute ST changes but had T wave inversion in V2 and aVL. Device interrogation emergency room showed nonsustained V. tach on 9/17/2020 and heart rate 130's. Chest x-ray was negative for any cardiopulmonary pathology. Patient has underlying history of CAD, hypertension, hyperlipidemia, ischemic cardiomyopathy S/P ICD placement. Patient is on long-term opioid medication for cancer pain, Megace for loss of appetite. He was found to have some oral ulcers with white plaques      Review of Systems:     Constitutional:  negative for chills, fevers, sweats,+ fatigue  Respiratory:  negative for cough, dyspnea on exertion, shortness of breath, wheezing  Cardiovascular:  negative for chest pain, chest pressure/discomfort, lower extremity edema, palpitations  Gastrointestinal:  negative for abdominal pain, constipation, diarrhea, nausea, vomiting,+ diminished appetite  Neurological:  negative for dizziness, headache  + Mid back pain as before  Medications: Allergies:     Allergies   Allergen Reactions    Penicillins      Swollen hand with itching       Current Meds:   Scheduled Meds:    [Held by provider] aspirin  81 mg Oral Daily    carvedilol  12.5 mg Oral BID WC    folic acid  1 mg Oral Daily    pantoprazole  40 mg Oral QAM AC    sodium chloride flush  10 mL Intravenous 2 times per day    enoxaparin  40 mg Subcutaneous Daily    vancomycin (VANCOCIN) intermittent dosing (placeholder)   Other RX Placeholder    nystatin  5 mL Oral 4x Daily     Continuous Infusions:   PRN Meds: sodium chloride flush, acetaminophen **OR** acetaminophen, potassium chloride, oxyCODONE, potassium chloride **OR** potassium alternative oral replacement    Data:     Past Medical History:   has a past medical history of Cancer [facility]), Cardiac resynchronization therapy defibrillator (CRT-D) in place, Chest pain, Coronary artery disease, Dyspnea on exertion, Elevated troponin, Heart attack ([facility] [address] Hyperlipidemia, Hypertension, Ischemic cardiomyopathy, and NSTEMI (non-ST elevated myocardial infarction) ([facility] [address] Social History:   reports that he quit smoking about 8 years ago. His smoking use included cigarettes. He has never used smokeless tobacco. He reports previous drug use. Drug: Marijuana. He reports that he does not drink alcohol. Family History:   Family History   Problem Relation Age of Onset   [facility] Cancer Mother     Cancer Father     Prostate Cancer Father        Vitals:  /65   Pulse 92   Temp 98 °F (36.7 °C) (Oral)   Resp 18   Ht 5' 5\" (1.651 m)   Wt 121 lb 14.6 oz (55.3 kg)   SpO2 96%   BMI 20.29 kg/m²   Temp (24hrs), Av.3 °F (36.8 °C), Min:97.4 °F (36.3 °C), Max:98.9 °F (37.2 °C)    No results for input(s): POCGLU in the last 72 hours. I/O (24Hr): Intake/Output Summary (Last 24 hours) at 2020 0759  Last data filed at 2020 0549  Gross per 24 hour   Intake 732. 85 ml   Output 300 ml   Net 432.85 ml       Labs:  Hematology:  Recent Labs     20  0110 20  0304   WBC 15.0*  --  16.4*   RBC 3.22*  --  3.33*   HGB 9.4*  --  9.8*   HCT 30.0*  --  30.9*   MCV 93.2  --  92.8   MCH 29.2  --  29.4   MCHC 31.3  --  31.7   RDW 17.9*  --  18.1*   PLT 62*  --  70*   MPV 12.3  --  11.7   SEDRATE  --  20  --    CRP  --  81.4*  --    INR 1.2  --  1.3     Chemistry:  Recent Labs     20  1917  20  1953 20  0110 20  0304 20  0556 20  1342 20  1402   NA  --   --  138  --  139  --   --   --    K  --   --  4.1  --  3.4*  --   --   --    CL  --   --  107  --  107  --   --   --    CO2  -- --  18*  --  21  --   --   --    GLUCOSE 99  --  101*  --  105*  --   --   --    BUN  --   --  18  --  16  --   --   --    CREATININE 0.90  --  0.59*  --  0.92  --   --   --    MG  --   --  4.4* 2.2 2.1  --   --   --    ANIONGAP  --   --  13  --  11  --   --   --    LABGLOM >60  --  >60  --  >60  --   --   --    GFRAA  --   --  >60  --  >60  --   --   --    CALCIUM  --   --  7.8*  --  7.8*  --   --   --    CAION  --   --   --  1.19 1.18  --   --   --    PHOS  --   --  3.4  --  3.2  --   --   --    PROBNP  --   --  3,856*  --   --   --   --   --    TROPHS  --    < > 39* 43*  --  45* 38* 39*   CKTOTAL  --    < > 413* 465*  --  451* 302  --    CKMB  --   --   --  8.3  --  7.5 5.0  --    MYOGLOBIN  --    < > 710* 314*  --  206*  --  139*   LACTACIDWB  --   --  2.0 1.9  --   --   --   --     < > = values in this interval not displayed. Recent Labs     09/24/20 1953 09/25/20  0304   PROT 4.5* 4.5*   LABALBU 2.0* 2.2*   TSH 1.47  --    AST 37 30   ALT 30 31   ALKPHOS 109 117   BILITOT 0.55 0.57   URICACID 4.9  --      ABG:  Lab Results   Component Value Date    FIO2 NOT REPORTED 09/24/2020     Lab Results   Component Value Date/Time    SPECIAL NOT REPORTED 09/25/2020 12:54 AM     Lab Results   Component Value Date/Time    CULTURE NO GROWTH 09/25/2020 12:54 AM       Radiology:  Ct Head Wo Contrast    Result Date: 9/24/2020  1. No evidence of acute intracranial hemorrhage or infarct. 2. Marked improvement of intracranial metastatic disease. Slight residual vasogenic edema seen in the left frontoparietal centrum semiovale. If clinically indicated, follow-up MRI of the brain could be done for further evaluation. Xr Chest Portable    Result Date: 9/24/2020  No evidence for acute cardiopulmonary pathology. Stable right hilar mass. Xr Chest Portable    Result Date: 9/19/2020  Right hilar mass has moderately decreased in size consistent with response to therapy. No acute infiltrate, pneumothorax or pleural fluid. Lytic metastatic lesion involving the lateral right 7th rib. Ct Chest Pulmonary Embolism W Contrast    Result Date: 9/24/2020  1. No definite scan evidence for pulmonary embolus. 2. Pathologic T6 compression fracture with enlarging right 7th rib metastasis and new sternal metastasis. 3. Improving right upper lobe nodule, right lower lobe mass and, right hilar and mediastinal adenopathy. 4. Low-density lesion in the spleen could represent a metastasis or a hemangioma. Physical Examination:        General appearance: Cachectic, ill looking but cooperative  Mental Status:  oriented to person, place and time and normal affect  Lungs:  clear to auscultation bilaterally, normal effort  Heart:  regular rate and rhythm, no murmur  Abdomen:  soft, nontender, nondistended, normal bowel sounds, no masses, hepatomegaly, splenomegaly  Extremities:  no edema, redness, tenderness in the calves  Skin:  no gross lesions, rashes, induration    Assessment:        Hospital Problems           Last Modified POA    * (Principal) Sepsis ([address] 9/25/2020 Yes    Thrush 9/25/2020 Yes    HTN (hypertension) 9/25/2020 Yes    CAD (coronary artery disease) 9/25/2020 Yes    Ischemic cardiomyopathy 9/25/2020 Yes    Elevated troponin 9/25/2020 Yes    CKD (chronic kidney disease) 9/25/2020 Yes    Brain metastasis ([address] 9/25/2020 Yes    Adenocarcinoma of right lung ([address] 9/25/2020 Yes    Moderate malnutrition ([address] 9/25/2020 Yes    AMS (altered mental status) 0[birth year]31/1931 Yes    Metabolic encephalopathy 1/21/0142 Yes    Failure to thrive in adult 9/25/2020 Yes    Prolonged Q-T interval on ECG 9/25/2020 Yes    Hypocalcemia 9/25/2020 Yes    Pathological compression fracture of spine ([address] 9/25/2020 Yes          Plan:        1. SIRS / possible sepsis - less likely, no clinical support for diagnosis of sepsis, antibiotics have been stopped by ID,? Leucocytosis from recent Granix.    2. Dehydration due to poor intake and failure to thrive  - continue IV fluids. 3. Thrombocytopenia secondary to chemotherapy   4. 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 5. Failure to thrive -   6. Severe malnutrition - protein supplement . Dietary consult  7. CAD S/P stents -okay to hold aspirin. Continue Coreg  8. Ischemic cardiomyopathy S/P ICD CRT  9.  Pathological fracture T6 -neurosurgery postponed T6 ablation and kyphoplasty till platelets are 934 or greater      Kandace Cartagena MD  9/26/2020  7:59 AM

## 2020-09-26 NOTE — PLAN OF CARE
Problem: Pain:  Description: Pain management should include both nonpharmacologic and pharmacologic interventions.   Goal: Pain level will decrease  9/26/2020 1516 by Zackery Borjas RN  Outcome: Ongoing  9/26/2020 0419 by Yoav Mccann RN  Outcome: Ongoing  Goal: Control of acute pain  9/26/2020 1516 by Zackery Borjas RN  Outcome: Ongoing  9/26/2020 0419 by Yoav Mccann RN  Outcome: Ongoing  Goal: Control of chronic pain  9/26/2020 1516 by Zcakery Borjas RN  Outcome: Ongoing  9/26/2020 0419 by Yoav Mccann RN  Outcome: Ongoing     Problem: Skin Integrity:  Goal: Will show no infection signs and symptoms  9/26/2020 1516 by Zackery Borjas RN  Outcome: Ongoing  9/26/2020 0419 by Yoav Mccann RN  Outcome: Ongoing  Goal: Absence of new skin breakdown  9/26/2020 1516 by Zackery Borjas RN  Outcome: Ongoing  9/26/2020 0419 by Yoav Mccann RN  Outcome: Ongoing     Problem: Falls - Risk of:  Goal: Will remain free from falls  9/26/2020 1516 by Zackery Borjas RN  Outcome: Ongoing  9/26/2020 0419 by Yoav Mccann RN  Outcome: Ongoing  Goal: Absence of physical injury  9/26/2020 1516 by Zackery Borjas RN  Outcome: Ongoing  9/26/2020 0419 by Yoav Mccann RN  Outcome: Ongoing     Problem: Confusion - Acute:  Goal: Absence of continued neurological deterioration signs and symptoms  9/26/2020 1516 by Zackery Borjas RN  Outcome: Ongoing  9/26/2020 0419 by Yoav Mccann RN  Outcome: Ongoing  Goal: Mental status will be restored to baseline  9/26/2020 1516 by Zackery Borjas RN  Outcome: Ongoing  9/26/2020 0419 by Yoav Mccann RN  Outcome: Ongoing     Problem: Discharge Planning:  Goal: Ability to perform activities of daily living will improve  9/26/2020 1516 by Zackery Borjas RN  Outcome: Ongoing  9/26/2020 0419 by Yoav Mccann RN  Outcome: Ongoing  Goal: Participates in care planning  9/26/2020 1516 by Zackery Borjas RN  Outcome: Ongoing  9/26/2020 0419 by Barry Petty Alaina Herrera RN  Outcome: Ongoing     Problem: Injury - Risk of, Physical Injury:  Goal: Will remain free from falls  9/26/2020 1516 by Di Joy RN  Outcome: Ongoing  9/26/2020 0419 by Catarino Srivastava RN  Outcome: Ongoing  Goal: Absence of physical injury  9/26/2020 1516 by Di Joy RN  Outcome: Ongoing  9/26/2020 0419 by Catarino Srivastava RN  Outcome: Ongoing     Problem: Mood - Altered:  Goal: Mood stable  9/26/2020 1516 by Di Joy RN  Outcome: Ongoing  9/26/2020 0419 by Catarino Srivastava RN  Outcome: Ongoing  Goal: Absence of abusive behavior  9/26/2020 1516 by Di Joy RN  Outcome: Ongoing  9/26/2020 0419 by Catarino Srivastava RN  Outcome: Ongoing  Goal: Verbalizations of feeling emotionally comfortable while being cared for will increase  9/26/2020 1516 by Di Joy RN  Outcome: Ongoing  9/26/2020 0419 by Catarino Srivastava RN  Outcome: Ongoing     Problem: Psychomotor Activity - Altered:  Goal: Absence of psychomotor disturbance signs and symptoms  9/26/2020 1516 by Di Joy RN  Outcome: Ongoing  9/26/2020 0419 by Catarino Srivastava RN  Outcome: Ongoing     Problem: Sensory Perception - Impaired:  Goal: Demonstrations of improved sensory functioning will increase  9/26/2020 1516 by Di Joy RN  Outcome: Ongoing  9/26/2020 0419 by Catarino Srivastava RN  Outcome: Ongoing  Goal: Decrease in sensory misperception frequency  9/26/2020 1516 by Di Joy RN  Outcome: Ongoing  9/26/2020 0419 by Catarino Srivastava RN  Outcome: Ongoing  Goal: Able to refrain from responding to false sensory perceptions  9/26/2020 1516 by Di Joy RN  Outcome: Ongoing  9/26/2020 0419 by Catarino Srivastava RN  Outcome: Ongoing  Goal: Demonstrates accurate environmental perceptions  9/26/2020 1516 by Di Joy RN  Outcome: Ongoing  9/26/2020 0419 by Catarino Srivastava RN  Outcome: Ongoing  Goal: Able to distinguish between reality-based and nonreality-based

## 2020-09-26 NOTE — PROGRESS NOTES
Today's Date: 9/26/2020  Patient Name: Vic Levin  Date of admission: 9/24/2020  5:26 PM  Patient's age: 69 y.o., 1950  Admission Dx: AMS (altered mental status) [R41.82]      Requesting Physician: Golden Norton MD    CHIEF COMPLAINT: Excessive weakness and fatigue. Nausea. Consult for lung cancer with recent treatment    SUBJECTIVE:    Patient seen and examined   His kyphoplasty has been canceled because of low platelets   He denies any symptoms of bleeding   No fever chills   No nausea vomiting     HISTORY OF PRESENT ILLNESS:    The patient is a 69 y.o.  male who is admitted to the hospital for further management of increasing weakness and fatigue and nausea after recent chemotherapy treatment. Patient is known to our practice. He had stage IV lung cancer with lung and spine metastasis. He had radiation therapy and he was started on systemic treatment with Keytruda, carboplatin and Alimta. First treatment was September 10, 2020. For the last few days he is having increasing weakness and fatigue and decreased appetite. Poor nutrition. Poor performance. He was evaluated in the emergency room and was subsequently admitted. Patient was hospitalized 1 week ago with similar conditions. At that time he had thrombocytopenia and leukopenia. He was given Granix with good recovery of the white blood cells. Currently there is no fever or chills. No signs of infections. Past Medical History:   has a past medical history of Cancer [facility]), Cardiac resynchronization therapy defibrillator (CRT-D) in place, Chest pain, Coronary artery disease, Dyspnea on exertion, Elevated troponin, Heart attack ([address] Hyperlipidemia, Hypertension, Ischemic cardiomyopathy, and NSTEMI (non-ST elevated myocardial infarction) ([facility]     Past Surgical History:   has a past surgical history that includes Coronary angioplasty with stent (2012); pacemaker placement (02/11/2019); CT BIOPSY DEEP BONE PERCUTANEOUS (8/11/2020); and IR PORT PLACEMENT > 5 YEARS (8/24/2020). Family History: family history includes Cancer in his father and mother; Prostate Cancer in his father. Social History:   reports that he quit smoking about 8 years ago. His smoking use included cigarettes. He has never used smokeless tobacco. He reports previous drug use. Drug: Marijuana. He reports that he does not drink alcohol. Medications:    Prior to Admission medications    Medication Sig Start Date End Date Taking? Authorizing Provider   oxyCODONE (ROXICODONE) 5 MG immediate release tablet Take 1 tablet by mouth every 4 hours as needed for Pain for up to 15 days. 9/18/20 10/3/20 Yes Steff Guerrier MD   fentaNYL (DURAGESIC) 25 MCG/HR Place 1 patch onto the skin every 72 hours for 30 days. Apply one every 3 days 9/14/20 10/14/20 Yes Steff Guerrier MD   ondansetron (ZOFRAN ODT) 4 MG disintegrating tablet Take 1 tablet by mouth every 8 hours as needed for Nausea 8/31/20  Yes Steff Guerrier MD   folic acid (FOLVITE) 1 MG tablet Take 1 tablet by mouth daily START 7 DAYS BEFORE FIRST DOSE OF PEMETREXED---TO BE TAKEN DAILY --AND CONTINUE 21 DAYS AFTER LAST DOSE OF PEMETREXED 8/17/20  Yes Steff Guerrier MD   pantoprazole (PROTONIX) 40 MG tablet Take 1 tablet by mouth every morning (before breakfast) 8/13/20  Yes Vic Arnold MD   aspirin 81 MG chewable tablet Take 1 tablet by mouth daily 2/12/19  Yes Joel Calvillo MD   megestrol (MEGACE ES) 625 MG/5ML suspension Take 5 mLs by mouth daily 9/14/20   Steff Guerrier MD   fentaNYL (DURAGESIC) 12 MCG/HR Place 1 patch onto the skin every 72 hours for 30 days.  Apply one every 3 days 8/31/20 9/30/20  Steff Guerrier MD   polyethylene glycol (GLYCOLAX) 17 g packet Take 17 g by mouth daily as needed for Constipation    Historical Provider, MD   carvedilol (COREG) 12.5 MG tablet Take 12.5 mg by mouth 2 times daily (with meals)    Historical Provider, MD   nitroGLYCERIN (NITROSTAT) 0.4 MG SL tablet up to max of 3 total doses. If no relief after 1 dose, call 911.   Patient not taking: Reported on 9/14/2020 2/11/19   Jorge Ontiveros MD     Current Facility-Administered Medications   Medication Dose Route Frequency Provider Last Rate Last Dose    [Held by provider] aspirin chewable tablet 81 mg  81 mg Oral Daily REYNALDO Law CNP        carvedilol (COREG) tablet 12.5 mg  12.5 mg Oral BID WC REYNALDO Law CNP   12.5 mg at 17/89/09 8346    folic acid (FOLVITE) tablet 1 mg  1 mg Oral Daily REYNALDO Law CNP   1 mg at 09/26/20 7225    pantoprazole (PROTONIX) tablet 40 mg  40 mg Oral QAM AC REYNALDO Law CNP   40 mg at 09/26/20 0548    sodium chloride flush 0.9 % injection 10 mL  10 mL Intravenous 2 times per day REYNALDO Law CNP   10 mL at 09/26/20 0910    sodium chloride flush 0.9 % injection 10 mL  10 mL Intravenous PRN REYNALDO Law CNP        acetaminophen (TYLENOL) tablet 650 mg  650 mg Oral Q6H PRN REYNALDO Law CNP        Or    acetaminophen (TYLENOL) suppository 650 mg  650 mg Rectal Q6H PRN REYNALDO Law CNP        enoxaparin (LOVENOX) injection 40 mg  40 mg Subcutaneous Daily REYNALDO Law CNP   40 mg at 09/26/20 8201    nystatin (MYCOSTATIN) 745659 UNIT/ML suspension 500,000 Units  5 mL Oral 4x Daily REYNALDO Leong CNP   500,000 Units at 09/26/20 1321    potassium chloride 10 mEq/100 mL IVPB (Peripheral Line)  10 mEq Intravenous PRN REYNALDO Griffith  mL/hr at 09/25/20 0810 10 mEq at 09/25/20 0810    oxyCODONE (ROXICODONE) immediate release tablet 5 mg  5 mg Oral Q4H PRN REYNALDO Griffith CNP   5 mg at 09/26/20 0857    potassium chloride (KLOR-CON M) extended release tablet 40 mEq  40 mEq Oral PRN Jacklyn Sorenson MD        Or    potassium bicarb-citric acid (EFFER-K) effervescent tablet 40 mEq  40 mEq Oral PRN Aguilar Dueñas MD           Allergies:  Penicillins    REVIEW OF SYSTEMS:      · General: Positive for weakness and fatigue. Positive for weight loss and decreased appetite. No fever or chills. · Eyes: No blurred vision, eye pain or double vision. · Ears: No hearing problems or drainage. No tinnitus. · Throat: No sore throat, problems with swallowing or dysphagia. · Respiratory: Occasional cough, no sputum or hemoptysis. Positive for shortness of breath. No pleuritic chest pain. · Cardiovascular: No chest pain, orthopnea or PND. No lower extremity edema. No palpitation. · Gastrointestinal: As above. · Genitourinary: No dysuria, hematuria, frequency or urgency. · Musculoskeletal: No muscle aches or pains. No limitation of movement. No back pain. No gait disturbance, No joint complaints. · Dermatologic: No skin rashes or pruritus. No skin lesions or discolorations. · Psychiatric: No depression, anxiety, or stress or signs of schizophrenia. No change in mood or affect. · Hematologic: No history of bleeding tendency. No bruises or ecchymosis. No history of clotting problems. · Infectious disease: No fever, chills or frequent infections. · Endocrine: No polydipsia or polyuria. No temperature intolerance. · Neurologic: No headaches or dizziness. No weakness or numbness of the extremities. No changes in balance, coordination,  memory, mentation, behavior. · Allergic/Immunologic: No nasal congestion or hives. No repeated infections. PHYSICAL EXAM:      /68   Pulse 99   Temp 98 °F (36.7 °C) (Oral)   Resp 17   Ht 5' 5\" (1.651 m)   Wt 121 lb 14.6 oz (55.3 kg)   SpO2 96%   BMI 20.29 kg/m²    Temp (24hrs), Av.5 °F (36.9 °C), Min:98 °F (36.7 °C), Max:98.9 °F (37.2 °C)      General appearance -patient looks chronically ill.   Not in pain or distress  Mental status - alert and oriented  Eyes - pupils equal and reactive, extraocular eye movements intact  Ears - bilateral TM's and external ear canals normal  Nose - normal and patent, no erythema, discharge or polyps  Mouth - mucous membranes moist, pharynx normal without lesions  Neck - supple, no significant adenopathy  Lymphatics - no palpable lymphadenopathy, no hepatosplenomegaly  Chest -decreased air entry bilaterally.   Clear to auscultation, no wheezes, rales or rhonchi,   Heart - normal rate, regular rhythm, normal S1, S2, no murmurs, rubs, clicks or gallops  Abdomen - soft, nontender, nondistended, no masses or organomegaly  Neurological - alert, oriented, normal speech, no focal findings or movement disorder noted  Musculoskeletal - no joint tenderness, deformity or swelling  Extremities - peripheral pulses normal, no pedal edema, no clubbing or cyanosis  Skin - normal coloration and turgor, no rashes, no suspicious skin lesions noted           DATA:      Labs:       CBC:   Recent Labs     09/25/20  0304 09/26/20  0733   WBC 16.4* 17.8*   HGB 9.8* 8.7*   HCT 30.9* 26.2*   PLT 70* 72*     BMP:   Recent Labs     09/24/20 1953 09/25/20  0304    139   K 4.1 3.4*   CO2 18* 21   BUN 18 16   CREATININE 0.59* 0.92   LABGLOM >60 >60   GLUCOSE 101* 105*     PT/INR:   Recent Labs     09/24/20 1953 09/25/20  0304   PROTIME 12.4* 13.5*   INR 1.2 1.3     APTT:  Recent Labs     09/24/20 1953   APTT 29.4     LIVER PROFILE:  Recent Labs     09/24/20 1953 09/25/20  0304   AST 37 30   ALT 30 31   LABALBU 2.0* 2.2*               IMPRESSION:    Primary Problem  Sepsis [facility])    Active Hospital Problems    Diagnosis Date Noted    Thrush [B37.0] 09/25/2020     Priority: Medium    Sepsis ([address] [A41.9] 21/40/0465    Metabolic encephalopathy [K88.86] 09/25/2020    Failure to thrive in adult [R62.7] 09/25/2020    Prolonged Q-T interval on ECG [R94.31] 09/25/2020    Hypocalcemia [E83.51] 09/25/2020    Path[facility] Michaela Patterson 09/25/2020    AMS (altered mental status) [R41.82] 09/24/2020    Moderate malnutrition ([facility] [E44.0] 09/20/2020    Adenocarcinoma of right lung ([facility] [C34.91] 08/17/2020    [facility] [C79.31] 08/09/2020    CKD (chronic kidney disease) [N18.9] 08/09/2020    Elevated troponin [R79.89]     HTN (hypertension) [I10] 02/08/2019    CAD (coronary artery disease) [I25.10] 02/08/2019    Ischemic cardiomyopathy [I25.5] 02/08/2019       RECOMMENDATIONS:  1. Records and labs and images were reviewed and discussed with the patient and his family. 2. Patient started on chemo immunotherapy on September 10, 2020 for stage IV lung cancer. 3. Obviously he is having multiple side effects related to treatment. May need to adjust doses of chemotherapy on the second cycle which is due on October 1, 2020. 4. Reviewing his labs, platelets are recovering. White blood cells are higher than normal due to recent treatment with Granix. Doubt infections. 5. CT scan was reviewed by neurosurgery. They are planned for kyphoplasty and nerve blocking. 6. I recommend 2 units platelet transfusion prior to surgery. Recheck platelet in a.m.  7. If the surgery is planned for Monday recommend 2 units platelet transfusion on Sunday night and repeat on Monday morning if platelet count less than 100 K  8. IV fluids for rehydration. 9. We will follow with you. 10. Patient's questions were answered to the best of his satisfaction and he verbalized full understanding and agreement. 11. Thank you for allowing us to participate in the care of this pleasant patient. Discussed with patient and Nurse. MD Maxime Wilson MD  Hematologist/Medical Oncologist    Cell: [phone]      This note is created with the assistance of a speech recognition program.  While intending to generate a document that actually reflects the content of the visit, the document can still have some errors including those of syntax and sound a like substitutions which may escape proof reading. It such instances, actual meaning can be extrapolated by contextual diversion.

## 2020-09-27 NOTE — PLAN OF CARE
Problem: Pain:  Goal: Pain level will decrease  Description: Pain level will decrease  9/27/2020 1813 by Maryellen Celaya RN  Outcome: Ongoing  9/27/2020 0813 by Rupert Dhaliwal RN  Outcome: Ongoing  Note: Pt able to communicate pain as needed, uses call light appropriately. Pt satisfied with pain interventions.     Goal: Control of acute pain  Description: Control of acute pain  9/27/2020 0813 by Rupert Dhaliwal RN  Outcome: Ongoing  Goal: Control of chronic pain  Description: Control of chronic pain  9/27/2020 0813 by Rupert Dhaliwal RN  Outcome: Ongoing

## 2020-09-27 NOTE — PROGRESS NOTES
Neurosurgery JUAN ALBERTO/Resident    Daily Progress Note   Chief Complaint   Patient presents with    Other     hasn't been doing good after starting chemo two weeks ago     9/27/2020  4:07 PM    Chart reviewed. No acute events overnight. No new complaints. Anxiously waiting for surgery. Continues to have back pain. PLT 144k today. Vitals:    09/27/20 0838 09/27/20 1200 09/27/20 1440 09/27/20 1552   BP: 125/76 (!) 109/57  108/65   Pulse: 101 92 94 99   Resp: 16 15  16   Temp: 98.8 °F (37.1 °C) 98.4 °F (36.9 °C)  98.9 °F (37.2 °C)   TempSrc: Oral Oral  Oral   SpO2: 98% 94%  98%   Weight:       Height:         PE:   AOx3   Motor   L deltoid 5/5; R deltoid 5/5  L biceps 5/5; R biceps 5/5  L triceps 5/5; R triceps 5/5  L intrinsics 5/5; R intrinsics 5/5      L iliopsoas 5/5 , R iliopsoas 5/5  L quadriceps 5/5; R quadriceps 5/5  L Dorsiflexion 5/5; R dorsiflexion 5/5  L Plantarflexion 5/5; R plantarflexion 5/5      Lab Results   Component Value Date    WBC 19.5 (H) 09/27/2020    HGB 8.9 (L) 09/27/2020    HCT 29.0 (L) 09/27/2020     09/27/2020    CHOL 162 09/10/2020    TRIG 100 09/10/2020    HDL 49 09/10/2020    ALT 31 09/25/2020    AST 30 09/25/2020     09/25/2020    K 3.4 (L) 09/25/2020     09/25/2020    CREATININE 0.92 09/25/2020    BUN 16 09/25/2020    CO2 21 09/25/2020    TSH 1.47 09/24/2020    PSA 5.38 (H) 08/09/2020    INR 1.3 09/25/2020    LABA1C 5.5 02/08/2019    CRP 81.4 (H) 09/25/2020    SEDRATE 20 09/25/2020           A/P  69 y.o. male who presents with  pathologic T6 compression fracture     - plan for ablation of T6 as well as kyphoplasty tomorrow 9/28  - NPO after MN  - PLT 144k today  - hold lovenox in AM      Please contact neurosurgery with any changes in patients neurologic status.        Rosa Maria Barkley CNP  9/27/20  4:07 PM

## 2020-09-27 NOTE — PROGRESS NOTES
[facility]  Office: [address], DO, Gilmar Dye, DO, Gavin Sykes, DO, Wilma English Blood, DO, Chika Davila MD, Ashanti Wasserman MD, Lee Leija MD, Liliana Campo MD, Bertha Roth MD, Nakul Beard MD, Lizeth Lynn MD, Vitor Sykes MD, Ollie Morales MD, Jossy Gonzalez, DO, Alie Yadav MD, Franciso Schaumann, MD, Benjie Neumann, DO, Konstantin England MD,  Lillie Herndon, DO, Adriane Street MD, Denman Najjar, MD, Gabriella Arellano, [facility], [address], [facility], Radha Scales, CNP, Hyun Rose, CNS, Dionisio Davis, CNP, Brent Calabrese, CNP, Soledad Bell, CNP, Ayleen Brown, CNP, Sangeeta Sifuentes, CNP, Kerwin Conrad PA-C, Ashish Aguiar, [facility], Jojo Mercado, CNP, Leonardo Webb, CNP, Sabi Salcedo, CNP, Angie Onofre, CNP, Kiran Fletcher, [facility]      Daily Progress Note     Admit Date: 9/24/2020  Bed/Room No.  6985/7542-51  Admitting Physician : Lee Leija MD  Code Status :Full Code  Hospital Day:  LOS: 3 days   Chief Complaint:     Chief Complaint   Patient presents with    Other     hasn't been doing good after starting chemo two weeks ago     Principal Problem:    Sepsis [facility])  Active Problems: Thrush    HTN (hypertension)    CAD (coronary artery disease)    Ischemic cardiomyopathy    Elevated troponin    CKD (chronic kidney disease)    Brain metastasis (HCC)    Adenocarcinoma of right lung (HCC)    Moderate malnutrition (HCC)    AMS (altered mental status)    Metabolic encephalopathy    Failure to thrive in adult    Prolonged Q-T interval on ECG    Hypocalcemia    Pathological compression fracture of spine ([facility]    Bandemia  Resolved Problems:    * No resolved hospital problems. *    Subjective : Interval History/Significant events :  09/27/20    Patient reports improvement in dysphagia. He still has poor oral intake, appetite. Patient reports that back pain is controlled with medication.   He denies any cough, fever, chills, breathing difficulty. No abdominal pain. Vitals - Stable afebrile  Labs -persistent leukocytosis. Hemoglobin stable. Nursing notes , Consults notes reviewed. Overnight events and updates discussed with Nursing staff . Background History:         Rj Houston is 69 y.o. male  Who was admitted to the hospital on 9/24/2020 for treatment of Sepsis ([facility] Patient was brought to hospital by family for meningitis, decreased oral intake, confusion, generalized weakness. He has been complaining of fatigue. Patient has history of stage IV lung cancer with bone and brain mets and was started recently on chemotherapy and immunotherapy. He was recently admitted in the hospital for pancytopenia and received Granix at that time. Evaluation emergency room showed temperature 98.2 heart rate 116, respiratory 16, blood pressure 123/76, SPO2 92%. Lab evaluation showed sodium 138, potassium 4.1, chloride 107, bicarb 18, creatinine 0.59, BUN 18. CK was elevated at 413, hemoglobin 710, proBNP 3856. EKG showed QTC prolongation without any acute ST changes but had T wave inversion in V2 and aVL. Device interrogation emergency room showed nonsustained V. tach on 9/17/2020 and heart rate 130's. Chest x-ray was negative for any cardiopulmonary pathology. Patient has underlying history of CAD, hypertension, hyperlipidemia, ischemic cardiomyopathy S/P ICD placement. Patient is on long-term opioid medication for cancer pain, Megace for loss of appetite. He was found to have some oral ulcers with white plaques. Patient was started on nystatin swish and swallow. He was given IV broad-spectrum antibiotics. ID was consulted and recommended to monitor off antibiotics as no source of infection was identified. Leukocytosis was considered reactive to Granix infusion.     PMH:  Past Medical History:   Diagnosis Date    Cancer [facility])     right lung    Cardiac resynchronization therapy defibrillator (CRT-D) in place     Chest pain (37.2 °C) Oral 102 16 95 %   09/26/20 2000 -- -- -- 99 -- --   09/26/20 1916 106/61 99 °F (37.2 °C) Temporal 96 18 93 %   09/26/20 1605 113/64 99.4 °F (37.4 °C) Oral 103 16 97 %   09/26/20 1230 106/68 -- -- 99 17 --     Intake / output   09/26 0701 - 09/27 0700  In: 350 [P.O.:340; I.V.:10]  Out: 400 [Urine:400]  Physical Exam:  Physical Exam  Vitals signs and nursing note reviewed. Constitutional:       General: He is not in acute distress. Appearance: He is cachectic. He is ill-appearing. He is not diaphoretic. HENT:      Head: Normocephalic and atraumatic. Nose:      Right Sinus: No maxillary sinus tenderness or frontal sinus tenderness. Left Sinus: No maxillary sinus tenderness or frontal sinus tenderness. Mouth/Throat:      Pharynx: No oropharyngeal exudate. Eyes:      General: No scleral icterus. Conjunctiva/sclera: Conjunctivae normal.      Pupils: Pupils are equal, round, and reactive to light. Neck:      Musculoskeletal: Full passive range of motion without pain and neck supple. Thyroid: No thyromegaly. Vascular: No JVD. Cardiovascular:      Rate and Rhythm: Normal rate and regular rhythm. Pulses:           Dorsalis pedis pulses are 2+ on the right side and 2+ on the left side. Heart sounds: Normal heart sounds. No murmur. Pulmonary:      Effort: Pulmonary effort is normal.      Breath sounds: Normal breath sounds. No wheezing or rales. Chest:       Abdominal:      Palpations: Abdomen is soft. There is no mass. Tenderness: There is no abdominal tenderness. Lymphadenopathy:      Head:      Right side of head: No submandibular adenopathy. Left side of head: No submandibular adenopathy. Cervical: No cervical adenopathy. Skin:     General: Skin is warm. Neurological:      Mental Status: He is alert and oriented to person, place, and time. Motor: No tremor. Psychiatric:         Behavior: Behavior is cooperative. vasogenic edema seen in the left frontoparietal centrum semiovale. If clinically indicated, follow-up MRI of the brain could be done for further evaluation. Xr Chest Portable    Result Date: 9/24/2020  No evidence for acute cardiopulmonary pathology. Stable right hilar mass. Xr Chest Portable    Result Date: 9/19/2020  Right hilar mass has moderately decreased in size consistent with response to therapy. No acute infiltrate, pneumothorax or pleural fluid. Lytic metastatic lesion involving the lateral right 7th rib. Ct Chest Pulmonary Embolism W Contrast    Result Date: 9/24/2020  1. No definite scan evidence for pulmonary embolus. 2. Pathologic T6 compression fracture with enlarging right 7th rib metastasis and new sternal metastasis. 3. Improving right upper lobe nodule, right lower lobe mass and, right hilar and mediastinal adenopathy. 4. Low-density lesion in the spleen could represent a metastasis or a hemangioma. Clinical Course : unchanged  Assessment and Plan  :        1. SIRS -sepsis ruled out.- off  Antibiotics. ID following. Cultures negative . Leucocytosis likely from recent Granix. 2. Dehydration due to poor intake and failure to thrive  - continue IV fluids. Encourage oral hydration and diet supplement  3. Thrombocytopenia secondary to chemotherapy - improved   4. 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 On   5. Failure to thrive -recommend supplement. 6. Severe malnutrition - protein supplement . Dietary consult  7. CAD S/P stents -okay to hold aspirin. Continue Coreg  8. Ischemic cardiomyopathy S/P ICD CRT  9. Pathological fracture T6 -neurosurgery consulted. Surgery kyphoplasty was deferred due to thrombocytopenia. Planned surgery on 9/20/2020. Patient will be non prohibitive risk from Kyphoplasty. PLT stable and improved. Proceed with surgery. No testing or treatment planned preoperatively     N.p.o. after midnight.   Discussed with neurosurgery Dr. Aura Cortes. Continue to monitor vitals , Intake / output ,  Cell count , HGB , Kidney function, oxygenation  as indicated . Plan and updates discussed with patient ,  answers  explained to satisfaction.    Plan discussed with Staff   RN     (Please note that portions of this note were completed with a voice recognition program. Efforts were made to edit the dictations but occasionally words are mis-transcribed.)      Christy Mccartney MD  9/27/2020

## 2020-09-27 NOTE — PLAN OF CARE
Problem: Falls - Risk of:  Goal: Will remain free from falls  Description: Will remain free from falls  Outcome: Met This Shift  Note: Pt able to communicate pain as needed, uses call light appropriately. Pt satisfied with pain interventions. Goal: Absence of physical injury  Description: Absence of physical injury  Outcome: Met This Shift     Problem: Injury - Risk of, Physical Injury:  Goal: Will remain free from falls  Description: Will remain free from falls  Outcome: Met This Shift  Note: Pt able to communicate pain as needed, uses call light appropriately. Pt satisfied with pain interventions. Goal: Absence of physical injury  Description: Absence of physical injury  Outcome: Met This Shift     Problem: Pain:  Goal: Pain level will decrease  Description: Pain level will decrease  Outcome: Ongoing  Note: Pt able to communicate pain as needed, uses call light appropriately. Pt satisfied with pain interventions.     Goal: Control of acute pain  Description: Control of acute pain  Outcome: Ongoing  Goal: Control of chronic pain  Description: Control of chronic pain  Outcome: Ongoing     Problem: Skin Integrity:  Goal: Will show no infection signs and symptoms  Description: Will show no infection signs and symptoms  Outcome: Ongoing  Goal: Absence of new skin breakdown  Description: Absence of new skin breakdown  Outcome: Ongoing

## 2020-09-28 NOTE — PROGRESS NOTES
Physical Therapy  DATE: 2020    NAME: Timothy Naqvi  MRN: [id number]   : 1950    Patient not seen this date for Physical Therapy due to:  [] Blood transfusion in progress  [] Hemodialysis  []  Patient Declined  [] Spine Precautions   [] Strict Bedrest  [x] Surgery/ Procedure    Kypho    [] Testing      [] Other        [] PT being discontinued at this time. Patient independent. No further needs. [] PT being discontinued at this time as the patient has been transferred to palliative care. No further needs.     Everton Christiansen, PT

## 2020-09-28 NOTE — PROGRESS NOTES
Today's Date: 9/27/2020  Patient Name: Donald Velez  Date of admission: 9/24/2020  5:26 PM  Patient's age: 69 y.o., 1950  Admission Dx: AMS (altered mental status) [R41.82]      Requesting Physician: Love Javier MD    CHIEF COMPLAINT: Excessive weakness and fatigue. Nausea. Consult for lung cancer with recent treatment    SUBJECTIVE:    Patient seen and examined   Plts 144K  He denies any symptoms of bleeding   No fever chills   No nausea vomiting     HISTORY OF PRESENT ILLNESS:    The patient is a 69 y.o.  male who is admitted to the hospital for further management of increasing weakness and fatigue and nausea after recent chemotherapy treatment. Patient is known to our practice. He had stage IV lung cancer with lung and spine metastasis. He had radiation therapy and he was started on systemic treatment with Keytruda, carboplatin and Alimta. First treatment was September 10, 2020. For the last few days he is having increasing weakness and fatigue and decreased appetite. Poor nutrition. Poor performance. He was evaluated in the emergency room and was subsequently admitted. Patient was hospitalized 1 week ago with similar conditions. At that time he had thrombocytopenia and leukopenia. He was given Granix with good recovery of the white blood cells. Currently there is no fever or chills. No signs of infections. Past Medical History:   has a past medical history of Cancer [facility]), Cardiac resynchronization therapy defibrillator (CRT-D) in place, Chest pain, Coronary artery disease, Dyspnea on exertion, Elevated troponin, Heart attack ([address] Hyperlipidemia, Hypertension, Ischemic cardiomyopathy, and NSTEMI (non-ST elevated myocardial infarction) ([facility] [address]     Past Surgical History:   has a past surgical history that includes Coronary angioplasty with stent (2012); pacemaker placement (02/11/2019); CT BIOPSY DEEP BONE PERCUTANEOUS (8/11/2020); and IR PORT PLACEMENT > 5 YEARS (8/24/2020). Family History: family history includes Cancer in his father and mother; Prostate Cancer in his father. Social History:   reports that he quit smoking about 8 years ago. His smoking use included cigarettes. He has never used smokeless tobacco. He reports previous drug use. Drug: Marijuana. He reports that he does not drink alcohol. Medications:    Prior to Admission medications    Medication Sig Start Date End Date Taking? Authorizing Provider   oxyCODONE (ROXICODONE) 5 MG immediate release tablet Take 1 tablet by mouth every 4 hours as needed for Pain for up to 15 days. 9/18/20 10/3/20 Yes Deric Ayala MD   fentaNYL (DURAGESIC) 25 MCG/HR Place 1 patch onto the skin every 72 hours for 30 days. Apply one every 3 days 9/14/20 10/14/20 Yes Deric Ayala MD   ondansetron (ZOFRAN ODT) 4 MG disintegrating tablet Take 1 tablet by mouth every 8 hours as needed for Nausea 8/31/20  Yes Deric Ayala MD   folic acid (FOLVITE) 1 MG tablet Take 1 tablet by mouth daily START 7 DAYS BEFORE FIRST DOSE OF PEMETREXED---TO BE TAKEN DAILY --AND CONTINUE 21 DAYS AFTER LAST DOSE OF PEMETREXED 8/17/20  Yes Deric Ayala MD   pantoprazole (PROTONIX) 40 MG tablet Take 1 tablet by mouth every morning (before breakfast) 8/13/20  Yes Holden Dennis MD   aspirin 81 MG chewable tablet Take 1 tablet by mouth daily 2/12/19  Yes Thomas Ribera MD   megestrol (MEGACE ES) 625 MG/5ML suspension Take 5 mLs by mouth daily 9/14/20   Deric Ayala MD   fentaNYL (DURAGESIC) 12 MCG/HR Place 1 patch onto the skin every 72 hours for 30 days.  Apply one every 3 days 8/31/20 9/30/20  Deric Ayala MD   polyethylene glycol (GLYCOLAX) 17 g packet Take 17 g by mouth daily as needed for Constipation    Historical Provider, MD   carvedilol (COREG) 12.5 MG tablet Take 12.5 mg by mouth 2 times daily (with meals)    Historical Provider, MD   nitroGLYCERIN (NITROSTAT) 0.4 MG SL tablet up to max of 3 total doses. If no relief after 1 dose, call 911.   Patient not taking: Reported on 9/14/2020 2/11/19   Matt Zhang MD     Current Facility-Administered Medications   Medication Dose Route Frequency Provider Last Rate Last Dose    [Held by provider] aspirin chewable tablet 81 mg  81 mg Oral Daily Dorcus Darby, APRN - CNP        carvedilol (COREG) tablet 12.5 mg  12.5 mg Oral BID WC Dorcus Darby, APRN - CNP   12.5 mg at 42/00/59 8841    folic acid (FOLVITE) tablet 1 mg  1 mg Oral Daily Dorcus Darby, APRN - CNP   1 mg at 09/27/20 0900    pantoprazole (PROTONIX) tablet 40 mg  40 mg Oral QAM AC Dorcus Darby, APRN - CNP   40 mg at 09/27/20 9934    sodium chloride flush 0.9 % injection 10 mL  10 mL Intravenous 2 times per day Dorcus Darby, APRN - CNP   10 mL at 09/27/20 2014    sodium chloride flush 0.9 % injection 10 mL  10 mL Intravenous PRN Dorcus Darby, APRN - CNP        acetaminophen (TYLENOL) tablet 650 mg  650 mg Oral Q6H PRN Dorcus Darby, APRN - CNP        Or    acetaminophen (TYLENOL) suppository 650 mg  650 mg Rectal Q6H PRN Dorcus Darby, APRN - CNP        [Held by provider] enoxaparin (LOVENOX) injection 40 mg  40 mg Subcutaneous Daily Dorcus Darby, APRN - CNP   40 mg at 09/26/20 8175    nystatin (MYCOSTATIN) 022192 UNIT/ML suspension 500,000 Units  5 mL Oral 4x Daily REYNALDO Vaca CNP   500,000 Units at 09/27/20 2003    potassium chloride 10 mEq/100 mL IVPB (Peripheral Line)  10 mEq Intravenous PRN REYNALDO Griffith  mL/hr at 09/25/20 0810 10 mEq at 09/25/20 0810    oxyCODONE (ROXICODONE) immediate release tablet 5 mg  5 mg Oral Q4H PRN REYNALDO Griffith - CNP   5 mg at 09/27/20 2003    potassium chloride (KLOR-CON M) extended release tablet 40 mEq  40 mEq Oral PRN Gibran Cisneros MD        Or    potassium bicarb-citric acid (EFFER-K) effervescent 09/20/2020    Adenocarcinoma of right lung (HCC) [C34.91] 08/17/2020    Brain metastasis (HCC) [C79.31] 08/09/2020    CKD (chronic kidney disease) [N18.9] 08/09/2020    Elevated troponin [R79.89]     HTN (hypertension) [I10] 02/08/2019    CAD (coronary artery disease) [I25.10] 02/08/2019    Ischemic cardiomyopathy [I25.5] 02/08/2019       RECOMMENDATIONS:  1. Records and labs and images were reviewed and discussed with the patient and his family. 2. Patient started on chemo immunotherapy on September 10, 2020 for stage IV lung cancer. 3. Obviously he is having multiple side effects related to treatment. May need to adjust doses of chemotherapy on the second cycle which is due on October 1, 2020. 4. Reviewing his labs, platelets are recovering. White blood cells are higher than normal due to recent treatment with Granix. Doubt infections. 5. CT scan was reviewed by neurosurgery. They are planned for kyphoplasty and nerve blocking. 6. plts 144K. Recheck in AM  7. If the surgery is planned and plts are lower than 100 K, recommend 2 units platelet transfusion   8. IV fluids for rehydration. 9. We will follow with you. 10. Patient's questions were answered to the best of his satisfaction and he verbalized full understanding and agreement. 11. Thank you for allowing us to participate in the care of this pleasant patient. Discussed with patient and Nurse. MD Maxime Churchill MD  Hematologist/Medical Oncologist    Cell: [phone]      This note is created with the assistance of a speech recognition program.  While intending to generate a document that actually reflects the content of the visit, the document can still have some errors including those of syntax and sound a like substitutions which may escape proof reading. It such instances, actual meaning can be extrapolated by contextual diversion.

## 2020-09-28 NOTE — PROGRESS NOTES
[facility]  Office: [address], DO, Lonncismael Rileyy, DO, Gen Signs, DO, Valerie Adrián Blood, DO, Garrick Kim MD, Mignon Meckel, MD, Alia Myles MD, Berenice Burton MD, Erica Jo MD, George Mckeon MD, Sarah Elizabeth MD, Rebeca Jones MD, Ollie Limon MD, Juni Martins, DO, Damon Mirza MD, Aguila Bullock MD, Autumn Delgado, DO, Bandar Marie MD,  Leodan Wilson, DO, Heather Antoine MD, Eliseo Huerta MD, Lavelle Mcintyre, [facility], [place] [address] [address], CNP, Rosie Rivera, CNP, Felipe MusaMing, CNS, Kelly Hernandez, CNP, Sherrie Ku, CNP, Isaac Clarke, CNP, Veronica Silva, CNP, Tri Wray, CNP, Hansa Mahajan PA-C, Justine Isbell, [facility], Caty Hernandez, CNP, Monse Simpson, CNP, Lizz Lino, CNP, Emily Hurtado, CNP, Caryle Poet, [facility]      Daily Progress Note     Admit Date: 9/24/2020  Bed/Room No.  4935/6679-72  Admitting Physician : Alia Myles MD  Code Status :Full Code  Hospital Day:  LOS: 4 days   Chief Complaint:     Chief Complaint   Patient presents with    Other     hasn't been doing good after starting chemo two weeks ago     Principal Problem:    Sepsis [facility])  Active Problems: Thrush    HTN (hypertension)    CAD (coronary artery disease)    Ischemic cardiomyopathy    Elevated troponin    CKD (chronic kidney disease)    Brain metastasis (HCC)    Adenocarcinoma of right lung (HCC)    Moderate malnutrition (HCC)    AMS (altered mental status)    Metabolic encephalopathy    Failure to thrive in adult    Prolonged Q-T interval on ECG    Hypocalcemia    Pathological compression fracture of spine ([facility] [address]    Bandemia  Resolved Problems:    * No resolved hospital problems. *    Subjective : Interval History/Significant events :  09/28/20    Patient reports back pain is controlled. Patient is n.p.o. and waiting for surgery. He denies any breathing difficulty, chest pain, palpitations. No nausea, vomiting.   Vitals - Stable afebrile  Labs -persistent leukocytosis. Hemoglobin stable. Nursing notes , Consults notes reviewed. Overnight events and updates discussed with Nursing staff . Background History:         Sylvie Rodas is 69 y.o. male  Who was admitted to the hospital on 9/24/2020 for treatment of Sepsis ([facility] [address] Patient was brought to hospital by family for meningitis, decreased oral intake, confusion, generalized weakness. He has been complaining of fatigue. Patient has history of stage IV lung cancer with bone and brain mets and was started recently on chemotherapy and immunotherapy. He was recently admitted in the hospital for pancytopenia and received Granix at that time. Evaluation emergency room showed temperature 98.2 heart rate 116, respiratory 16, blood pressure 123/76, SPO2 92%. Lab evaluation showed sodium 138, potassium 4.1, chloride 107, bicarb 18, creatinine 0.59, BUN 18. CK was elevated at 413, hemoglobin 710, proBNP 3856. EKG showed QTC prolongation without any acute ST changes but had T wave inversion in V2 and aVL. Device interrogation emergency room showed nonsustained V. tach on 9/17/2020 and heart rate 130's. Chest x-ray was negative for any cardiopulmonary pathology. Patient has underlying history of CAD, hypertension, hyperlipidemia, ischemic cardiomyopathy S/P ICD placement. Patient is on long-term opioid medication for cancer pain, Megace for loss of appetite. He was found to have some oral ulcers with white plaques. Patient was started on nystatin swish and swallow. He was given IV broad-spectrum antibiotics. ID was consulted and recommended to monitor off antibiotics as no source of infection was identified. Leukocytosis was considered reactive to Granix infusion. Patient was evaluated by neurosurgery and recommended kyphoplasty.     PMH:  Past Medical History:   Diagnosis Date    Cancer [facility])     right lung    Cardiac resynchronization therapy defibrillator (CRT-D) in place     116/59 99.5 °F (37.5 °C) Temporal 101 24 96 % --   09/27/20 2345 116/61 98.2 °F (36.8 °C) Temporal 104 20 95 % --   09/27/20 1913 114/69 99.7 °F (37.6 °C) Temporal 85 18 93 % --   09/27/20 1552 108/65 98.9 °F (37.2 °C) Oral 99 16 98 % --   09/27/20 1440 -- -- -- 94 -- -- --   09/27/20 1200 (!) 109/57 98.4 °F (36.9 °C) Oral 92 15 94 % --   09/27/20 0838 125/76 98.8 °F (37.1 °C) Oral 101 16 98 % --   09/27/20 0815 -- -- -- 97 -- -- --     Intake / output   09/27 0701 - 09/28 0700  In: 410 [P.O.:400; I.V.:10]  Out: 425 [Urine:425]  Physical Exam:  Physical Exam  Vitals signs and nursing note reviewed. Constitutional:       General: He is not in acute distress. Appearance: He is cachectic. He is ill-appearing. He is not diaphoretic. HENT:      Head: Normocephalic and atraumatic. Nose:      Right Sinus: No maxillary sinus tenderness or frontal sinus tenderness. Left Sinus: No maxillary sinus tenderness or frontal sinus tenderness. Mouth/Throat:      Pharynx: No oropharyngeal exudate. Eyes:      General: No scleral icterus. Conjunctiva/sclera: Conjunctivae normal.      Pupils: Pupils are equal, round, and reactive to light. Neck:      Musculoskeletal: Full passive range of motion without pain and neck supple. Thyroid: No thyromegaly. Vascular: No JVD. Cardiovascular:      Rate and Rhythm: Normal rate and regular rhythm. Pulses:           Dorsalis pedis pulses are 2+ on the right side and 2+ on the left side. Heart sounds: Normal heart sounds. No murmur. Pulmonary:      Effort: Pulmonary effort is normal.      Breath sounds: Normal breath sounds. No wheezing or rales. Chest:       Abdominal:      Palpations: Abdomen is soft. There is no mass. Tenderness: There is no abdominal tenderness. Lymphadenopathy:      Head:      Right side of head: No submandibular adenopathy. Left side of head: No submandibular adenopathy. Cervical: No cervical adenopathy. Skin:     General: Skin is warm. Neurological:      Mental Status: He is alert and oriented to person, place, and time. Motor: No tremor. Psychiatric:         Behavior: Behavior is cooperative. Laboratory findings:    Recent Labs     09/26/20  0733 09/27/20  0637 09/28/20  0452   WBC 17.8* 19.5*  --    HGB 8.7* 8.9*  --    HCT 26.2* 29.0*  --    PLT 72* 144  --    INR  --   --  1.2     Recent Labs     09/28/20  0452      K 3.2*      CO2 21   GLUCOSE 98   BUN 15   CREATININE 0.83   CALCIUM 7.3*     Recent Labs     09/25/20  1342   CKTOTAL 302   CKMB 5.0          Specific Gravity, UA   Date Value Ref Range Status   09/25/2020 1.025 1.005 - 1.030 Final     Protein, UA   Date Value Ref Range Status   09/25/2020 1+ (A) NEGATIVE Final     RBC, UA   Date Value Ref Range Status   09/25/2020 2 TO 5 0 - 4 /HPF Final     Comment:     Reference range defined for non-centrifuged specimen. Bacteria, UA   Date Value Ref Range Status   09/25/2020 NOT REPORTED None Final     Nitrite, Urine   Date Value Ref Range Status   09/25/2020 NEGATIVE NEGATIVE Final     WBC, UA   Date Value Ref Range Status   09/25/2020 2 TO 5 0 - 5 /HPF Final     Leukocyte Esterase, Urine   Date Value Ref Range Status   09/25/2020 NEGATIVE NEGATIVE Final       Imaging / Clinical Data :-   Ct Head Wo Contrast    Result Date: 9/24/2020  1. No evidence of acute intracranial hemorrhage or infarct. 2. Marked improvement of intracranial metastatic disease. Slight residual vasogenic edema seen in the left frontoparietal centrum semiovale. If clinically indicated, follow-up MRI of the brain could be done for further evaluation. Xr Chest Portable    Result Date: 9/24/2020  No evidence for acute cardiopulmonary pathology. Stable right hilar mass. Xr Chest Portable    Result Date: 9/19/2020  Right hilar mass has moderately decreased in size consistent with response to therapy.  No acute infiltrate, pneumothorax or pleural fluid. Lytic metastatic lesion involving the lateral right 7th rib. Ct Chest Pulmonary Embolism W Contrast    Result Date: 9/24/2020  1. No definite scan evidence for pulmonary embolus. 2. Pathologic T6 compression fracture with enlarging right 7th rib metastasis and new sternal metastasis. 3. Improving right upper lobe nodule, right lower lobe mass and, right hilar and mediastinal adenopathy. 4. Low-density lesion in the spleen could represent a metastasis or a hemangioma. Clinical Course : unchanged  Assessment and Plan  :        1. SIRS -sepsis ruled out.- off  Antibiotics. ID following. Cultures negative . Leucocytosis likely from recent Granix. 2. Dehydration due to poor intake and failure to thrive  - continue IV fluids. Encourage oral hydration and diet supplement  3. Thrombocytopenia secondary to chemotherapy - improved   4. 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 On   5. Failure to thrive -recommend supplement. 6. Severe malnutrition - protein supplement . Dietary consult  7. CAD S/P stents -okay to hold aspirin. Continue Coreg  8. Ischemic cardiomyopathy S/P ICD CRT  9. Pathological fracture T6 -neurosurgery consulted. Surgery kyphoplasty was deferred due to thrombocytopenia. Planned surgery on 9/20/2020. Patient will be non prohibitive risk from Kyphoplasty. PLT stable and improved. Proceed with surgery. No testing or treatment planned preoperatively     Follow after surgery. Expecting discharge home with home care tomorrow. Continue to monitor vitals , Intake / output ,  Cell count , HGB , Kidney function, oxygenation  as indicated . Plan and updates discussed with patient ,  answers  explained to satisfaction.    Plan discussed with Staff Ruiz RANKIN     (Please note that portions of this note were completed with a voice recognition program. Efforts were made to edit the dictations but occasionally words are mis-transcribed.)      Ike Ragland MD  9/28/2020

## 2020-09-28 NOTE — ANESTHESIA PRE PROCEDURE
Department of Anesthesiology  Preprocedure Note       Name:  Nikia Figueroa   Age:  69 y.o.  :  1950                                          MRN:  [id number]         Date:  2020      Surgeon: Antionette Badillo):  Olayinka Warren MD    Procedure: Procedure(s):  KYPHOPLASTY, ABLASION    Medications prior to admission:   Prior to Admission medications    Medication Sig Start Date End Date Taking? Authorizing Provider   oxyCODONE (ROXICODONE) 5 MG immediate release tablet Take 1 tablet by mouth every 4 hours as needed for Pain for up to 15 days. 9/18/20 10/3/20 Yes Dorita Russo MD   fentaNYL (DURAGESIC) 25 MCG/HR Place 1 patch onto the skin every 72 hours for 30 days. Apply one every 3 days 9/14/20 10/14/20 Yes Dorita Russo MD   ondansetron (ZOFRAN ODT) 4 MG disintegrating tablet Take 1 tablet by mouth every 8 hours as needed for Nausea 20  Yes Dorita Russo MD   folic acid (FOLVITE) 1 MG tablet Take 1 tablet by mouth daily START 7 DAYS BEFORE FIRST DOSE OF PEMETREXED---TO BE TAKEN DAILY --AND CONTINUE 21 DAYS AFTER LAST DOSE OF PEMETREXED 20  Yes Dorita Russo MD   pantoprazole (PROTONIX) 40 MG tablet Take 1 tablet by mouth every morning (before breakfast) 20  Yes Miya Foy MD   aspirin 81 MG chewable tablet Take 1 tablet by mouth daily 19  Yes Emelyn Nino MD   megestrol (MEGACE ES) 625 MG/5ML suspension Take 5 mLs by mouth daily 20   Dorita Russo MD   fentaNYL (DURAGESIC) 12 MCG/HR Place 1 patch onto the skin every 72 hours for 30 days. Apply one every 3 days 20  Dorita Russo MD   polyethylene glycol (GLYCOLAX) 17 g packet Take 17 g by mouth daily as needed for Constipation    Historical Provider, MD   carvedilol (COREG) 12.5 MG tablet Take 12.5 mg by mouth 2 times daily (with meals)    Historical Provider, MD   nitroGLYCERIN (NITROSTAT) 0.4 MG SL tablet up to max of 3 total doses. If no relief after 1 dose, call 911.   Patient not taking: Reported on 9/14/2020 2/11/19   Ami Gregory MD       Current medications:    Current Facility-Administered Medications   Medication Dose Route Frequency Provider Last Rate Last Dose    [Held by provider] aspirin chewable tablet 81 mg  81 mg Oral Daily REYNALDO Sorto CNP        carvedilol (COREG) tablet 12.5 mg  12.5 mg Oral BID WC REYNALDO Sorto - CNP   12.5 mg at 38/33/69 8258    folic acid (FOLVITE) tablet 1 mg  1 mg Oral Daily REYNALDO Sorto - CNP   1 mg at 09/28/20 0804    pantoprazole (PROTONIX) tablet 40 mg  40 mg Oral QAM AC REYNALDO Sorto - CNP   40 mg at 09/27/20 3449    sodium chloride flush 0.9 % injection 10 mL  10 mL Intravenous 2 times per day REYNALDO Sorto CNP   10 mL at 09/28/20 0815    sodium chloride flush 0.9 % injection 10 mL  10 mL Intravenous PRN REYNALDO Sorto CNP        acetaminophen (TYLENOL) tablet 650 mg  650 mg Oral Q6H PRN REYNALDO Sorto CNP        Or    acetaminophen (TYLENOL) suppository 650 mg  650 mg Rectal Q6H PRN REYNALDO Sorto CNP        [Held by provider] enoxaparin (LOVENOX) injection 40 mg  40 mg Subcutaneous Daily REYNALDO Sorto - CNP   40 mg at 09/26/20 7389    nystatin (MYCOSTATIN) 694995 UNIT/ML suspension 500,000 Units  5 mL Oral 4x Daily REYNALDO Guardado CNP   500,000 Units at 09/28/20 0804    potassium chloride 10 mEq/100 mL IVPB (Peripheral Line)  10 mEq Intravenous PRN REYNALDO Griffith  mL/hr at 09/25/20 0810 10 mEq at 09/25/20 0810    oxyCODONE (ROXICODONE) immediate release tablet 5 mg  5 mg Oral Q4H PRN REYNALDO Griffith - CNP   5 mg at 09/28/20 0804    potassium chloride (KLOR-CON M) extended release tablet 40 mEq  40 mEq Oral PRN Pura Alejandro MD   40 mEq at 09/28/20 0956    Or    potassium bicarb-citric acid (EFFER-K) effervescent tablet 40 mEq  40 mEq Oral PRN Pura Alejandro MD Allergies:     Allergies   Allergen Reactions    Penicillins      Swollen hand with itching       Problem List:    Patient Active Problem List   Diagnosis Code    HTN (hypertension) I10    CAD (coronary artery disease) I25.10    Ischemic cardiomyopathy I25.5    AICD (automatic cardioverter/defibrillator) present Z95.810    Elevated troponin R79.89    Lung mass R91.8    Enlarged prostate N40.0    CKD (chronic kidney disease) N18.9    CVA (cerebral vascular accident) ([address] I63.9    Brain metastasis ([address] C79.31    Vasogenic edema ([address] G93.6    Right hemiparesis ([address] G81.91    Adenocarcinoma of right lung ([address] C34.91    Thrombocytopenia ([address] D69.6    Pancytopenia due to antineoplastic chemotherapy ([address] D61.810, T45.1X5A    Bone metastasis ([address] C79.51    Other fatigue R53.83    Dehydration E86.0    Moderate malnutrition (HCC) E44.0    AMS (altered mental status) R41.82    Sepsis ([address] P17.1    Metabolic encephalopathy Z20.16    Failure to thrive in adult R62.7    Prolonged Q-T interval on ECG R94.31    Hypocalcemia E83.51    Pathological compression fracture of spine ([address] M48.50XA    Thrush B37.0    Bandemia D72.825       Past Medical History:        Diagnosis Date    Cancer ([address]     right lung    Cardiac resynchronization therapy defibrillator (CRT-D) in place     Chest pain 02/08/2019    Coronary artery disease     Dyspnea on exertion     Elevated troponin 02/08/2019    Heart attack ([address] 2012    Hyperlipidemia     Hypertension     Ischemic cardiomyopathy     NSTEMI (non-ST elevated myocardial infarction) ([address] 2/8/2019       Past Surgical History:        Procedure Laterality Date    CORONARY ANGIOPLASTY WITH STENT PLACEMENT  2012    BMS to LAD    CT BIOPSY PERCUTANEOUS DEEP BONE  8/11/2020    CT BIOPSY PERCUTANEOUS DEEP BONE 8/11/2020 [facility] CT SCAN    IR PORT PLACEMENT EQUAL OR GREATER THAN 5 YEARS  8/24/2020    IR PORT PLACEMENT EQUAL OR GREATER THAN 5 YEARS 2020 MD NAOMI Bravo SPECIAL PROCEDURES    PACEMAKER PLACEMENT  2019    AICD/ [employer]  MODEL #[id number] CRTD AMPLIA MRI USDF4. LEADS [id number], [id number] AND 7303-07        Social History:    Social History     Tobacco Use    Smoking status: Former Smoker     Types: Cigarettes     Last attempt to quit: 2012     Years since quittin.1    Smokeless tobacco: Never Used    Tobacco comment: 48 yr hx of smoking   Substance Use Topics    Alcohol use: No                                Counseling given: Not Answered  Comment: 50 yr hx of smoking      Vital Signs (Current):   Vitals:    20 0338 20 0523 20 0700 20 1200   BP: (!) 116/59  124/73 118/62   Pulse: 101  102 95   Resp:    Temp: 99.5 °F (37.5 °C)  99 °F (37.2 °C) 98.4 °F (36.9 °C)   TempSrc: Temporal  Temporal Oral   SpO2: 96%  96% 96%   Weight:  131 lb 2.8 oz (59.5 kg)     Height:                                                  BP Readings from Last 3 Encounters:   20 118/62   20 (!) 106/55   20 (!) 74/50       NPO Status:                                                                                 BMI:   Wt Readings from Last 3 Encounters:   20 131 lb 2.8 oz (59.5 kg)   20 136 lb (61.7 kg)   20 122 lb 3.2 oz (55.4 kg)     Body mass index is 21.83 kg/m².     CBC:   Lab Results   Component Value Date    WBC 19.5 2020    RBC 2.93 2020    HGB 8.9 2020    HCT 29.0 2020    MCV 99.0 2020    RDW 19.2 2020     2020       CMP:   Lab Results   Component Value Date     2020    K 3.2 2020     2020    CO2 21 2020    BUN 15 2020    CREATININE 0.83 2020    GFRAA >60 2020    LABGLOM >60 2020    GLUCOSE 98 2020    PROT 4.5 2020    CALCIUM 7.3 2020    BILITOT 0.57 2020    ALKPHOS 117 2020    AST 30 2020    ALT 31 2020       POC Tests: No results for input(s): POCGLU, POCNA, POCK, POCCL, POCBUN, POCHEMO, POCHCT in the last 72 hours. Coags:   Lab Results   Component Value Date    PROTIME 12.5 09/28/2020    INR 1.2 09/28/2020    APTT 29.4 09/24/2020       HCG (If Applicable): No results found for: PREGTESTUR, PREGSERUM, HCG, HCGQUANT     ABGs: No results found for: PHART, PO2ART, EUA6LFQ, MLP9HOY, BEART, A3ZIILGW     Type & Screen (If Applicable):  No results found for: LABABO, LABRH    Drug/Infectious Status (If Applicable):  No results found for: HIV, HEPCAB    COVID-19 Screening (If Applicable):   Lab Results   Component Value Date    COVID19 Not Detected 09/27/2020    COVID19 Not Detected 08/20/2020         Anesthesia Evaluation  Patient summary reviewed no history of anesthetic complications:   Airway: Mallampati: III        Dental:          Pulmonary:   (+) shortness of breath:  decreased breath sounds,                            ROS comment: Adenocarcinoma of right lung   Cardiovascular:    (+) hypertension:, past MI:, CAD:, GRIFFITHS:,         Rhythm: regular  Rate: normal                 ROS comment: Ischemic cardiomyopathy   AICD (automatic cardioverter/defibrillator) present          Neuro/Psych:   (+) CVA:,              ROS comment: Right hemiparesis  Dehydration   Moderate malnutrition (HCC)   AMS (altered mental status)   Sepsis ([address]   Metabolic encephalopathy   Failure to thrive in adult      GI/Hepatic/Renal:   (+) renal disease:,           Endo/Other:                      ROS comment: Thrombocytopenia ([address]   Pancytopenia due to antineoplastic chemotherapy (HCC)   Bone metastasis     Pathological compression fracture of spine  Abdominal:           Vascular: negative vascular ROS. Anesthesia Plan      general     ASA 4       Induction: intravenous. Anesthetic plan and risks discussed with patient. Plan discussed with CRNA.            Patient has history of stage IV lung cancer with bone and brain mets and was started recently on chemotherapy and immunotherapy. He was recently admitted in the hospital for pancytopenia and received Granix at that time. Medicine note:  1. SIRS -sepsis ruled out.- off  Antibiotics. ID following. Cultures negative . Leucocytosis likely from recent Granix. 2. Dehydration due to poor intake and failure to thrive  - continue IV fluids. Encourage oral hydration and diet supplement  3. Thrombocytopenia secondary to chemotherapy - improved   4. 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 On   5. Failure to thrive -recommend supplement. 6. Severe malnutrition - protein supplement . Dietary consult  7. CAD S/P stents -okay to hold aspirin. Continue Coreg  8. Ischemic cardiomyopathy S/P ICD CRT  9. Pathological fracture T6 -neurosurgery consulted. Surgery kyphoplasty was deferred due to thrombocytopenia. Planned surgery on 9/20/2020.     Patient will be non prohibitive risk from Kyphoplasty. PLT stable and improved. Proceed with surgery. No testing or treatment planned preoperatively       CONCLUSIONS     Summary  Technically difficult study. Moderately Dilated LV with severely reduced function. EF 15-20%  Normal RV size and function. RVSP 21 mmHg  ICD leads noted in RV  Normal size LA and RA. No obvious significant structural valvular abnormality noted. Normal aortic root dimension. No significant pericardial effusion. If apical clot is suspected VLADIMIR is recommended.     Cards note: battery changed last year    Georgiana Heredia MD   9/28/2020

## 2020-09-28 NOTE — TELEPHONE ENCOUNTER
PATIENT'S DAUGHTER CALLED AND LEFT A VM @ 8:01AM AND STATED PATIENT WAS ADMITTED TO THE HOSPITAL AND WILL BE HAVING BACK SURGERY TODAY.

## 2020-09-28 NOTE — ANESTHESIA POSTPROCEDURE EVALUATION
Department of Anesthesiology  Postprocedure Note    Patient: Alessandra Frazier  MRN: [id number]  YOB: 1950  Date of evaluation: 9/28/2020  Time:  7:31 PM     Procedure Summary     Date:  09/28/20 Room / Location:  [address]    Anesthesia Start:  1401 Anesthesia Stop:  6737    Procedure:  KYPHOPLASTY, ABLATION WITH T6 BONE BIOPSY (N/A ) Diagnosis:  (*TO FOLLOW SELF* T6 COMPRESSION FRACTURE)    Surgeon:  Ramy Selby MD Responsible Provider:  Ling Haley MD    Anesthesia Type:  general ASA Status:  4          Anesthesia Type: general    Maria C Phase I: Maria C Score: 9    Maria C Phase II:      Last vitals: Reviewed and per EMR flowsheets.        Anesthesia Post Evaluation    Patient location during evaluation: PACU  Patient participation: complete - patient participated  Level of consciousness: awake and alert  Pain score: 0  Nausea & Vomiting: no nausea  Cardiovascular status: hemodynamically stable  Respiratory status: room air

## 2020-09-28 NOTE — PROGRESS NOTES
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 · HTN  · CAD  · CKD  · Pathological compression fracture of spine. · Superficial Lt gluteal abrasion    Recommendations:   · Nystatin swish for oral lesions. · Blood cultures show no growth -  Monitor off Antibiotics. Medical Decision Making/Summary/Discussion:   ·   Infection Control Recommendations   · [place] Precautions    Antimicrobial Stewardship Recommendations   · Simplification of therapy    Coordination of Outpatient Care:   · Estimated Length of IV antimicrobials: 9/25/2020  · Patient will need Midline Catheter Insertion: No  · Patient will need PICC line Insertion: No  · Patient will need: Home IV , [place],  SNF,  LTAC: TBD  · Patient will need outpatient wound care: Yes    Chief complaint/reason for consultation:   · SIRS, on Chemotherapy. History of Present Illness:   Ly Andrew is a 69y.o.-year-old  male who was initially admitted on 9/24/2020. Patient seen at the request of Dr. John Luna:    Patient was brought to hospital by family for decreased oral intake, confusion, generalized weakness. He has been complaining of fatigue. Patient has history of stage IV lung cancer with bone and brain metastases and was started recently on chemotherapy and immunotherapy. He was recently admitted to [facility] for pancytopenia and received Granix at that time. MRI thoracic spine from August 10, 2020 reveals T6 metastasis with pathologic compression fracture. Patient has underlying history of CAD, hypertension, hyperlipidemia, ischemic cardiomyopathy. S/P ICD placement. Patient is on long-term opioid medication for cancer pain, Megace for loss of appetite.      Evaluation in the ER showed temperature 98.2, heart rate 116, respiratory 16, blood pressure 123/76, SPO2 92%. Lab evaluation showed sodium 138, potassium 4.1, chloride 107, bicarb 18, creatinine 0.59, BUN 18. CK was elevated at 413, hemoglobin 710, proBNP 3856. EKG showed QTC prolongation without any acute ST changes but had T wave inversion in V2 and aVL. Device interrogation emergency room showed nonsustained V. tach on 9/17/2020 and heart rate 130's. During this admission,  CT head was done due to altered mental status. No evidence of acute intracranial hemorrhage or infarct. Marked improvement of intracranial metastatic disease  CT chest PE was done with no evidence of pulmonary embolus. Pathologic T6 compression fracture with enlarging right seventh rib metastasis and new sternal metastasis noted    He was found to have some oral ulcers with white plaques       CURRENT EVALUATION: 09/28/20     Afebrile  VSS    Mentation is partially improved  The patient underwent kyphoplasty for T6 9-29-20 with a decrease in pain immediately following surgery  Chest CT negative for pneumonia    He had a neutropenia after chemotherapy 9-10-20, white count has recovered to a range of the teens after Neupogen    Pt using Nystatin swish and swallow for oral lesions. Labs:    BUN:18>16>15  Creatinine:0.59>0.92>0.83  Glucose: 101>105  Calcium: 7.8>7.8>7.3    Hgb:9.4>9.8>8.9  Platelet:62>70>144  WBC: 15.0>16.4 >17>19.5    Cultures:  Urine:  ·   Blood:  · 9-24-20: No growth  · 9-24-20: No growth  Sputum :  ·   Wound:     CSF  ·       IMAGING:    CT Chest PE W Contrast- 9/24/2020  No definite scan evidence for pulmonary embolus. Pathologic T6 compression fracture with enlarging right 7th rib metastasis   and new sternal metastasis. Improving right upper lobe nodule, right lower lobe mass and, right hilar and mediastinal adenopathy. Low-density lesion in the spleen could represent a metastasis or a hemangioma.              CT Head wo Contrast-9/24/2020  No evidence of acute intracranial hemorrhage or infarct. Marked improvement of intracranial metastatic disease.  Slight residual vasogenic edema seen in the left frontoparietal centrum semiovale.  If clinically indicated, follow-up MRI of the brain could be done for further evaluation. 9-25-20:          Discussed with patient, RN, family. I have personally reviewed the past medical history, past surgical history, medications, social history, and family history, and I have updated the database accordingly. Past Medical History:     Past Medical History:   Diagnosis Date    Cancer [facility])     right lung    Cardiac resynchronization therapy defibrillator (CRT-D) in place     Chest pain 02/08/2019    Coronary artery disease     Dyspnea on exertion     Elevated troponin 02/08/2019    Heart attack ([facility] [address] 2012    Hyperlipidemia     Hypertension     Ischemic cardiomyopathy     NSTEMI (non-ST elevated myocardial infarction) ([facility] [address] 2/8/2019       Past Surgical  History:     Past Surgical History:   Procedure Laterality Date    CORONARY ANGIOPLASTY WITH STENT PLACEMENT  2012    BMS to LAD    CT BIOPSY PERCUTANEOUS DEEP BONE  8/11/2020    CT BIOPSY PERCUTANEOUS DEEP BONE 8/11/2020 [facility] CT SCAN    IR PORT PLACEMENT EQUAL OR GREATER THAN 5 YEARS  8/24/2020    IR PORT PLACEMENT EQUAL OR GREATER THAN 5 YEARS 8/24/2020 Armand Noe MD [facility] SPECIAL PROCEDURES    PACEMAKER PLACEMENT  02/11/2019    AICD/ MEDTRONIC  MODEL #[id number] CRTD AMPLIA MRI USDF4.  LEADS [id number], [id number] AND 4520-97        Medications:      [Held by provider] aspirin  81 mg Oral Daily    carvedilol  12.5 mg Oral BID WC    folic acid  1 mg Oral Daily    pantoprazole  40 mg Oral QAM AC    sodium chloride flush  10 mL Intravenous 2 times per day    [Held by provider] enoxaparin  40 mg Subcutaneous Daily    nystatin  5 mL Oral 4x Daily       Social History:     Social History     Socioeconomic History    Marital status:      Spouse name: Not on file    Number of children: Not on file    Years of education: Not on file    Highest education level: Not on file   Occupational History    Not on file   Social Needs    Financial resource strain: Not on file    Food insecurity     Worry: Not on file     Inability: Not on file    Transportation needs     Medical: Not on file     Non-medical: Not on file   Tobacco Use    Smoking status: Former Smoker     Types: Cigarettes     Last attempt to quit: 2012     Years since quittin.1    Smokeless tobacco: Never Used    Tobacco comment: 48 yr hx of smoking   Substance and Sexual Activity    Alcohol use: No    Drug use: Not Currently     Types: Marijuana     Comment: last smoked 2020    Sexual activity: Not on file   Lifestyle    Physical activity     Days per week: Not on file     Minutes per session: Not on file    Stress: Not on file   Relationships    Social connections     Talks on phone: Not on file     Gets together: Not on file     Attends Restoration service: Not on file     Active member of club or organization: Not on file     Attends meetings of clubs or organizations: Not on file     Relationship status: Not on file    Intimate partner violence     Fear of current or ex partner: Not on file     Emotionally abused: Not on file     Physically abused: Not on file     Forced sexual activity: Not on file   Other Topics Concern    Not on file   Social History Narrative    Not on file       Family History:     Family History   Problem Relation Age of Onset    Cancer Mother     Cancer Father     Prostate Cancer Father         Allergies:   Penicillins     Review of Systems:   Constitutional: No fever chills  Head: No headaches  Eyes: No double vision or blurry vision. No conjunctival inflammation. ENT: No oral thrush at this time. Cardiovascular: No chest pain or palpitations. No shortness of breath. No GRIFFITHS  Lung: No shortness of breath or cough.  No sputum production  Abdomen: No nausea, vomiting, diarrhea, or abdominal pain. Gay Ribeiro No cramps. Genitourinary: No increased urinary frequency, or dysuria. No hematuria. No suprapubic or CVA pain. Musculoskeletal: C/O back pain  Hematologic: No bleeding or bruising. Neurologic: No headache, alert responsive  Integument: No rash, no ulcers. Psychiatric: No depression. Endocrine: No polyuria, no polydipsia, no polyphagia. Physical Examination :     Patient Vitals for the past 8 hrs:   BP Temp Temp src Pulse Resp SpO2 Weight   09/28/20 0700 124/73 99 °F (37.2 °C) Temporal 102 22 96 % --   09/28/20 0523 -- -- -- -- -- -- 131 lb 2.8 oz (59.5 kg)   09/28/20 0338 (!) 116/59 99.5 °F (37.5 °C) Temporal 101 24 96 % --     General Appearance: Awake, alert,Cachetic, ill appearing and in apparent distress  Head:  Normocephalic, no trauma  ENT: Pt has lesions on posterior tongue and palate. Neck:Supple, without lymphadenopathy. Thyroid normal, No bruits. Pulmonary/Chest: Clear to auscultation, without wheezes, rales, or rhonchi. Pt has ICD on the left upper chest and Port for chemotherapy on right upper chest.  Cardiovascular: Regular rate and rhythm without murmurs, rubs, or gallops. Abdomen: Soft, non tender. Bowel sounds normal. No organomegaly  All four Extremities: Pt has b/l non-pitting lower extremity edema. Neurologic: No gross sensory or motor deficits. Skin: Warm and dry with good turgor. No signs of peripheral arterial or venous insufficiency. No ulcerations. Wound on left buttock.         Medical Decision Making -Laboratory:   I have independently reviewed/ordered the following labs:    CBC with Differential:   Recent Labs     09/26/20  0733 09/27/20  0637   WBC 17.8* 19.5*   HGB 8.7* 8.9*   HCT 26.2* 29.0*   PLT 72* 144   LYMPHOPCT 5*  --    MONOPCT 6  --      BMP:   Recent Labs     09/28/20  0452      K 3.2*      CO2 21   BUN 15   CREATININE 0.83     Hepatic Function Panel:   No results for input(s): PROT, LABALBU, BILIDIR, IBILI, BILITOT, ALKPHOS, ALT, AST in the last 72 hours. No results for input(s): RPR in the last 72 hours. No results for input(s): HIV in the last 72 hours. No results for input(s): BC in the last 72 hours. Lab Results   Component Value Date    MUCUS NOT REPORTED 09/25/2020    RBC 2.93 09/27/2020    TRICHOMONAS NOT REPORTED 09/25/2020    WBC 19.5 09/27/2020    YEAST NOT REPORTED 09/25/2020    TURBIDITY CLEAR 09/25/2020     Lab Results   Component Value Date    CREATININE 0.83 09/28/2020    GLUCOSE 98 09/28/2020       Medical Decision Making-Imaging:     EXAMINATION:    CTA OF THE CHEST 9/24/2020 9:16 pm         TECHNIQUE:    CTA of the chest was performed after the administration of intravenous    contrast.  Multiplanar reformatted images are provided for review.  MIP    images are provided for review. Dose modulation, iterative reconstruction,    and/or weight based adjustment of the mA/kV was utilized to reduce the    radiation dose to as low as reasonably achievable.         COMPARISON:    08/09/2020         HISTORY:    ORDERING SYSTEM PROVIDED HISTORY: hx metastatic lung CA, tachycardic and    hypoxic    TECHNOLOGIST PROVIDED HISTORY:    hx metastatic lung CA, tachycardic and hypoxic    Reason for Exam: pt currently on chemo,    Acuity: Acute    Type of Exam: Initial         FINDINGS:    Pulmonary Arteries: There is mild motion artifact.  No definite evidence of    intraluminal filling defect to suggest pulmonary embolism.  Main pulmonary    artery is normal in caliber.         Mediastinum: Mediastinal and right hilar adenopathy has mildly improved.  The    heart and pericardium demonstrate no acute abnormality.  The left ventricle    is dilated.  There is no acute abnormality of the thoracic aorta.         Lungs/pleura: There is a small right pleural effusion, increased in volume.     No effusion is seen on the left.  There is no pneumothorax.  The    low-attenuation subpleural right lower lobe mass has decreased in size and    now measures 3.0 x 3.5 cm.  The right upper lobe nodule adjacent to the right    hilum is decreased in size and now measures 9 x 13 mm.         Upper Abdomen: There is a 1.6 x 1.7 cm low-density lesion anteriorly in the    spleen.  The visualized upper abdomen is otherwise unremarkable.         Soft Tissues/Bones: There is a pathologic compression fracture at T6 with    approximately 70% loss of vertebral body height and minimal retropulsion. There is increased destruction and associated soft tissue involving the right    7th rib.  There are subtle lytic lesions in the sternum.              Impression    1. No definite scan evidence for pulmonary embolus. 2. Pathologic T6 compression fracture with enlarging right 7th rib metastasis    and new sternal metastasis. 3. Improving right upper lobe nodule, right lower lobe mass and, right hilar    and mediastinal adenopathy.     4. Low-density lesion in the spleen could represent a metastasis or a    hemangioma.           EXAMINATION:    CT OF THE HEAD WITHOUT CONTRAST  9/24/2020 9:38 pm         TECHNIQUE:    CT of the head was performed without the administration of intravenous    contrast. Dose modulation, iterative reconstruction, and/or weight based    adjustment of the mA/kV was utilized to reduce the radiation dose to as low    as reasonably achievable.         COMPARISON:    Unenhanced head CT dated 08/09/2020 and MRI brain dated 08/10/2020         HISTORY:    ORDERING SYSTEM PROVIDED HISTORY: AMS, hx thrombocytopenia    TECHNOLOGIST PROVIDED HISTORY:    AMS, hx thrombocytopenia         Reason for Exam: ams    Acuity: Acute    Type of Exam: Initial         FINDINGS:    BRAIN/VENTRICLES: There is no evidence of an acute infarct or intracranial    hemorrhage. [place] Gentleman has been marked improvement of vasogenic edema seen    previously, most prominent in the left parietal lobe but also in the right    parietal lobe.  Subtle residual hypodensity is seen in the left    frontoparietal centrum semiovale on series 2 images 43 through 51.  The    findings are suggestive of improvement of known intracranial metastatic    disease.  Mild atrophic changes are again noted.  There is no evidence of    midline shift.         ORBITS: The visualized portion of the orbits demonstrate no acute abnormality.         SINUSES: The visualized paranasal sinuses and mastoid air cells demonstrate    no acute abnormality.         SOFT TISSUES/SKULL:  No acute abnormality of the visualized skull or soft    tissues.              Impression    1. No evidence of acute intracranial hemorrhage or infarct. 2. Marked improvement of intracranial metastatic disease.  Slight residual    vasogenic edema seen in the left frontoparietal centrum semiovale.  If    clinically indicated, follow-up MRI of the brain could be done for further    evaluation.                    Medical Decision Making-Other: Thank you for allowing us to participate in the care of this patient. Please call with questions. Sage Ram DPM     ATTESTATION:    I have discussed the case, including pertinent history and exam findings with the residents and students. I have seen and examined the patient and the key elements of the encounter have been performed by me. I was present when the student obtained his information or examined the patient. I have reviewed the laboratory data, other diagnostic studies and discussed them with the residents. I have updated the medical record where necessary. I agree with the assessment, plan and orders as documented by the resident/ student.     Jocelyne Tompkins MD.

## 2020-09-28 NOTE — PROGRESS NOTES
Neurosurgery JUAN ALBERTO/Resident    Daily Progress Note   Chief Complaint   Patient presents with    Other     hasn't been doing good after starting chemo two weeks ago     9/28/2020  9:17 AM    Chart reviewed. No acute events overnight. No new complaints. Continues to have back pain. Vitals:    09/27/20 2345 09/28/20 0338 09/28/20 0523 09/28/20 0700   BP: 116/61 (!) 116/59  124/73   Pulse: 104 101  102   Resp: 20 24 22   Temp: 98.2 °F (36.8 °C) 99.5 °F (37.5 °C)  99 °F (37.2 °C)   TempSrc: Temporal Temporal  Temporal   SpO2: 95% 96%  96%   Weight:   131 lb 2.8 oz (59.5 kg)    Height:           PE:   AOx3   Motor   Moves all extremities equally    Lab Results   Component Value Date    WBC 19.5 (H) 09/27/2020    HGB 8.9 (L) 09/27/2020    HCT 29.0 (L) 09/27/2020     09/27/2020    CHOL 162 09/10/2020    TRIG 100 09/10/2020    HDL 49 09/10/2020    ALT 31 09/25/2020    AST 30 09/25/2020     09/28/2020    K 3.2 (L) 09/28/2020     09/28/2020    CREATININE 0.83 09/28/2020    BUN 15 09/28/2020    CO2 21 09/28/2020    TSH 1.47 09/24/2020    PSA 5.38 (H) 08/09/2020    INR 1.2 09/28/2020    LABA1C 5.5 02/08/2019    CRP 81.4 (H) 09/25/2020    SEDRATE 20 09/25/2020           A/P  69 y.o. male who presents with pathologic T6 compression fracture     - plan for ablation of T6 as well as kyphoplasty today  - consent obtained and all questions answered  - NPO since midnight      Please contact neurosurgery with any changes in patients neurologic status.        Romayne Schilling, CNP  9/28/20  9:17 AM

## 2020-09-28 NOTE — PROGRESS NOTES
Comprehensive Nutrition Assessment    Type and Reason for Visit:  Reassess    Nutrition Recommendations/Plan:   - As able, continue a Cardiac diet with Ensure Enlive with all meals and frozen Magic Cups x 2 per day. Encourage/monitor intakes as tolerated. - Will monitor labs and plan of care. Nutrition Assessment:  Pt NPO currently for ablation of T6 and kyphoplasty today. Pt continues to have a poor appetite with intakes of 25% or less of meals and small amounts of oral supplements. Labs reviewed: K+3.2 mmol/L. Malnutrition Assessment:  Malnutrition Status: Moderate malnutrition    Context:  Chronic Illness     Findings of the 6 clinical characteristics of malnutrition:  Energy Intake:  7 - 75% or less estimated energy requirements for 1 month or longer  Weight Loss:  1 - Mild weight loss (specify amount and time period)     Body Fat Loss:  1 - Mild body fat loss Orbital   Muscle Mass Loss:  1 - Mild muscle mass loss Clavicles (pectoralis & deltoids)  Fluid Accumulation:  1 - Mild(to Moderate) Extremities, Generalized   Strength:  Not Performed    Estimated Daily Nutrient Needs:  Energy (kcal):  28-30 kcal/kg = 3407-3424 kcals/day; Weight Used for Energy Requirements:  Admission   Protein (g):  1.5-1.8 gm/kg =  gm pro/day; Weight Used for Protein Requirements:  Ideal          Nutrition Related Findings:  Labs reviewed: K+3.2 mmol/L. Meds reviewed. BM 9/28. +Thrush. Stage 4 lunch cancer with brain and spine mets. Wounds:  Pressure Injury, Stage II(to left buttocks)       Current Nutrition Therapies:    Diet NPO, After Midnight Exceptions are:  Other (See Comment), Sips with Meds    Anthropometric Measures:  · Height: 5' 5\" (165.1 cm)  · Current Body Weight: 131 lb 2.8 oz (59.5 kg)   · Admission Body Weight: 134 lb 4.2 oz (60.9 kg)    · Usual Body Weight: (145-150 lb pt pt/family)     · Ideal Body Weight: 136 lbs; % Ideal Body Weight 98.7 %   · BMI: 21.8  · BMI Categories: Underweight

## 2020-09-28 NOTE — PLAN OF CARE
Problem: Pain:  Goal: Pain level will decrease  Description: Pain level will decrease  9/28/2020 0753 by Gamal Borrego RN  Outcome: Ongoing  9/27/2020 1813 by Nicholas Potter RN  Outcome: Ongoing  Goal: Control of acute pain  Description: Control of acute pain  Outcome: Ongoing  Goal: Control of chronic pain  Description: Control of chronic pain  Outcome: Ongoing     Problem: Skin Integrity:  Goal: Will show no infection signs and symptoms  Description: Will show no infection signs and symptoms  9/28/2020 0753 by Gamal Borrego RN  Outcome: Ongoing  9/27/2020 1813 by Nicholas Potter RN  Outcome: Ongoing  Goal: Absence of new skin breakdown  Description: Absence of new skin breakdown  Outcome: Ongoing     Problem: Falls - Risk of:  Goal: Will remain free from falls  Description: Will remain free from falls  9/28/2020 0753 by Gamal Borrego RN  Outcome: Ongoing  9/27/2020 1813 by Nicholas Potter RN  Outcome: Ongoing  Goal: Absence of physical injury  Description: Absence of physical injury  Outcome: Ongoing     Problem: Injury - Risk of, Physical Injury:  Goal: Will remain free from falls  Description: Will remain free from falls  9/28/2020 0753 by Gamal Borrego RN  Outcome: Ongoing  9/27/2020 1813 by Nicholas Potter RN  Outcome: Ongoing  Goal: Absence of physical injury  Description: Absence of physical injury  Outcome: Ongoing

## 2020-09-28 NOTE — PROGRESS NOTES
Occupational Therapy    Occupational Therapy Not Seen Note    DATE: 2020  Name: Vandana Parsons  : 1950  MRN: [id number]    Patient not available for Occupational Therapy due to:    Surgery/Procedure: Plan for ablation of T6 as well as kyphoplasty today per neurosurgery    Next Scheduled Treatment: 2020    Electronically signed by LALIT Hsu on 2020 at 11:39 AM

## 2020-09-28 NOTE — PLAN OF CARE
Problem: Skin Integrity:  Goal: Absence of new skin breakdown  Description: Absence of new skin breakdown  9/28/2020 1818 by Gilda Camara RN  Outcome: Ongoing  Note: Pts skin maintains structural intactness and physiologic function. No further breakdown on sacrum. Pt able to reposition independently in bed/ Assisting pt with turns every 2 hours and heels elevated off bed. Linens remain clean and dry. Will continue to monitor.

## 2020-09-29 NOTE — PROGRESS NOTES
CLINICAL PHARMACY NOTE: MEDS TO [address] Select Patient?: No  Total # of Prescriptions Filled: 1   The following medications were delivered to the patient:  · Nystatin susp   Total # of Interventions Completed: 1  Time Spent (min): 45    Additional Documentation:Medication delivered to the patient in his room ([id number]).

## 2020-09-29 NOTE — PROGRESS NOTES
[facility]  Office: [address], DO, Mary Corrales, DO, Davila Shown, DO, [employer], DO, Ari Brown MD, Je Daniel MD, Hugh Blandon MD, Ni Ferrell MD, Zoey Borrego MD, Leonardo Gong MD, Bro Cruz MD, Hallie Rizvi MD, Ollie Schneider MD, Daphnie Enciso, DO, Iza Thomas MD, Emily Downing MD, Giovana Montoya, DO, Jonna Gottlieb MD,  Ten Walker, DO, Per Coulter MD, Bailey Poole MD, Kimberly Cruz, [facility], [address], [facility], Marlon Potter, CNP, Jose Sheehan, CNS, Lopez Stone, CNP, Niki Gastelum, CNP, Ramu Coronado, CNP, Marie Garcia, CNP, Orquidea Chino, CNP, Shelbi Barreto PA-C, Jae Brito, [facility], [place]), Annie Wilcox, CNP, Talia Larson, CNP, Juanjo Hamilton, CNP, Adina Valentin, CNP, Sofia Rae, [facility]      Daily Progress Note     Admit Date: 9/24/2020  Bed/Room No.  5143/1639-49  Admitting Physician : Hugh Blandon MD  Code Status :Full Code  Hospital Day:  LOS: 5 days   Chief Complaint:     Chief Complaint   Patient presents with    Other     hasn't been doing good after starting chemo two weeks ago     Principal Problem:    Sepsis [facility])  Active Problems: Thrush    HTN (hypertension)    CAD (coronary artery disease)    Ischemic cardiomyopathy    Elevated troponin    CKD (chronic kidney disease)    Brain metastasis (HCC)    Adenocarcinoma of right lung (HCC)    Moderate malnutrition (HCC)    AMS (altered mental status)    Metabolic encephalopathy    Failure to thrive in adult    Prolonged Q-T interval on ECG    Hypocalcemia    Pathological compression fracture of spine ([facility] [address]    Bandemia  Resolved Problems:    * No resolved hospital problems. *    Subjective : Interval History/Significant events :  09/29/20    Patient underwent Kyphoplasty . Pain controlled . He is eating OK , suggest supplements. Has poor appetite. Patient denies any nausea or vomiting.    Vitals - Stable afebrile  Labs - Persistent leukocytosis. Hemoglobin stable. Nursing notes , Consults notes reviewed. Overnight events and updates discussed with Nursing staff . Background History:         Cierra Stevenson is 69 y.o. male  Who was admitted to the hospital on 9/24/2020 for treatment of Sepsis ([facility] [address] Patient was brought to hospital by family for meningitis, decreased oral intake, confusion, generalized weakness. He has been complaining of fatigue. Patient has history of stage IV lung cancer with bone and brain mets and was started recently on chemotherapy and immunotherapy. He was recently admitted in the hospital for pancytopenia and received Granix at that time. Evaluation emergency room showed temperature 98.2 heart rate 116, respiratory 16, blood pressure 123/76, SPO2 92%. Lab evaluation showed sodium 138, potassium 4.1, chloride 107, bicarb 18, creatinine 0.59, BUN 18. CK was elevated at 413, hemoglobin 710, proBNP 3856. EKG showed QTC prolongation without any acute ST changes but had T wave inversion in V2 and aVL. Device interrogation emergency room showed nonsustained V. tach on 9/17/2020 and heart rate 130's. Chest x-ray was negative for any cardiopulmonary pathology. Patient has underlying history of CAD, hypertension, hyperlipidemia, ischemic cardiomyopathy S/P ICD placement. Patient is on long-term opioid medication for cancer pain, Megace for loss of appetite. He was found to have some oral ulcers with white plaques. Patient was started on nystatin swish and swallow. He was given IV broad-spectrum antibiotics. ID was consulted and recommended to monitor off antibiotics as no source of infection was identified. Leukocytosis was considered reactive to Granix infusion. Patient was evaluated by neurosurgery and recommedned surgery once PLT improves. Patient underwent  kyphoplasty on 9/28/20 after Platelet count stabilized.      PMH:  Past Medical History:   Diagnosis Date    Cancer [facility])     right lung    Cardiac resynchronization therapy defibrillator (CRT-D) in place     Chest pain 02/08/2019    Coronary artery disease     Dyspnea on exertion     Elevated troponin 02/08/2019    Heart attack ([facility] [address] 2012    Hyperlipidemia     Hypertension     Ischemic cardiomyopathy     NSTEMI (non-ST elevated myocardial infarction) ([facility] 2/8/2019      Allergies: Allergies   Allergen Reactions    Penicillins      Swollen hand with itching      Medications :  aspirin, 81 mg, Oral, Daily  enoxaparin, 40 mg, Subcutaneous, Daily  carvedilol, 12.5 mg, Oral, BID WC  folic acid, 1 mg, Oral, Daily  pantoprazole, 40 mg, Oral, QAM AC  sodium chloride flush, 10 mL, Intravenous, 2 times per day  nystatin, 5 mL, Oral, 4x Daily        Review of Systems   Review of Systems   Constitutional: Positive for activity change, appetite change and fatigue. Negative for fever and unexpected weight change. HENT: Negative for congestion, nosebleeds, rhinorrhea, sinus pressure, sneezing and voice change. Respiratory: Negative for cough, choking, chest tightness, shortness of breath and wheezing. Cardiovascular: Negative for chest pain, palpitations and leg swelling. Gastrointestinal: Negative for abdominal pain, constipation, diarrhea, nausea and vomiting. Genitourinary: Negative for difficulty urinating, discharge, dysuria, frequency and testicular pain. Musculoskeletal: Positive for back pain. Skin: Negative for rash. Neurological: Negative for dizziness, weakness, light-headedness and headaches. Hematological: Does not bruise/bleed easily. Psychiatric/Behavioral: Negative for agitation, behavioral problems, confusion, self-injury, sleep disturbance and suicidal ideas.      Objective :      Current Vitals : Temp: 98.9 °F (37.2 °C),  Pulse: 96, Resp: 17, BP: 112/62, SpO2: 94 %  Last 24 Hrs Vitals   Patient Vitals for the past 24 hrs:   BP Temp Temp src Pulse Resp SpO2 Height Weight   09/29/20 0733 112/62 98.9 °F (37.2 °C) Axillary 96 17 94 % -- --   09/29/20 0600 -- -- -- -- -- -- -- 138 lb 10.7 oz (62.9 kg)   09/29/20 0332 113/60 98.7 °F (37.1 °C) Axillary 101 23 96 % -- --   09/28/20 2345 (!) 91/54 98 °F (36.7 °C) Oral 100 22 94 % -- --   09/28/20 2045 102/63 97.9 °F (36.6 °C) Axillary 94 17 96 % -- --   09/28/20 1630 116/63 98.1 °F (36.7 °C) Oral 93 11 91 % -- --   09/28/20 1615 128/70 -- -- 96 21 93 % -- --   09/28/20 1600 124/70 -- -- 96 10 95 % -- --   09/28/20 1545 120/75 -- -- 95 9 96 % -- --   09/28/20 1530 113/62 -- -- 87 23 99 % -- --   09/28/20 1526 113/62 98.4 °F (36.9 °C) Temporal 96 18 98 % -- --   09/28/20 1259 111/72 97.9 °F (36.6 °C) Temporal 96 14 96 % 5' 5\" (1.651 m) 131 lb (59.4 kg)   09/28/20 1200 118/62 98.4 °F (36.9 °C) Oral 95 13 96 % -- --     Intake / output   09/28 0701 - 09/29 0700  In: 1370 [P.O.:550; I.V.:820]  Out: 230 [Urine:225]  Physical Exam:  Physical Exam  Vitals signs and nursing note reviewed. Constitutional:       General: He is not in acute distress. Appearance: He is cachectic. He is ill-appearing. He is not diaphoretic. HENT:      Head: Normocephalic and atraumatic. Nose:      Right Sinus: No maxillary sinus tenderness or frontal sinus tenderness. Left Sinus: No maxillary sinus tenderness or frontal sinus tenderness. Mouth/Throat:      Pharynx: No oropharyngeal exudate. Eyes:      General: No scleral icterus. Conjunctiva/sclera: Conjunctivae normal.      Pupils: Pupils are equal, round, and reactive to light. Neck:      Musculoskeletal: Full passive range of motion without pain and neck supple. Thyroid: No thyromegaly. Vascular: No JVD. Cardiovascular:      Rate and Rhythm: Normal rate and regular rhythm. Pulses:           Dorsalis pedis pulses are 2+ on the right side and 2+ on the left side. Heart sounds: Normal heart sounds. No murmur. Pulmonary:      Effort: Pulmonary effort is normal.      Breath sounds: Normal breath sounds.  No wheezing or rales. Chest:       Abdominal:      Palpations: Abdomen is soft. There is no mass. Tenderness: There is no abdominal tenderness. Musculoskeletal:        Back:    Lymphadenopathy:      Head:      Right side of head: No submandibular adenopathy. Left side of head: No submandibular adenopathy. Cervical: No cervical adenopathy. Skin:     General: Skin is warm. Neurological:      Mental Status: He is alert and oriented to person, place, and time. Motor: No tremor. Psychiatric:         Behavior: Behavior is cooperative. Laboratory findings:    Recent Labs     09/27/20  0637 09/28/20  0452   WBC 19.5*  --    HGB 8.9*  --    HCT 29.0*  --      --    INR  --  1.2     Recent Labs     09/28/20  0452 09/29/20  0535    137   K 3.2* 4.2    106   CO2 21 19*   GLUCOSE 98 86   BUN 15 15   CREATININE 0.83 0.75   CALCIUM 7.3* 7.3*     No results for input(s): PROT, LABALBU, LABA1C, K4ICHGS, K4QOJMW, FT4, TSH, AST, ALT, LDH, GGT, ALKPHOS, BILITOT, BILIDIR, AMMONIA, AMYLASE, LIPASE, LACTATE, CHOL, HDL, LDLCHOLESTEROL, CHOLHDLRATIO, TRIG, VLDL, BNP, TROPONINI, CKTOTAL, CKMB, CKMBINDEX, RF, VANDANA in the last 72 hours. Specific Gravity, UA   Date Value Ref Range Status   09/25/2020 1.025 1.005 - 1.030 Final     Protein, UA   Date Value Ref Range Status   09/25/2020 1+ (A) NEGATIVE Final     RBC, UA   Date Value Ref Range Status   09/25/2020 2 TO 5 0 - 4 /HPF Final     Comment:     Reference range defined for non-centrifuged specimen. Bacteria, UA   Date Value Ref Range Status   09/25/2020 NOT REPORTED None Final     Nitrite, Urine   Date Value Ref Range Status   09/25/2020 NEGATIVE NEGATIVE Final     WBC, UA   Date Value Ref Range Status   09/25/2020 2 TO 5 0 - 5 /HPF Final     Leukocyte Esterase, Urine   Date Value Ref Range Status   09/25/2020 NEGATIVE NEGATIVE Final       Imaging / Clinical Data :-   Ct Head Wo Contrast    Result Date: 9/24/2020  1.  No evidence of acute intracranial hemorrhage or infarct. 2. Marked improvement of intracranial metastatic disease. Slight residual vasogenic edema seen in the left frontoparietal centrum semiovale. If clinically indicated, follow-up MRI of the brain could be done for further evaluation. Xr Chest Portable    Result Date: 9/24/2020  No evidence for acute cardiopulmonary pathology. Stable right hilar mass. Xr Chest Portable    Result Date: 9/19/2020  Right hilar mass has moderately decreased in size consistent with response to therapy. No acute infiltrate, pneumothorax or pleural fluid. Lytic metastatic lesion involving the lateral right 7th rib. Ct Chest Pulmonary Embolism W Contrast    Result Date: 9/24/2020  1. No definite scan evidence for pulmonary embolus. 2. Pathologic T6 compression fracture with enlarging right 7th rib metastasis and new sternal metastasis. 3. Improving right upper lobe nodule, right lower lobe mass and, right hilar and mediastinal adenopathy. 4. Low-density lesion in the spleen could represent a metastasis or a hemangioma. Clinical Course : unchanged  Assessment and Plan  :        1. SIRS -sepsis ruled out.- Cultures were negative  Leucocytosis likely from recent Granix. ID evaluated and recommended monitor off antibiotics. 2. Dehydration due to poor intake and failure to thrive -   Encourage oral hydration and diet supplement  3. Thrombocytopenia secondary to chemotherapy - improved   4. 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 On   5. Failure to thrive -recommend supplement. 6. Severe malnutrition - protein supplement . Dietary consult  7. CAD S/P stents -okay to hold aspirin. Continue Coreg  8. Ischemic cardiomyopathy S/P ICD CRT  9. Pathological fracture T6 -neurosurgery consulted. Surgery kyphoplasty was deferred due to thrombocytopenia. Planned surgery on 9/20/2020. Discharge home with Home health.      Continue to monitor vitals , Intake / output ,  Cell count , HGB , Kidney function, oxygenation  as indicated . Plan and updates discussed with patient ,  answers  explained to satisfaction.    Plan discussed with Staff RN     (Please note that portions of this note were completed with a voice recognition program. Efforts were made to edit the dictations but occasionally words are mis-transcribed.)      Christiano Shea MD  9/29/2020

## 2020-09-29 NOTE — TELEPHONE ENCOUNTER
DENNIS PHONED OFFICE TO CONFIRM THAT DEDE HAS A MD EXAM PRIOR TO HIS [address] ON 10/05/2020. SHE IS CONCERNED AS WITH RECENT HOSPITALIZATION THEY WERE TOLD PROBABLE DOSE ADJUSTMENTS SINCE HE HAD EXPERIENCED SUCH SIDE EFFECTS. REVIEWED AND CONFIRMED HE WOULD BE SEEN PRIOR TO ANY THERAPEUTIC MED GIVEN. RN DRAW 0800 10/05/2020  MD EXAM 1819 10/05/2020. DENNIS VOICED UNDERSTANDING.

## 2020-09-29 NOTE — OP NOTE
[address]                  [facility]                                OPERATIVE REPORT    PATIENT NAME: Beryle Lovely                     :        1950  MED REC NO:   [id number]                             ROOM:  ACCOUNT NO:   [de-identified]                           ADMIT DATE: 2020  PROVIDER:     Olayinka Gilliam    DATE OF PROCEDURE:  2020    PREOPERATIVE DIAGNOSIS:  T6 compression fracture secondary to metastatic  cancer. POSTOPERATIVE DIAGNOSIS:  T6 compression fracture secondary to  metastatic cancer. OPERATIVE PROCEDURES:  1. Biopsy of T6 body. 2.  Radiofrequency ablation tumor, T6.  3.  T6 kyphoplasty. SURGEON:  Tatiana Blandon. Douglas aBca MD    INDICATION FOR SURGERY:  The patient with history of lung cancer with  severe back pain. MRI shows metastatic tumor and collapse of the body  of T6.    DESCRIPTION OF OPERATION:  After an adequate level of general  endotracheal anesthesia had been obtained, the patient was turned prone  on the Joseph table and prepared and draped in the usual sterile  fashion. Under biplanar fluoroscopy, the working cannula was advanced  transpedicularly into the body of T6. Once this was done, biopsy was  obtained from the left side and sent for pathologic evaluation. Following this, drill was placed. The appropriate depth of the probe  was determined, and then the probe was placed. This was hooked to the  radiofrequency ablation machine, and 7 minutes of ablation of 70 degrees  centigrade was done. Following this, the ablation probes were removed. The balloon was placed into the fracture, inflated, deflated, and the  cavity was filled with Kyphon cement. Once we had a nice fill, the  needles were left in place until the cement hardened. They were removed  and final radiographs done in the AP and lateral direction looked good.    Estimated blood loss was minimal.  The patient was awakened in the  operating room and taken to the recovery room in good condition.         Anna Vazquez    D: 09/28/2020 15:38:19       T: 09/28/2020 16:06:27     [email or web address]  Job#: [id number]     Doc#: [id number]    CC:

## 2020-09-29 NOTE — DISCHARGE INSTR - COC
HTN (hypertension) I10    CAD (coronary artery disease) I25.10    Ischemic cardiomyopathy I25.5    AICD (automatic cardioverter/defibrillator) present Z95.810    Elevated troponin R79.89    Lung mass R91.8    Enlarged prostate N40.0    CKD (chronic kidney disease) N18.9    CVA (cerebral vascular accident) ([facility] [address] I63.9    Brain metastasis (HCC) C79.31    Vasogenic edema (HCC) G93.6    Right hemiparesis (HCC) G81.91    Adenocarcinoma of right lung (HCC) C34.91    Thrombocytopenia ([facility] [address] D69.6    Pancytopenia due to antineoplastic chemotherapy ([facility] [address] D61.810, T45.1X5A    Bone metastasis (HCC) C79.51    Other fatigue R53.83    Dehydration E86.0    Moderate malnutrition (HCC) E44.0    AMS (altered mental status) O83.80    Metabolic encephalopathy A37.55    Failure to thrive in adult R62.7    Prolonged Q-T interval on ECG R94.31    Hypocalcemia E83.51    Pathological compression fracture of spine (HCC) M48.50XA    Thrush B37.0    Bandemia D72.825       Isolation/Infection:   Isolation            No Isolation          Patient Infection Status       Infection Onset Added Last Indicated Last Indicated By Review Planned Expiration Resolved Resolved By    None active    Resolved    COVID-19 Rule Out 09/27/20 09/27/20 09/27/20 COVID-19 (Ordered)   09/27/20 Rule-Out Test Resulted    COVID-19 Rule Out 08/19/20 08/19/20 08/20/20 Covid-19 Ambulatory (Ordered)   08/22/20 Rule-Out Test Resulted    COVID-19 Rule Out 08/10/20 08/10/20 08/10/20 COVID-19 (Ordered)   08/10/20 Rule-Out Test Resulted            Nurse Assessment:  Last Vital Signs: /62   Pulse 96   Temp 98.9 °F (37.2 °C) (Axillary)   Resp 17   Ht 5' 5\" (1.651 m)   Wt 138 lb 10.7 oz (62.9 kg)   SpO2 94%   BMI 23.08 kg/m²     Last documented pain score (0-10 scale): Pain Level: 0  Last Weight:   Wt Readings from Last 1 Encounters:   09/29/20 138 lb 10.7 oz (62.9 kg)     Mental Status:  oriented and alert    IV Access:  - Venous Chest Port    Nursing Mobility/ADLs:  Walking   Assisted  Transfer  Assisted  Bathing  Assisted  Dressing  Assisted  Toileting  Assisted  Feeding  Independent  Med Admin  Assisted  Med Delivery   whole    Wound Care Documentation and Therapy:  Wound 09/25/20 Buttocks Left (Active)   Wound Image   09/26/20 1100   Wound Pressure Stage  2 09/29/20 0400   Dressing Status Clean;Dry; Intact 09/29/20 0400   Dressing Changed Changed/New 09/28/20 1600   Dressing/Treatment Foam 09/29/20 0400   Dressing Change Due 09/28/20 09/29/20 0400   Wound Length (cm) 4.5 cm 09/26/20 1100   Wound Width (cm) 2.5 cm 09/26/20 1100   Wound Depth (cm) 0.1 cm 09/26/20 1100   Wound Surface Area (cm^2) 11.25 cm^2 09/26/20 1100   Wound Volume (cm^3) 1.13 cm^3 09/26/20 1100   Wound Assessment Fragile;Pink;Purple 09/29/20 0400   Drainage Amount None 09/29/20 0400   Drainage Description Serosanguinous 09/26/20 1100   Odor None 09/29/20 0400   Dorothea-wound Assessment Pink 09/29/20 0400   Number of days: 4        Elimination:  Continence:   · Bowel: Yes  · Bladder: Yes  Urinary Catheter: None   Colostomy/Ileostomy/Ileal Conduit: No       Date of Last BM: 9/28/2020    Intake/Output Summary (Last 24 hours) at 9/29/2020 0932  Last data filed at 9/29/2020 0900  Gross per 24 hour   Intake 1370 ml   Output 330 ml   Net 1040 ml     I/O last 3 completed shifts: In: 1370 [P.O.:550; I.V.:820]  Out: 230 [Urine:225; Blood:5]    Safety Concerns: At Risk for Falls    Impairments/Disabilities:      None    Nutrition Therapy:  Current Nutrition Therapy:   - Oral Diet:  General    Routes of Feeding: Oral  Liquids: No Restrictions  Daily Fluid Restriction: no  Last Modified Barium Swallow with Video (Video Swallowing Test): not done    Treatments at the Time of Hospital Discharge:   Respiratory Treatments: N/A  Oxygen Therapy:  is not on home oxygen therapy.   Ventilator:    - No ventilator support    Rehab Therapies: Physical Therapy and Occupational Therapy  Weight Bearing Status/Restrictions: No weight bearing restirctions  Other Medical Equipment (for information only, NOT a DME order):  walker  Other Treatments: N/A    Patient's personal belongings (please select all that are sent with patient):  None    RN SIGNATURE:  Electronically signed by Marilin York RN on 9/29/20 at 11:25 AM EDT    CASE MANAGEMENT/SOCIAL WORK SECTION    Inpatient Status Date: ***    Readmission Risk Assessment Score:  Readmission Risk              Risk of Unplanned Readmission:        32           Discharging to Facility/ Agency   · Name: [address]  · Address: [place] [address]  · Phone:   [phone]  · Fax:    Dialysis Facility (if applicable)   · Name:  · Address:  · Dialysis Schedule:  · Phone:  · Fax:    / signature: Electronically signed by Nela Bingham RN on 9/29/20 at 12:39 PM EDT    PHYSICIAN SECTION    Prognosis: Fair    Condition at Discharge: Stable    Rehab Potential (if transferring to Rehab): Fair    Recommended Labs or Other Treatments After Discharge: PT , OT . Follow up with Oncology in 7 days . Follow up with neurosurgery in 2 weeks     Physician Certification: I certify the above information and transfer of Paola Munguia  is necessary for the continuing treatment of the diagnosis listed and that he requires [address] for greater 30 days.      Update Admission H&P: No change in H&P    PHYSICIAN SIGNATURE:  Electronically signed by Emma Perez MD on 9/29/20 at 9:33 AM EDT

## 2020-09-29 NOTE — DISCHARGE SUMMARY
[facility]  Office: [phone]  Jonatan De Dios Blood DO, John Stacy MD, Tello Lomeli MD, Annette Kelly MD, Deandre Mcbride MD, Santos Calzada MD, Kya Mehta MD, Estela Marie MD, Rosa Elena Wing MD, Ollie Celeste MD, Ismael Machado DO, Lamar Nunes MD, Keenan Beard MD, Fabi Zamorano DO, Rosenda Alcocer MD,  Kayleigh Taylor DO, Norris Holter MD, Isabell Del Real MD, Gay Ortiz CNP, [facility] CNP, Sharon Horns CNP, Arturo Alert CNS, Danyel Apple CNP, Chirag Menchaca CNP, Danielle Horsfall CNP, Kenya Helen CNP, Chana Guevara CNP, Alonso Camacho PA-C, Anthony Escalera CNP, Lissett Mixon CNP, Shobha Card CNP, Grover Loop CNP, eCsar Hair CNP, Nuris Carlosy, [address] [address]      Discharge Summary     Patient ID: Sukumar Greene  :     MRN: [id number]     ACCOUNT:  [de-identified]   Patient Location : [address] [place] [address]  Patient's PCP: Nikole Valadez MD  Admit Date: 2020   Discharge Date: 2020     Length of Stay: 5  Code Status:  Full Code  Admitting Physician: Annette Kelly MD  Discharge Physician: Annette Kelly MD     Active Discharge Diagnosis :     Primary Problem  Pathological compression fracture of spine [facility]    Diagnosis Date Noted   Chris Rana [B37.0] 2020     Priority: Medium    Bandemia [O27.891]     Metabolic encephalopathy [D57.27] 2020    Failure to thrive in adult [R62.7] 2020    Prolonged Q-T interval on ECG [R94.31] 2020    Hypocalcemia [E83.51] 2020    Pathological compression fracture of spine ([facility] Lynnette Hernandeztle 2020    AMS (altered mental status) [R41.82] 2020    Moderate malnutrition ([address] [E44.0] 2020    Adenocarcinoma of right lung ([facility] [address] [C34.91] 2020    [facility] metastasis ([facility] [C79.31] 2020    CKD (chronic kidney disease) [N18.9] 2020    Elevated troponin [R79.89]     HTN (hypertension) [I10] 02/08/2019    CAD (coronary artery disease) [I25.10] 02/08/2019    Ischemic cardiomyopathy [I25.5] 02/08/2019       Admission Condition:  fair     Discharged Condition: stable    Hospital Stay:     Hospital Course:  Cierra Stevenson is a 69 y.o. male who was admitted for the management of   Pathological compression fracture of spine ([address] , presented to ER with Other (hasn't been doing good after starting chemo two weeks ago)  Patient was brought to hospital by family for meningitis, decreased oral intake, confusion, generalized weakness. He has been complaining of fatigue. Patient has history of stage IV lung cancer with bone and brain mets and was started recently on chemotherapy and immunotherapy. He was recently admitted in the hospital for pancytopenia and received Granix at that time. Evaluation emergency room showed temperature 98.2 heart rate 116, respiratory 16, blood pressure 123/76, SPO2 92%. Lab evaluation showed sodium 138, potassium 4.1, chloride 107, bicarb 18, creatinine 0.59, BUN 18. CK was elevated at 413, hemoglobin 710, proBNP 3856. EKG showed QTC prolongation without any acute ST changes but had T wave inversion in V2 and aVL. Device interrogation emergency room showed nonsustained V. tach on 9/17/2020 and heart rate 130's. Chest x-ray was negative for any cardiopulmonary pathology. Patient has underlying history of CAD, hypertension, hyperlipidemia, ischemic cardiomyopathy S/P ICD placement. Patient is on long-term opioid medication for cancer pain, Megace for loss of appetite. He was found to have some oral ulcers with white plaques. Patient was started on nystatin swish and swallow. He was given IV broad-spectrum antibiotics. ID was consulted and recommended to monitor off antibiotics as no source of infection was identified. Leukocytosis was considered reactive to Granix infusion.   Patient was evaluated by neurosurgery and recommedned surgery once PLT improves. Patient underwent  kyphoplasty on 9/28/20 after Platelet count stabilized. Significant therapeutic interventions:   1. SIRS -sepsis ruled out.- Cultures were negative  Leucocytosis likely from recent Granix. ID evaluated and recommended monitor off antibiotics. 2. Dehydration due to poor intake and failure to thrive -   Encourage oral hydration and diet supplement. 3. Candidal Oral thrush - nystatin thrush. 4. Thrombocytopenia secondary to chemotherapy - improved   5. 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 On   6. Failure to thrive -recommend supplement. 7. Severe malnutrition - protein supplement . Dietary consult  8. CAD S/P stents -okay to hold aspirin. Continue Coreg  9. Ischemic cardiomyopathy S/P ICD CRT  10. Pathological fracture T6 -neurosurgery consulted. Surgery kyphoplasty was deferred due to thrombocytopenia.   Planned surgery on 9/20/2020.       Significant Diagnostic Studies:   Labs / Micro:/Radiology  Recent Labs     09/27/20  0637 09/26/20  0733 09/25/20  0304   WBC 19.5* 17.8* 16.4*   HGB 8.9* 8.7* 9.8*   HCT 29.0* 26.2* 30.9*   MCV 99.0 90.7 92.8    72* 70*     Labs Renal Latest Ref Rng & Units 9/29/2020 9/28/2020 9/25/2020 9/24/2020 9/20/2020   BUN 8 - 23 mg/dL 15 15 16 18 21   Cr 0.70 - 1.20 mg/dL 0.75 0.83 0.92 0.59(L) 0.95   K 3.7 - 5.3 mmol/L 4.2 3.2(L) 3.4(L) 4.1 4.6   Na 135 - 144 mmol/L 137 138 139 138 137     Lab Results   Component Value Date    ALT 31 09/25/2020    AST 30 09/25/2020    ALKPHOS 117 09/25/2020    BILITOT 0.57 09/25/2020     Lab Results   Component Value Date    TSH 1.47 09/24/2020     No results found for: HAV, HEPAIGM, HEPBIGM, HEPBCAB, HBEAG, HEPCAB  Lab Results   Component Value Date    COLORU YELLOW 09/25/2020    NITRU NEGATIVE 09/25/2020    GLUCOSEU NEGATIVE 09/25/2020    KETUA MODERATE 09/25/2020    UROBILINOGEN Normal 09/25/2020    [facility] NEGATIVE 09/25/2020     Lab Results Component Value Date    LABA1C 5.5 02/08/2019     Lab Results   Component Value Date     02/08/2019     Lab Results   Component Value Date    INR 1.2 09/28/2020    INR 1.3 09/25/2020    INR 1.2 09/24/2020    PROTIME 12.5 (H) 09/28/2020    PROTIME 13.5 (H) 09/25/2020    PROTIME 12.4 (H) 09/24/2020       Xr Thoracic Spine (2 Views)    Result Date: 9/28/2020  Intraprocedural fluoroscopic spot images as above. See separate procedure report for more information. Ct Head Wo Contrast    Result Date: 9/24/2020  1. No evidence of acute intracranial hemorrhage or infarct. 2. Marked improvement of intracranial metastatic disease. Slight residual vasogenic edema seen in the left frontoparietal centrum semiovale. If clinically indicated, follow-up MRI of the brain could be done for further evaluation. Xr Chest Portable    Result Date: 9/24/2020  No evidence for acute cardiopulmonary pathology. Stable right hilar mass. Ct Chest Pulmonary Embolism W Contrast    Result Date: 9/24/2020  1. No definite scan evidence for pulmonary embolus. 2. Pathologic T6 compression fracture with enlarging right 7th rib metastasis and new sternal metastasis. 3. Improving right upper lobe nodule, right lower lobe mass and, right hilar and mediastinal adenopathy. 4. Low-density lesion in the spleen could represent a metastasis or a hemangioma. Consultations:    Consults:     Final Specialist Recommendations/Findings:   IP CONSULT TO HOSPITALIST  IP CONSULT TO NEUROSURGERY  PHARMACY TO DOSE VANCOMYCIN  IP CONSULT TO INFECTIOUS DISEASES  IP CONSULT TO DIETITIAN  IP CONSULT TO ONCOLOGY  IP CONSULT TO HOME CARE NEEDS      The patient was seen and examined on day of discharge and this discharge summary is in conjunction with any daily progress note from day of discharge.     Discharge plan:     Disposition: Home Health     Physician Follow Up:     Atul Mo MD  [address] [place] [address] [address] [place] [address]    Phone:  [phone]   · nystatin [id number] UNIT/ML suspension         Time Spent on discharge is  35 mins in patient examination, evaluation, counseling as well as medication reconciliation, prescriptions for required medications, discharge plan and follow up. Electronically signed by   Annette Kelly MD  9/29/2020        Thank you Dr. Nikole Valadez MD for the opportunity to be involved in this patient's care.

## 2020-09-29 NOTE — PROGRESS NOTES
Occupational Therapy   Occupational Therapy Initial Assessment  Date: 2020   Patient Name: Paola Munguia  MRN: [id number]     : 1950    Date of Service: 2020    Discharge Recommendations:  Patient would benefit from continued therapy after discharge   Copied from Neurosurgery: The patient is a 69 y.o. male who presents with daughter-in-law to the ED. patient has a known history of stage IV lung cancer with spinal metastasis. Had radiation therapy and underwent systemic treatment starting on September 10. Patient daughter-in-law reports that he has been increasingly fatigued and decreased appetite. Patient also complains of some bone pain/back pain. MRI thoracic spine from August 10, 2020 reveals T6 metastasis with pathologic compression fracture.     During this admission,  CT head was done due to altered mental status. No evidence of acute intracranial hemorrhage or infarct. Marked improvement of intracranial metastatic disease  CT chest PE was done with no evidence of pulmonary embolus. Pathologic T6 compression fracture with enlarging right seventh rib metastasis and new sternal metastasis noted     Patient follows oncology and radiation oncology. Reports pain controlled on Roxicodone and fentanyl patch  No new trauma. Assessment    Pt lying supine in bed upon entrance to room. Pt completed bed mobility with min assistance. Pt sat at eob ~25 minutes with good unsupported sitting balance with forward flexed posture. Sit to stand with contact guard assistance. Pt requires verbal cuing for proper hand placement as pt tends to pull from walker, fair return. Pt completed dynamic mob in room with contact guard assistance. Please refer to below assist levels for adl completion. Pt ed on OT POC, safety awareness tech, proper hand placement for transfers, and energy conservation tech with proper breathing tech with fair return.  Pt retired supine in bed with call light and phone in reach, bed alarm activated. All needs met upon exit. Performance deficits / Impairments: Decreased functional mobility ; Decreased safe awareness;Decreased balance;Decreased ADL status; Decreased endurance;Decreased high-level IADLs  Treatment Diagnosis: T6 Compression Fracture-S/P Kyphoplasty  Prognosis: Good  Decision Making: Medium Complexity  OT Education: Plan of Care;OT Role  Patient Education: Pt ed on OT POC, safety awareness tech, proper hand placement for transfers, and energy conservation tech with proper breathing tech with fair return. REQUIRES OT FOLLOW UP: Yes  Safety Devices  Safety Devices in place: Yes  Type of devices: Call light within reach; Left in bed;Bed alarm in place  Restraints  Initially in place: No         Patient Diagnosis(es): The primary encounter diagnosis was Altered mental status, unspecified altered mental status type. Diagnoses of Failure to thrive in adult, Acute on chronic systolic CHF (congestive heart failure) ([facility] Leukocytosis, unspecified type, and Elevated troponin were also pertinent to this visit. has a past medical history of Cancer [facility]), Cardiac resynchronization therapy defibrillator (CRT-D) in place, Chest pain, Coronary artery disease, Dyspnea on exertion, Elevated troponin, Heart attack ([address] Hyperlipidemia, Hypertension, Ischemic cardiomyopathy, and NSTEMI (non-ST elevated myocardial infarction) ([facility] [address] has a past surgical history that includes Coronary angioplasty with stent (2012); pacemaker placement (02/11/2019); CT BIOPSY DEEP BONE PERCUTANEOUS (8/11/2020); IR PORT PLACEMENT > 5 YEARS (8/24/2020); Fixation Kyphoplasty (09/28/2020); and Kyphosis surgery (N/A, 9/28/2020).     Treatment Diagnosis: T6 Compression Fracture-S/P Kyphoplasty    Restrictions  Restrictions/Precautions  Restrictions/Precautions: Fall Risk, Up as Tolerated  Required Braces or Orthoses?: No  Position Activity Restriction  Other position/activity restrictions: up with assistance    Subjective General  Patient assessed for rehabilitation services?: Yes  Family / Caregiver Present: No  Patient Currently in Pain: Yes  Pain Assessment  Pain Assessment: 0-10  Pain Level: 4  Pain Type: Acute pain;Surgical pain  Pain Location: Back  Non-Pharmaceutical Pain Intervention(s): Repositioned; Ambulation/Increased Activity; Distraction; Therapeutic presence  Vital Signs  Patient Currently in Pain: Yes  Oxygen Therapy  O2 Device: None (Room air)  Social/Functional History  Social/Functional History  Lives With: Son(and dtr in law, 2 gr children ages 15and 6years old)  Home Layout: Two level, Able to Live on Main level with bedroom/bathroom, Performs ADL's on one level  Home Access: Level entry  Bathroom Shower/Tub: Tub/Shower unit, Curtain  Bathroom Toilet: Standard  Bathroom Equipment: Shower chair  Bathroom Accessibility: Walker accessible  Home Equipment: Rolling walker, Cane  Receives Help From: Family(son works day shift; dtr in law currently on a KESHAWN from work; gr children are completing virtual school at home.  There are times that pt is left home alone-but not very much per pt)  ADL Assistance: Needs assistance  Bath: Minimal assistance  Dressing: Minimal assistance  Grooming: Independent  Feeding: Independent  Toileting: Independent  Homemaking Assistance: Needs assistance  Homemaking Responsibilities: Yes  Meal Prep Responsibility: No(dtr in law completes)  Laundry Responsibility: No(gr children complete)  Cleaning Responsibility: Secondary(everyone pitches in)  Shopping Responsibility: No(dtr in law and son complete shopping)  Dependent Care Responsibility: Secondary(assists with letting the dog out, black lab-Shadow)  Ambulation Assistance: Independent  Transfer Assistance: Independent  Active : No  Patient's  Info: son and dtr in law drive pt to appts  Mode of Transportation: Family, [email or web address]  Occupation: Retired  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  RUE Strength  R Hand General: 4/5  RUE Strength Comment: NT formally due to kyphoplasty-Antigravity [address]  Times per week: 3-4 x week    AM-PAC Score  AM-PAC Inpatient Daily Activity Raw Score: 20 (09/29/20 1107)  AM-PAC Inpatient ADL T-Scale Score : 42.03 (09/29/20 1107)  ADL Inpatient CMS 0-100% Score: 38.32 (09/29/20 1107)  ADL Inpatient CMS G-Code Modifier : Lillie Christianson (09/29/20 1107)    Goals  Short term goals  Time Frame for Short term goals: Pt will, by discharge:  Short term goal 1: Dem I with adl performance  Short term goal 2: Dem I with bed mobiltiy  Short term goal 3: Dem I with functional transfers with good safety for hand placement  Short term goal 4: Dem I with dynamic mobility for adl completion  Short term goal 5: Dem a 10 min dynamic task with sup to increase activity tolerance  Patient Goals   Patient goals :  \"To go home\"       Therapy Time   Individual Concurrent Group Co-treatment   Time In 0930         Time Out 1010         Minutes 40         Timed Code Treatment Minutes: [address], OTR/L

## 2020-09-29 NOTE — PROGRESS NOTES
Physical Therapy    Facility/Department: [facility] 4B STEPDOWN  Initial Assessment    NAME: Becky Marvin  : 1950  MRN: [id number]    Date of Service: 2020  Date of Procedure: 2020   Pre-Op Diagnosis: *TO FOLLOW SELF* T6 COMPRESSION FRACTURE   Post-Op Diagnosis: Same     Procedure(s):  KYPHOPLASTY, ABLATION WITH T6 BONE BIOPSY     Discharge Recommendations:    No further therapy required at discharge. Assessment   Body structures, Functions, Activity limitations: Decreased functional mobility ; Decreased endurance  PT Education: Plan of Care;General Safety  Activity Tolerance  Activity Tolerance: Patient limited by endurance       Patient Diagnosis(es): The primary encounter diagnosis was Altered mental status, unspecified altered mental status type. Diagnoses of Failure to thrive in adult, Acute on chronic systolic CHF (congestive heart failure) ([facility] [address] [address] Leukocytosis, unspecified type, and Elevated troponin were also pertinent to this visit. has a past medical history of Cancer [facility]), Cardiac resynchronization therapy defibrillator (CRT-D) in place, Chest pain, Coronary artery disease, Dyspnea on exertion, Elevated troponin, Heart attack ([address] Hyperlipidemia, Hypertension, Ischemic cardiomyopathy, and NSTEMI (non-ST elevated myocardial infarction) ([facility] [address] has a past surgical history that includes Coronary angioplasty with stent (); pacemaker placement (2019); CT BIOPSY DEEP BONE PERCUTANEOUS (2020); IR PORT PLACEMENT > 5 YEARS (2020); Fixation Kyphoplasty (2020); and Kyphosis surgery (N/A, 2020).     Restrictions  Restrictions/Precautions  Restrictions/Precautions: Fall Risk, Up as Tolerated, Surgical Protocols  Required Braces or Orthoses?: No  Position Activity Restriction  Other position/activity restrictions: up with assistance  Vision/Hearing  Vision: Impaired  Vision Exceptions: Wears glasses at all times  Hearing: Within functional limits Subjective  General  Patient assessed for rehabilitation services?: Yes  Family / Caregiver Present: No  Follows Commands: Within Functional Limits  General Comment  Comments: Pt supine in bed. Agreeable to ambulate. Subjective  Subjective: Pt wants discharge ASAp.   Pain Screening  Patient Currently in Pain: Yes  Pain Assessment  Pain Assessment: 0-10  Pain Level: 4  Pain Type: Acute pain;Surgical pain  Pain Location: Back  Vital Signs  Patient Currently in Pain: Yes  Pre Treatment Pain Screening  Intervention List: Patient able to continue with treatment    Orientation  Orientation  Overall Orientation Status: Within Functional Limits  Social/Functional History  Social/Functional History  Lives With: Son  Home Layout: Two level, Able to Live on Main level with bedroom/bathroom, Performs ADL's on one level  Home Access: Level entry  Bathroom Shower/Tub: Tub/Shower unit, Curtain  Bathroom Toilet: Standard  Bathroom Equipment: Shower chair  Bathroom Accessibility: Walker accessible  Home Equipment: Rolling walker, Cane  Receives Help From: Family  ADL Assistance: Needs assistance  Bath: Minimal assistance  Dressing: Minimal assistance  Grooming: Independent  Feeding: Independent  Toileting: Independent  Homemaking Assistance: Needs assistance  Homemaking Responsibilities: No  Meal Prep Responsibility: No(dtr in law completes)  Laundry Responsibility: No(gr children complete)  Cleaning Responsibility: Secondary(everyone pitches in)  Shopping Responsibility: No(dtr in law and son complete shopping)  Dependent Care Responsibility: Secondary(assists with letting the dog out, black lab-Shadow)  Ambulation Assistance: Independent  Transfer Assistance: Independent  Active : No  Patient's  Info: son and dtr in law drive pt to appts  Mode of Transportation: Family  Occupation: Retired  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; 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[id number]: used to like to bowl, enjoys playing the hector  Cognition   Cognition  Overall Cognitive Status: WFL  Following Commands: Follows one step commands with increased time  Memory: Appears intact  Safety Judgement: Decreased awareness of need for assistance  Problem Solving: Decreased awareness of errors  Insights: Decreased awareness of deficits  Initiation: Requires cues for some  Sequencing: Requires cues for some    Objective          AROM RLE (degrees)  RLE AROM: WFL  AROM LLE (degrees)  LLE AROM : WFL  AROM RUE (degrees)  RUE AROM : WFL  AROM LUE (degrees)  LUE AROM : WFL  Strength Other  Other: Moves all extremities antigravity.   Motor Control  Gross Motor?: WFL  Sensation  Overall Sensation Status: WFL  Bed mobility  Rolling to Right: Stand by assistance  Supine to Sit: Stand by assistance  Scooting: Stand by assistance  Pt sat EOB with SBA  Transfers  Sit to Stand: Contact guard assistance  Stand to sit: Stand by assistance  Ambulation  Ambulation?: Yes  Ambulation 1  Surface: level tile  Device: Rolling Walker  Assistance: Stand by assistance  Gait Deviations: Slow Morenita;Decreased step length;Decreased step height  Distance: 100ft     Balance  Sitting - Static: Good  Sitting - Dynamic: Good  Standing - Static: Good  Standing - Dynamic: Good;-  Comments: Standing balance with rolling walker        Plan   Plan  Times per week: 5x/week  Current Treatment Recommendations: Strengthening, Balance Training, Endurance Training, Safety Education & Training, Functional Mobility Training, Transfer Training, Gait Training  Safety Devices  Type of devices: Left in chair, Call light within reach, Gait belt    AM-PAC Score     AM-PAC Inpatient Mobility without Stair Climbing Raw Score : 17 (09/29/20 1133)  AM-PAC Inpatient without Stair Climbing T-Scale Score : 48.47 (09/29/20 1133)  Mobility Inpatient CMS 0-100% Score: 32.72 (09/29/20 1133)  Mobility Inpatient without Stair CMS G-Code Modifier : Magaly Walsh (09/29/20 1133)       Goals  Short term goals  Time Frame for Short term goals: 6 visits  Short term goal 1: Independent bed mobility  Short term goal 2: Independent transfers  Short term goal 3: Independent ambulation with least restricitve device 200 ft  Short term goal 4: Pt to tolerate 30 minutesof activity to improve strength and endurance. Patient Goals   Patient goals : Go home soon.        Therapy Time   Individual Concurrent Group Co-treatment   Time In 1046         Time Out 1106         Minutes 20         Timed Code Treatment Minutes: Niharika murray, PT

## 2020-09-29 NOTE — PLAN OF CARE
Problem: Pain:  Goal: Control of acute pain  Description: Control of acute pain  Outcome: Ongoing   Pt able to communicate pain as needed, uses call light appropriately. Pt satisfied with pain interventions. Problem: Skin Integrity:  Goal: Absence of new skin breakdown  Description: Absence of new skin breakdown  9/29/2020 0358 by Pratibha Castro RN  Outcome: Ongoing  Pt able to reposition independently in bed, assisting pt with turns PRN . Heels elevated off bed. Linens remain clean and dry. Will continue to monitor. Problem: Falls - Risk of:  Goal: Will remain free from falls  Description: Will remain free from falls  Outcome: Ongoing   Pt remains free of falls at this time. Bed locked in lowest position, siderails x2, call light in reach. Non-skid footwear applied. Encouraged pt to call for assistance as needed for safety. Bed alarm activated. Will continue to monitor.

## 2020-09-29 NOTE — PROGRESS NOTES
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 · HTN  · CAD  · CKD  · Pathological compression fracture of spine. · Superficial Lt gluteal abrasion    Recommendations:   · Nystatin swish for oral lesions. · Blood cultures show no growth -  Monitor off Antibiotics    Medical Decision Making/Summary/Discussion:   ·   Infection Control Recommendations   · [place] Precautions    Antimicrobial Stewardship Recommendations   · Simplification of therapy    Coordination of Outpatient Care:   · Estimated Length of IV antimicrobials: 9/25/2020  · Patient will need Midline Catheter Insertion: No  · Patient will need PICC line Insertion: No  · Patient will need: Home IV , [place],  SNF,  LTAC: TBD  · Patient will need outpatient wound care: Yes    Chief complaint/reason for consultation:   · SIRS, on Chemotherapy. History of Present Illness:   Paola Munguia is a 69y.o.-year-old  male who was initially admitted on 9/24/2020. Patient seen at the request of Dr. Mark Smith:    Patient was brought to hospital by family for decreased oral intake, confusion, generalized weakness. He has been complaining of fatigue. Patient has history of stage IV lung cancer with bone and brain metastases and was started recently on chemotherapy and immunotherapy. He was recently admitted to [facility] for pancytopenia and received Granix at that time. MRI thoracic spine from August 10, 2020 reveals T6 metastasis with pathologic compression fracture. Patient has underlying history of CAD, hypertension, hyperlipidemia, ischemic cardiomyopathy. S/P ICD placement. Patient is on long-term opioid medication for cancer pain, Megace for loss of appetite.      Evaluation in the ER showed temperature 98.2, heart rate 116, respiratory 16, blood pressure 123/76, SPO2 92%. Lab evaluation showed sodium 138, potassium 4.1, chloride 107, bicarb 18, creatinine 0.59, BUN 18. CK was elevated at 413, hemoglobin 710, proBNP 3856. EKG showed QTC prolongation without any acute ST changes but had T wave inversion in V2 and aVL. Device interrogation emergency room showed nonsustained V. tach on 9/17/2020 and heart rate 130's. During this admission,  CT head was done due to altered mental status. No evidence of acute intracranial hemorrhage or infarct. Marked improvement of intracranial metastatic disease  CT chest PE was done with no evidence of pulmonary embolus. Pathologic T6 compression fracture with enlarging right seventh rib metastasis and new sternal metastasis noted    He was found to have some oral ulcers with white plaques       CURRENT EVALUATION: 09/29/20     Afebrile  VSS    Mentation is stable  Monitored off antibiotics 9-28-20  The patient underwent kyphoplasty for T6 9-29-20 with a decrease in pain immediately following surgery  Chest CT negative for pneumonia    He had a neutropenia after chemotherapy 9-10-20, white count has recovered to a range of the teens after Neupogen    Pt using Nystatin swish and swallow for oral lesions. Patient feels better  Discharge plans in progress      Labs: 9/29/2020      BUN:18>16>15  Creatinine:0.59>0.92>0.83>0.75  Glucose: 101>105  Calcium: 7.8>7.8>7.3    Hgb:9.4>9.8>8.9  Platelet:62>70>144  WBC: 15.0>16.4 >17>19.5    Cultures:  Urine:  ·   Blood:  · 9-24-20: No growth  · 9-24-20: No growth  Sputum :  ·   Wound:     CSF  ·       IMAGING:    CT Chest PE W Contrast- 9/24/2020  No definite scan evidence for pulmonary embolus. Pathologic T6 compression fracture with enlarging right 7th rib metastasis   and new sternal metastasis. Improving right upper lobe nodule, right lower lobe mass and, right hilar and mediastinal adenopathy.  Low-density lesion in the spleen could represent a metastasis or a hemangioma. CT Head wo Contrast-9/24/2020  No evidence of acute intracranial hemorrhage or infarct. Marked improvement of intracranial metastatic disease.  Slight residual vasogenic edema seen in the left frontoparietal centrum semiovale.  If clinically indicated, follow-up MRI of the brain could be done for further evaluation. 9-25-20:          Discussed with patient, RN, family. I have personally reviewed the past medical history, past surgical history, medications, social history, and family history, and I have updated the database accordingly. Past Medical History:     Past Medical History:   Diagnosis Date    Cancer [facility])     right lung    Cardiac resynchronization therapy defibrillator (CRT-D) in place     Chest pain 02/08/2019    Coronary artery disease     Dyspnea on exertion     Elevated troponin 02/08/2019    Heart attack ([facility] [address] 2012    Hyperlipidemia     Hypertension     Ischemic cardiomyopathy     NSTEMI (non-ST elevated myocardial infarction) ([facility] [address] 2/8/2019       Past Surgical  History:     Past Surgical History:   Procedure Laterality Date    CORONARY ANGIOPLASTY WITH STENT PLACEMENT  2012    BMS to LAD    CT BIOPSY PERCUTANEOUS DEEP BONE  8/11/2020    CT BIOPSY PERCUTANEOUS DEEP BONE 8/11/2020 [facility] CT SCAN    FIXATION KYPHOPLASTY  09/28/2020    KYPHOPLASTY, ABLATION WITH T6 BONE BIOPSY     IR PORT PLACEMENT EQUAL OR GREATER THAN 5 YEARS  8/24/2020    IR PORT PLACEMENT EQUAL OR GREATER THAN 5 YEARS 8/24/2020 Rupa Perez MD [facility] SPECIAL PROCEDURES    KYPHOSIS SURGERY N/A 9/28/2020    KYPHOPLASTY, ABLATION WITH T6 BONE BIOPSY performed by Ruslan Jade MD at [place] [id number]  02/11/2019    AICD/ MED[employer]  MODEL #[id number] CRTD AMPLIA MRI USDF4.  LEADS [id number], [id number] AND 9904-13        Medications:      aspirin  81 mg Oral Daily    enoxaparin  40 mg Subcutaneous Daily    carvedilol  12.5 mg Oral BID WC    folic acid  1 mg Oral double vision or blurry vision. No conjunctival inflammation. ENT: No oral thrush at this time. Cardiovascular: No chest pain or palpitations. No shortness of breath. No GRIFFITHS  Lung: No shortness of breath or cough. No sputum production  Abdomen: No nausea, vomiting, diarrhea, or abdominal pain. Armando Francis No cramps. Genitourinary: No increased urinary frequency, or dysuria. No hematuria. No suprapubic or CVA pain. Musculoskeletal: C/O back pain  Hematologic: No bleeding or bruising. Neurologic: No headache, alert responsive  Integument: No rash, no ulcers. Psychiatric: No depression. Endocrine: No polyuria, no polydipsia, no polyphagia. Physical Examination :     Patient Vitals for the past 8 hrs:   BP Temp Temp src Pulse Resp SpO2 Weight   09/29/20 0733 112/62 98.9 °F (37.2 °C) Axillary 96 17 94 % --   09/29/20 0600 -- -- -- -- -- -- 138 lb 10.7 oz (62.9 kg)   09/29/20 0332 113/60 98.7 °F (37.1 °C) Axillary 101 23 96 % --     General Appearance: Awake, alert,Cachetic, ill appearing and in apparent distress  Head:  Normocephalic, no trauma  ENT: Pt has lesions on posterior tongue and palate. Neck:Supple, without lymphadenopathy. Thyroid normal, No bruits. Pulmonary/Chest: Clear to auscultation, without wheezes, rales, or rhonchi. Pt has ICD on the left upper chest and Port for chemotherapy on right upper chest.  Cardiovascular: Regular rate and rhythm without murmurs, rubs, or gallops. Abdomen: Soft, non tender. Bowel sounds normal. No organomegaly  All four Extremities: Pt has b/l non-pitting lower extremity edema. Neurologic: No gross sensory or motor deficits. Skin: Warm and dry with good turgor. No signs of peripheral arterial or venous insufficiency. No ulcerations. Wound on left buttock.         Medical Decision Making -Laboratory:   I have independently reviewed/ordered the following labs:    CBC with Differential:   Recent Labs     09/27/20  0637   WBC 19.5*   HGB 8.9*   HCT 29.0*        BMP: Recent Labs     09/28/20  0452 09/29/20  0535    137   K 3.2* 4.2    106   CO2 21 19*   BUN 15 15   CREATININE 0.83 0.75     Hepatic Function Panel:   No results for input(s): PROT, LABALBU, BILIDIR, IBILI, BILITOT, ALKPHOS, ALT, AST in the last 72 hours. No results for input(s): RPR in the last 72 hours. No results for input(s): HIV in the last 72 hours. No results for input(s): BC in the last 72 hours. Lab Results   Component Value Date    MUCUS NOT REPORTED 09/25/2020    RBC 2.93 09/27/2020    TRICHOMONAS NOT REPORTED 09/25/2020    WBC 19.5 09/27/2020    YEAST NOT REPORTED 09/25/2020    TURBIDITY CLEAR 09/25/2020     Lab Results   Component Value Date    CREATININE 0.75 09/29/2020    GLUCOSE 86 09/29/2020       Medical Decision Making-Imaging:     EXAMINATION:    CTA OF THE CHEST 9/24/2020 9:16 pm         TECHNIQUE:    CTA of the chest was performed after the administration of intravenous    contrast.  Multiplanar reformatted images are provided for review.  MIP    images are provided for review. Dose modulation, iterative reconstruction,    and/or weight based adjustment of the mA/kV was utilized to reduce the    radiation dose to as low as reasonably achievable.         COMPARISON:    08/09/2020         HISTORY:    ORDERING SYSTEM PROVIDED HISTORY: hx metastatic lung CA, tachycardic and    hypoxic    TECHNOLOGIST PROVIDED HISTORY:    hx metastatic lung CA, tachycardic and hypoxic    Reason for Exam: pt currently on chemo,    Acuity: Acute    Type of Exam: Initial         FINDINGS:    Pulmonary Arteries:  There is mild motion artifact.  No definite evidence of    intraluminal filling defect to suggest pulmonary embolism.  Main pulmonary    artery is normal in caliber.         Mediastinum: Mediastinal and right hilar adenopathy has mildly improved.  The    heart and pericardium demonstrate no acute abnormality.  The left ventricle    is dilated.  There is no acute abnormality of the thoracic aorta.         Lungs/pleura: There is a small right pleural effusion, increased in volume. No effusion is seen on the left.  There is no pneumothorax.  The    low-attenuation subpleural right lower lobe mass has decreased in size and    now measures 3.0 x 3.5 cm.  The right upper lobe nodule adjacent to the right    hilum is decreased in size and now measures 9 x 13 mm.         Upper Abdomen: There is a 1.6 x 1.7 cm low-density lesion anteriorly in the    spleen.  The visualized upper abdomen is otherwise unremarkable.         Soft Tissues/Bones: There is a pathologic compression fracture at T6 with    approximately 70% loss of vertebral body height and minimal retropulsion. There is increased destruction and associated soft tissue involving the right    7th rib.  There are subtle lytic lesions in the sternum.              Impression    1. No definite scan evidence for pulmonary embolus. 2. Pathologic T6 compression fracture with enlarging right 7th rib metastasis    and new sternal metastasis. 3. Improving right upper lobe nodule, right lower lobe mass and, right hilar    and mediastinal adenopathy.     4. Low-density lesion in the spleen could represent a metastasis or a    hemangioma.           EXAMINATION:    CT OF THE HEAD WITHOUT CONTRAST  9/24/2020 9:38 pm         TECHNIQUE:    CT of the head was performed without the administration of intravenous    contrast. Dose modulation, iterative reconstruction, and/or weight based    adjustment of the mA/kV was utilized to reduce the radiation dose to as low    as reasonably achievable.         COMPARISON:    Unenhanced head CT dated 08/09/2020 and MRI brain dated 08/10/2020         HISTORY:    ORDERING SYSTEM PROVIDED HISTORY: AMS, hx thrombocytopenia    TECHNOLOGIST PROVIDED HISTORY:    AMS, hx thrombocytopenia         Reason for Exam: ams    Acuity: Acute    Type of Exam: Initial         FINDINGS:    BRAIN/VENTRICLES: There is no evidence of an acute infarct or intracranial    hemorrhage. Worth Gentleman has been marked improvement of vasogenic edema seen    previously, most prominent in the left parietal lobe but also in the right    parietal lobe.  Subtle residual hypodensity is seen in the left    frontoparietal centrum semiovale on series 2 images 43 through 51.  The    findings are suggestive of improvement of known intracranial metastatic    disease.  Mild atrophic changes are again noted.  There is no evidence of    midline shift.         ORBITS: The visualized portion of the orbits demonstrate no acute abnormality.         SINUSES: The visualized paranasal sinuses and mastoid air cells demonstrate    no acute abnormality.         SOFT TISSUES/SKULL:  No acute abnormality of the visualized skull or soft    tissues.              Impression    1. No evidence of acute intracranial hemorrhage or infarct. 2. Marked improvement of intracranial metastatic disease.  Slight residual    vasogenic edema seen in the left frontoparietal centrum semiovale.  If    clinically indicated, follow-up MRI of the brain could be done for further    evaluation.                    Medical Decision Making-Other: Thank you for allowing us to participate in the care of this patient. Please call with questions. Sumit Zambrano DPM     ATTESTATION:    I have discussed the case, including pertinent history and exam findings with the residents and students. I have seen and examined the patient and the key elements of the encounter have been performed by me. I was present when the student obtained his information or examined the patient. I have reviewed the laboratory data, other diagnostic studies and discussed them with the residents. I have updated the medical record where necessary. I agree with the assessment, plan and orders as documented by the resident/ student.     Jerry Delvalle MD.

## 2020-10-01 NOTE — TELEPHONE ENCOUNTER
Received phone call from Lukas for home care from [address] [place] [address] care [facility]. Margarita Barnhart has compression fracture from cancer and was wondering if Dr Chay Holcomb would sign home care orders?   He does not have PCP  Her phone number is [phone]      Please advise

## 2020-10-05 NOTE — TELEPHONE ENCOUNTER
Teena Arelalno MD VISIT & TX  DR BUSTAMANTE IN TO SEE PATIENT  ORDERS RECEIVED  KEYTRUDA ONLY TODAY  1000 ML IVF TOTAL  PT WOULD LIKE TO TALK TO C ASE MANAGER/ FOR ORAL MEDS  RV 3 WEEKS  SENT A MESSAGE TO GREGORIO REYNOLDS)  MD VISIT 10/26/20 @9:45AM  [email or web address]  SCRIPT SENT TO PATIENTS PHARMACY  AVS PRINTED AND GIVEN TO PATIENT WITH INSTRUCTIONS  PATIENT REMAINS IN [address] [place] [address]

## 2020-10-05 NOTE — PROGRESS NOTES
Patient arrive ambulatory for cycle 2 day 1 treatment and MD visit. Pt was recently admitted to the hospital and has Kyphoplasty done 10/2/20   Pt has SOB , decreased appetite , increased fatigue . Pt has some concerns about wound on back , wound is red and painful . Denies any  other complaint or concern. Port accessed; specimen sent. Labs reviewed. MD met with patient , will hold Carbo and Alimta and give Sheeba Allegra today . Write cleansed wound and changed dressing on pt back , per Dr. Constantino Getting infused with no sign of adverse reaction; line flushed  Port flushed and heparinized with intact caraballo needle removed per protocol.   Patient ambulate off unit per self at discharge
abdomen soft, non-tender, and non-distended. Bowel sounds present.

## 2020-10-05 NOTE — PROGRESS NOTES
SW reviewed patient's psychosocial distress screen and he has no needs at this time. Patient recently discharged from [facility] with [facility] in place. Patient lives with his son and daughter-in-law who help with his care. Patient has Medicare in place and is also involved with the [place]. SW following as needed. Leah Oliva.  Sangeetha Maki

## 2020-10-05 NOTE — PROGRESS NOTES
Gabrielle Rodriguez                                                                                                                  10/5/2020  MRN:   [id number]  YOB: 1950  PCP:                           Deep Chamberlain MD  Referring Physician: No ref. provider found  Treating Physician Name: Deep Chamberlain MD      Reason for visit:  Chief Complaint   Patient presents with    Chemotherapy    Follow-up     had recent surgery   Post hospital discharge follow-up. Toxicity check. Discussed treatment plan. Current problems:  Metastatic poorly differentiated adenocarcinoma of lung primary  Brain metastasis  Bone metastasis    Active and recent treatments:  Whole brain radiation, 3000 cGy-8/2020  Palliative radiation to thoracic spine  Thoracic spine kyphoplasty- 9/2020  Systemic therapy using carboplatin Alimta and Keytruda    Summary of Case/History:    Gabrielle Rodriguez a 69 y.o.male is a patient with metastatic poorly differentiated adenocarcinoma of lung with brain and bone mets. Patient was initially seen at [place] with complains of right upper extremity weakness. Work-up in the hospital also showed a lung mass. Initially stroke alert was called but further work-up revealed that patient right-sided weakness was because of brain metastasis. Patient was subsequently started on steroids. Patient MRI spine also showed thoracic spine metastasis. Biopsy from the iliac bone came back consistent with adenocarcinoma lung primary    Interim History:    Patient presents to the clinic for a follow-up visit and to discuss further treatment plan. Since last office visit patient was hospitalized. Patient underwent kyphoplasty since then back pain has improved. Patient has lost about 5 pounds in weight. Complains of poor appetite. Has been taking Megace but still complains of loss of appetite. Denies nausea or vomiting.   Does complain of noticing fever at home however he has been taking Tylenol. He was started on ciprofloxacin earlier. Patient has elevated white cell count but it has been persistently elevated. Denies any cough or discolorized phlegm. Back is warm to touch but patient earlier received radiation there    During this visit patient's allergy, social, medical, surgical history and medications were reviewed and updated. Past Medical History:   Past Medical History:   Diagnosis Date    Cancer [facility])     right lung    Cardiac resynchronization therapy defibrillator (CRT-D) in place     Chest pain 02/08/2019    Coronary artery disease     Dyspnea on exertion     Elevated troponin 02/08/2019    Heart attack ([facility] 2012    Hyperlipidemia     Hypertension     Ischemic cardiomyopathy     NSTEMI (non-ST elevated myocardial infarction) ([facility] 2/8/2019       Past Surgical History:     Past Surgical History:   Procedure Laterality Date    CORONARY ANGIOPLASTY WITH STENT PLACEMENT  2012    BMS to LAD    CT BIOPSY PERCUTANEOUS DEEP BONE  8/11/2020    CT BIOPSY PERCUTANEOUS DEEP BONE 8/11/2020 [facility] CT SCAN    FIXATION KYPHOPLASTY  09/28/2020    KYPHOPLASTY, ABLATION WITH T6 BONE BIOPSY     IR PORT PLACEMENT EQUAL OR GREATER THAN 5 YEARS  8/24/2020    IR PORT PLACEMENT EQUAL OR GREATER THAN 5 YEARS 8/24/2020 Mady Wright MD [facility] SPECIAL PROCEDURES    KYPHOSIS SURGERY N/A 9/28/2020    KYPHOPLASTY, ABLATION WITH T6 BONE BIOPSY performed by Alia Ramires MD at [place] [id number]  02/11/2019    AICD/ MEDTR[employer]IC  MODEL #MKAJ1E9 CRTD AMPLIA MRI USDF4.  LEADS [id number], [id number] AND 4187-57        Patient Family Social History:     Social History     Socioeconomic History    Marital status:      Spouse name: None    Number of children: None    Years of education: None    Highest education level: None   Occupational History    None   Social Needs    Financial resource strain: None    Food insecurity     Worry: None     Inability: None    Transportation needs     Medical: None     Non-medical: None   Tobacco Use    Smoking status: Former Smoker     Types: Cigarettes     Last attempt to quit: 2012     Years since quittin.1    Smokeless tobacco: Never Used    Tobacco comment: 48 yr hx of smoking   Substance and Sexual Activity    Alcohol use: No    Drug use: Not Currently     Types: Marijuana     Comment: last smoked 2020    Sexual activity: None   Lifestyle    Physical activity     Days per week: None     Minutes per session: None    Stress: None   Relationships    Social connections     Talks on phone: None     Gets together: None     Attends Shinto service: None     Active member of club or organization: None     Attends meetings of clubs or organizations: None     Relationship status: None    Intimate partner violence     Fear of current or ex partner: None     Emotionally abused: None     Physically abused: None     Forced sexual activity: None   Other Topics Concern    None   Social History Narrative    None     Family History   Problem Relation Age of Onset    Cancer Mother     Cancer Father     Prostate Cancer Father        Current Medications:     Current Outpatient Medications   Medication Sig Dispense Refill    ciprofloxacin (CIPRO) 500 MG tablet Take 1 tablet by mouth 2 times daily for 7 days 14 tablet 0    nystatin (MYCOSTATIN) 611014 UNIT/ML suspension Take 5 mLs by mouth 4 times daily for 10 days 200 mL 0    megestrol (MEGACE ES) 625 MG/5ML suspension Take 5 mLs by mouth daily 150 mL 3    ondansetron (ZOFRAN ODT) 4 MG disintegrating tablet Take 1 tablet by mouth every 8 hours as needed for Nausea 20 tablet 0    polyethylene glycol (GLYCOLAX) 17 g packet Take 17 g by mouth daily as needed for Constipation      folic acid (FOLVITE) 1 MG tablet Take 1 tablet by mouth daily START 7 DAYS BEFORE FIRST DOSE OF PEMETREXED---TO BE TAKEN DAILY --AND CONTINUE 21 DAYS AFTER LAST DOSE OF PEMETREXED 30 tablet 2    pantoprazole (PROTONIX) 40 MG tablet Take 1 tablet by mouth every morning (before breakfast) 30 tablet 3    aspirin 81 MG chewable tablet Take 1 tablet by mouth daily 30 tablet 3    nitroGLYCERIN (NITROSTAT) 0.4 MG SL tablet up to max of 3 total doses. If no relief after 1 dose, call 911. 25 tablet 3    ciprofloxacin (CIPRO) 500 MG/5ML (10%) suspension Take 5 mLs by mouth 2 times daily for 10 days 100 mL 0    carvedilol (COREG) 12.5 MG tablet Take 12.5 mg by mouth 2 times daily (with meals)       No current facility-administered medications for this visit. Allergies:   Penicillins    Review of Systems:    Constitutional: No fever or chills. No night sweats, positive weight loss, poor appetite  Eyes: No eye discharge, double vision, or eye pain   HEENT: negative for sore mouth, sore throat, hoarseness and voice change   Respiratory: negative for cough , sputum, dyspnea, wheezing, hemoptysis, chest pain   Cardiovascular: negative for chest pain, dyspnea, palpitations, orthopnea, PND   Gastrointestinal: negative for nausea, vomiting, diarrhea, constipation, abdominal pain, Dysphagia, hematemesis and hematochezia   Genitourinary: negative for frequency, dysuria, nocturia, urinary incontinence, and hematuria   Integument: Erythema over the back  Hematologic/Lymphatic: negative for easy bruising, bleeding, lymphadenopathy, or petechiae   Endocrine: negative for heat or cold intolerance,weight changes, change in bowel habits and hair loss   Musculoskeletal: negative for myalgias, arthralgias, pain, joint swelling,and bone pain +severe back pain radiating down legs  Neurological: negative for headaches, dizziness, seizures,  numbness.   Positive right-sided weakness        Physical Exam:    Vitals: BP (!) 94/55 (Site: Right Upper Arm, Position: Sitting)   Pulse 62   Temp 97.8 °F (36.6 °C) (Oral)   Resp 20   Wt 119 lb (54 kg)   BMI 19.80 kg/m²   General appearance -frail appearing, no in pain or distress  Mental status - AAO X3  Eyes - pupils equal and reactive, extraocular eye movements intact  Mouth - mucous membranes moist, pharynx normal without lesions  Neck - supple, no significant adenopathy  Lymphatics - no palpable lymphadenopathy, no hepatosplenomegaly  Chest - clear to auscultation, no wheezes, rales or rhonchi, symmetric air entry  Heart - normal rate, regular rhythm, normal S1, S2, no murmurs  Abdomen - soft, nontender, nondistended, no masses or organomegaly  Neurological - alert, oriented, normal speech. Right upper extremity weakness  Extremities - peripheral pulses normal, no pedal edema, no clubbing or cyanosis  Skin -erythema over the back.       DATA:    Results for orders placed or performed during the hospital encounter of 10/05/20   Lipid Panel   Result Value Ref Range    Cholesterol 151 <200 mg/dL    HDL 12 (L) >40 mg/dL    LDL Cholesterol 74 0 - 130 mg/dL    Chol/HDL Ratio 12.6 (H) <5    Triglycerides 325 (H) <150 mg/dL    VLDL NOT REPORTED (H) 1 - 30 mg/dL   TSH without Reflex   Result Value Ref Range    TSH 4.95 0.30 - 5.00 mIU/L   Comprehensive Metabolic Panel   Result Value Ref Range    Glucose 118 (H) 70 - 99 mg/dL    BUN 18 8 - 23 mg/dL    CREATININE 1.08 0.70 - 1.20 mg/dL    Bun/Cre Ratio 17 9 - 20    Calcium 7.7 (L) 8.6 - 10.4 mg/dL    Sodium 137 135 - 144 mmol/L    Potassium 3.8 3.7 - 5.3 mmol/L    Chloride 105 98 - 107 mmol/L    CO2 22 20 - 31 mmol/L    Anion Gap 10 9 - 17 mmol/L    Alkaline Phosphatase 210 (H) 40 - 129 U/L    ALT 13 5 - 41 U/L    AST 19 <40 U/L    Total Bilirubin 0.59 0.3 - 1.2 mg/dL    Total Protein 4.8 (L) 6.4 - 8.3 g/dL    Alb 2.2 (L) 3.5 - 5.2 g/dL    Albumin/Globulin Ratio NOT REPORTED 1.0 - 2.5    GFR Non-African American >60 >60 mL/min    GFR African American >60 >60 mL/min    GFR Comment          GFR Staging NOT REPORTED    CBC Auto Differential   Result Value Ref Range    WBC 20.7 (H) 3.5 - 11.3 k/uL    RBC 3.34 (L) 4.21 - 5.77 m/uL    Hemoglobin 9.8 (L) 13.0 - 17.0 g/dL Hematocrit 32.0 (L) 40.7 - 50.3 %    MCV 95.8 82.6 - 102.9 fL    MCH 29.3 25.2 - 33.5 pg    MCHC 30.6 28.4 - 34.8 g/dL    RDW 21.1 (H) 11.8 - 14.4 %    Platelets 874 549 - 753 k/uL    MPV 10.0 8.1 - 13.5 fL    NRBC Automated 0.1 (H) 0.0 per 100 WBC    Differential Type NOT REPORTED     WBC Morphology NOT REPORTED     RBC Morphology NOT REPORTED     Platelet Estimate NOT REPORTED     Seg Neutrophils 74 (H) 36 - 66 %    Lymphocytes 8 (L) 24 - 44 %    Monocytes 7 1 - 7 %    Eosinophils % 1 1 - 4 %    Basophils 1 %    Immature Granulocytes 9 (H) 0 %    Segs Absolute 15.31 (H) 1.8 - 7.7 k/uL    Absolute Lymph # 1.66 1.0 - 4.8 k/uL    Absolute Mono # 1.45 (H) 0.2 - 0.8 k/uL    Absolute Eos # 0.21 0.0 - 0.4 k/uL    Basophils Absolute 0.21 (H) 0.0 - 0.2 k/uL    Absolute Immature Granulocyte 1.86 (H) 0.00 - 0.30 k/uL    Morphology ANISOCYTOSIS PRESENT      Xr Thoracic Spine (2 Views)    Result Date: 9/28/2020  EXAMINATION: SPOT FLUOROSCOPIC IMAGES 9/28/2020 3:21 pm TECHNIQUE: Fluoroscopy was provided by the radiology department for procedure. Radiologist was not present during examination. FLUOROSCOPY DOSE AND TYPE OR TIME AND EXPOSURES: 123.9 seconds. Air kerma 12.69 mGy. COMPARISON: CT chest dated 09/24/2020 HISTORY: Intraprocedural imaging. FINDINGS: 6 spot images of the thoracic spine were obtained. Limited intraoperative fluoroscopic spot images demonstrate instruments utilized to administer kyphoplasty cement in the midthoracic spine. Intraprocedural fluoroscopic spot images as above. See separate procedure report for more information. Ct Head Wo Contrast    Result Date: 9/24/2020  EXAMINATION: CT OF THE HEAD WITHOUT CONTRAST  9/24/2020 9:38 pm TECHNIQUE: CT of the head was performed without the administration of intravenous contrast. Dose modulation, iterative reconstruction, and/or weight based adjustment of the mA/kV was utilized to reduce the radiation dose to as low as reasonably achievable. COMPARISON: Unenhanced head CT dated 08/09/2020 and MRI brain dated 08/10/2020 HISTORY: ORDERING SYSTEM PROVIDED HISTORY: AMS, hx thrombocytopenia TECHNOLOGIST PROVIDED HISTORY: AMS, hx thrombocytopenia Reason for Exam: ams Acuity: Acute Type of Exam: Initial FINDINGS: BRAIN/VENTRICLES: There is no evidence of an acute infarct or intracranial hemorrhage. There has been marked improvement of vasogenic edema seen previously, most prominent in the left parietal lobe but also in the right parietal lobe. Subtle residual hypodensity is seen in the left frontoparietal centrum semiovale on series 2 images 43 through 51. The findings are suggestive of improvement of known intracranial metastatic disease. Mild atrophic changes are again noted. There is no evidence of midline shift. ORBITS: The visualized portion of the orbits demonstrate no acute abnormality. SINUSES: The visualized paranasal sinuses and mastoid air cells demonstrate no acute abnormality. SOFT TISSUES/SKULL:  No acute abnormality of the visualized skull or soft tissues. 1. No evidence of acute intracranial hemorrhage or infarct. 2. Marked improvement of intracranial metastatic disease. Slight residual vasogenic edema seen in the left frontoparietal centrum semiovale. If clinically indicated, follow-up MRI of the brain could be done for further evaluation. Xr Chest Portable    Result Date: 9/24/2020  EXAMINATION: ONE XRAY VIEW OF THE CHEST 9/24/2020 6:10 pm COMPARISON: 09/19/2020 HISTORY: ORDERING SYSTEM PROVIDED HISTORY: hypoxic, tachycardic, metastatic lung CA, on chemo TECHNOLOGIST PROVIDED HISTORY: hypoxic, tachycardic, metastatic lung CA, on chemo Reason for Exam: hypoxic, tachycardic, metastic lung CA, on chemo Acuity: Unknown Type of Exam: Unknown FINDINGS: Cardiac silhouette is normal in size. Vascular port terminates overlying the expected location of the SVC. Implantable cardiac device leads overlying the right atrium and ventricle. Acuity: Acute Type of Exam: Initial FINDINGS: Pulmonary Arteries: There is mild motion artifact. No definite evidence of intraluminal filling defect to suggest pulmonary embolism. Main pulmonary artery is normal in caliber. Mediastinum: Mediastinal and right hilar adenopathy has mildly improved. The heart and pericardium demonstrate no acute abnormality. The left ventricle is dilated. There is no acute abnormality of the thoracic aorta. Lungs/pleura: There is a small right pleural effusion, increased in volume. No effusion is seen on the left. There is no pneumothorax. The low-attenuation subpleural right lower lobe mass has decreased in size and now measures 3.0 x 3.5 cm. The right upper lobe nodule adjacent to the right hilum is decreased in size and now measures 9 x 13 mm. Upper Abdomen: There is a 1.6 x 1.7 cm low-density lesion anteriorly in the spleen. The visualized upper abdomen is otherwise unremarkable. Soft Tissues/Bones: There is a pathologic compression fracture at T6 with approximately 70% loss of vertebral body height and minimal retropulsion. There is increased destruction and associated soft tissue involving the right 7th rib. There are subtle lytic lesions in the sternum. 1. No definite scan evidence for pulmonary embolus. 2. Pathologic T6 compression fracture with enlarging right 7th rib metastasis and new sternal metastasis. 3. Improving right upper lobe nodule, right lower lobe mass and, right hilar and mediastinal adenopathy. 4. Low-density lesion in the spleen could represent a metastasis or a hemangioma. Impression:  Metastatic poorly differentiated adenocarcinoma of lung primary, no actionable mutation, PDL 1 expression 99%  Brain metastasis  Bone metastasis  History of tobacco dependence, quit 8 years ago  Thrombocytopenia     Plan:  I had a detailed discussion with the patient and his daughter-in-law. Reviewed goals and expectations.   Reviewed records from hospitalization. Patient has some erythema over the back which is likely related to the radiation to the thoracic spine however patient is on antibiotics for fever he had earlier. The boundaries of the erythema were marked and patient will let us know if it expands which would be suggestive of a cellulitis  Toxicity check performed. Patient had high-grade toxicity from the combination chemotherapy and immunotherapy. Based on the next addition sequencing patient has high PDL 1 expression. We will proceed with cycle #2 with [facility] only on hold chemotherapy and reassess at cycle 3  CT scan done in hospital does show decrease in size of the primary lung tumor  Patient counseled on use of pain medication. I will call in Marinol to help stimulate appetite. Patient has been taking Megace for the last 3 weeks but it is not helping  I will also give 1 L of IV fluid with treatment  Home health care will also be established during this week. Patient's conditions  were taken into consideration when deciding risk-benefit for therapy. Patient is at high risk for drug interaction risk of complications. Given multiple comorbid conditions patient is at high risk for complication from intervention, risk of hospitalization, medical interaction overall life expectancy. NCCN guidelines were reviewed and discussed with the patient. The diagnosis and care plan were discussed with the patient in detail. I discussed the natural history of the disease, prognosis, risks and goals of therapy and answered all the patients questions to the best of my ability. Patient expressed understanding and was in agreement. Prerna Elise      This note is created with the assistance of a speech recognition program.  While intending to generate a document that actually reflects the content of the visit, the document can still have some errors including those of syntax and sound a like substitutions which may escape proof reading.   It such instances, actual meaning can be extrapolated by contextual diversion.

## 2020-10-05 NOTE — PROGRESS NOTES
SW received a referral that patient needs help with his scripts because he does not have script coverage. Patient has Medicare Part A and B and is also a [place] and SW has discussed script coverage with patient's daughter, Rachel Mesa, previously. SW called and patient and his daughter and patient prescribed Marinol. SW asked about status of applying for Part D, Medicare prescription coverage, and getting connected to the [place]. Rachel Mesa states they have done neither due to patient's frequent hospitalizations. SW asked if they had received any mail from [employer] [facility] as it is open enrollment currently and they have, but have not opened it yet. SW explained the process and a penalty that he may incur due to not enrolling right away. They were encouraged to do this as the [facility] can only help short-term if patient has access to coverage and chooses not to get the coverage. They were also again encouraged to contact the [place] about services. SW looked up the cost of Marinol on zipcodemailer.com and at [facility] it is $34.  Rachel Mesa states she would rather just pay this than go to [email or web address] to  the script, as this is the only location SW able to approve scripts. Rachel Mesa did also state that they did not fill the previous script on the [facility] because it was denied due to being a narcotic. SW apologized and explained that cancer patients are exempt. SW encouraged daughter/patient  to contact SW directly with any further issues, which they state they will do. Heron Mckeon.  Lurlean Galeazzi

## 2020-10-05 NOTE — PATIENT INSTRUCTIONS
Maribell Murillo only today   1000 ml  ivf total     Pt would like ot talk to  /  for oral meds     rv in 3 weeks

## 2020-10-06 NOTE — TELEPHONE ENCOUNTER
Name: Lit Trujillo  : 1950  MRN: [id number]    Oncology Navigation Follow-Up Note  Navigator spoke with pts. daughter and writer offering assistance if needed. [facility] opened case on , but have not been to see pt since. Writer spoke with Orquidea Garvin from [facility] and they are needing verbal confirmation from PCP office for continuation of care. In review of chart Dr. Zak Webber confirming continuation of care with Home Care and writer spoke with MD about continuation of care. Navigator spoke with Malinda Quevedo and informed to proceed with continuation of care and Dr. Zak Webber would be following. Plan to give message to Triage.   Electronically signed by Ovi Rivera RN on 10/6/2020 at 10:57 AM

## 2020-10-09 PROBLEM — I50.9 CHF WITH UNKNOWN LVEF (HCC): Status: ACTIVE | Noted: 2020-01-01

## 2020-10-09 NOTE — ED PROVIDER NOTES
[facility]     Emergency Department     Faculty Attestation    I performed a history and physical examination of the patient and discussed management with the resident. I have reviewed and agree with the residents findings including all diagnostic interpretations, and treatment plans as written at the time of my review. Any areas of disagreement are noted on the chart. I was personally present for the key portions of any procedures. I have documented in the chart those procedures where I was not present during the key portions. For Physician Assistant/ Nurse Practitioner cases/documentation I have personally evaluated this patient and have completed at least one if not all key elements of the E/M (history, physical exam, and MDM). Additional findings are as noted. This patient was evaluated in the Emergency Department for symptoms described in the history of present illness. The patient was evaluated in the context of the global COVID-19 pandemic, which necessitated consideration that the patient might be at risk for infection with the SARS-CoV-2 virus that causes COVID-19. Institutional protocols and algorithms that pertain to the evaluation of patients at risk for COVID-19 are in a state of rapid change based on information released by regulatory bodies including the CDC and federal and state organizations. These policies and algorithms were followed during the patient's care in the ED. Primary Care Physician: Cliff Paul MD    History: This is a 69 y.o. male who presents to the Emergency Department with complaint of fatigue. Patient had a fall 2 nights ago. Also complains of decreased appetite and generalized weakness. Patient also complains of dyspnea on exertion. The patient has a history of cancer and is undergoing chemotherapy and has had some recent kyphoplasty as well. Physical:   temperature is 98.7 °F (37.1 °C).  His blood pressure is 119/82 and his pulse is 115. His respiration is 18 and oxygen saturation is 98%. Lungs are clear to auscultation bilaterally, heart tachycardic with a regular rhythm, abdomen is soft nontender patient moves all extremities well. Impression: Fatigue    Plan: Chest x-ray, EKG, CBC, CMP, troponin, urinalysis, lactic acid      EKG Interpretation    Interpreted by me  Atrial sensed ventricular paced rhythm ventricular rate of 112, biventricular paced  Compared EKG of September 29, 2020, ventricular rate has increased by 16      (Please note that portions of this note were completed with a voice recognition program.  Efforts were made to edit the dictations but occasionally words are mis-transcribed.)    Hayde Tompkins.  Anjel Kaur MD, [facility]  Attending Emergency Medicine Physician        Jamee Kilgore MD  10/09/20 1127
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
## 2020-10-09 NOTE — TELEPHONE ENCOUNTER
RECEIVED A VM FROM DENNIS @ 0673 REPORTING THAT DEDE HAD RECEIVED HIS IMMUNOTHERAPY EARLIER IN THE WEEK AND HAS JUST CONTINUED TO DECLINE ALL WEEK, THIS AM HE ASKED TO GO TO [facility]. DENNIS IS WONDERING IF HIS HOME CARE NURSE COULD GIVE HIM SOME IV FLUIDS? WRITER SPOKE WITH MIK AT [place] [id number] AND WITH THE BENEFITS / SELECT COMPANY THEY HAVE TO USE FOR FLUIDS/SUPPLIES IT WOULD BE DIFFICULT TO GIVE FLUIDS YET TODAY. DEDE DOES HAVE A HOME CARE VISIT  TODAY. WRITER PHONED AND SPOKE WITH DENNIS 0932 TO INQUIRE ON DETAILS OF Ole Other. HE HAS BEEN ON COUCH ALL WEEK, USING A URINAL. STILL ON CIPRO WITH TWO DAYS LEFT AND NO FEVER. PULSE  -114 TAKING VERY LITTLE PO LIQUID OR SOLIDS. URINE IS DARK AND CONCENTRATED AND HE ASKED DENNIS TO GO TO [facility]. PT/ OT WERE IN THE HOME YESTERDAY AND COULD NOT EVEN GET HIM UP. THERE IS AN INFORMATIONAL MEETING SCHEDULED FOR 1030 TODAY WITH HOSPICE, THEY AREN'T CERTAIN IF THEY WANT TO GO THIS WAY. [id number] LEFT VM FOR DR Sadler Feeling TO UPDATE AND GET ORDERS.

## 2020-10-09 NOTE — TELEPHONE ENCOUNTER
I called pt to remind of RO f/u on Monday, pts son answered and stated pt is in ER now and possibly admitted. Asked to reschedule later next week. Appt changed to 10/15/20.

## 2020-10-09 NOTE — CONSULTS
carboplatin Alimta and Keytruda       Past Medical History:   has a past medical history of Cancer [facility]), Cardiac resynchronization therapy defibrillator (CRT-D) in place, Chest pain, Coronary artery disease, Dyspnea on exertion, Elevated troponin, Heart attack ([address] Hyperlipidemia, Hypertension, Ischemic cardiomyopathy, and NSTEMI (non-ST elevated myocardial infarction) ([address] Past Surgical History:   has a past surgical history that includes Coronary angioplasty with stent (2012); pacemaker placement (02/11/2019); CT BIOPSY DEEP BONE PERCUTANEOUS (8/11/2020); IR PORT PLACEMENT > 5 YEARS (8/24/2020); Fixation Kyphoplasty (09/28/2020); and Kyphosis surgery (N/A, 9/28/2020). Medications:    Prior to Admission medications    Medication Sig Start Date End Date Taking? Authorizing Provider   dronabinol (MARINOL) 2.5 MG capsule Take 1 capsule by mouth 2 times daily (before meals) for 30 days.  10/5/20 11/4/20  Corey Pena MD   ciprofloxacin (CIPRO) 500 MG/5ML (10%) suspension Take 5 mLs by mouth 2 times daily for 10 days 10/4/20 10/14/20  Everton Turner MD   ciprofloxacin (CIPRO) 500 MG tablet Take 1 tablet by mouth 2 times daily for 7 days 10/4/20 10/11/20  Everton Turner MD   nystatin (MYCOSTATIN) 618469 UNIT/ML suspension Take 5 mLs by mouth 4 times daily for 10 days 9/29/20 10/9/20  Jared Mixon MD   megestrol (MEGACE ES) 625 MG/5ML suspension Take 5 mLs by mouth daily 9/14/20   Everton Turner MD   ondansetron (ZOFRAN ODT) 4 MG disintegrating tablet Take 1 tablet by mouth every 8 hours as needed for Nausea 8/31/20   Everton Turner MD   polyethylene glycol (GLYCOLAX) 17 g packet Take 17 g by mouth daily as needed for Constipation    Historical Provider, MD   folic acid (FOLVITE) 1 MG tablet Take 1 tablet by mouth daily START 7 DAYS BEFORE FIRST DOSE OF PEMETREXED---TO BE TAKEN DAILY --AND CONTINUE 21 DAYS AFTER LAST DOSE OF PEMETREXED 8/17/20   Everton Turner MD   pantoprazole (PROTONIX) 40 MG tablet Take 1 tablet by mouth every morning (before breakfast) 8/13/20   Danny Llamas MD   carvedilol (COREG) 12.5 MG tablet Take 12.5 mg by mouth 2 times daily (with meals)    Historical Provider, MD   aspirin 81 MG chewable tablet Take 1 tablet by mouth daily 2/12/19   Enedina Solitario MD   nitroGLYCERIN (NITROSTAT) 0.4 MG SL tablet up to max of 3 total doses.  If no relief after 1 dose, call 911. 2/11/19   Enedina Solitario MD     Current Facility-Administered Medications   Medication Dose Route Frequency Provider Last Rate Last Dose    carvedilol (COREG) tablet 12.5 mg  12.5 mg Oral BID  REYNALDO Albarado - NP        ciprofloxacin (CIPRO) tablet 500 mg  500 mg Oral BID REYNALDO Albarado - J[facility]        dronabinol (MARINOL) capsule 2.5 mg  2.5 mg Oral BID  REYNALDO Albarado - NP        folic acid (FOLVITE) tablet 1 mg  1 mg Oral Daily REYNALDO Albarado - NP   1 mg at 10/09/20 1616    ondansetron (ZOFRAN-ODT) disintegrating tablet 4 mg  4 mg Oral Q8H PRN REYNALDO Albarado - [facility]       [employer] Madalyn Elder ON 10/10/2020] pantoprazole (PROTONIX) tablet 40 mg  40 mg Oral QAM  Leonro Craig APRN - J[facility]        polyethylene glycol (GLYCOLAX) packet 17 g  17 g Oral Daily PRN Leonor Craig APRN - NP        sodium chloride flush 0.9 % injection 10 mL  10 mL Intravenous 2 times per day REYNALDO Albarado - NP        sodium chloride flush 0.9 % injection 10 mL  10 mL Intravenous PRN Leonor Craig APRN - NP        potassium chloride (KLOR-CON M) extended release tablet 40 mEq  40 mEq Oral PRN REYNALDO Albarado - NP        Or    potassium bicarb-citric acid (EFFER-K) effervescent tablet 40 mEq  40 mEq Oral PRN REYNALDO Albarado - NP        Or   [employer] potassium chloride 10 mEq/100 mL IVPB (Peripheral Line)  10 mEq Intravenous PRN REYNALDO Albarado - NP        magnesium sulfate 1 g in dextrose 5% 100 mL IVPB  1 g Intravenous PRN REYNALDO Albarado - NP        acetaminophen (TYLENOL) tablet 650 mg  650 mg Oral Q6H PRN Lady Delmer APRBRYON Gibson NP        Or   Antoni Pena acetaminophen (TYLENOL) suppository 650 mg  650 mg Rectal Q6H PRN Lady Delmer APRBRYON Gibson NP        enoxaparin (LOVENOX) injection 40 mg  40 mg Subcutaneous Daily Lady Delmer APRBRYON Gibson NP   40 mg at 10/09/20 1616     Current Outpatient Medications   Medication Sig Dispense Refill    dronabinol (MARINOL) 2.5 MG capsule Take 1 capsule by mouth 2 times daily (before meals) for 30 days. 60 capsule 1    ciprofloxacin (CIPRO) 500 MG/5ML (10%) suspension Take 5 mLs by mouth 2 times daily for 10 days 100 mL 0    ciprofloxacin (CIPRO) 500 MG tablet Take 1 tablet by mouth 2 times daily for 7 days 14 tablet 0    nystatin (MYCOSTATIN) 657170 UNIT/ML suspension Take 5 mLs by mouth 4 times daily for 10 days 200 mL 0    megestrol (MEGACE ES) 625 MG/5ML suspension Take 5 mLs by mouth daily 150 mL 3    ondansetron (ZOFRAN ODT) 4 MG disintegrating tablet Take 1 tablet by mouth every 8 hours as needed for Nausea 20 tablet 0    polyethylene glycol (GLYCOLAX) 17 g packet Take 17 g by mouth daily as needed for Constipation      folic acid (FOLVITE) 1 MG tablet Take 1 tablet by mouth daily START 7 DAYS BEFORE FIRST DOSE OF PEMETREXED---TO BE TAKEN DAILY --AND CONTINUE 21 DAYS AFTER LAST DOSE OF PEMETREXED 30 tablet 2    pantoprazole (PROTONIX) 40 MG tablet Take 1 tablet by mouth every morning (before breakfast) 30 tablet 3    carvedilol (COREG) 12.5 MG tablet Take 12.5 mg by mouth 2 times daily (with meals)      aspirin 81 MG chewable tablet Take 1 tablet by mouth daily 30 tablet 3    nitroGLYCERIN (NITROSTAT) 0.4 MG SL tablet up to max of 3 total doses. If no relief after 1 dose, call 911. 25 tablet 3       Allergies:  Penicillins    Social History:   reports that he quit smoking about 8 years ago. His smoking use included cigarettes. He has never used smokeless tobacco. He reports previous drug use. Drug: Marijuana. He reports that he does not drink alcohol.      Family History: family history includes Cancer in his father and mother; Prostate Cancer in his father. REVIEW OF SYSTEMS:      Constitutional: No fever or chills. No night sweats, positive weight loss. Positive fatigue  Eyes: No eye discharge, double vision, or eye pain   HEENT: negative for sore mouth, sore throat, hoarseness and voice change   Respiratory: negative for cough , sputum, dyspnea, wheezing, hemoptysis, chest pain   Cardiovascular: negative for chest pain, dyspnea, palpitations, orthopnea, PND   Gastrointestinal: negative for nausea, vomiting, diarrhea, constipation, abdominal pain, Dysphagia, hematemesis and hematochezia   Genitourinary: negative for frequency, dysuria, nocturia, urinary incontinence, and hematuria   Integument: negative for rash, skin lesions, bruises.    Hematologic/Lymphatic: negative for easy bruising, bleeding, lymphadenopathy, or petechiae   Endocrine: negative for heat or cold intolerance,weight changes, change in bowel habits and hair loss   Musculoskeletal: Positive back pain  Neurological: negative for headaches, dizziness, seizures, weakness, numbness    PHYSICAL EXAM:        /80   Pulse 112   Temp 98.7 °F (37.1 °C)   Resp 18   Ht 5' 10\" (1.778 m)   Wt 119 lb (54 kg)   SpO2 95%   BMI 17.07 kg/m²    Temp (24hrs), Av.7 °F (37.1 °C), Min:98.7 °F (37.1 °C), Max:98.7 °F (37.1 °C)      General appearance -frail appearing, no in pain or distress   Mental status - alert and cooperative   Eyes - pupils equal and reactive, extraocular eye movements intact   Ears - bilateral TM's and external ear canals normal   Mouth - mucous membranes moist, pharynx normal without lesions   Neck - supple, no significant adenopathy   Lymphatics - no palpable lymphadenopathy, no hepatosplenomegaly   Chest -decreased breathing sounds  Heart - normal rate, regular rhythm, normal S1, S2, no murmurs  Abdomen - soft, nontender, nondistended, no masses or organomegaly   Neurological - alert, oriented, normal speech, no focal findings or movement disorder noted   Musculoskeletal - no joint tenderness, deformity or swelling   Extremities - peripheral pulses normal, no pedal edema, no clubbing or cyanosis   Skin - normal coloration and turgor, no rashes, no suspicious skin lesions noted ,      DATA:      Labs:     Results for orders placed or performed during the hospital encounter of 10/09/20   Culture, Blood 1    Specimen: Blood   Result Value Ref Range    Specimen Description . BLOOD     Special Requests 10 R ARM     Culture NO GROWTH 3 HOURS    Culture, Blood 2    Specimen: Blood   Result Value Ref Range    Specimen Description . BLOOD     Special Requests LA 6 ML     Culture NO GROWTH 2 HOURS    CBC Auto Differential   Result Value Ref Range    WBC 19.0 (H) 3.5 - 11.3 k/uL    RBC 3.34 (L) 4.21 - 5.77 m/uL    Hemoglobin 10.0 (L) 13.0 - 17.0 g/dL    Hematocrit 32.6 (L) 40.7 - 50.3 %    MCV 97.6 82.6 - 102.9 fL    MCH 29.9 25.2 - 33.5 pg    MCHC 30.7 28.4 - 34.8 g/dL    RDW 22.4 (H) 11.8 - 14.4 %    Platelets 984 673 - 079 k/uL    MPV 10.5 8.1 - 13.5 fL    NRBC Automated 0.2 (H) 0.0 per 100 WBC    Differential Type NOT REPORTED     WBC Morphology NOT REPORTED     RBC Morphology NOT REPORTED     Platelet Estimate NOT REPORTED     Immature Granulocytes 7 (H) 0 %    Seg Neutrophils 84 (H) 36 - 66 %    Lymphocytes 2 (L) 24 - 44 %    Monocytes 4 1 - 7 %    Eosinophils % 3 1 - 4 %    Basophils 0 0 - 2 %    Absolute Immature Granulocyte 1.33 (H) 0.00 - 0.30 k/uL    Segs Absolute 15.96 (H) 1.8 - 7.7 k/uL    Absolute Lymph # 0.38 (L) 1.0 - 4.8 k/uL    Absolute Mono # 0.76 0.1 - 0.8 k/uL    Absolute Eos # 0.57 (H) 0.0 - 0.4 k/uL    Basophils Absolute 0.00 0.0 - 0.2 k/uL    Morphology ANISOCYTOSIS PRESENT     Morphology 1+ POLYCHROMASIA    Comprehensive Metabolic Panel w/ Reflex to MG   Result Value Ref Range    Glucose 115 (H) 70 - 99 mg/dL    BUN 11 8 - 23 mg/dL    CREATININE 1.07 0.70 - 1.20 mg/dL    Bun/Cre Ratio NOT REPORTED 9 - 20    Calcium 7.7 (L) 8.6 - 10.4 mg/dL    Sodium 136 135 - 144 mmol/L    Potassium 3.7 3.7 - 5.3 mmol/L    Chloride 104 98 - 107 mmol/L    CO2 21 20 - 31 mmol/L    Anion Gap 11 9 - 17 mmol/L    Alkaline Phosphatase 227 (H) 40 - 129 U/L    ALT 12 5 - 41 U/L    AST 17 <40 U/L    Total Bilirubin 0.65 0.3 - 1.2 mg/dL    Total Protein 5.1 (L) 6.4 - 8.3 g/dL    Alb 2.4 (L) 3.5 - 5.2 g/dL    Albumin/Globulin Ratio 0.9 (L) 1.0 - 2.5    GFR Non-African American >60 >60 mL/min    GFR African American >60 >60 mL/min    GFR Comment          GFR Staging NOT REPORTED    Brain Natriuretic Peptide   Result Value Ref Range    Pro-BNP 9,289 (H) <300 pg/mL    BNP Interpretation Pro-BNP Reference Range:    Protime-INR   Result Value Ref Range    Protime 13.1 (H) 9.0 - 12.0 sec    INR 1.3    APTT   Result Value Ref Range    PTT 29.4 20.5 - 30.5 sec   Urinalysis, reflex to microscopic   Result Value Ref Range    Color, UA YELLOW YELLOW    Turbidity UA CLEAR CLEAR    Glucose, Ur NEGATIVE NEGATIVE    Bilirubin Urine NEGATIVE NEGATIVE    Ketones, Urine NEGATIVE NEGATIVE    Specific Gravity, UA 1.013 1.005 - 1.030    Urine Hgb NEGATIVE NEGATIVE    pH, UA 7.0 5.0 - 8.0    Protein, UA TRACE (A) NEGATIVE    Urobilinogen, Urine Normal Normal    Nitrite, Urine NEGATIVE NEGATIVE    Leukocyte Esterase, Urine NEGATIVE NEGATIVE    Urinalysis Comments NOT REPORTED    Lactic Acid, Plasma   Result Value Ref Range    Lactic Acid NOT REPORTED mmol/L    Lactic Acid, Whole Blood 2.9 (H) 0.7 - 2.1 mmol/L   Troponin   Result Value Ref Range    Troponin, High Sensitivity 79 (HH) 0 - 22 ng/L    Troponin T NOT REPORTED <0.03 ng/mL    Troponin Interp NOT REPORTED    Troponin   Result Value Ref Range    Troponin, High Sensitivity 55 (HH) 0 - 22 ng/L    Troponin T NOT REPORTED <0.03 ng/mL    Troponin Interp NOT REPORTED    Lactic Acid, Plasma   Result Value Ref Range    Lactic Acid NOT REPORTED mmol/L    Lactic Acid, Whole Blood 3.2 (H) 0.7 - 2.1 mmol/L   Microscopic Urinalysis   Result Value Ref Range    -          WBC, UA 2 TO 5 0 - 5 /HPF    RBC, UA 2 TO 5 0 - 4 /HPF    Casts UA  0 - 8 /LPF     5 TO 10 HYALINE Reference range defined for non-centrifuged specimen. Crystals, UA NOT REPORTED None /HPF    Epithelial Cells UA 2 TO 5 0 - 5 /HPF    Renal Epithelial, UA NOT REPORTED 0 /HPF    Bacteria, UA NOT REPORTED None    Mucus, UA NOT REPORTED None    Trichomonas, UA NOT REPORTED None    Amorphous, UA NOT REPORTED None    Other Observations UA NOT REPORTED NOT REQ. Yeast, UA NOT REPORTED None         IMAGING DATA:    Xr Thoracic Spine (2 Views)    Result Date: 9/28/2020  EXAMINATION: SPOT FLUOROSCOPIC IMAGES 9/28/2020 3:21 pm TECHNIQUE: Fluoroscopy was provided by the radiology department for procedure. Radiologist was not present during examination. FLUOROSCOPY DOSE AND TYPE OR TIME AND EXPOSURES: 123.9 seconds. Air kerma 12.69 mGy. COMPARISON: CT chest dated 09/24/2020 HISTORY: Intraprocedural imaging. FINDINGS: 6 spot images of the thoracic spine were obtained. Limited intraoperative fluoroscopic spot images demonstrate instruments utilized to administer kyphoplasty cement in the midthoracic spine. Intraprocedural fluoroscopic spot images as above. See separate procedure report for more information. Ct Head Wo Contrast    Result Date: 9/24/2020  EXAMINATION: CT OF THE HEAD WITHOUT CONTRAST  9/24/2020 9:38 pm TECHNIQUE: CT of the head was performed without the administration of intravenous contrast. Dose modulation, iterative reconstruction, and/or weight based adjustment of the mA/kV was utilized to reduce the radiation dose to as low as reasonably achievable.  COMPARISON: Unenhanced head CT dated 08/09/2020 and MRI brain dated 08/10/2020 HISTORY: ORDERING SYSTEM PROVIDED HISTORY: AMS, hx thrombocytopenia TECHNOLOGIST PROVIDED HISTORY: AMS, hx thrombocytopenia Reason for Exam: ams Acuity: Acute Type of Exam: Initial FINDINGS: BRAIN/VENTRICLES: There is no evidence of an acute infarct or intracranial hemorrhage. There has been marked improvement of vasogenic edema seen previously, most prominent in the left parietal lobe but also in the right parietal lobe. Subtle residual hypodensity is seen in the left frontoparietal centrum semiovale on series 2 images 43 through 51. The findings are suggestive of improvement of known intracranial metastatic disease. Mild atrophic changes are again noted. There is no evidence of midline shift. ORBITS: The visualized portion of the orbits demonstrate no acute abnormality. SINUSES: The visualized paranasal sinuses and mastoid air cells demonstrate no acute abnormality. SOFT TISSUES/SKULL:  No acute abnormality of the visualized skull or soft tissues. 1. No evidence of acute intracranial hemorrhage or infarct. 2. Marked improvement of intracranial metastatic disease. Slight residual vasogenic edema seen in the left frontoparietal centrum semiovale. If clinically indicated, follow-up MRI of the brain could be done for further evaluation. Xr Chest Portable    Result Date: 10/9/2020  EXAMINATION: ONE XRAY VIEW OF THE CHEST 10/9/2020 12:27 pm COMPARISON: September 24, 2020 HISTORY: ORDERING SYSTEM PROVIDED HISTORY: weakness, hx of lung CA TECHNOLOGIST PROVIDED HISTORY: weakness, hx of lung CA FINDINGS: Left AICD. Right-sided Port-A-Cath. No focal consolidation. Right perihilar-lower lobe mass as seen on prior CT. Cardiomegaly. No pulmonary edema. No acute pulmonary process. Right lower lobe mass.      Xr Chest Portable    Result Date: 9/24/2020  EXAMINATION: ONE XRAY VIEW OF THE CHEST 9/24/2020 6:10 pm COMPARISON: 09/19/2020 HISTORY: ORDERING SYSTEM PROVIDED HISTORY: hypoxic, tachycardic, metastatic lung CA, on chemo TECHNOLOGIST PROVIDED HISTORY: hypoxic, tachycardic, metastatic lung CA, on chemo Reason for Exam: hypoxic, tachycardic, metastic lung CA, on chemo Acuity: Unknown Type of Exam: Unknown FINDINGS: Cardiac silhouette is normal in size. Vascular port terminates overlying the expected location of the SVC. Implantable cardiac device leads overlying the right atrium and ventricle. Mild generalized interstitial prominence without superimposed focal airspace consolidation, sizeable pleural effusion or pneumothorax. Stable appearing right hilar mass. Osseous structures and soft tissues are grossly intact. No evidence for acute cardiopulmonary pathology. Stable right hilar mass. Xr Chest Portable    Result Date: 9/19/2020  EXAMINATION: ONE XRAY VIEW OF THE CHEST 9/19/2020 8:43 pm COMPARISON: Prior studies including 08/20/2020. HISTORY: ORDERING SYSTEM PROVIDED HISTORY: Chest Pain TECHNOLOGIST PROVIDED HISTORY: Chest Pain Reason for Exam: fatigue Acuity: Unknown Type of Exam: Unknown FINDINGS: Mass in the right infrahilar region has moderately decreased in size in the interval.  No acute infiltrate, pneumothorax or pleural effusion. Heart size is normal.  There is an unchanged 3 lead left subclavian AICD. There is been interval placement of a right IJ MediPort catheter with the tip in good position in the region of the superior vena cava. There is a lytic lesion involving the lateral right 7th rib. Right hilar mass has moderately decreased in size consistent with response to therapy. No acute infiltrate, pneumothorax or pleural fluid. Lytic metastatic lesion involving the lateral right 7th rib. Ct Chest Pulmonary Embolism W Contrast    Result Date: 9/24/2020  EXAMINATION: CTA OF THE CHEST 9/24/2020 9:16 pm TECHNIQUE: CTA of the chest was performed after the administration of intravenous contrast.  Multiplanar reformatted images are provided for review. MIP images are provided for review. Dose modulation, iterative reconstruction, and/or weight based adjustment of the mA/kV was utilized to reduce the radiation dose to as low as reasonably achievable.  COMPARISON: 08/09/2020 HISTORY: ORDERING SYSTEM PROVIDED HISTORY: hx metastatic lung CA, tachycardic and hypoxic TECHNOLOGIST PROVIDED HISTORY: hx metastatic lung CA, tachycardic and hypoxic Reason for Exam: pt currently on chemo, Acuity: Acute Type of Exam: Initial FINDINGS: Pulmonary Arteries: There is mild motion artifact. No definite evidence of intraluminal filling defect to suggest pulmonary embolism. Main pulmonary artery is normal in caliber. Mediastinum: Mediastinal and right hilar adenopathy has mildly improved. The heart and pericardium demonstrate no acute abnormality. The left ventricle is dilated. There is no acute abnormality of the thoracic aorta. Lungs/pleura: There is a small right pleural effusion, increased in volume. No effusion is seen on the left. There is no pneumothorax. The low-attenuation subpleural right lower lobe mass has decreased in size and now measures 3.0 x 3.5 cm. The right upper lobe nodule adjacent to the right hilum is decreased in size and now measures 9 x 13 mm. Upper Abdomen: There is a 1.6 x 1.7 cm low-density lesion anteriorly in the spleen. The visualized upper abdomen is otherwise unremarkable. Soft Tissues/Bones: There is a pathologic compression fracture at T6 with approximately 70% loss of vertebral body height and minimal retropulsion. There is increased destruction and associated soft tissue involving the right 7th rib. There are subtle lytic lesions in the sternum. 1. No definite scan evidence for pulmonary embolus. 2. Pathologic T6 compression fracture with enlarging right 7th rib metastasis and new sternal metastasis. 3. Improving right upper lobe nodule, right lower lobe mass and, right hilar and mediastinal adenopathy. 4. Low-density lesion in the spleen could represent a metastasis or a hemangioma.          IMPRESSION:   Primary Problem  CHF with unknown LVEF [facility])    Active Hospital Problems    Diagnosis Date Noted   Fabby Coulterard [[id number]] 09/25/2020     Priority: speech recognition program.  While intending to generate a document that actually reflects the content of the visit, the document can still have some errors including those of syntax and sound a like substitutions which may escape proof reading. It such instances, actual meaning can be extrapolated by contextual diversion.

## 2020-10-09 NOTE — ED NOTES
Pt to room 30 via wheelchair with c/o fatigue. Pt states that he started chemo therapy and has been feeling increased fatigue, loss of appetite and overall not feeling well. Pt reports that he had fallen last night, but denies hitting his head. Pt reports that he has fentanyl patch for pain and takes tylenol for breakthrough pain. Pt placed on continuous cardiac monitor, bp and pulse ox. EKG completed, IV established, blood work drawn. Pt alert and oriented x4, talking in complete sentences, respirations even and unlabored. Pt acting age appropriate. White board updated, will continue to monitor.        Panda Angulo RN  10/09/20 1100

## 2020-10-09 NOTE — ED PROVIDER NOTES
[facility]. 2  Emergency Department Encounter  EmergencyMedicine Resident     Pt Nolan Tomlinson  MRN: [id number]  Danyagfbritt 1950  Date of evaluation: 10/9/20  PCP:  Tamera Salinas MD    [address] [place] [address]       Chief Complaint   Patient presents with    Fatigue       HISTORY OF PRESENT ILLNESS  (Location/Symptom, Timing/Onset, Context/Setting, Quality, Duration, Modifying Factors, Severity.)    This patient was evaluated in the Emergency Department for symptoms described in the history of present illness. He/she was evaluated in the context of the global COVID-19 pandemic, which necessitated consideration that the patient might be at risk for infection with the SARS-CoV-2 virus that causes COVID-19. Institutional protocols and algorithms that pertain to the evaluation of patients at risk for COVID-19 are in a state of rapid change based on information released by regulatory bodies including the CDC and federal and state organizations. These policies and algorithms were followed during the patient's care in the ED. Ly Andrew is a 69 y.o. male who presents to the emergency department today with complaints of fatigue. He is accompanied by his daughter-in-law. Patient has stage IV lung cancer with known metastatic disease to brain and spine. Was diagnosed August 10 of this year. Started chemotherapy and immunosuppressive therapy on September 5, he is only received 1 dose of chemotherapy. Patient developed back pain was found to have compression fracture related to metastatic lesion in back, kyphoplasty on September 28. This was followed by some erythema of incision site and concern for infection and patient was started on antibiotics on October 4 and is currently continuing to take ciprofloxacin with plan for 2 more days of medication.   Most recent chemo treatment which would have been patient second was held due to concern for recent infection, immunosuppressive therapy was continued though. Patient reports decreased appetite, generalized fatigue and weakness. No chest pain or shortness of breath. Per patient's daughter-in-law patient never wants to come to the emergency department but requested to come the hospital today. No fevers or chills at home. Reportedly had an unwitnessed fall this morning at 4 AM but no reported injury or loss of consciousness. Follows up with Dr. Abhay Price, his oncologist at [facility].  Daughter-in-law also when checking vitals this morning noted the patient's heart rate was fast with heart rate in the 1 teens and it appeared irregular to her. Patient has AICD/pacemaker in place. Just denies any reported firing. No history of A. fib or a flutter. Not on anticoagulation at this time. When asked about history of CHF, reported that was recently told that he might have a touch of congestive heart failure. Patient also complaining of diffuse erythematous rash on trunk and torso. PAST MEDICAL / SURGICAL / SOCIAL / FAMILY HISTORY      has a past medical history of Cancer [facility]), Cardiac resynchronization therapy defibrillator (CRT-D) in place, Chest pain, Coronary artery disease, Dyspnea on exertion, Elevated troponin, Heart attack ([address] Hyperlipidemia, Hypertension, Ischemic cardiomyopathy, and NSTEMI (non-ST elevated myocardial infarction) ([facility] [address] has a past surgical history that includes Coronary angioplasty with stent (2012); pacemaker placement (02/11/2019); CT BIOPSY DEEP BONE PERCUTANEOUS (8/11/2020); IR PORT PLACEMENT > 5 YEARS (8/24/2020); Fixation Kyphoplasty (09/28/2020); and Kyphosis surgery (N/A, 9/28/2020).     Social History     Socioeconomic History    Marital status:      Spouse name: Not on file    Number of children: Not on file    Years of education: Not on file    Highest education level: Not on file   Occupational History    Not on file   Social Needs    Financial resource strain: Not on file    Food insecurity Worry: Not on file     Inability: Not on file    Transportation needs     Medical: Not on file     Non-medical: Not on file   Tobacco Use    Smoking status: Former Smoker     Types: Cigarettes     Last attempt to quit: 2012     Years since quittin.1    Smokeless tobacco: Never Used    Tobacco comment: 48 yr hx of smoking   Substance and Sexual Activity    Alcohol use: No    Drug use: Not Currently     Types: Marijuana     Comment: last smoked 2020    Sexual activity: Not on file   Lifestyle    Physical activity     Days per week: Not on file     Minutes per session: Not on file    Stress: Not on file   Relationships    Social connections     Talks on phone: Not on file     Gets together: Not on file     Attends Buddhism service: Not on file     Active member of club or organization: Not on file     Attends meetings of clubs or organizations: Not on file     Relationship status: Not on file    Intimate partner violence     Fear of current or ex partner: Not on file     Emotionally abused: Not on file     Physically abused: Not on file     Forced sexual activity: Not on file   Other Topics Concern    Not on file   Social History Narrative    Not on file       Family History   Problem Relation Age of Onset    Cancer Mother     Cancer Father     Prostate Cancer Father        Allergies:  Penicillins    Home Medications:  Prior to Admission medications    Medication Sig Start Date End Date Taking? Authorizing Provider   dronabinol (MARINOL) 2.5 MG capsule Take 1 capsule by mouth 2 times daily (before meals) for 30 days.  10/5/20 11/4/20  Corey Pena MD   ciprofloxacin (CIPRO) 500 MG/5ML (10%) suspension Take 5 mLs by mouth 2 times daily for 10 days 10/4/20 10/14/20  Geri Hannah MD   ciprofloxacin (CIPRO) 500 MG tablet Take 1 tablet by mouth 2 times daily for 7 days 10/4/20 10/11/20  Geri Hannah MD   nystatin (MYCOSTATIN) 204430 UNIT/ML suspension Take 5 mLs by mouth 4 times daily for 10 days 9/29/20 10/9/20  Beverly Hinojosa MD   megestrol (MEGACE ES) 625 MG/5ML suspension Take 5 mLs by mouth daily 9/14/20   [facility], MD   ondansetron (ZOFRAN ODT) 4 MG disintegrating tablet Take 1 tablet by mouth every 8 hours as needed for Nausea 8/31/20   [facility] MD LITO   polyethylene glycol (GLYCOLAX) 17 g packet Take 17 g by mouth daily as needed for Constipation    Historical Provider, MD   folic acid (FOLVITE) 1 MG tablet Take 1 tablet by mouth daily START 7 DAYS BEFORE FIRST DOSE OF PEMETREXED---TO BE TAKEN DAILY --AND CONTINUE 21 DAYS AFTER LAST DOSE OF PEMETREXED 8/17/20   [facility], MD   pantoprazole (PROTONIX) 40 MG tablet Take 1 tablet by mouth every morning (before breakfast) 8/13/20   Franco Ward MD   carvedilol (COREG) 12.5 MG tablet Take 12.5 mg by mouth 2 times daily (with meals)    Historical Provider, MD   aspirin 81 MG chewable tablet Take 1 tablet by mouth daily 2/12/19   Wale Berger MD   nitroGLYCERIN (NITROSTAT) 0.4 MG SL tablet up to max of 3 total doses. If no relief after 1 dose, call 911. 2/11/19   Wale Berger MD       REVIEW OF SYSTEMS    (2-9 systems for level 4, 10 or more for level 5)      Review of Systems   Constitutional: Positive for fatigue. Negative for chills and fever. Recent fall. HENT: Negative for congestion. Eyes: Negative for photophobia. Respiratory: Negative for cough and shortness of breath. History of lung cancer. Cardiovascular: Negative for chest pain, palpitations and leg swelling. History of AICD   Gastrointestinal: Negative for abdominal pain, constipation, diarrhea, nausea and vomiting. Genitourinary: Negative for dysuria and hematuria. Musculoskeletal: Negative for back pain and myalgias. Skin: Positive for rash. Neurological: Positive for weakness (Generalized). Negative for dizziness, numbness and headaches. Hematological: Does not bruise/bleed easily.        PHYSICAL EXAM   (up to 7 for level 4, 8 or more for level 5)      INITIAL VITALS:   /79   Pulse 115   Temp 98.2 °F (36.8 °C) (Oral)   Resp 20   Ht 5' 5\" (1.651 m)   Wt 109 lb 2 oz (49.5 kg)   SpO2 98%   BMI 18.16 kg/m²     Physical Exam  Constitutional:       General: He is not in acute distress. Appearance: He is well-developed. He is ill-appearing. He is not toxic-appearing. Comments: Ill-appearing cachectic male. Appears frail and weak. HENT:      Head: Normocephalic and atraumatic. Nose: Nose normal.      Mouth/Throat:      Mouth: Mucous membranes are dry. Eyes:      Extraocular Movements: Extraocular movements intact. Conjunctiva/sclera: Conjunctivae normal.      Pupils: Pupils are equal, round, and reactive to light. Neck:      Musculoskeletal: Normal range of motion. Trachea: No tracheal deviation. Comments: No C-spine tenderness or evidence of traumatic injury. Cardiovascular:      Rate and Rhythm: Normal rate and regular rhythm. Heart sounds: Normal heart sounds. Comments: Port right chest wall, mild peeling of skin surrounding port but no evidence of acute infection. Pulmonary:      Effort: Pulmonary effort is normal. No respiratory distress. Breath sounds: Normal breath sounds. No stridor. No wheezing, rhonchi or rales. Abdominal:      General: Bowel sounds are normal. There is no distension. Palpations: Abdomen is soft. Tenderness: There is no abdominal tenderness. There is no guarding or rebound. Musculoskeletal:         General: No swelling or deformity. Comments: No peripheral edema or calf tenderness   Skin:     General: Skin is warm and dry. Findings: Rash (Diffuse erythematous rash that is blanching to chest.) present. Comments: Stage I sacral decubitus ulcer. Procedure site from recent back surgery appears well-healing with no significant overlying erythema. No tenderness, no fluctuance, no induration.    Neurological: General: No focal deficit present. Mental Status: He is alert and oriented to person, place, and time. Cranial Nerves: No cranial nerve deficit. Sensory: No sensory deficit.          DIFFERENTIAL  DIAGNOSIS     PLAN (LABS / IMAGING / EKG):  Orders Placed This Encounter   Procedures    Culture, Urine    Culture, Blood 1    Culture, Blood 2    XR CHEST PORTABLE    CBC Auto Differential    Comprehensive Metabolic Panel w/ Reflex to MG    Brain Natriuretic Peptide    Protime-INR    APTT    Urinalysis, reflex to microscopic    Lactic Acid, Plasma    Troponin    Troponin    Lactic Acid, Plasma    Basic Metabolic Panel w/ Reflex to MG    CBC    Protime-INR    Microscopic Urinalysis    DIET GENERAL;    Telemetry Monitoring    Vital signs per unit routine    Notify physician    Up with assistance    Daily weights    Intake and output    Place intermittent pneumatic compression device    Full Code    Inpatient consult to Internal Medicine    Inpatient consult to Hospitalist    Inpatient consult to Cardiology    Inpatient consult to Oncology    Inpatient consult to Dietitian    OT eval and treat    PT evaluation and treat    Initiate Oxygen Therapy Protocol    Pulse Oximetry Spot Check    Pacer Interrogate    EKG 12 Lead    EKG 12 Lead    Wound ostomy eval and treat    PATIENT STATUS (FROM ED OR OR/PROCEDURAL) Inpatient       MEDICATIONS ORDERED:  Orders Placed This Encounter   Medications    0.9 % sodium chloride bolus    cefepime (MAXIPIME) 2 g IVPB minibag    vancomycin (VANCOCIN) 750 mg in dextrose 5 % 250 mL IVPB     Order Specific Question:   Antimicrobial Indications     Answer:   Sepsis of Unknown Etiology    carvedilol (COREG) tablet 12.5 mg    ciprofloxacin (CIPRO) tablet 500 mg     Order Specific Question:   Antimicrobial Indications     Answer:   Bloodstream Infection    dronabinol (MARINOL) capsule 2.5 mg    folic acid (FOLVITE) tablet 1 mg    Range    Troponin, High Sensitivity 79 (HH) 0 - 22 ng/L    Troponin T NOT REPORTED <0.03 ng/mL    Troponin Interp NOT REPORTED    Troponin   Result Value Ref Range    Troponin, High Sensitivity 55 (HH) 0 - 22 ng/L    Troponin T NOT REPORTED <0.03 ng/mL    Troponin Interp NOT REPORTED    Lactic Acid, Plasma   Result Value Ref Range    Lactic Acid NOT REPORTED mmol/L    Lactic Acid, Whole Blood 3.2 (H) 0.7 - 2.1 mmol/L   Microscopic Urinalysis   Result Value Ref Range    -          WBC, UA 2 TO 5 0 - 5 /HPF    RBC, UA 2 TO 5 0 - 4 /HPF    Casts UA  0 - 8 /LPF     5 TO 10 HYALINE Reference range defined for non-centrifuged specimen. Crystals, UA NOT REPORTED None /HPF    Epithelial Cells UA 2 TO 5 0 - 5 /HPF    Renal Epithelial, UA NOT REPORTED 0 /HPF    Bacteria, UA NOT REPORTED None    Mucus, UA NOT REPORTED None    Trichomonas, UA NOT REPORTED None    Amorphous, UA NOT REPORTED None    Other Observations UA NOT REPORTED NOT REQ. Yeast, UA NOT REPORTED None   EKG 12 Lead   Result Value Ref Range    Ventricular Rate 106 BPM    Atrial Rate 202 BPM    QRS Duration 184 ms    Q-T Interval 380 ms    QTc Calculation (Bazett) 504 ms    P Axis 72 degrees    R Axis -76 degrees    T Axis 89 degrees         RADIOLOGY:  XR CHEST PORTABLE   Final Result   No acute pulmonary process. Right lower lobe mass. EKG  By ventricularly paced rhythm, tachycardic, no significant ST changes by Sgarbossa criteria    All EKG's are interpreted by the Emergency Department Physician who either signs or Co-signs this chart in the absence of a cardiologist.    IMPRESSION/MDM/EMERGENCY DEPARTMENT COURSE:  Patient came to emergency department, HPI and physical exam were conducted. All nursing notes were reviewed. 59-year-old male presented emergency department with complaints of fatigue, generalized weakness, decreased appetite in the setting of recent diagnosis of stage IV lung cancer.   Known metastatic lesions to brain and spine. Was started on immunosuppressive therapy and chemotherapy and is only received 1 treatment of chemotherapy. Recent kyphoplasty due to metastatic lesion to spine. Complicated by potential infection for which patient is currently taking ciprofloxacin. Discussed DNR status with patient and family member. At this time patient is full code. Patient was screened and has no clinical signs or symptoms of a CoVID-19 infection at this time. However, given current pandemic and atypical presentations, face mask, eye protection, surgical cap, and gloves were worn during examination. Patient was wearing surgical mask. Tachycardic but vitals otherwise within normal limits. Chronically ill appearing frail cachectic patient. Not acutely toxic. No acute distress. Heart is regular rhythm. Lungs are clear to auscultate by without wheezes rales or rhonchi. Abdomen is soft, nontender nondistended. Normal peripheral pulses. Strength is equal bilateral upper and lower extremities. No sensory deficits. Cranial nerves intact. Head is atraumatic normocephalic. Patient has a port to right chest wall with no overlying erythema fluctuance or drainage. Stage I sacral decubitus ulcer. Site of recent back surgery appears well-healing with no overlying erythema, tenderness, fluctuance, drainage. Diffuse blanching erythematous maculopapular rash to trunk and extremities. Differential includes dehydration, reaction to chemotherapeutic agent, sepsis, CHF, ACS, A. fib/a flutter, malnutrition. Given patient is immunocompromised will obtain septic work-up in the setting of fatigue and tachycardia. Patient will likely require admission. Will gently hydrate by starting 500 cc of fluid slow drip due to concern for potential CHF. Will obtain cardiac work-up additionally including BNP, troponin, EKG, chest x-ray.     ED Course as of Oct 09 2114   Fri Oct 09, 2020   1148 Dr. Javier Guillen, no abnormal pacing of pacemaker. Fluid sensor does detect increased fluid that is been ongoing since September. Patient has been pacing over 90% of time which is how it is programmed. Episodes of rates in the 120s and 140s earlier today but no obvious A. fib a flutter     [ZT]   1149 Hemoglobin 10.0, improved from previous. [ZT]   5255 INR: 1.3 [ZT]   1208 Troponin, High Sensitivity(!!): 79 [ZT]   1209 Troponin elevated at 79, markedly elevated from baseline. [ZT]   1209 BNP markedly elevated from baseline at 9000. Fluids were held at this time. Patient may still be intravascularly depleted but due to concern for CHF will hold until discussion had with admitting service. Patient's tachycardia has improved to less than 110. [ZT]   1610 Updated patient on results. Repeat troponin downtrending. [ZT]   8373 With leukocytosis in setting of immunocompromise will start broad-spectrum antibiotics. sources include recent back surgery but low suspicion at this time, also include sacral ulcer which stage I. No symptoms of pulmonary source. Will start vancomycin and cefepime given history of penicillin allergy   [ZT]   1235 Lactic Acid, Whole Blood(!): 2.9 [ZT] repeat lactic ordered. 1320 Troponin, High Sensitivity(!!): 55 [ZT]   1344 Spoke to Intermed service who agreed to admit patient. [ZT]      ED Course User Index  [ZT] Julia Zendejas, DO     Sepsis Times and Checklist  Vital Signs: BP: 119/79  Pulse: 115  Resp: 20  Temp: 98.2 °F (36.8 °C) SpO2: 98 %  SIRS (>2)   Temp > 38.0C or < 36C   HR > 90   RR > 20   WBC > 12 or < 4 or >10% bands  SIRS (>2) and confirmed or suspected source of infection = Sepsis  Is Sepsis due to likely bacterial infection?:  Unclear      Sepsis Orders:  ·  CBC: Yes  ·  CMP: Yes  ·  PT/PTT: Yes  ·  Blood Cultures x2: Yes  ·  Urinalysis and Urine Culture: Yes  ·  Lactate:  Yes  ·  Broad Spectrum Antibiotics Given (within 3 hours of sepsis identification,  after blood cultures):  Yes    (If unable to obtain IV access for IV antibiotics within 3 hours of  identification of sepsis, IM antibiotics is acceptable.)  ·             If lactate >2.0 MUST repeat within 6 hours    If elevated, is elevated lactate from a likely infectious source?:  Unclear if lactic acid is due to dehydration or is due to infectious etiology. IV Fluid Bolus:  Is lactate > 4.0:  No    PROCEDURES:  None    CONSULTS:  IP CONSULT TO INTERNAL MEDICINE  IP CONSULT TO HOSPITALIST  IP CONSULT TO CARDIOLOGY  IP CONSULT TO ONCOLOGY  IP CONSULT TO DIETITIAN    CRITICAL CARE:  None    FINAL IMPRESSION      1. Acute on chronic congestive heart failure, unspecified heart failure type ([address]    2. Fatigue, unspecified type    3. Leukocytosis, unspecified type    4. Hx of cancer of lung          DISPOSITION / PLAN     DISPOSITION Admitted 10/09/2020 01:51:38 PM      PATIENT REFERRED TO:  No follow-up provider specified.     DISCHARGE MEDICATIONS:  Current Discharge Medication List          Wayne Kumar DO  Emergency Medicine Resident    (Please note that portions of thisnote were completed with a voice recognition program.  Efforts were made to edit the dictations but occasionally words are mis-transcribed.)       Wayne Kumar DO  Resident  10/09/20 9712       Wayne Kumar DO  Resident  10/10/20 4083

## 2020-10-09 NOTE — TELEPHONE ENCOUNTER
DISCUSSED CASE WITH DR Heather Calderon. WILL HAVE DEDE GO TO ER FOR EVAL. WRITER UPDATED DENNIS. SHE VOICED UNDERSTANDING AND WILL CALL HER  AS SHE CANNOT GET HIM TO CAR BY HERSELF AND WOULD PREFER NOT TO CALL [facility].

## 2020-10-09 NOTE — CARE COORDINATION
Case Management Initial Discharge Plan  Vandana Gorman,             Met with:patient,( to discuss discharge plans. Information verified: address, contacts, phone number, , insurance Yes    Emergency Contact/Next of Kin name & number: Fady Lopez (child) [phone]    PCP: Em Eldridge MD  Date of last visit: per [facility] 2020    Insurance Provider: Medicare    Discharge Planning    Living Arrangements:  Children, Family Members   Support Systems:  Children, Family Members    Home has 2 stories  3 stairs to climb to get into front door, 1 flight stairs to climb to reach second floor  Location of bedroom/bathroom in home main level    Patient able to perform ADL's:Assisted    Current Services (outpatient & in home) [facility]  DME equipment: cane, rolling walker, shower chair,   DME provider:     Receiving oral anticoagulation therapy? Dorla Alex    If indicated:   Physician managing anticoagulation treatment:   Where does patient obtain lab work for ATC treatment? Potential Assistance Needed:  Durable Medical Equipment    Patient agreeable to home care: current with [facility]  [place] of choice provided:  n/a    Prior SNF/Rehab Placement and Facility:   Agreeable to SNF/Rehab: No  [place] of choice provided: n/a     Evaluation: no    Expected Discharge date:  10/12/20    Patient expects to be discharged to:  return to home with family and current services with [facility]  Follow Up Appointment: Best Day/ Time:      Transportation provider: family  Transportation arrangements needed for discharge: No, family will transporrt. Readmission Risk              Risk of Unplanned Readmission:        35             Does patient have a readmission risk score greater than 14?: Yes  If yes, follow-up appointment must be made within 7 days of discharge. Goals of Care:       Discharge Plan: return to home with family and [facility].           Electronically signed by Desmond Rooney RN on 10/9/20 at 5:57 PM EDT

## 2020-10-09 NOTE — ED NOTES
ED to inpatient nurses report    Chief Complaint   Patient presents with    Fatigue      Present to ED from waiting room via wheelchair with c/o fatigue. Pt states that he started chemo therapy and has been feeling increased fatigue, loss of appetite and overall not feeling well. Pt reports that he had fallen last night, but denies hitting his head. Pt reports that he has fentanyl patch for pain and takes tylenol for breakthrough pain  LOC: A&Ox4  Vital signs   Vitals:    10/09/20 1516 10/09/20 1601 10/09/20 1646 10/09/20 1716   BP: 120/83 117/83 118/86 124/80   Pulse: 107 110 110 112   Resp:       Temp:       SpO2: 97% 100% 94% 95%   Weight:       Height:          Oxygen Baseline 95%-100%    Current needs required: none, RA   LDAs:   Peripheral IV 10/09/20 Left Forearm (Active)   Site Assessment Clean;Dry; Intact 10/09/20 1104   Line Status Brisk blood return;Flushed;Specimen collected 10/09/20 1104   Dressing Status Clean;Dry; Intact 10/09/20 1104   Dressing Intervention New 10/09/20 1104     Mobility: 1 assist   Pending ED orders: none  Present condition: stable   Code Status: [unfilled]   Consults:  [x]  Hospitalist  Completed  [x] yes [] no  [x]  Medicine  Completed  [x] yes [] No  [x]  Cardiology  Completed  [x] yes [] No  []  GI   Completed  [] yes [] No  []  Neurology  Completed  [] yes [] No  []  Nephrology Completed  [] yes [] No  []  Vascular  Completed  [] yes [] No   []  Surgery  Completed  [] yes [] No   []  Urology  Completed  [] yes [] No   []  Plastics  Completed  [] yes [] No   []  ENT  Completed  [] yes [] No   []  Other   Completed  [] yes [] No  Pertinent event(s) possible sepsis, abx given, chemo patient    Pertinent event(s)   Electronically signed by Jessica Longo RN on 10/9/2020 at 5:50 PM     Nguyen Rodriguez RN  10/09/20 [phone]

## 2020-10-10 PROBLEM — E43 SEVERE MALNUTRITION (HCC): Status: ACTIVE | Noted: 2020-01-01

## 2020-10-10 PROBLEM — R78.81 BACTEREMIA: Status: ACTIVE | Noted: 2020-01-01

## 2020-10-10 PROBLEM — I50.23 ACUTE ON CHRONIC SYSTOLIC CHF (CONGESTIVE HEART FAILURE) (HCC): Status: ACTIVE | Noted: 2020-01-01

## 2020-10-10 NOTE — PROGRESS NOTES
Comprehensive Nutrition Assessment    Type and Reason for Visit:  Initial, Consult(Poor appetite)    Nutrition Recommendations/Plan:   -Continue general diet   -Suggest ensure enlive supplements TID   -Pt may benefit from starting EN 2/2 severe malnutrition  -Will monitor po intake, weights and wound healing     Nutrition Assessment:   Pt admitted d/t fatigue. Pt w/ stage IV lung CA w/ mets to brain and spine. Pt stated that his appetite has been poor for months and that \"nothing tastes good\" to him anymore. Pt reports that he drinks ensure enlive supplements at home when he can. Pt reported UBW of 140 lbs x 2 mo ago - pt w/ 22% wt loss, significant. Pt meets the criteria for severe malnutrition. Will start nutritional supplements. Goal of wt stabilization/gain as medically able. Malnutrition Assessment:  Malnutrition Status:  Severe malnutrition    Context:  Chronic Illness     Findings of the 6 clinical characteristics of malnutrition:  Energy Intake:  7 - 75% or less estimated energy requirements for 1 month or longer  Weight Loss:  7 - 5% over 1 month     Body Fat Loss:  1 - Mild body fat loss Orbital, Triceps   Muscle Mass Loss:  1 - Mild muscle mass loss Temples (temporalis), Clavicles (pectoralis & deltoids), Scapula (trapezius)  Fluid Accumulation:  1 - Mild Extremities   Strength:  Not Performed    Estimated Daily Nutrient Needs:  Energy (kcal):  30-35 ~> 1100-7372 kcals/d; Weight Used for Energy Requirements:  Admission     Protein (g):  1.5-1.7 gm/kg ~> 75-85 gms/d; Weight Used for Protein Requirements:  Admission          Nutrition Related Findings:  K 3.2      Wounds:  Multiple, Open Wounds       Current Nutrition Therapies:    DIET GENERAL;     Anthropometric Measures:  · Height: 5' 5\" (165.1 cm)  · Current Body Weight: 109 lb (49.4 kg)   · Admission Body Weight: 109 lb (49.4 kg)    · Usual Body Weight: 140 lb (63.5 kg)(per pt)     · Ideal Body Weight: 136 lbs; % Ideal Body Weight 80.1 % · BMI: 18.1  · BMI Categories: Underweight (BMI less than 22) age over 72       Nutrition Diagnosis:   · Severe malnutrition, In context of chronic illness related to catabolic illness, inadequate protein-energy intake as evidenced by poor intake prior to admission, intake 0-25%, weight loss greater than or equal to 5% in 1 month, mild muscle loss, localized or generalized fluid accumulation, mild loss of subcutaneous fat(Need for ONS)      Nutrition Interventions:   Food and/or Nutrient Delivery:  Continue Current Diet, Start Oral Nutrition Supplement  Nutrition Education/Counseling:  Education not indicated   Coordination of Nutrition Care:  Continued Inpatient Monitoring    Goals Set   Pt to meet >75% of est'd daily needs via PO       Nutrition Monitoring and Evaluation:   Food/Nutrient Intake Outcomes:  Food and Nutrient Intake, Supplement Intake  Physical Signs/Symptoms Outcomes:  Biochemical Data, Nutrition Focused Physical Findings, Skin, Weight, GI Status, Fluid Status or Edema     Discharge Planning:     Too soon to determine     Electronically signed by Isa Ram RD, LD on 10/10/20 at 12:56 PM EDT    Contact: [phone]

## 2020-10-10 NOTE — PROGRESS NOTES
Physical Therapy    Facility/Department: [facility] 2  Initial Assessment    NAME: Zeinab Haywood  : 1950  MRN: [id number]    Date of Service: 10/10/2020    From H and P: Zeinab Haywood is a 69 y.o. Non-/non  male who presents with Fatigue and is admitted to the hospital for the management of CHF with unknown LVEF (HCC).   Mr. Freddy Foster is a 69-year-old male who presents to the hospital today with complaints of fatigue and increased weakness. He has a history of primary stage IV lung cancer with metastasis to the brain and the bone. He underwent palliative kyphoplasty on . He was seen by Dr. Grace Shore on 10/6/2020 and started on Marinol in addition to his Megace for severe malnutrition and decreased appetite. He has been unable to receive a scheduled dose of chemotherapy due to his general decline. Daughter is at bedside and hoping for feeding tube placement.   ED work-up showed initial troponin of 79 follow-up troponin 55. He has known elevated troponin levels. He has a white blood cell count of 19.0 (last 20.0)  and a BNP of 5289. Pacemaker was interrogated with no malfunction noted. Discharge Recommendations:  Patient would benefit from continued therapy after discharge        Assessment   Body structures, Functions, Activity limitations: Decreased functional mobility ; Increased pain;Decreased strength;Decreased safe awareness;Decreased endurance;Decreased posture  Assessment: Ambulated poorly, c/o fatigue, weakness. Knees mildly buckling. Verbalizes appreciation of therapist getting him out of bed, feels good sitting up in bedside chair. In his current condition, he is unlikely to be able to negotiate up his entry steps and walk in home without more falls. (Patient reports 3 falls in the last 6 months). Will continue to treat for strength and mobility. Prognosis: Good  Decision Making: Medium Complexity  Clinical Presentation: evolving  PT Education: Goals; General Safety;Gait Screening  Patient Currently in Pain: Yes  Pain Assessment  Pain Assessment: 0-10  Pain Level: 5  Pain Location: Back  Vital Signs  Pulse: 93  Patient Currently in Pain: Yes  Oxygen Therapy  SpO2: 98 %  Pulse Oximeter Device Mode: Continuous  Pulse Oximeter Device Location: Left  O2 Device: None (Room air)       Orientation  Orientation  Overall Orientation Status: Within Functional Limits  Social/Functional History  Social/Functional History  Lives With: Family  Type of Home: House  Home Layout: Two level, Able to Live on Main level with bedroom/bathroom  Home Access: Stairs to enter with rails  Entrance Stairs - Number of Steps: 3  Home Equipment: Rolling walker, Cane  ADL Assistance: Needs assistance  Ambulation Assistance: Needs assistance  Transfer Assistance: Needs assistance  Active : No  Mode of Transportation: Family  Additional Comments: VANIA taken a leave of absence to help him at home. 3 falls in the last 6 months per patient. Mostly using his RW now instead of cane. Cognition   Cognition  Overall Cognitive Status: WFL    Objective      Bed mobility  Supine to Sit: Stand by assistance  Transfers  Sit to Stand: Minimal Assistance  Stand to sit: Minimal Assistance  Bed to Chair: Minimal assistance  Comment: Knees buckling, static standing- unsteady  Ambulation  Ambulation?: Yes  Ambulation 1  Surface: level tile  Device: Rolling Walker  Assistance: Minimal assistance  Distance: 8'x2  Comments: Unsteady gait. Given cues to \"plan on\" his knees buckling and compensate with UE weight bearing, locking each knee before taking contralateral step. Balance  Sitting - Static: Fair  Sitting - Dynamic: Fair  Standing - Static: Poor; +(In static standing with UEs on walker, he is visibly swaying.   Needs both support on walker AND physical assist.)  Standing - Dynamic: Poor        Plan   Plan  Times per week: 5-6x/wk  Current Treatment Recommendations: Stair training, Gait Training, Transfer Training, Functional Mobility Training, Endurance Training, Positioning, Strengthening, Home Exercise Program, Safety Education & Training, Balance Training  Safety Devices  Type of devices: Call light within reach, Chair alarm in place, Gait belt, Patient at risk for falls, Nurse notified, Left in chair, All fall risk precautions in place    AM-PAC Score  AM-PAC Inpatient Mobility Raw Score : 17 (10/10/20 1206)  AM-PAC Inpatient T-Scale Score : 42.13 (10/10/20 1206)  Mobility Inpatient CMS 0-100% Score: 50.57 (10/10/20 1206)  Mobility Inpatient CMS G-Code Modifier : CK (10/10/20 1206)          Goals  Short term goals  Time Frame for Short term goals: 14 visits  Short term goal 1: Sit to/from stand with supervision  Short term goal 2: Ambulate 48' with RW with SBA  Short term goal 3: Improve strength LEs to support function as seen by completion of 10 reps standing LE exercise x 5 muscle groups.   Patient Goals   Patient goals : Get better       Therapy Time   Individual Concurrent Group Co-treatment   Time In 1102         Time Out 1150         Minutes 48         Timed Code Treatment Minutes: Scott, PT

## 2020-10-10 NOTE — PROGRESS NOTES
Physician Progress Note      PATIENTSanjuan Cockayne  [facility] #:                  [id number]  :                       1950  ADMIT DATE:       10/9/2020 10:45 AM  DISCH DATE:  RESPONDING  PROVIDER #:        Prosper Cabrera MD        QUERY TEXT:    Stage of Chronic Kidney Disease: Please provide further specificity, if known. Options provided:  -- Chronic kidney disease stage 1  -- Chronic kidney disease stage 2  -- Chronic kidney disease stage 3  -- Chronic kidney disease stage 4  -- Chronic kidney disease stage 5  -- Chronic kidney disease stage 5, requiring dialysis  -- End stage renal disease        PROVIDER RESPONSE TEXT:    The patient has chronic kidney disease stage 2.       Electronically signed by:  Prosper Cabrera MD 10/10/2020 3:50 PM

## 2020-10-10 NOTE — CONSULTS
[facility]    Consult / H&P               Today's Date: 10/10/2020  Patient Name: Shravan Monge  Date of admission: 10/9/2020 10:45 AM  Patient's age: 69 y.o., 1950  Admission Dx: CHF with unknown LVEF ([facility] [address] [I50.9]    Reason for Consult:  Cardiac evaluation    Requesting Physician: Autsin Waller MD    CHIEF COMPLAINT:     History Obtained From:  patient    HISTORY OF PRESENT ILLNESS:      24-year-old female with past medical history of ischemic cardiomyopathy previous sending in [facility], history of AICD, hypertension, stage IV lung cancer with metastasis to brain presented with complaint of shortness of breath. Patient denies any chest pain, shortness of breath. Patient is on aspirin, Lipitor, Coreg. Is not on lisinopril or Entresto. Patient's troponin at 79 and 55. EKG unchanged from previous. BNP was elevated to 9000. Patient is euvolemic. Past Medical History:   has a past medical history of Cancer [facility]), Cardiac resynchronization therapy defibrillator (CRT-D) in place, Chest pain, Coronary artery disease, Dyspnea on exertion, Elevated troponin, Heart attack ([facility] [address] Hyperlipidemia, Hypertension, Ischemic cardiomyopathy, and NSTEMI (non-ST elevated myocardial infarction) ([facility] [address] Past Surgical History:   has a past surgical history that includes Coronary angioplasty with stent (2012); pacemaker placement (02/11/2019); CT BIOPSY DEEP BONE PERCUTANEOUS (8/11/2020); IR PORT PLACEMENT > 5 YEARS (8/24/2020); Fixation Kyphoplasty (09/28/2020); and Kyphosis surgery (N/A, 9/28/2020). Home Medications:    Prior to Admission medications    Medication Sig Start Date End Date Taking? Authorizing Provider   dronabinol (MARINOL) 2.5 MG capsule Take 1 capsule by mouth 2 times daily (before meals) for 30 days.  10/5/20 11/4/20  Corey Pena MD   ciprofloxacin (CIPRO) 500 MG/5ML (10%) suspension Take 5 mLs by mouth 2 times daily for 10 days 10/4/20 10/14/20  Dakota Vaz MD   ciprofloxacin (CIPRO) 500 MG tablet Take 1 tablet by mouth 2 times daily for 7 days 10/4/20 10/11/20  Fernando Garcia MD   megestrol (MEGACE ES) 625 MG/5ML suspension Take 5 mLs by mouth daily 9/14/20   Fernando Garcia MD   ondansetron (ZOFRAN ODT) 4 MG disintegrating tablet Take 1 tablet by mouth every 8 hours as needed for Nausea 8/31/20   Fernando Garcia MD   polyethylene glycol (GLYCOLAX) 17 g packet Take 17 g by mouth daily as needed for Constipation    Historical Provider, MD   folic acid (FOLVITE) 1 MG tablet Take 1 tablet by mouth daily START 7 DAYS BEFORE FIRST DOSE OF PEMETREXED---TO BE TAKEN DAILY --AND CONTINUE 21 DAYS AFTER LAST DOSE OF PEMETREXED 8/17/20   Fernando Garcia MD   pantoprazole (PROTONIX) 40 MG tablet Take 1 tablet by mouth every morning (before breakfast) 8/13/20   Jess Zaldivar MD   carvedilol (COREG) 12.5 MG tablet Take 12.5 mg by mouth 2 times daily (with meals)    Historical Provider, MD   aspirin 81 MG chewable tablet Take 1 tablet by mouth daily 2/12/19   Rich Flood MD   nitroGLYCERIN (NITROSTAT) 0.4 MG SL tablet up to max of 3 total doses.  If no relief after 1 dose, call 911. 2/11/19   Rich Flood MD      Current Facility-Administered Medications: aspirin chewable tablet 81 mg, 81 mg, Oral, Daily  atorvastatin (LIPITOR) tablet 40 mg, 40 mg, Oral, Nightly  0.9 % sodium chloride infusion, , Intravenous, Continuous  sacubitril-valsartan (ENTRESTO) 24-26 MG per tablet 1 tablet, 1 tablet, Oral, BID  carvedilol (COREG) tablet 12.5 mg, 12.5 mg, Oral, BID WC  ciprofloxacin (CIPRO) tablet 500 mg, 500 mg, Oral, BID  dronabinol (MARINOL) capsule 2.5 mg, 2.5 mg, Oral, BID AC  folic acid (FOLVITE) tablet 1 mg, 1 mg, Oral, Daily  ondansetron (ZOFRAN-ODT) disintegrating tablet 4 mg, 4 mg, Oral, Q8H PRN  pantoprazole (PROTONIX) tablet 40 mg, 40 mg, Oral, QAM AC  polyethylene glycol (GLYCOLAX) packet 17 g, 17 g, Oral, Daily PRN  sodium chloride flush 0.9 % injection 10 mL, 10 mL, Intravenous, 2 times per day  sodium chloride flush 0.9 % injection 10 mL, 10 mL, Intravenous, PRN  potassium chloride (KLOR-CON M) extended release tablet 40 mEq, 40 mEq, Oral, PRN **OR** potassium bicarb-citric acid (EFFER-K) effervescent tablet 40 mEq, 40 mEq, Oral, PRN **OR** potassium chloride 10 mEq/100 mL IVPB (Peripheral Line), 10 mEq, Intravenous, PRN  magnesium sulfate 1 g in dextrose 5% 100 mL IVPB, 1 g, Intravenous, PRN  acetaminophen (TYLENOL) tablet 650 mg, 650 mg, Oral, Q6H PRN **OR** acetaminophen (TYLENOL) suppository 650 mg, 650 mg, Rectal, Q6H PRN  enoxaparin (LOVENOX) injection 40 mg, 40 mg, Subcutaneous, Daily    Allergies:  Penicillins    Social History:   reports that he quit smoking about 8 years ago. His smoking use included cigarettes. He has never used smokeless tobacco. He reports previous drug use. Drug: Marijuana. He reports that he does not drink alcohol. Family History: family history includes Cancer in his father and mother; Prostate Cancer in his father. No h/o sudden cardiac death. No for premature CAD    REVIEW OF SYSTEMS:    · Constitutional: there has been no unanticipated weight loss. There's been No change in energy level, No change in activity level. · Eyes: No visual changes or diplopia. No scleral icterus. · ENT: No Headaches  · Cardiovascular: No cardiac history  · Respiratory: No previous pulmonary problems, No cough  · Gastrointestinal: No abdominal pain. No change in bowel or bladder habits. · Genitourinary: No dysuria, trouble voiding, or hematuria. · Musculoskeletal:  No gait disturbance, No weakness or joint complaints. · Integumentary: No rash or pruritis. · Neurological: No headache, diplopia, change in muscle strength, numbness or tingling. No change in gait, balance, coordination, mood, affect, memory, mentation, behavior.   ·       PHYSICAL EXAM:      /68   Pulse 93   Temp 99.1 °F (37.3 °C) (Oral)   Resp 16   Ht 5' 5\" (1.651 m)   Wt 109 lb 2 oz (49.5 kg) SpO2 98%   BMI 18.16 kg/m²    Constitutional and General Appearance: alert, cooperative, no distress and appears stated age  [de-identified]: PERRL, no cervical lymphadenopathy. No masses palpable. Normal oral mucosa  Respiratory:  · Normal excursion and expansion without use of accessory muscles  · Resp Auscultation: Good respiratory effort. No for increased work of breathing. On auscultation: clear to auscultation bilaterally  Cardiovascular:  · The apical impulse is not displaced  · Heart tones are crisp and normal. regular S1 and S2.  · Jugular venous pulsation Normal  · The carotid upstroke is normal in amplitude and contour without delay or bruit  · Peripheral pulses are symmetrical and full   Abdomen:   · No masses or tenderness  · Bowel sounds present  Extremities:  ·  No Cyanosis or Clubbing  ·  Lower extremity edema: No  ·  Skin: Warm and dry  Neurological:  · Alert and oriented. · Moves all extremities well  · No abnormalities of mood, affect, memory, mentation, or behavior are noted      Labs:     CBC:   Recent Labs     10/09/20  1133 10/10/20  0533   WBC 19.0* 15.7*   HGB 10.0* 8.8*   HCT 32.6* 27.8*    180     BMP:   Recent Labs     10/09/20  1133 10/10/20  0533    138   K 3.7 3.2*   CO2 21 22   BUN 11 13   CREATININE 1.07 1.13   LABGLOM >60 >60   GLUCOSE 115* 104*     BNP: No results for input(s): BNP in the last 72 hours. PT/INR:   Recent Labs     10/09/20  1133 10/10/20  0533   PROTIME 13.1* 14.4*   INR 1.3 1.4     APTT:  Recent Labs     10/09/20  1133   APTT 29.4     CARDIAC ENZYMES:No results for input(s): CKTOTAL, CKMB, CKMBINDEX, TROPONINI in the last 72 hours.   FASTING LIPID PANEL:  Lab Results   Component Value Date    HDL 12 10/05/2020    TRIG 325 10/05/2020     LIVER PROFILE:  Recent Labs     10/09/20  1133   AST 17   ALT 12   LABALBU 2.4*       IMPRESSION:    Patient Active Problem List   Diagnosis    HTN (hypertension)    CAD (coronary artery disease)    Ischemic cardiomyopathy  AICD (automatic cardioverter/defibrillator) present    Elevated troponin    Lung mass    Enlarged prostate    CKD (chronic kidney disease)    CVA (cerebral vascular accident) ([facility] [address]    Brain metastasis (HCC)    Vasogenic edema (HCC)    Right hemiparesis (HCC)    Adenocarcinoma of right lung (HCC)    Thrombocytopenia (HCC)    Pancytopenia due to antineoplastic chemotherapy ([facility] [address]    Bone metastasis (HCC)    Other fatigue    Dehydration    Moderate malnutrition (HCC)    AMS (altered mental status)    Metabolic encephalopathy    Failure to thrive in adult    Prolonged Q-T interval on ECG    Hypocalcemia    Pathological compression fracture of spine (HCC)    Thrush    Bandemia    CHF with unknown LVEF ([facility] [address]     1. History of ischemic cardiomyopathy. Had stent in LAD. Patient has AICD in place. 2.  Stage IV lung cancer status post chemotherapy. 3. CKD   4. RECOMMENDATIONS:  1. Currently euvolemic. Last echo in February 2019 showed ejection fraction of 10 to 20%. Patient is on aspirin, Lipitor, Coreg. Patient is not on lisinopril or Entresto. Will start patient on Entresto. Monitor creatinine and potassium. Monitor vitals closely. Discussed with patient and Nurse. Electronically signed by Jin Lawrence MD on 10/10/2020 at 12:48 PM         I have examined the patient and reviewed the H&P/Consult / Progress note as completed , and made appropriate changes to the patient exam and treatment plans.       I have reviewed the case with resident / fellow  I have examined the patient personally  Patient agree with treatment plan, correction in notes was made as appropriate, and discussed final arrangement based on  my evaluation and exam.    Additional Recommendations:      Crow Barnes MD  [facility]

## 2020-10-10 NOTE — PROGRESS NOTES
Physician Progress Note      Bhupinder Gibson  CSN #:                  [id number]  :                       1950  ADMIT DATE:       10/9/2020 10:45 AM  DISCH DATE:  RESPONDING  PROVIDER #:        Christina Daley MD          QUERY TEXT:    Pt admitted with heart failure. If possible, please document in progress notes   and discharge summary further specificity regarding the type and acuity of   CHF:    The medical record reflects the following:  Risk Factors: CHF, metastatic adenocarcinoma of lung w/ mets to brain and bone  Clinical Indicators: BNP 9289, (3856 on 20)  CXR w/ RLL mass, + fatigue   and weakness H&P \"CHF\" Echo 19 with Moderately Dilated LV with severely   reduced function. EF 15-20%  Treatment: Labs, CXR, monitoring, plan for Lasix for worsening BNP/SOB    Thank-you,  Gloria Blanco RN, CDS  Please call for questions [phone]  Options provided:  -- Acute on Chronic Systolic and Diastolic CHF  -- Acute on Chronic Systolic CHF/HFrEF  -- Chronic Systolic CHF/HFrEF  -- Chronic Systolic and Diastolic CHF  -- Other - I will add my own diagnosis  -- Disagree - Not applicable / Not valid  -- Disagree - Clinically unable to determine / Unknown  -- Refer to Clinical Documentation Reviewer    PROVIDER RESPONSE TEXT:    This patient has chronic systolic and diastolic CHF.     Query created by: Prasanna Kulkarni on 10/10/2020 6:58 AM      Electronically signed by:  Christina Daley MD 10/10/2020 7:41 AM
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
## 2020-10-10 NOTE — FLOWSHEET NOTE
Assessment: The patient's daughter- in- law was at bedside. The patient was passive, but stated that his was coping well with being in the hospital. He is hopeful that they figure out how to make him feel better so he can go home. Intervention:  engaged in active listening.  explored the patient's thoughts and feelings.  inquired about the patients support system; family, friends, and community groups.  asked if the patient would like prayer and informed the patient that chaplains are available 24/7. Outcome: The patient engaged in the conversation.         10/10/20 1520   Encounter Summary   Services provided to: Patient and family together   Referral/Consult From: [address] [facility] Family members   Place of Orthodox None   Continue Visiting   (10/10/20)   Complexity of Encounter Moderate   Length of Encounter 15 minutes   Spiritual Assessment Completed Yes   Routine   Type Initial   Assessment Passive;Coping   Intervention Active listening;Explored feelings, thoughts, concerns;Explored coping resources;Nurtured hope   Outcome Expressed gratitude
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
## 2020-10-11 NOTE — PROGRESS NOTES
[facility]  Progress Note                   Date:   10/11/2020  Patient name: Julieta Rodriguez  Date of admission:  10/9/2020 10:45 AM  MRN:   [id number]  YOB: 1950  PCP: Joel Gonsalez MD    Reason for Admission: CHF with unknown LVEF ([facility] [I50.9]    Subjective:       Clinical Changes /Abnormalities: Seen & examined in room. Denies any chest pain or SOB. Tele reviewed- V paced 1:1.     Review of Systems    Medications:   Scheduled Meds:   aspirin  81 mg Oral Daily    atorvastatin  40 mg Oral Nightly    sacubitril-valsartan  1 tablet Oral BID    levofloxacin  500 mg Intravenous Q24H    dronabinol  2.5 mg Oral BID AC    folic acid  1 mg Oral Daily    pantoprazole  40 mg Oral QAM AC    sodium chloride flush  10 mL Intravenous 2 times per day    enoxaparin  40 mg Subcutaneous Daily     Continuous Infusions:   sodium chloride 75 mL/hr at 10/11/20 0615     CBC:   Recent Labs     10/09/20  1133 10/10/20  0533   WBC 19.0* 15.7*   HGB 10.0* 8.8*    180     BMP:    Recent Labs     10/09/20  1133 10/10/20  0533    138   K 3.7 3.2*    107   CO2 21 22   BUN 11 13   CREATININE 1.07 1.13   GLUCOSE 115* 104*     Hepatic:  Recent Labs     10/09/20  1133   AST 17   ALT 12   BILITOT 0.65   ALKPHOS 227*     Troponin:   Recent Labs     10/09/20  1133 10/09/20  1225   TROPHS 79* 55*     BNP: No results for input(s): BNP in the last 72 hours. Lipids: No results for input(s): CHOL, HDL in the last 72 hours. Invalid input(s): LDLCALCU  INR:   Recent Labs     10/09/20  1133 10/10/20  0533   INR 1.3 1.4       Objective:   Vitals: /72   Pulse 98   Temp 98.4 °F (36.9 °C) (Oral)   Resp 23   Ht 5' 5\" (1.651 m)   Wt 128 lb 15.5 oz (58.5 kg)   SpO2 99%   BMI 21.46 kg/m²   General appearance: alert and cooperative with exam  HEENT: Head: Normocephalic, no lesions, without obvious abnormality.   Neck:no JVD, trachea midline, no adenopathy  Lungs: Clear to auscultation  Heart: Regular rate and rhythm, s1/s2 auscultated, no murmurs  Abdomen: soft, non-tender, bowel sounds active  Extremities: no edema  Neurologic: not done    ICD CHANGE OUT 2/11/19: Done by Dr. Jelani Atkinson     ECHO 2/11/19: EF 15-20%, ICD leads noted in RV, no valvular abnormalities noted. If apical clot is suspected, VLADIMIR is recommended.      CATH 2/8/19: Minimal CAD in LCx and RCA. EF 15%.     Assessment / Acute Cardiac Problems:   1. Chronic systolic CHF  2. ICMP with AICD in situ  3. H/O STEMI s/p BMS to LAD 2012  4. HTN  5. CKD3  6. Right lung mass    Patient Active Problem List:     HTN (hypertension)     CAD (coronary artery disease)     Ischemic cardiomyopathy     AICD (automatic cardioverter/defibrillator) present     Elevated troponin     Lung mass     Enlarged prostate     CKD (chronic kidney disease)     CVA (cerebral vascular accident) ([address]     Brain metastasis (HCC)     Vasogenic edema (HCC)     Right hemiparesis (HCC)     Adenocarcinoma of right lung (HCC)     Thrombocytopenia (HCC)     Pancytopenia due to antineoplastic chemotherapy ([address]     Bone metastasis (HCC)     Other fatigue     Dehydration     Moderate malnutrition ([address]     AMS (altered mental status)     Metabolic encephalopathy     Failure to thrive in adult     Prolonged Q-T interval on ECG     Hypocalcemia     Pathological compression fracture of spine ([address]     Thrush     Bandemia     CHF with unknown LVEF (HCC)     Severe malnutrition (HCC)     Bacteremia     Acute on chronic systolic CHF (congestive heart failure) ([address]      Plan of Treatment:   1. Remains euvolemic and denies CP, SOB, or GRIFFITHS. Continue PO ASA, statin, & Entresto  2. States his only concern is no appetite and \"my taste buds are just gone. \"  3. Discussed importance of diet, exercise, monitoring PO fluid intake and urine output. 4. No objection to discharge from CV standpoint. F/U as OP in clinic in 1-2 weeks.      Electronically signed by Ralph Camara REYNALDO - CNP on 10/11/2020 at 7:43 AM  [address]  [phone]

## 2020-10-11 NOTE — PROGRESS NOTES
Pt resting in bed with family at bedside provided updates and spoke with case management regarding plans for after d/c. Linens and gown changed. Potassium replaced and [employer] sent and read regarding calcium level. Lunch at bedside. Will continue to monitor.

## 2020-10-11 NOTE — PLAN OF CARE
Problem: Pain:  Goal: Pain level will decrease  Description: Pain level will decrease  10/11/2020 1013 by Jd Newsome RN  Outcome: Ongoing  10/10/2020 2355 by Anjana Landry RN  Outcome: Ongoing  Goal: Control of acute pain  Description: Control of acute pain  10/11/2020 1013 by Jd Newsome RN  Outcome: Ongoing  10/10/2020 2355 by Anjana Landry RN  Outcome: Ongoing  Goal: Control of chronic pain  Description: Control of chronic pain  10/11/2020 1013 by Jd Newsome RN  Outcome: Ongoing  10/10/2020 2355 by Anjana Landry RN  Outcome: Ongoing     Problem: Falls - Risk of:  Goal: Will remain free from falls  Description: Will remain free from falls  10/11/2020 1013 by Jd Newsome RN  Outcome: Ongoing  10/10/2020 2355 by Anjana Landry RN  Outcome: Ongoing  Goal: Absence of physical injury  Description: Absence of physical injury  10/11/2020 1013 by Jd Newsome RN  Outcome: Ongoing  10/10/2020 2355 by Anjana Landry RN  Outcome: Ongoing     Problem: Skin Integrity:  Goal: Will show no infection signs and symptoms  Description: Will show no infection signs and symptoms  10/11/2020 1013 by Jd Newsome RN  Outcome: Ongoing  10/10/2020 2355 by Anjana Landry RN  Outcome: Ongoing  Goal: Absence of new skin breakdown  Description: Absence of new skin breakdown  10/11/2020 1013 by Jd Newsome RN  Outcome: Ongoing  10/10/2020 2355 by Anjana Landry RN  Outcome: Ongoing     Problem: IP BALANCE  Goal: BALANCE EDUCATION  Description: Educate patients on maintaining dynamic/static standing/sitting balance, with/without upper extremity support.   10/11/2020 1013 by Jd Newsome RN  Outcome: Ongoing  10/10/2020 2355 by Anjana Landry RN  Outcome: Ongoing     Problem: IP MOBILITY  Goal: LTG - patient will ambulate household distance  10/11/2020 1013 by Jd Newsome RN  Outcome: Ongoing  10/10/2020 2355 by Anjana Landry RN  Outcome: Ongoing

## 2020-10-11 NOTE — CARE COORDINATION
Met with patient and daughter in law Jermain Phillips to discuss transitional planning. Awaiting palliative care consult to discuss goals of care.   Provided with SNF list if needed, current with [facility]

## 2020-10-12 NOTE — FLOWSHEET NOTE
[phone] from [address] called regarding patient, patient was to have information meeting with them regarding Hospice but ended up in hospital. She would like to be contacted for Hospice consult. Ysabel Jimenez CM notified.

## 2020-10-12 NOTE — CARE COORDINATION
Patient/family seen: Yes       Informed patient/family of [facility] Medical Bundle Program with potential outreach by either Care Transitions Team or naviHealth Team based on hospital admission and location.        [facility]PCI-A Notification Letter given: Yes         Current discharge plan: Palliative consult, SNF list given to family, current with [facility]

## 2020-10-12 NOTE — PLAN OF CARE
Problem: Pain:  Goal: Pain level will decrease  Description: Pain level will decrease  Outcome: Ongoing  Goal: Control of acute pain  Description: Control of acute pain  Outcome: Ongoing  Goal: Control of chronic pain  Description: Control of chronic pain  Outcome: Ongoing     Problem: Falls - Risk of:  Goal: Will remain free from falls  Description: Will remain free from falls  Outcome: Ongoing  Goal: Absence of physical injury  Description: Absence of physical injury  Outcome: Ongoing     Problem: Skin Integrity:  Goal: Will show no infection signs and symptoms  Description: Will show no infection signs and symptoms  Outcome: Ongoing  Goal: Absence of new skin breakdown  Description: Absence of new skin breakdown  Outcome: Ongoing     Problem: IP BALANCE  Goal: BALANCE EDUCATION  Description: Educate patients on maintaining dynamic/static standing/sitting balance, with/without upper extremity support.   Outcome: Ongoing     Problem: IP MOBILITY  Goal: LTG - patient will ambulate household distance  Outcome: Ongoing
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
## 2020-10-12 NOTE — PROGRESS NOTES
PALLIATIVE CARE PROGRESS NOTE     Patient: Vandana Gorman    Room: 2010/2010-01    Reason For Consult:  Goals of care evaluation  Distress management  Guidance and support  Facilitate communications  Assistance in coordinating care  Recommendations for the above      HISTORY OF PRESENT ILLNESS:     IMPRESSION:   Vandana Gorman is a 69 y.o. male and  has a past medical history of Cancer [facility]), Cardiac resynchronization therapy defibrillator (CRT-D) in place, Chest pain, Coronary artery disease, Dyspnea on exertion, Elevated troponin, Heart attack ([facility] Hyperlipidemia, Hypertension, Ischemic cardiomyopathy, and NSTEMI (non-ST elevated myocardial infarction) ([facility] [address] .  Follows with Em Eldridge MD as PCP. He was admitted to the primary service for the management of CHF with unknown LVEF ([facility] [address] [facility] [I50.9]. His hospital course has been associated with complications of metastatic cancer. The patient has a complicated medical history and has been hospitalized since 10/9/2020 10:45 AM The Palliative Care Team was consulted to help manage symptoms, facilitate communications and establish goals of care.       Vital Signs:  /61   Pulse 114   Temp 98.2 °F (36.8 °C) (Oral)   Resp 18   Ht 5' 5\" (1.651 m)   Wt 128 lb 2.4 oz (58.1 kg)   SpO2 96%   BMI 21.33 kg/m²          Pertinent Laboratory Studies Reviewed by Me Personally Today:  Lab Results   Component Value Date    WBC 17.2 (H) 10/12/2020    HGB 9.0 (L) 10/12/2020    HCT 28.5 (L) 10/12/2020    MCV 99.3 10/12/2020     10/12/2020     Lab Results   Component Value Date     10/12/2020    K 3.4 10/12/2020     10/12/2020    CO2 18 10/12/2020    BUN 12 10/12/2020    CREATININE 0.96 10/12/2020    GLUCOSE 105 10/12/2020    CALCIUM 7.3 10/12/2020            Patient Active Problem List   Diagnosis    HTN (hypertension)    CAD (coronary artery disease)    Ischemic cardiomyopathy    AICD (automatic cardioverter/defibrillator) present    Elevated troponin  Lung mass    Enlarged prostate    CKD (chronic kidney disease)    CVA (cerebral vascular accident) ([facility]    Brain metastasis ([facility]    Vasogenic edema (HCC)    Right hemiparesis (HCC)    Adenocarcinoma of right lung (HCC)    Thrombocytopenia (HCC)    Pancytopenia due to antineoplastic chemotherapy ([facility]    Bone metastasis (HCC)    Other fatigue    Dehydration    Moderate malnutrition (HCC)    AMS (altered mental status)    Metabolic encephalopathy    Failure to thrive in adult    Prolonged Q-T interval on ECG    Hypocalcemia    Pathological compression fracture of spine ([facility]    Thrush    Bandemia    CHF with unknown LVEF (HCC)    Severe malnutrition (HCC)    Bacteremia    Acute on chronic systolic CHF (congestive heart failure) (HCC)       Palliative Performance Scale:  ___60%  Ambulation reduced; Significant disease; Can't do hobbies/housework; intake normal or reduced; occasional assist; LOC full/confusion  X___50%  Mainly sit/lie; Extensive disease; Can't do any work; Considerable assist; intake normal or reduced; LOC full/confusion  ___40%  Mainly in bed; Extensive disease; Mainly assist; intake normal or reduced; LOC full/confusion   ___30%  Bed Bound; Extensive disease; Total care; intake reduced; LOCfull/confusion  ___20%  Bed Bound; Extensive disease; Total care; intake minimal; Drowsy/coma  ___10%  Bed Bound; Extensive disease; Total care; Mouth care only; Drowsy/coma  ___0       Death    Palliative Interaction:  Awake and alert in bed, flat affect but responds to questions asked. Daughter in law present, son on the phone and contributing to conversation  Reviewed clinical situation and patient and DIL state an understanding that situation is not good. Patient states that if no treatment options are available, he wants to go home. He understands that it would not be safe to live alone, and is ok with going to son's home, and will accept home hospice as a last resort.  But he insists he wants to be home with his family and dogs. Reviewed his wishes for care, stating he would not want CPR, Vent, or artificial N&H. After discussion with oncologist, he and the family will decide on code status. Living [facility] paperwork provided. Will review as a family. Principle Problem/Diagnosis:  Weakness, poor appetite RT metastatic cancer    IMPRESSION/ PLAN  1- Symptom management/ pain control   We feel the patient symptoms are presently controlled. His current regimen is reviewed by myself and discussed with the staff. Will ask NP to review as well. 2- Goals of care evaluation   The patient goals of care are   remain at home, preserve independence/autonomy/control and support for family/caregiver     Long discussion to ensure his understanding of goals of care, and the concept of palliative care. The family's understanding of his goals of care were reviewed and agree that he is able to determine his course of care. 3-Code Status:  Full Code  Will discuss change after meeting with oncology    4-Other Recommendations:  Request follow-up visit by [facility] for Hospice services in the home. Family encouraged to ask for a contingent plan if they are unable to care for the patient in the home as he declines.      Will continue to follow and assure patient of support     Electronically signed by   George Bishop RN  Palliative Care Team  on 10/12/2020 at 2:52 PM    Palliative Care Office [phone]  Available via [employer]

## 2020-10-12 NOTE — PROGRESS NOTES
Occupational Therapy   Occupational Therapy Initial Assessment  Date: 10/12/2020   Patient Name: Dave Ralph  MRN: [id number]     : 1950    Date of Service: 10/12/2020    Discharge Recommendations:  Patient would benefit from continued therapy after discharge  OT Equipment Recommendations  Other: CTA    Assessment   Performance deficits / Impairments: Decreased functional mobility ; Decreased safe awareness;Decreased ADL status; Decreased endurance;Decreased high-level IADLs;Decreased strength  Assessment: Pt required SBA for bed mobility and CGA for functional transfers/functional mobility with RW. CGA for LB dressing sitting EOB. Pt limited by significant fatigue this date. Pt would benefit from continued OT services during acute hospitilization to maximize safety and increase independence in ADL/functional mobility tasks. Prognosis: Good  Decision Making: Medium Complexity  OT Education: OT Role;Plan of Care;Energy Conservation;Transfer Training  Patient Education: energy conservation techniques (taking breaks, deep breathing)  REQUIRES OT FOLLOW UP: Yes  Activity Tolerance  Activity Tolerance: Patient limited by fatigue  Safety Devices  Safety Devices in place: Yes  Type of devices: All fall risk precautions in place;Gait belt;Patient at risk for falls;Call light within reach; Left in chair;Chair alarm in place;Nurse notified  Restraints  Initially in place: No           Patient Diagnosis(es): The primary encounter diagnosis was Acute on chronic congestive heart failure, unspecified heart failure type ([address] Diagnoses of Fatigue, unspecified type, Leukocytosis, unspecified type, and Hx of cancer of lung were also pertinent to this visit.      has a past medical history of Cancer [facility]), Cardiac resynchronization therapy defibrillator (CRT-D) in place, Chest pain, Coronary artery disease, Dyspnea on exertion, Elevated troponin, Heart attack ([address] Hyperlipidemia, Hypertension, Ischemic cardiomyopathy, and NSTEMI (non-ST elevated myocardial infarction) ([facility] has a past surgical history that includes Coronary angioplasty with stent (2012); pacemaker placement (02/11/2019); CT BIOPSY DEEP BONE PERCUTANEOUS (8/11/2020); IR PORT PLACEMENT > 5 YEARS (8/24/2020); Fixation Kyphoplasty (09/28/2020); and Kyphosis surgery (N/A, 9/28/2020). Restrictions  Restrictions/Precautions  Restrictions/Precautions: Fall Risk, General Precautions(up with assist)  Required Braces or Orthoses?: No  Position Activity Restriction  Other position/activity restrictions: Up with assist, T6 compression fracture, 7th rib left with metastasis, sternal metastasis    Subjective   General  Patient assessed for rehabilitation services?: Yes  Family / Caregiver Present: No  General Comment  Comments: Rn ok'd for therapy visit this date. Pt agreeable to session, pleasent/cooperative throughout. Patient Currently in Pain: Yes  Pain Assessment  Pain Assessment: 0-10  Pain Level: 5  Pain Location: Back  Vital Signs  Patient Currently in Pain: Yes     Social/Functional History  Social/Functional History  Lives With: Family  Type of Home: House  Home Layout: Two level, Able to Live on Main level with bedroom/bathroom  Home Access: Stairs to enter with rails  Entrance Stairs - Number of Steps: 3  Bathroom Shower/Tub: Tub/Shower unit  Bathroom Toilet: Standard  Bathroom Equipment: Shower chair  Bathroom Accessibility: Accessible  Home Equipment: Rolling walker, Cane(Uses RW when needed, reports when he is feeling fatigued)  Receives Help From: Family  ADL Assistance: Independent  Homemaking Assistance: Needs assistance  Homemaking Responsibilities: No(Family completes)  Ambulation Assistance: Independent  Transfer Assistance: Independent  Active : No  Patient's  Info: son and dtr in law drive pt to appts  Mode of Transportation: Family  Additional Comments: DIL taken a leave of absence to help him at home.   3 falls in the last 6 months per patient. Mostly using his RW now instead of cane. Objective   Vision: Impaired  Vision Exceptions: Wears glasses at all times    Hearing: Within functional limits      Orientation  Overall Orientation Status: Within Functional Limits     Balance  Sitting Balance: Stand by assistance(dressing tasks sitting EOB ~5 minutes)  Standing Balance: Contact guard assistance  Standing Balance  Time: ~4 minutes  Activity: functional mobility to recliner, standing static EOB  Comment: Pt slighly unsteady, no LOB, use of RW. Functional Mobility  Functional - Mobility Device: Rolling Walker  Activity: (to recliner)  Assist Level: Contact guard assistance  Functional Mobility Comments: pt completed functional mobility from bed to recliner with CGA, slighly unsteady throughout. Pt reports feeling extremely fatigued upon standing and reports he is unable to complete further mobility after sitting in recliner. ADL  Feeding: Setup;Modified independent   Grooming: Contact guard assistance;Setup; Increased time to complete  UE Bathing: Setup;Minimal assistance; Increased time to complete  LE Bathing: Moderate assistance; Increased time to complete;Setup  UE Dressing: Setup; Increased time to complete;Contact guard assistance  LE Dressing: Setup;Contact guard assistance; Increased time to complete(Pt donned socks sitting EOB)  Toileting: Minimal assistance;Setup; Increased time to complete  Additional Comments: Increased effort/time required d/t pt feeling extremely fatigued this visit. Tone RUE  RUE Tone: Normotonic  Tone LUE  LUE Tone: Normotonic    Coordination  Movements Are Fluid And Coordinated: Yes     Bed mobility  Supine to Sit: Stand by assistance  Sit to Supine: (Pt left in recliner upon exit)  Scooting: Stand by assistance  Comment: Increased time/effort d/t pt feeling significantly fatigued. HOB raised ~30 degrees.     Transfers  Sit to stand: Contact guard assistance  Stand to sit: Contact guard assistance  Transfer Comments: Use of RW.      Cognition  Overall Cognitive Status: WFL        Sensation  Overall Sensation Status: WFL        LUE AROM (degrees)  LUE AROM : WFL  Left Hand AROM (degrees)  Left Hand AROM: WFL    RUE AROM (degrees)  RUE AROM : WFL  Right Hand AROM (degrees)  Right Hand AROM: WFL    LUE Strength  Gross LUE Strength: Exceptions to [facility]  L Hand General: 4-/5  LUE Strength Comment: Grossly 4-/5    RUE Strength  Gross RUE Strength: Exceptions to [facility]  R Hand General: 4-/5  RUE Strength Comment: Grossly 4-/5           Plan   Plan  Times per week: 3-4x/week  Current Treatment Recommendations: Strengthening, Safety Education & Training, Patient/Caregiver Education & Training, Self-Care / ADL, Functional Mobility Training, Equipment Evaluation, Education, & procurement, Endurance Training      AM-PAC Score        AM-[facility] Inpatient Daily Activity Raw Score: 18 (10/12/20 1059)  AM-PAC Inpatient ADL T-Scale Score : 38.66 (10/12/20 1059)  ADL Inpatient CMS 0-100% Score: 46.65 (10/12/20 1059)  ADL Inpatient CMS G-Code Modifier : CK (10/12/20 1059)    Goals  Short term goals  Time Frame for Short term goals: By discharge  Short term goal 1: Pt will complete functional mobility with Mod I, use of LRD  Short term goal 2: Pt will complete ADLs with Mod I, use of adaptive strategies and equiptment PRN  Short term goal 3: Pt will demo +20 minutes of standing tolerance during functional mobility/ADL tasks, using LRD, using energy conservation techniques PRN  Short term goal 4: Pt will demo good use of energy conservation techniques thorughout all functional mobility/ADL tasks PRN  Short term goal 5: Pt will demo good safety awareness throughout all functional mobility/ADL tasks       Therapy Time   Individual Concurrent Group Co-treatment   Time In 0843         Time Out 0916         Minutes 33         Timed Code Treatment Minutes: 22 Minutes       Keren Lainez, OTR/L

## 2020-10-12 NOTE — PROGRESS NOTES
Today's Date: 10/11/2020  Patient Name: Gabrielle Rodriguez  Date of admission: 10/9/2020 10:45 AM  Patient's age: 69 y.o., 1950  Admission Dx: CHF with unknown LVEF ([facility] [address] [I50.9]    Reason for Consult: management recommendations  Requesting Physician: Mary Randolph MD    CHIEF COMPLAINT: Failure to thrive. Weakness. Fatigue. Interim  history  Pt seen and examined  Not feeling any better  poor appetite. HISTORY OF PRESENT ILLNESS:      The patient is a 69 y.o.  male who is admitted to the hospital for chief complaint of weakness poor appetite and weight loss and failure to thrive. Patient is well-known to us. Patient was recently diagnosed with a metastatic adenocarcinoma of lung. Patient has had whole brain radiation as well as palliative radiation to the spine. Patient was started on combination chemoimmunotherapy using carboplatin Alimta and Keytruda. Patient was hospitalized twice after cycle #1. Patient was just seen in the office earlier this week as he was scheduled for cycle 2. Overall patient's back pain is improved. But he still complains of being very weak. Subsequently he only received Keytruda without any chemotherapy. Over the last couple of days patient has progressively gotten weaker. He has lost weight. Complains of poor appetite. Patient has been on Marinol earlier and was given a prescription for Megace on the last visit but continues to have a poor appetite. Patient was also having fever over the last week and was prescribed ciprofloxacin. Patient does have area of erythema on the back however this is thought to be secondary radiation to the thoracic spine.     Relevant oncology history:    Current problems:  Metastatic poorly differentiated adenocarcinoma of lung primary  Brain metastasis  Bone metastasis     Active and recent treatments:  Whole brain radiation, 3000 cGy-8/2020  Palliative radiation to thoracic spine  Thoracic spine kyphoplasty- 9/2020  Systemic therapy using carboplatin Alimta and Keytruda       Past Medical History:   has a past medical history of Cancer [facility]), Cardiac resynchronization therapy defibrillator (CRT-D) in place, Chest pain, Coronary artery disease, Dyspnea on exertion, Elevated troponin, Heart attack ([facility] [address] Hyperlipidemia, Hypertension, Ischemic cardiomyopathy, and NSTEMI (non-ST elevated myocardial infarction) ([facility] [address] Past Surgical History:   has a past surgical history that includes Coronary angioplasty with stent (2012); pacemaker placement (02/11/2019); CT BIOPSY DEEP BONE PERCUTANEOUS (8/11/2020); IR PORT PLACEMENT > 5 YEARS (8/24/2020); Fixation Kyphoplasty (09/28/2020); and Kyphosis surgery (N/A, 9/28/2020). Medications:    Prior to Admission medications    Medication Sig Start Date End Date Taking? Authorizing Provider   dronabinol (MARINOL) 2.5 MG capsule Take 1 capsule by mouth 2 times daily (before meals) for 30 days.  10/5/20 11/4/20  Corey Pena MD   ciprofloxacin (CIPRO) 500 MG/5ML (10%) suspension Take 5 mLs by mouth 2 times daily for 10 days 10/4/20 10/14/20  Dung Alvares MD   ciprofloxacin (CIPRO) 500 MG tablet Take 1 tablet by mouth 2 times daily for 7 days 10/4/20 10/11/20  Dung Alvares MD   megestrol (MEGACE ES) 625 MG/5ML suspension Take 5 mLs by mouth daily 9/14/20   Dung Alvares MD   ondansetron (ZOFRAN ODT) 4 MG disintegrating tablet Take 1 tablet by mouth every 8 hours as needed for Nausea 8/31/20   Dung Alvares MD   polyethylene glycol (GLYCOLAX) 17 g packet Take 17 g by mouth daily as needed for Constipation    Historical Provider, MD   folic acid (FOLVITE) 1 MG tablet Take 1 tablet by mouth daily START 7 DAYS BEFORE FIRST DOSE OF PEMETREXED---TO BE TAKEN DAILY --AND CONTINUE 21 DAYS AFTER LAST DOSE OF PEMETREXED 8/17/20   Dung Alvares MD   pantoprazole (PROTONIX) 40 MG tablet Take 1 tablet by mouth every morning (before breakfast) 8/13/20   Gerardo Salvador MD   carvedilol (COREG) cold intolerance,weight changes, change in bowel habits and hair loss   Musculoskeletal: Positive back pain  Neurological: negative for headaches, dizziness, seizures, weakness, numbness    PHYSICAL EXAM:        /77   Pulse 111   Temp 98.1 °F (36.7 °C) (Oral)   Resp 25   Ht 5' 5\" (1.651 m)   Wt 128 lb 15.5 oz (58.5 kg)   SpO2 98%   BMI 21.46 kg/m²    Temp (24hrs), Av.3 °F (36.8 °C), Min:98.1 °F (36.7 °C), Max:98.4 °F (36.9 °C)      General appearance -frail appearing, no in pain or distress   Mental status - alert and cooperative   Eyes - pupils equal and reactive, extraocular eye movements intact   Ears - bilateral TM's and external ear canals normal   Mouth - mucous membranes moist, pharynx normal without lesions   Neck - supple, no significant adenopathy   Lymphatics - no palpable lymphadenopathy, no hepatosplenomegaly   Chest -decreased breathing sounds  Heart - normal rate, regular rhythm, normal S1, S2, no murmurs  Abdomen - soft, nontender, nondistended, no masses or organomegaly   Neurological - alert, oriented, normal speech, no focal findings or movement disorder noted   Musculoskeletal - no joint tenderness, deformity or swelling   Extremities - peripheral pulses normal, no pedal edema, no clubbing or cyanosis   Skin - normal coloration and turgor, no rashes, no suspicious skin lesions noted ,      DATA:      Labs:     Results for orders placed or performed during the hospital encounter of 10/09/20   Culture, Urine    Specimen: Urine   Result Value Ref Range    Specimen Description . URINE     Special Requests NOT REPORTED     Culture NO GROWTH    Culture, Blood 1    Specimen: Blood   Result Value Ref Range    Specimen Description . BLOOD     Special Requests 10 R ARM     Culture NO GROWTH 2 DAYS    Culture, Blood 2    Specimen: Blood   Result Value Ref Range    Specimen Description . BLOOD     Special Requests LA 6 ML     Culture (A)      POSITIVE Blood Culture Results called to and read back by: [id number] 10/10/2020 CHITRA STEWART    Culture       DIRECT GRAM STAIN FROM BOTTLE: GRAM POSITIVE COCCI IN CHAINS    Culture (A)      Streptococcus species detected by PCR (not S. agalactiae (Group B), S. pneumoniae, or S. pyogenes (Group A))    Culture (A)      VIRIDANS STREPTOCOCCUS GROUP A single positive blood culture of coagulase negative Staphylocci, diptheroids, micrococci, Cutibacterium, viridans Streptocci, Bacillus, or Lactobacillus species should be interpreted with caution and viewed as a likely skin contaminant.    CBC Auto Differential   Result Value Ref Range    WBC 19.0 (H) 3.5 - 11.3 k/uL    RBC 3.34 (L) 4.21 - 5.77 m/uL    Hemoglobin 10.0 (L) 13.0 - 17.0 g/dL    Hematocrit 32.6 (L) 40.7 - 50.3 %    MCV 97.6 82.6 - 102.9 fL    MCH 29.9 25.2 - 33.5 pg    MCHC 30.7 28.4 - 34.8 g/dL    RDW 22.4 (H) 11.8 - 14.4 %    Platelets 198 885 - 256 k/uL    MPV 10.5 8.1 - 13.5 fL    NRBC Automated 0.2 (H) 0.0 per 100 WBC    Differential Type NOT REPORTED     WBC Morphology NOT REPORTED     RBC Morphology NOT REPORTED     Platelet Estimate NOT REPORTED     Immature Granulocytes 7 (H) 0 %    Seg Neutrophils 84 (H) 36 - 66 %    Lymphocytes 2 (L) 24 - 44 %    Monocytes 4 1 - 7 %    Eosinophils % 3 1 - 4 %    Basophils 0 0 - 2 %    Absolute Immature Granulocyte 1.33 (H) 0.00 - 0.30 k/uL    Segs Absolute 15.96 (H) 1.8 - 7.7 k/uL    Absolute Lymph # 0.38 (L) 1.0 - 4.8 k/uL    Absolute Mono # 0.76 0.1 - 0.8 k/uL    Absolute Eos # 0.57 (H) 0.0 - 0.4 k/uL    Basophils Absolute 0.00 0.0 - 0.2 k/uL    Morphology ANISOCYTOSIS PRESENT     Morphology 1+ POLYCHROMASIA    Comprehensive Metabolic Panel w/ Reflex to MG   Result Value Ref Range    Glucose 115 (H) 70 - 99 mg/dL    BUN 11 8 - 23 mg/dL    CREATININE 1.07 0.70 - 1.20 mg/dL    Bun/Cre Ratio NOT REPORTED 9 - 20    Calcium 7.7 (L) 8.6 - 10.4 mg/dL    Sodium 136 135 - 144 mmol/L    Potassium 3.7 3.7 - 5.3 mmol/L    Chloride 104 98 - 107 mmol/L    CO2 21 20 - 31 mmol/L    Anion Gap 11 9 - 17 mmol/L    Alkaline Phosphatase 227 (H) 40 - 129 U/L    ALT 12 5 - 41 U/L    AST 17 <40 U/L    Total Bilirubin 0.65 0.3 - 1.2 mg/dL    Total Protein 5.1 (L) 6.4 - 8.3 g/dL    Alb 2.4 (L) 3.5 - 5.2 g/dL    Albumin/Globulin Ratio 0.9 (L) 1.0 - 2.5    GFR Non-African American >60 >60 mL/min    GFR African American >60 >60 mL/min    GFR Comment          GFR Staging NOT REPORTED    Brain Natriuretic Peptide   Result Value Ref Range    Pro-BNP 9,289 (H) <300 pg/mL    BNP Interpretation Pro-BNP Reference Range:    Protime-INR   Result Value Ref Range    Protime 13.1 (H) 9.0 - 12.0 sec    INR 1.3    APTT   Result Value Ref Range    PTT 29.4 20.5 - 30.5 sec   Urinalysis, reflex to microscopic   Result Value Ref Range    Color, UA YELLOW YELLOW    Turbidity UA CLEAR CLEAR    Glucose, Ur NEGATIVE NEGATIVE    Bilirubin Urine NEGATIVE NEGATIVE    Ketones, Urine NEGATIVE NEGATIVE    Specific Gravity, UA 1.013 1.005 - 1.030    Urine Hgb NEGATIVE NEGATIVE    pH, UA 7.0 5.0 - 8.0    Protein, UA TRACE (A) NEGATIVE    Urobilinogen, Urine Normal Normal    Nitrite, Urine NEGATIVE NEGATIVE    Leukocyte Esterase, Urine NEGATIVE NEGATIVE    Urinalysis Comments NOT REPORTED    Lactic Acid, Plasma   Result Value Ref Range    Lactic Acid NOT REPORTED mmol/L    Lactic Acid, Whole Blood 2.9 (H) 0.7 - 2.1 mmol/L   Troponin   Result Value Ref Range    Troponin, High Sensitivity 79 (HH) 0 - 22 ng/L    Troponin T NOT REPORTED <0.03 ng/mL    Troponin Interp NOT REPORTED    Troponin   Result Value Ref Range    Troponin, High Sensitivity 55 (HH) 0 - 22 ng/L    Troponin T NOT REPORTED <0.03 ng/mL    Troponin Interp NOT REPORTED    Lactic Acid, Plasma   Result Value Ref Range    Lactic Acid NOT REPORTED mmol/L    Lactic Acid, Whole Blood 3.2 (H) 0.7 - 2.1 mmol/L   Microscopic Urinalysis   Result Value Ref Range    -          WBC, UA 2 TO 5 0 - 5 /HPF    RBC, UA 2 TO 5 0 - 4 /HPF    Casts UA  0 - 8 /LPF     5 TO 10 HYALINE Reference range defined for non-centrifuged specimen. Crystals, UA NOT REPORTED None /HPF    Epithelial Cells UA 2 TO 5 0 - 5 /HPF    Renal Epithelial, UA NOT REPORTED 0 /HPF    Bacteria, UA NOT REPORTED None    Mucus, UA NOT REPORTED None    Trichomonas, UA NOT REPORTED None    Amorphous, UA NOT REPORTED None    Other Observations UA NOT REPORTED NOT REQ.     Yeast, UA NOT REPORTED None   Basic Metabolic Panel w/ Reflex to MG   Result Value Ref Range    Glucose 104 (H) 70 - 99 mg/dL    BUN 13 8 - 23 mg/dL    CREATININE 1.13 0.70 - 1.20 mg/dL    Bun/Cre Ratio NOT REPORTED 9 - 20    Calcium 7.3 (L) 8.6 - 10.4 mg/dL    Sodium 138 135 - 144 mmol/L    Potassium 3.2 (L) 3.7 - 5.3 mmol/L    Chloride 107 98 - 107 mmol/L    CO2 22 20 - 31 mmol/L    Anion Gap 9 9 - 17 mmol/L    GFR Non-African American >60 >60 mL/min    GFR African American >60 >60 mL/min    GFR Comment          GFR Staging NOT REPORTED    CBC   Result Value Ref Range    WBC 15.7 (H) 3.5 - 11.3 k/uL    RBC 2.92 (L) 4.21 - 5.77 m/uL    Hemoglobin 8.8 (L) 13.0 - 17.0 g/dL    Hematocrit 27.8 (L) 40.7 - 50.3 %    MCV 95.2 82.6 - 102.9 fL    MCH 30.1 25.2 - 33.5 pg    MCHC 31.7 28.4 - 34.8 g/dL    RDW 22.4 (H) 11.8 - 14.4 %    Platelets 512 400 - 145 k/uL    MPV 9.8 8.1 - 13.5 fL    NRBC Automated 0.1 (H) 0.0 per 100 WBC   Protime-INR   Result Value Ref Range    Protime 14.4 (H) 9.0 - 12.0 sec    INR 1.4    Magnesium   Result Value Ref Range    Magnesium 2.0 1.6 - 2.6 mg/dL   CBC WITH AUTO DIFFERENTIAL   Result Value Ref Range    WBC 17.8 (H) 3.5 - 11.3 k/uL    RBC 2.94 (L) 4.21 - 5.77 m/uL    Hemoglobin 9.0 (L) 13.0 - 17.0 g/dL    Hematocrit 29.5 (L) 40.7 - 50.3 %    .3 82.6 - 102.9 fL    MCH 30.6 25.2 - 33.5 pg    MCHC 30.5 28.4 - 34.8 g/dL    RDW 22.8 (H) 11.8 - 14.4 %    Platelets 153 101 - 987 k/uL    MPV 10.0 8.1 - 13.5 fL    NRBC Automated 0.0 0.0 per 100 WBC    Differential Type NOT REPORTED     WBC Morphology NOT REPORTED     RBC Morphology NOT REPORTED Platelet Estimate NOT REPORTED     Immature Granulocytes 6 (H) 0 %    Seg Neutrophils 84 (H) 36 - 66 %    Lymphocytes 4 (L) 24 - 44 %    Monocytes 5 1 - 7 %    Eosinophils % 1 1 - 4 %    Basophils 0 0 - 2 %    Absolute Immature Granulocyte 1.07 (H) 0.00 - 0.30 k/uL    Segs Absolute 14.95 (H) 1.8 - 7.7 k/uL    Absolute Lymph # 0.71 (L) 1.0 - 4.8 k/uL    Absolute Mono # 0.89 (H) 0.1 - 0.8 k/uL    Absolute Eos # 0.18 0.0 - 0.4 k/uL    Basophils Absolute 0.00 0.0 - 0.2 k/uL    Morphology ANISOCYTOSIS PRESENT     Morphology 1+ POLYCHROMASIA    BASIC METABOLIC PANEL   Result Value Ref Range    Glucose 119 (H) 70 - 99 mg/dL    BUN 13 8 - 23 mg/dL    CREATININE 0.97 0.70 - 1.20 mg/dL    Bun/Cre Ratio NOT REPORTED 9 - 20    Calcium 7.1 (L) 8.6 - 10.4 mg/dL    Sodium 137 135 - 144 mmol/L    Potassium 3.2 (L) 3.7 - 5.3 mmol/L    Chloride 110 (H) 98 - 107 mmol/L    CO2 19 (L) 20 - 31 mmol/L    Anion Gap 8 (L) 9 - 17 mmol/L    GFR Non-African American >60 >60 mL/min    GFR African American >60 >60 mL/min    GFR Comment          GFR Staging NOT REPORTED    EKG 12 Lead   Result Value Ref Range    Ventricular Rate 112 BPM    Atrial Rate 112 BPM    P-R Interval 128 ms    QRS Duration 158 ms    Q-T Interval 384 ms    QTc Calculation (Bazett) 524 ms    P Axis 70 degrees    R Axis -76 degrees    T Axis 96 degrees   EKG 12 Lead   Result Value Ref Range    Ventricular Rate 106 BPM    Atrial Rate 202 BPM    QRS Duration 184 ms    Q-T Interval 380 ms    QTc Calculation (Bazett) 504 ms    P Axis 72 degrees    R Axis -76 degrees    T Axis 89 degrees         IMAGING DATA:    Xr Thoracic Spine (2 Views)    Result Date: 9/28/2020  EXAMINATION: SPOT FLUOROSCOPIC IMAGES 9/28/2020 3:21 pm TECHNIQUE: Fluoroscopy was provided by the radiology department for procedure. Radiologist was not present during examination. FLUOROSCOPY DOSE AND TYPE OR TIME AND EXPOSURES: 123.9 seconds. Air kerma 12.69 mGy.  COMPARISON: CT chest dated 09/24/2020 HISTORY: Intraprocedural imaging. FINDINGS: 6 spot images of the thoracic spine were obtained. Limited intraoperative fluoroscopic spot images demonstrate instruments utilized to administer kyphoplasty cement in the midthoracic spine. Intraprocedural fluoroscopic spot images as above. See separate procedure report for more information. Ct Head Wo Contrast    Result Date: 9/24/2020  EXAMINATION: CT OF THE HEAD WITHOUT CONTRAST  9/24/2020 9:38 pm TECHNIQUE: CT of the head was performed without the administration of intravenous contrast. Dose modulation, iterative reconstruction, and/or weight based adjustment of the mA/kV was utilized to reduce the radiation dose to as low as reasonably achievable. COMPARISON: Unenhanced head CT dated 08/09/2020 and MRI brain dated 08/10/2020 HISTORY: ORDERING SYSTEM PROVIDED HISTORY: AMS, hx thrombocytopenia TECHNOLOGIST PROVIDED HISTORY: AMS, hx thrombocytopenia Reason for Exam: ams Acuity: Acute Type of Exam: Initial FINDINGS: BRAIN/VENTRICLES: There is no evidence of an acute infarct or intracranial hemorrhage. There has been marked improvement of vasogenic edema seen previously, most prominent in the left parietal lobe but also in the right parietal lobe. Subtle residual hypodensity is seen in the left frontoparietal centrum semiovale on series 2 images 43 through 51. The findings are suggestive of improvement of known intracranial metastatic disease. Mild atrophic changes are again noted. There is no evidence of midline shift. ORBITS: The visualized portion of the orbits demonstrate no acute abnormality. SINUSES: The visualized paranasal sinuses and mastoid air cells demonstrate no acute abnormality. SOFT TISSUES/SKULL:  No acute abnormality of the visualized skull or soft tissues. 1. No evidence of acute intracranial hemorrhage or infarct. 2. Marked improvement of intracranial metastatic disease.   Slight residual vasogenic edema seen in the left frontoparietal centrum semiovale. If clinically indicated, follow-up MRI of the brain could be done for further evaluation. Xr Chest Portable    Result Date: 10/9/2020  EXAMINATION: ONE XRAY VIEW OF THE CHEST 10/9/2020 12:27 pm COMPARISON: September 24, 2020 HISTORY: ORDERING SYSTEM PROVIDED HISTORY: weakness, hx of lung CA TECHNOLOGIST PROVIDED HISTORY: weakness, hx of lung CA FINDINGS: Left AICD. Right-sided Port-A-Cath. No focal consolidation. Right perihilar-lower lobe mass as seen on prior CT. Cardiomegaly. No pulmonary edema. No acute pulmonary process. Right lower lobe mass. Xr Chest Portable    Result Date: 9/24/2020  EXAMINATION: ONE XRAY VIEW OF THE CHEST 9/24/2020 6:10 pm COMPARISON: 09/19/2020 HISTORY: ORDERING SYSTEM PROVIDED HISTORY: hypoxic, tachycardic, metastatic lung CA, on chemo TECHNOLOGIST PROVIDED HISTORY: hypoxic, tachycardic, metastatic lung CA, on chemo Reason for Exam: hypoxic, tachycardic, metastic lung CA, on chemo Acuity: Unknown Type of Exam: Unknown FINDINGS: Cardiac silhouette is normal in size. Vascular port terminates overlying the expected location of the SVC. Implantable cardiac device leads overlying the right atrium and ventricle. Mild generalized interstitial prominence without superimposed focal airspace consolidation, sizeable pleural effusion or pneumothorax. Stable appearing right hilar mass. Osseous structures and soft tissues are grossly intact. No evidence for acute cardiopulmonary pathology. Stable right hilar mass. Xr Chest Portable    Result Date: 9/19/2020  EXAMINATION: ONE XRAY VIEW OF THE CHEST 9/19/2020 8:43 pm COMPARISON: Prior studies including 08/20/2020.  HISTORY: ORDERING SYSTEM PROVIDED HISTORY: Chest Pain TECHNOLOGIST PROVIDED HISTORY: Chest Pain Reason for Exam: fatigue Acuity: Unknown Type of Exam: Unknown FINDINGS: Mass in the right infrahilar region has moderately decreased in size in the interval.  No acute infiltrate, pneumothorax or pleural effusion. Heart size is normal.  There is an unchanged 3 lead left subclavian AICD. There is been interval placement of a right IJ MediPort catheter with the tip in good position in the region of the superior vena cava. There is a lytic lesion involving the lateral right 7th rib. Right hilar mass has moderately decreased in size consistent with response to therapy. No acute infiltrate, pneumothorax or pleural fluid. Lytic metastatic lesion involving the lateral right 7th rib. Ct Chest Pulmonary Embolism W Contrast    Result Date: 9/24/2020  EXAMINATION: CTA OF THE CHEST 9/24/2020 9:16 pm TECHNIQUE: CTA of the chest was performed after the administration of intravenous contrast.  Multiplanar reformatted images are provided for review. MIP images are provided for review. Dose modulation, iterative reconstruction, and/or weight based adjustment of the mA/kV was utilized to reduce the radiation dose to as low as reasonably achievable. COMPARISON: 08/09/2020 HISTORY: ORDERING SYSTEM PROVIDED HISTORY: hx metastatic lung CA, tachycardic and hypoxic TECHNOLOGIST PROVIDED HISTORY: hx metastatic lung CA, tachycardic and hypoxic Reason for Exam: pt currently on chemo, Acuity: Acute Type of Exam: Initial FINDINGS: Pulmonary Arteries: There is mild motion artifact. No definite evidence of intraluminal filling defect to suggest pulmonary embolism. Main pulmonary artery is normal in caliber. Mediastinum: Mediastinal and right hilar adenopathy has mildly improved. The heart and pericardium demonstrate no acute abnormality. The left ventricle is dilated. There is no acute abnormality of the thoracic aorta. Lungs/pleura: There is a small right pleural effusion, increased in volume. No effusion is seen on the left. There is no pneumothorax. The low-attenuation subpleural right lower lobe mass has decreased in size and now measures 3.0 x 3.5 cm.   The right upper lobe nodule adjacent to the right hilum is decreased in size and now measures 9 x 13 mm. Upper Abdomen: There is a 1.6 x 1.7 cm low-density lesion anteriorly in the spleen. The visualized upper abdomen is otherwise unremarkable. Soft Tissues/Bones: There is a pathologic compression fracture at T6 with approximately 70% loss of vertebral body height and minimal retropulsion. There is increased destruction and associated soft tissue involving the right 7th rib. There are subtle lytic lesions in the sternum. 1. No definite scan evidence for pulmonary embolus. 2. Pathologic T6 compression fracture with enlarging right 7th rib metastasis and new sternal metastasis. 3. Improving right upper lobe nodule, right lower lobe mass and, right hilar and mediastinal adenopathy. 4. Low-density lesion in the spleen could represent a metastasis or a hemangioma.          IMPRESSION:   Primary Problem  Acute on chronic systolic CHF (congestive heart failure) [facility])    Active Hospital Problems    Diagnosis Date Noted    Thrush [B37.0] 09/25/2020     Priority: Medium    Severe malnutrition ([address] [E43] 10/10/2020    Bacteremia [R78.81] 10/10/2020    Acute on chronic systolic CHF (congestive heart failure) ([address] [I50.23] 10/10/2020    CHF with unknown LVEF ([address] [I50.9] 10/09/2020    Failure to thrive in adult [R62.7] 09/25/2020    Bone metastasis ([address] [C79.51] 09/20/2020    Other fatigue [R53.83]     Adenocarcinoma of right lung ([address] [C34.91] 08/17/2020    CKD (chronic kidney disease) [N18.9] 08/09/2020    Lung mass [R91.8] 08/09/2020    CVA (cerebral vascular accident) ([address] [I63.9] 08/09/2020    Brain metastasis ([address] [C79.31] 08/09/2020    Elevated troponin [R77.8]     CAD (coronary artery disease) [I25.10] 02/08/2019    Ischemic cardiomyopathy [I25.5] 02/08/2019    HTN (hypertension) [I10] 02/08/2019    AICD (automatic cardioverter/defibrillator) present [Z95.810] 02/08/2019     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 Uzma Grumbles only for cycle 2  Failure to thrive  Weight loss  PDL 1 expression 99%  Cancer cachexia     RECOMMENDATIONS:  1. I personally reviewed results of lab work-up imaging studies and other relevant clinical data. 2. Pt condition is worsening. 3. Patient CT scan done during last hospitalization did show decrease in size of the primary lung tumor however patient overall condition has over time deteriorated. 4. I am not sure he is a candidate for any further therapy   5. Consider palliative care and hospice  6. Pt and his POA were consulted, they are both worried about going home, we will consider placement     Discussed with patient and Nurse. Thank you for asking us to see this patient. Lora Hayden MD  Hematologist/Medical Oncologist  Cell: [phone]            This note is created with the assistance of a speech recognition program.  While intending to generate a document that actually reflects the content of the visit, the document can still have some errors including those of syntax and sound a like substitutions which may escape proof reading. It such instances, actual meaning can be extrapolated by contextual diversion.

## 2020-10-12 NOTE — PROGRESS NOTES
Physical Therapy  DATE: 10/12/2020    NAME: Shravan Monge  MRN: [id number]   : 1950    Patient not seen this date for Physical Therapy due to:  [] Blood transfusion in progress  [] Hemodialysis  [x]  Patient Declined  -  Pt states he's already walked around his room with OT. Per chart pt has a Palliative meeting scheduled. RN informed. [] Spine Precautions   [] Strict Bedrest  [] Surgery/ Procedure  [] Testing      [] Other        [] PT being discontinued at this time. Patient independent. No further needs. [] PT being discontinued at this time as the patient has been transferred to palliative care. No further needs.     Shirley Sifuentes, PTA

## 2020-10-13 NOTE — CARE COORDINATION
Transitional planning-talked with patient, plan is to go home with his family and have [address] Called and talked with family member Garth Krueger is to take him home.

## 2020-10-13 NOTE — DISCHARGE INSTR - COC
Continuity of Care Form    Patient Name: Shravan Monge   :  [birth year]  MRN:  [id number]    Admit date:  10/9/2020  Discharge date:  10/14/2020    Code Status Order: Full Code   Advance Directives:      Admitting Physician:  Lashae Nava MD  PCP: Makenzie Ryan MD    Discharging Nurse: WOODS AT [facility] Unit/Room#:   Discharging Unit Phone Number: ***    Emergency Contact:   Extended Emergency Contact Information  Primary Emergency Contact: [address] [facility] of [address] Phone: [phone]  Mobile Phone: [phone]  Relation: Child  Secondary Emergency Contact: [facility] of [address] Phone: [phone]  Mobile Phone: [phone]  Relation: Child    Past Surgical History:  Past Surgical History:   Procedure Laterality Date    CORONARY ANGIOPLASTY WITH STENT PLACEMENT      BMS to LAD    CT BIOPSY PERCUTANEOUS DEEP BONE  2020    CT BIOPSY PERCUTANEOUS DEEP BONE 2020 [facility] CT SCAN    FIXATION KYPHOPLASTY  2020    KYPHOPLASTY, ABLATION WITH T6 BONE BIOPSY     IR PORT PLACEMENT EQUAL OR GREATER THAN 5 YEARS  2020    IR PORT PLACEMENT EQUAL OR GREATER THAN 5 YEARS 2020 Rupa Perez MD [facility] SPECIAL PROCEDURES    KYPHOSIS SURGERY N/A 2020    KYPHOPLASTY, ABLATION WITH T6 BONE BIOPSY performed by Ruslan Jade MD at [place] [address]  2019    AICD/ [employer]  MODEL #[id number] CRTD AMPLIA MRI USDF4. LEADS [id number], [id number] AND 8583-61        Immunization History: There is no immunization history on file for this patient.     Active Problems:  Patient Active Problem List   Diagnosis Code    HTN (hypertension) I10    CAD (coronary artery disease) I25.10    Ischemic cardiomyopathy I25.5    AICD (automatic cardioverter/defibrillator) present Z95.810    Elevated troponin R77.8    Lung mass R91.8    Enlarged prostate N40.0    CKD (chronic kidney disease) N18.9    CVA (cerebral vascular accident) [facility]) I63.9    Brain metastasis ([address] C79.31    Vasogenic edema (HCC) G93.6    Right hemiparesis (HCC) G81.91    Adenocarcinoma of right lung (HCC) C34.91    Thrombocytopenia (HCC) D69.6    Pancytopenia due to antineoplastic chemotherapy (HCC) D61.810, T45.1X5A    Bone metastasis (HCC) C79.51    Other fatigue R53.83    Dehydration E86.0    Moderate malnutrition (HCC) E44.0    AMS (altered mental status) C90.24    Metabolic encephalopathy L14.57    Failure to thrive in adult R62.7    Prolonged Q-T interval on ECG R94.31    Hypocalcemia E83.51    Pathological compression fracture of spine (HCC) M48.50XA    Thrush B37.0    Bandemia D72.825    CHF with unknown LVEF (HCC) I50.9    Severe malnutrition (HCC) E43    Bacteremia R78.81    Acute on chronic systolic CHF (congestive heart failure) (HCC) I50.23       Isolation/Infection:   Isolation            No Isolation          Patient Infection Status       Infection Onset Added Last Indicated Last Indicated By Review Planned Expiration Resolved Resolved By    None active    Resolved    COVID-19 Rule Out 09/27/20 09/27/20 09/27/20 COVID-19 (Ordered)   09/27/20 Rule-Out Test Resulted    COVID-19 Rule Out 08/19/20 08/19/20 08/20/20 Covid-19 Ambulatory (Ordered)   08/22/20 Rule-Out Test Resulted    COVID-19 Rule Out 08/10/20 08/10/20 08/10/20 COVID-19 (Ordered)   08/10/20 Rule-Out Test Resulted            Nurse Assessment:  Last Vital Signs: /72   Pulse 112   Temp 98.4 °F (36.9 °C) (Oral)   Resp 20   Ht 5' 5\" (1.651 m)   Wt 128 lb 2.4 oz (58.1 kg)   SpO2 98%   BMI 21.33 kg/m²     Last documented pain score (0-10 scale): Pain Level: 3  Last Weight:   Wt Readings from Last 1 Encounters:   10/12/20 128 lb 2.4 oz (58.1 kg)     Mental Status:  disoriented, alert and answer question correctly but shows S&S of confusion     IV Access:  - None    Nursing Mobility/ADLs:  Walking   Dependent  Transfer  Assisted  Bathing  Dependent  Dressing Assisted  Toileting  Dependent  Feeding  Independent  Med Admin  Dependent  Med Delivery   whole, crushed and pt sucks on meds then swallows    Wound Care Documentation and Therapy:  Wound 09/25/20 Buttocks Left;Right (Active)   Wound Image   09/26/20 1100   Wound Etiology Pressure Stage  2 09/29/20 1200   Dressing Status Clean;Dry; Intact 10/13/20 0900   Wound Cleansed Not Cleansed 10/12/20 0845   Dressing/Treatment Foam 10/13/20 0900   Dressing Change Due 10/14/20 10/13/20 0900   Wound Length (cm) 4.5 cm 09/26/20 1100   Wound Width (cm) 2.5 cm 09/26/20 1100   Wound Depth (cm) 0.1 cm 09/26/20 1100   Wound Surface Area (cm^2) 11.25 cm^2 09/26/20 1100   Wound Volume (cm^3) 1.13 cm^3 09/26/20 1100   Wound Assessment Other (Comment) 10/13/20 0900   Drainage Amount None 10/13/20 0900   Odor None 10/13/20 0900   Dorothea-wound Assessment Dry/flaky; Other (Comment) 10/13/20 0900   Number of days: 18       Wound 10/09/20 Back Right;Upper;Mid red, dry, flaky skin (Active)   Wound Etiology Burn 10/11/20 2015   Wound Cleansed Not Cleansed 10/11/20 1600   Dressing/Treatment Open to air 10/13/20 0900   Wound Assessment Erythema; Other (Comment) 10/13/20 0900   Drainage Amount None 10/13/20 0900   Odor None 10/13/20 0900   Dorothea-wound Assessment Dry/flaky; Other (Comment) 10/13/20 0900   Number of days: 3        Elimination:  Continence:   · Bowel: No and incontinent pad   · Bladder: No and incontinent pad   Urinary Catheter: None   Colostomy/Ileostomy/Ileal Conduit: No       Date of Last BM: 10/13/2020    Intake/Output Summary (Last 24 hours) at 10/13/2020 1457  Last data filed at 10/13/2020 0503  Gross per 24 hour   Intake 2204 ml   Output 1020 ml   Net 1184 ml     I/O last 3 completed shifts: In: 2304 [P.O.:500; I.V.:1804]  Out: 1570 [Urine:1570]    Safety Concerns:      At Risk for Falls    Impairments/Disabilities:      None    Nutrition Therapy:  Current Nutrition Therapy:   - Oral Nutrition Supplement:  Standard  three times a day    Routes of Feeding: Oral  Liquids: No Restrictions  Daily Fluid Restriction: no  Last Modified Barium Swallow with Video (Video Swallowing Test): not done    Treatments at the Time of Hospital Discharge:   Respiratory Treatments: ***  Oxygen Therapy:  is not on home oxygen therapy. Ventilator:    - No ventilator support    Rehab Therapies: Physical Therapy and OT   Weight Bearing Status/Restrictions: No weight bearing restirctions  Other Medical Equipment (for information only, NOT a DME order):  wheelchair, walker, bedside commode and hospital bed  Other Treatments: Hospice RN for eval and treat    Patient's personal belongings (please select all that are sent with patient):  Glasses    RN SIGNATURE:  Electronically signed by Oscar Salinas RN on 10/14/20 at 11:36 AM EDT    CASE MANAGEMENT/SOCIAL WORK SECTION    Inpatient Status Date: 10-9-2020    Readmission Risk Assessment Score:  Readmission Risk              Risk of Unplanned Readmission:        37           Discharging to Facility/ Agency   · [facility]  · Address:  · Phone:  · Fax:    Dialysis Facility (if applicable)   · Name:  · Address:  · Dialysis Schedule:  · Phone:  · Fax:    / signature: Electronically signed by Kacy Villegas RN on 10/14/20 at 10:03 AM EDT    PHYSICIAN SECTION    Prognosis: Guarded    Condition at Discharge: Stable    Rehab Potential (if transferring to Rehab): Guarded    Recommended Labs or Other Treatments After Discharge:     Physician Certification: I certify the above information and transfer of Angelika Craig  is necessary for the continuing treatment of the diagnosis listed and that he requires Hospice for greater 30 days.      Update Admission H&P: No change in H&P    PHYSICIAN SIGNATURE:  Electronically signed by Ashley Mcbride MD on 10/14/20 at 11:10 AM EDT

## 2020-10-13 NOTE — PROGRESS NOTES
Physical Therapy  Facility/Department: [facility] CAR 2  Daily Treatment Note  NAME: Aj Butt  : 1950  MRN: [id number]    Date of Service: 10/13/2020    Discharge Recommendations:  Patient would benefit from continued therapy after discharge   PT Equipment Recommendations  Other: CTA; Pt has a RW and cane    Assessment   Body structures, Functions, Activity limitations: Decreased functional mobility ; Increased pain;Decreased strength;Decreased safe awareness;Decreased endurance;Decreased posture  Assessment: Pt requires time to regain his bearings after transfers. Pt required Lindsay to use a RW AMB. Pt AMB to the toilet and back, then reported fatigue. Pt is currently unsafe to AMB without assistance. PT will continue to treat pt to improve strength and mobility. Prognosis: Good  PT Education: Goals; General Safety;Gait Training;PT Role;Functional Mobility Training;Plan of Care;Precautions;Transfer Training; Adaptive Device Training; Injury Prevention;Pressure Relief  Patient Education: safe use of RW (needs reinforced)  REQUIRES PT FOLLOW UP: Yes  Activity Tolerance  Activity Tolerance: Patient limited by endurance; Patient limited by fatigue     Patient Diagnosis(es): The primary encounter diagnosis was Acute on chronic congestive heart failure, unspecified heart failure type ([facility] Diagnoses of Fatigue, unspecified type, Leukocytosis, unspecified type, and Hx of cancer of lung were also pertinent to this visit. has a past medical history of Cancer [facility]), Cardiac resynchronization therapy defibrillator (CRT-D) in place, Chest pain, Coronary artery disease, Dyspnea on exertion, Elevated troponin, Heart attack ([address] Hyperlipidemia, Hypertension, Ischemic cardiomyopathy, and NSTEMI (non-ST elevated myocardial infarction) ([address] has a past surgical history that includes Coronary angioplasty with stent (); pacemaker placement (2019); CT BIOPSY DEEP BONE PERCUTANEOUS (2020);  IR PORT PLACEMENT > 5 YEARS (8/24/2020); Fixation Kyphoplasty (09/28/2020); and Kyphosis surgery (N/A, 9/28/2020). Restrictions  Restrictions/Precautions  Restrictions/Precautions: Fall Risk, General Precautions(up with assist)  Required Braces or Orthoses?: No  Position Activity Restriction  Other position/activity restrictions: Up with assist, T6 compression fracture, 7th rib left with metastasis, sternal metastasis     Subjective   General  Chart Reviewed: Yes  Response To Previous Treatment: Patient with no complaints from previous session. Family / Caregiver Present: No  Subjective  Subjective: Pt and RN agreed to PT today.  Pt reports general weakness  Pain Screening  Patient Currently in Pain: Denies  Vital Signs  Patient Currently in Pain: Denies       Orientation  Orientation  Overall Orientation Status: Within Normal Limits    Objective   Bed mobility  Supine to Sit: Stand by assistance  Sit to Supine: Stand by assistance  Scooting: Stand by assistance  Comment: HOB upright  Transfers  Sit to Stand: Minimal Assistance  Stand to sit: Minimal Assistance  Comment: used RW  Ambulation  Ambulation?: Yes  Ambulation 1  Surface: level tile  Device: Rolling Walker  Assistance: Minimal assistance  Quality of Gait: Pt has poor control of the RW and tends to push it away from himself; Pt takes quick short steps, when he gets anxious  Gait Deviations: Deviated path;Decreased step length;Decreased step height  Distance: ~20' twice  Comments: PTA gave verbal and tactile cues to correct deviation, with poor followthrough from pt  Stairs/Curb  Stairs?: No     Balance  Sitting - Static: Fair  Sitting - Dynamic: Fair  Standing - Static: Fair  Standing - Dynamic: Poor;+  Exercises  Knee Long Arc Quad: BLE x15  Ankle Pumps: BLE x15       Goals  Short term goals  Time Frame for Short term goals: 14 visits  Short term goal 1: Sit to/from stand with supervision  Short term goal 2: Ambulate 48' with RW with SBA  Short term goal 3: Improve strength LEs to support function as seen by completion of 10 reps standing LE exercise x 5 muscle groups.   Patient Goals   Patient goals : Get better    Plan    Plan  Times per week: 5-6x/wk  Current Treatment Recommendations: Stair training, Gait Training, Transfer Training, Functional Mobility Training, Endurance Training, Positioning, Strengthening, Home Exercise Program, Safety Education & Training, Balance Training  Safety Devices  Type of devices: Call light within reach, Gait belt, Patient at risk for falls, Nurse notified, All fall risk precautions in place, Bed alarm in place, Left in bed     Therapy Time   Individual Concurrent Group Co-treatment   Time In 1025         Time Out 1054         Minutes 29         Timed Code Treatment Minutes: [address] [facility], [facility]

## 2020-10-13 NOTE — PROGRESS NOTES
the hospital today with complaints of fatigue and increased weakness.  He has a history of primary stage IV lung cancer with metastasis to the brain and the bone.   He underwent palliative kyphoplasty on 9/28. Crystal Jauregui was seen by Dr. Ophelia Granados 10/6/2020 and started on Marinol in addition to his Megace for severe malnutrition and decreased appetite. Crystal Jauregui has been unable to receive a scheduled dose of chemotherapy due to his general decline. Douglas Lopez is at bedside and hoping for feeding tube placement.     ED work-up showed initial troponin of 79 follow-up troponin 54.  He has known elevated troponin levels.  He has a white blood cell count of 19.0 (last 20.0)  and a BNP of 5289.  Pacemaker was interrogated with no malfunction noted.     10/12  Patient has stage IV lung cancer with mets to the brain  Admitted with SOB/Fatigue/failure to thrive on chemo   Oncology talking about hospice   Very weak  Pt wants to have family meeting with oncology  On abx for possible bacetermia, ? May be contaminant   On levoflox ,      Review of Systems:     Review of Systems   Constitutional: Positive for activity change, appetite change and fatigue. Negative for chills, diaphoresis and fever. HENT: Negative for congestion. Eyes: Negative for visual disturbance. Respiratory: Negative for cough, chest tightness, shortness of breath and wheezing. Cardiovascular: Negative for chest pain, palpitations and leg swelling. Gastrointestinal: Negative for abdominal pain, blood in stool, constipation, diarrhea, nausea and vomiting. Genitourinary: Negative for difficulty urinating. Neurological: Positive for weakness. Negative for dizziness, light-headedness, numbness and headaches. All other systems reviewed and are negative. Medications: Allergies:     Allergies   Allergen Reactions    Penicillins      Swollen hand with itching       Current Meds:   Scheduled Meds:    potassium chloride  20 mEq Oral Daily with breakfast  aspirin  81 mg Oral Daily    atorvastatin  40 mg Oral Nightly    sacubitril-valsartan  1 tablet Oral BID    levofloxacin  500 mg Intravenous Q24H    dronabinol  2.5 mg Oral BID AC    folic acid  1 mg Oral Daily    pantoprazole  40 mg Oral QAM AC    sodium chloride flush  10 mL Intravenous 2 times per day    enoxaparin  40 mg Subcutaneous Daily     Continuous Infusions:    sodium chloride 75 mL/hr at 10/13/20 0615     PRN Meds: ondansetron, polyethylene glycol, sodium chloride flush, potassium chloride **OR** potassium alternative oral replacement **OR** potassium chloride, magnesium sulfate, acetaminophen **OR** acetaminophen    Data:     Past Medical History:   has a past medical history of Cancer [facility]), Cardiac resynchronization therapy defibrillator (CRT-D) in place, Chest pain, Coronary artery disease, Dyspnea on exertion, Elevated troponin, Heart attack ([facility] [address] Hyperlipidemia, Hypertension, Ischemic cardiomyopathy, and NSTEMI (non-ST elevated myocardial infarction) ([facility] [address] Social History:   reports that he quit smoking about 8 years ago. His smoking use included cigarettes. He has never used smokeless tobacco. He reports previous drug use. Drug: Marijuana. He reports that he does not drink alcohol. Family History:   Family History   Problem Relation Age of Onset   [facility] Cancer Mother     Cancer Father     Prostate Cancer Father        Vitals:  /72   Pulse 112   Temp 98.4 °F (36.9 °C) (Oral)   Resp 20   Ht 5' 5\" (1.651 m)   Wt 128 lb 2.4 oz (58.1 kg)   SpO2 98%   BMI 21.33 kg/m²   Temp (24hrs), Av.5 °F (36.9 °C), Min:98.2 °F (36.8 °C), Max:98.8 °F (37.1 °C)    No results for input(s): POCGLU in the last 72 hours. I/O (24Hr):     Intake/Output Summary (Last 24 hours) at 10/13/2020 1740  Last data filed at 10/13/2020 0503  Gross per 24 hour   Intake 2204 ml   Output 1020 ml   Net 1184 ml       Labs:  Hematology:  Recent Labs     10/11/20  0910 10/12/20  0653 10/13/20  0616   WBC 17.8* 17.2* 18.2*   RBC 2.94* 2.87* 2.83*   HGB 9.0* 9.0* 8.7*   HCT 29.5* 28.5* 29.0*   .3 99.3 102.5   MCH 30.6 31.4 30.7   MCHC 30.5 31.6 30.0   RDW 22.8* 22.7* 23.0*    236 168   MPV 10.0 10.8 10.9     Chemistry:  Recent Labs     10/11/20  0910 10/12/20  0653 10/13/20  0616    140 141   K 3.2* 3.4* 3.2*   * 112* 113*   CO2 19* 18* 18*   GLUCOSE 119* 105* 107*   BUN 13 12 13   CREATININE 0.97 0.96 0.93   ANIONGAP 8* 10 10   LABGLOM >60 >60 >60   GFRAA >60 >60 >60   CALCIUM 7.1* 7.3* 7.3*     Recent Labs     10/13/20  0616   PROT 4.0*   LABALBU 1.9*   AST 15   ALT 8   ALKPHOS 159*   BILITOT 0.62     ABG:  Lab Results   Component Value Date    FIO2 NOT REPORTED 09/24/2020     Lab Results   Component Value Date/Time    SPECIAL NOT REPORTED 10/09/2020 02:54 PM     Lab Results   Component Value Date/Time    CULTURE NO GROWTH 10/09/2020 02:54 PM       Radiology:  Xr Chest Portable    Result Date: 10/9/2020  No acute pulmonary process. Right lower lobe mass. Physical Examination:        Physical Exam  Vitals signs and nursing note reviewed. Constitutional:       General: He is not in acute distress. Appearance: He is ill-appearing ( cachectic) . HENT:      Head: Normocephalic and atraumatic. Eyes:      Conjunctiva/sclera: Conjunctivae normal.      Pupils: Pupils are equal, round, and reactive to light. Cardiovascular:      Rate and Rhythm: Normal rate and regular rhythm. Heart sounds: No murmur. Pulmonary:      Effort: Pulmonary effort is normal. No accessory muscle usage or respiratory distress. Breath sounds: No stridor. Decreased breath sounds present. No wheezing, rhonchi or rales. Abdominal:      General: Bowel sounds are normal. There is no distension. Palpations: Abdomen is soft. Abdomen is not rigid. Tenderness: There is no abdominal tenderness. There is no guarding. Musculoskeletal:         General: No tenderness.    Skin:     General: Skin is warm and dry. Findings: No erythema, lesion or rash. Comments: Erythema on lower back   Neurological:      Mental Status: He is alert and oriented to person, place, and time. Cranial Nerves: No cranial nerve deficit. Motor: No seizure activity. Psychiatric:         Speech: Speech normal.         Behavior: Behavior normal. Behavior is cooperative. Assessment:        Hospital Problems           Last Modified POA    * (Principal) Acute on chronic systolic CHF (congestive heart failure) ([address] 10/10/2020 Yes    CAD (coronary artery disease) 10/9/2020 Yes    Thrush 10/9/2020 Yes    HTN (hypertension) 10/9/2020 Yes    Ischemic cardiomyopathy 10/9/2020 Yes    AICD (automatic cardioverter/defibrillator) present 10/9/2020 Yes    Elevated troponin 10/9/2020 Yes    Lung mass 10/9/2020 Yes    CKD (chronic kidney disease) 10/9/2020 Yes    CVA (cerebral vascular accident) ([address] 10/10/2020 Yes    Brain metastasis ([address] 10/10/2020 Yes    Adenocarcinoma of right lung ([address] 10/10/2020 Yes    Bone metastasis ([address] 10/10/2020 Yes    Other fatigue 10/9/2020 Yes    Failure to thrive in adult 10/9/2020 Yes    CHF with unknown LVEF ([address] 10/10/2020 Yes    Severe malnutrition ([address] 10/10/2020 Yes    Bacteremia 10/10/2020 Yes          Plan:        Discussed in detail with the patient in presence of his daughter in law discussed his comorbidities cachexia and weakness and that he is not a candidate for further treatment  Patient still hesitant and wanted to discuss further with oncologist    Daughter-in-law open to take the patient home with hospice care, she has been in contact with them on and off awaiting patient agreement. For now continue current management.     CODE STATUS currently still at full code    Discussed with the nurse as well    Reassess later after seeing oncologist      Ni Ferrell MD  10/13/2020  5:40 PM

## 2020-10-13 NOTE — PLAN OF CARE
Problem: Pain:  Goal: Pain level will decrease  Description: Pain level will decrease  Outcome: Ongoing  Note: Pain level assessment complete. Pt rated pain on 0-10 scale. Pt educated on pain scale and control interventions. PRN pain medication given per pt request. Pt verbalizes understanding to call out with new onset of pain or unrelieved pain. Will continue to monitor. Goal: Control of acute pain  Description: Control of acute pain  Outcome: Ongoing     Problem: Falls - Risk of:  Goal: Will remain free from falls  Description: Will remain free from falls  Outcome: Ongoing  Note: No falls to date. Bed in lowest position with call light within reach. Floor free from obstacles and pt verbalizes understanding to call out with needs or assistance with ambulation. Falls risk score evaluated-high risk. Bed alarm activated and falling star posted. Will continue to monitor additional needs. Goal: Absence of physical injury  Description: Absence of physical injury  Outcome: Ongoing     Problem: Skin Integrity:  Goal: Will show no infection signs and symptoms  Description: Will show no infection signs and symptoms  Outcome: Ongoing  Note: Skin assessment complete. No new breakdown noted. Interventions in place. See doc flowsheet for interventions initiated. Pt encouraged to turn.  Will continue to monitor for additional needs and skin breakdown   Goal: Absence of new skin breakdown  Description: Absence of new skin breakdown  Outcome: Ongoing

## 2020-10-13 NOTE — PROGRESS NOTES
[facility] PALLIATIVE CARE NURSING ASSESSMENT    Patient: Larry Casillas  Room: 2010/2010-01    Reason For Consult   Goals of care evaluation  Distress management  Guidance and support  Facilitate communications  Assistance in coordinating care      Impression: Larry Casillas is a 69y.o. year old male  has a past medical history of Cancer [facility]), Cardiac resynchronization therapy defibrillator (CRT-D) in place, Chest pain, Coronary artery disease, Dyspnea on exertion, Elevated troponin, Heart attack ([address] Hyperlipidemia, Hypertension, Ischemic cardiomyopathy, and NSTEMI (non-ST elevated myocardial infarction) ([address] .  Currently hospitalized for the management of acute on chronic CHF. The Palliative Care Team is following to assist with goals of care. Vital Signs  Blood pressure 120/72, pulse 112, temperature 98.4 °F (36.9 °C), temperature source Oral, resp. rate 20, height 5' 5\" (1.651 m), weight 128 lb 2.4 oz (58.1 kg), SpO2 98 %.     Patient Active Problem List   Diagnosis    HTN (hypertension)    CAD (coronary artery disease)    Ischemic cardiomyopathy    AICD (automatic cardioverter/defibrillator) present    Elevated troponin    Lung mass    Enlarged prostate    CKD (chronic kidney disease)    CVA (cerebral vascular accident) ([address]    Brain metastasis ([address]    Vasogenic edema ([address]    Right hemiparesis (HCC)    Adenocarcinoma of right lung (HCC)    Thrombocytopenia (HCC)    Pancytopenia due to antineoplastic chemotherapy ([address]    Bone metastasis ([address]    Other fatigue    Dehydration    Moderate malnutrition ([address]    AMS (altered mental status)    Metabolic encephalopathy    Failure to thrive in adult    Prolonged Q-T interval on ECG    Hypocalcemia    Pathological compression fracture of spine (HCC)    Thrush    Bandemia    CHF with unknown LVEF (HCC)    Severe malnutrition (HCC)    Bacteremia    Acute on chronic systolic CHF (congestive heart failure) ([address]       Palliative Interaction: Pt resting in bed. He was able to answer orientation questions but has random off-the-wall statements occasionally. He has a flat affect and only speaks when spoken to. Pt denies pain at present. He denies any complaints. Pt states, \"I couldn't eat but now i'm eating better\". I talked with patient about his wishes re: treatment and he states, \"Well i'm not ready to throw in the towel yet! \"     Later, Daughter-n-Law Chantel Mitchell came to visit. I visited patient again and Chantel Mitchell states, \"the doctor really hasn't told us he can't have more treatment\". Chantel Mitchell states the patient keeps changing his mind about what he wants. She states she is not sure she will be able to care for patient at home as he is getting more and more weak. Chantel Mitchell has small children at home. Chantel Mitchell states patient has been acting confused since visiting today. She states at home he has been having incontinence and it's getting increasingly hard to take care of him. I again talked with patient in front of Chantel Mitchell and gave him his options for care (rehab to get stronger vs. Hospice care) Pt states, \"well, let the chips fall where they may\". Oncology will be rounding soon and I will follow up after their visit today. Family states they have not had a chance to talk with oncology yet. I encouraged family to ask appropriate questions and we will meet after to discuss. Chantel Mitchell is agreeable. ADDENDUM: Oncologist rounded and spoke with patient and VANIA Cordero. Oncologist is offering rehab and possibly more treatment but given patient's overall picture and failure to thrive, he is recommending no treatment and comfort care. Pt is agreeable. Consuelo agreeable to take patient home with [facility]. Discharge planner notified Franciso Schaumann and will start process. Emotional support offered to patient and family.      Goals/Plan of care  Education/support to family  Education/support to patient  Discharge planning/helping to coordinate care  Providing support for coping/adaptation/distress of family  Providing support for coping/adaptation/distress of patient  Discussing meaning/purpose   Continue with current plan of care  Clarification of medical condition to patient and family  Code status clarified: Full Code  Provided information about hospice  Validating patient/family distress  Continued communication updates  Recognizing, reflecting, and empathizing with family members' anticipatory grief  Will follow up after oncology rounds and talks with family about treatment options. ADDENDUM: After talking with oncologist, it has been decided to take patient home with [facility]. Discharge planner contacted Umang Edwards and will start process.         Palliative Care Coordinator  [facility] MASSIEL Younger, ABE PACK [facility] Office: [phone] Office: [phone]    For Symptom Management Clinic scheduling please call [phone]

## 2020-10-13 NOTE — PROGRESS NOTES
cGy-8/2020  Palliative radiation to thoracic spine  Thoracic spine kyphoplasty- 9/2020  Systemic therapy using carboplatin Alimta and Keytruda       Past Medical History:   has a past medical history of Cancer [facility]), Cardiac resynchronization therapy defibrillator (CRT-D) in place, Chest pain, Coronary artery disease, Dyspnea on exertion, Elevated troponin, Heart attack ([facility] [address] Hyperlipidemia, Hypertension, Ischemic cardiomyopathy, and NSTEMI (non-ST elevated myocardial infarction) ([facility] [address] Past Surgical History:   has a past surgical history that includes Coronary angioplasty with stent (2012); pacemaker placement (02/11/2019); CT BIOPSY DEEP BONE PERCUTANEOUS (8/11/2020); IR PORT PLACEMENT > 5 YEARS (8/24/2020); Fixation Kyphoplasty (09/28/2020); and Kyphosis surgery (N/A, 9/28/2020). Medications:    Prior to Admission medications    Medication Sig Start Date End Date Taking? Authorizing Provider   dronabinol (MARINOL) 2.5 MG capsule Take 1 capsule by mouth 2 times daily (before meals) for 30 days.  10/5/20 11/4/20  Corey Pena MD   ciprofloxacin (CIPRO) 500 MG/5ML (10%) suspension Take 5 mLs by mouth 2 times daily for 10 days 10/4/20 10/14/20  Cj Cordon MD   megestrol (MEGACE ES) 625 MG/5ML suspension Take 5 mLs by mouth daily 9/14/20   Cj Cordon MD   ondansetron (ZOFRAN ODT) 4 MG disintegrating tablet Take 1 tablet by mouth every 8 hours as needed for Nausea 8/31/20   Cj Cordon MD   polyethylene glycol (GLYCOLAX) 17 g packet Take 17 g by mouth daily as needed for Constipation    Historical Provider, MD   folic acid (FOLVITE) 1 MG tablet Take 1 tablet by mouth daily START 7 DAYS BEFORE FIRST DOSE OF PEMETREXED---TO BE TAKEN DAILY --AND CONTINUE 21 DAYS AFTER LAST DOSE OF PEMETREXED 8/17/20   Cj Cordon MD   pantoprazole (PROTONIX) 40 MG tablet Take 1 tablet by mouth every morning (before breakfast) 8/13/20   Phylicia Jimenez MD   carvedilol (COREG) 12.5 MG tablet Take 12.5 mg by mouth 2 times daily (with meals)    Historical Provider, MD   aspirin 81 MG chewable tablet Take 1 tablet by mouth daily 2/12/19   Wale Berger MD   nitroGLYCERIN (NITROSTAT) 0.4 MG SL tablet up to max of 3 total doses.  If no relief after 1 dose, call 911. 2/11/19   Wale Berger MD     Current Facility-Administered Medications   Medication Dose Route Frequency Provider Last Rate Last Dose    potassium chloride (KLOR-CON M) extended release tablet 20 mEq  20 mEq Oral Daily with breakfast Zachery Dutton MD   20 mEq at 10/12/20 0841    aspirin chewable tablet 81 mg  81 mg Oral Daily Karen Malik MD   81 mg at 10/12/20 0841    atorvastatin (LIPITOR) tablet 40 mg  40 mg Oral Nightly Karne Malik MD   40 mg at 10/12/20 2011    0.9 % sodium chloride infusion   Intravenous Continuous Zachery Dutton MD 75 mL/hr at 10/12/20 2005      sacubitril-valsartan (ENTRESTO) 24-26 MG per tablet 1 tablet  1 tablet Oral BID Karen Malik MD   1 tablet at 10/12/20 2011    levoFLOXacin (LEVAQUIN) 500 MG/100ML infusion 500 mg  500 mg Intravenous Q24H Zachery Dutton MD   Stopped at 10/12/20 1815    dronabinol (MARINOL) capsule 2.5 mg  2.5 mg Oral BID AC Monik Nelson APRN - NP   2.5 mg at 26/47/17 2124    folic acid (FOLVITE) tablet 1 mg  1 mg Oral Daily Monik Nelson APRN - NP   1 mg at 10/12/20 0841    ondansetron (ZOFRAN-ODT) disintegrating tablet 4 mg  4 mg Oral Q8H PRN Monik Nelson, APRN - NP        pantoprazole (PROTONIX) tablet 40 mg  40 mg Oral QAM AC Monik Nelson, APRN - NP   40 mg at 10/12/20 3769    polyethylene glycol (GLYCOLAX) packet 17 g  17 g Oral Daily PRN Monik Nelson, APRN - NP        sodium chloride flush 0.9 % injection 10 mL  10 mL Intravenous 2 times per day Monik Nelson APRN - NP   10 mL at 10/12/20 0841    sodium chloride flush 0.9 % injection 10 mL  10 mL Intravenous PRN Monik Nelson APRN - NP        potassium chloride (KLOR-CON M) extended release tablet 40 mEq  40 mEq Oral PRN Isa Moros Sienna Eagle, APRN - NP   40 mEq at 10/10/20 1021    Or    potassium bicarb-citric acid (EFFER-K) effervescent tablet 40 mEq  40 mEq Oral PRN Mollie Rode, APRN - NP        Or   Callum Adame potassium chloride 10 mEq/100 mL IVPB (Peripheral Line)  10 mEq Intravenous PRN Mollie Rode, APRN - NP        magnesium sulfate 1 g in dextrose 5% 100 mL IVPB  1 g Intravenous PRN Mollie Rode, APRN - NP        acetaminophen (TYLENOL) tablet 650 mg  650 mg Oral Q6H PRN Mollie Rode, APRN - NP        Or   Stark Mayotte acetaminophen (TYLENOL) suppository 650 mg  650 mg Rectal Q6H PRN Mollie Rode, APRN - NP        enoxaparin (LOVENOX) injection 40 mg  40 mg Subcutaneous Daily Mollie Rode, APRN - NP   40 mg at 10/12/20 1670       Allergies:  Penicillins    Social History:   reports that he quit smoking about 8 years ago. His smoking use included cigarettes. He has never used smokeless tobacco. He reports previous drug use. Drug: Marijuana. He reports that he does not drink alcohol. Family History: family history includes Cancer in his father and mother; Prostate Cancer in his father. REVIEW OF SYSTEMS:      Constitutional: No fever or chills. No night sweats, positive weight loss. Positive fatigue  Eyes: No eye discharge, double vision, or eye pain   HEENT: negative for sore mouth, sore throat, hoarseness and voice change   Respiratory: negative for cough , sputum, dyspnea, wheezing, hemoptysis, chest pain   Cardiovascular: negative for chest pain, dyspnea, palpitations, orthopnea, PND   Gastrointestinal: negative for nausea, vomiting, diarrhea, constipation, abdominal pain, Dysphagia, hematemesis and hematochezia   Genitourinary: negative for frequency, dysuria, nocturia, urinary incontinence, and hematuria   Integument: negative for rash, skin lesions, bruises.    Hematologic/Lymphatic: negative for easy bruising, bleeding, lymphadenopathy, or petechiae   Endocrine: negative for heat or cold intolerance,weight changes, change in bowel habits and hair loss   Musculoskeletal: Positive back pain  Neurological: negative for headaches, dizziness, seizures, weakness, numbness    PHYSICAL EXAM:        /71   Pulse 115   Temp 98.8 °F (37.1 °C) (Oral)   Resp 16   Ht 5' 5\" (1.651 m)   Wt 128 lb 2.4 oz (58.1 kg)   SpO2 96%   BMI 21.33 kg/m²    Temp (24hrs), Av.4 °F (36.9 °C), Min:98.2 °F (36.8 °C), Max:98.8 °F (37.1 °C)      General appearance -frail appearing, no in pain or distress   Mental status - alert and cooperative   Eyes - pupils equal and reactive, extraocular eye movements intact   Ears - bilateral TM's and external ear canals normal   Mouth - mucous membranes moist, pharynx normal without lesions   Neck - supple, no significant adenopathy   Lymphatics - no palpable lymphadenopathy, no hepatosplenomegaly   Chest -decreased breathing sounds  Heart - normal rate, regular rhythm, normal S1, S2, no murmurs  Abdomen - soft, nontender, nondistended, no masses or organomegaly   Neurological - alert, oriented, normal speech, no focal findings or movement disorder noted   Musculoskeletal - no joint tenderness, deformity or swelling   Extremities - peripheral pulses normal, no pedal edema, no clubbing or cyanosis   Skin - normal coloration and turgor, no rashes, no suspicious skin lesions noted ,      DATA:      Labs:     Results for orders placed or performed during the hospital encounter of 10/09/20   Culture, Urine    Specimen: Urine   Result Value Ref Range    Specimen Description . URINE     Special Requests NOT REPORTED     Culture NO GROWTH    Culture, Blood 1    Specimen: Blood   Result Value Ref Range    Specimen Description . BLOOD     Special Requests 10 R ARM     Culture NO GROWTH 4 DAYS    Culture, Blood 2    Specimen: Blood   Result Value Ref Range    Specimen Description . BLOOD     Special Requests LA 6 ML     Culture (A)      POSITIVE Blood Culture Results called to and read back by: 0730 10/10/2020 Juan David Contreras DIRECT GRAM STAIN FROM BOTTLE: GRAM POSITIVE COCCI IN CHAINS    Culture (A)      Streptococcus species detected by PCR (not S. agalactiae (Group B), S. pneumoniae, or S. pyogenes (Group A))    Culture (A)      VIRIDANS STREPTOCOCCUS GROUP A single positive blood culture of coagulase negative Staphylocci, diptheroids, micrococci, Cutibacterium, viridans Streptocci, Bacillus, or Lactobacillus species should be interpreted with caution and viewed as a likely skin contaminant.    CBC Auto Differential   Result Value Ref Range    WBC 19.0 (H) 3.5 - 11.3 k/uL    RBC 3.34 (L) 4.21 - 5.77 m/uL    Hemoglobin 10.0 (L) 13.0 - 17.0 g/dL    Hematocrit 32.6 (L) 40.7 - 50.3 %    MCV 97.6 82.6 - 102.9 fL    MCH 29.9 25.2 - 33.5 pg    MCHC 30.7 28.4 - 34.8 g/dL    RDW 22.4 (H) 11.8 - 14.4 %    Platelets 520 159 - 377 k/uL    MPV 10.5 8.1 - 13.5 fL    NRBC Automated 0.2 (H) 0.0 per 100 WBC    Differential Type NOT REPORTED     WBC Morphology NOT REPORTED     RBC Morphology NOT REPORTED     Platelet Estimate NOT REPORTED     Immature Granulocytes 7 (H) 0 %    Seg Neutrophils 84 (H) 36 - 66 %    Lymphocytes 2 (L) 24 - 44 %    Monocytes 4 1 - 7 %    Eosinophils % 3 1 - 4 %    Basophils 0 0 - 2 %    Absolute Immature Granulocyte 1.33 (H) 0.00 - 0.30 k/uL    Segs Absolute 15.96 (H) 1.8 - 7.7 k/uL    Absolute Lymph # 0.38 (L) 1.0 - 4.8 k/uL    Absolute Mono # 0.76 0.1 - 0.8 k/uL    Absolute Eos # 0.57 (H) 0.0 - 0.4 k/uL    Basophils Absolute 0.00 0.0 - 0.2 k/uL    Morphology ANISOCYTOSIS PRESENT     Morphology 1+ POLYCHROMASIA    Comprehensive Metabolic Panel w/ Reflex to MG   Result Value Ref Range    Glucose 115 (H) 70 - 99 mg/dL    BUN 11 8 - 23 mg/dL    CREATININE 1.07 0.70 - 1.20 mg/dL    Bun/Cre Ratio NOT REPORTED 9 - 20    Calcium 7.7 (L) 8.6 - 10.4 mg/dL    Sodium 136 135 - 144 mmol/L    Potassium 3.7 3.7 - 5.3 mmol/L    Chloride 104 98 - 107 mmol/L    CO2 21 20 - 31 mmol/L    Anion Gap 11 9 - 17 mmol/L    Alkaline Phosphatase 227 (H) 40 - 129 U/L    ALT 12 5 - 41 U/L    AST 17 <40 U/L    Total Bilirubin 0.65 0.3 - 1.2 mg/dL    Total Protein 5.1 (L) 6.4 - 8.3 g/dL    Alb 2.4 (L) 3.5 - 5.2 g/dL    Albumin/Globulin Ratio 0.9 (L) 1.0 - 2.5    GFR Non-African American >60 >60 mL/min    GFR African American >60 >60 mL/min    GFR Comment          GFR Staging NOT REPORTED    Brain Natriuretic Peptide   Result Value Ref Range    Pro-BNP 9,289 (H) <300 pg/mL    BNP Interpretation Pro-BNP Reference Range:    Protime-INR   Result Value Ref Range    Protime 13.1 (H) 9.0 - 12.0 sec    INR 1.3    APTT   Result Value Ref Range    PTT 29.4 20.5 - 30.5 sec   Urinalysis, reflex to microscopic   Result Value Ref Range    Color, UA YELLOW YELLOW    Turbidity UA CLEAR CLEAR    Glucose, Ur NEGATIVE NEGATIVE    Bilirubin Urine NEGATIVE NEGATIVE    Ketones, Urine NEGATIVE NEGATIVE    Specific Gravity, UA 1.013 1.005 - 1.030    Urine Hgb NEGATIVE NEGATIVE    pH, UA 7.0 5.0 - 8.0    Protein, UA TRACE (A) NEGATIVE    Urobilinogen, Urine Normal Normal    Nitrite, Urine NEGATIVE NEGATIVE    Leukocyte Esterase, Urine NEGATIVE NEGATIVE    Urinalysis Comments NOT REPORTED    Lactic Acid, Plasma   Result Value Ref Range    Lactic Acid NOT REPORTED mmol/L    Lactic Acid, Whole Blood 2.9 (H) 0.7 - 2.1 mmol/L   Troponin   Result Value Ref Range    Troponin, High Sensitivity 79 (HH) 0 - 22 ng/L    Troponin T NOT REPORTED <0.03 ng/mL    Troponin Interp NOT REPORTED    Troponin   Result Value Ref Range    Troponin, High Sensitivity 55 (HH) 0 - 22 ng/L    Troponin T NOT REPORTED <0.03 ng/mL    Troponin Interp NOT REPORTED    Lactic Acid, Plasma   Result Value Ref Range    Lactic Acid NOT REPORTED mmol/L    Lactic Acid, Whole Blood 3.2 (H) 0.7 - 2.1 mmol/L   Microscopic Urinalysis   Result Value Ref Range    -          WBC, UA 2 TO 5 0 - 5 /HPF    RBC, UA 2 TO 5 0 - 4 /HPF    Casts UA  0 - 8 /LPF     5 TO 10 HYALINE Reference range defined for non-centrifuged specimen. Crystals, UA NOT REPORTED None /HPF    Epithelial Cells UA 2 TO 5 0 - 5 /HPF    Renal Epithelial, UA NOT REPORTED 0 /HPF    Bacteria, UA NOT REPORTED None    Mucus, UA NOT REPORTED None    Trichomonas, UA NOT REPORTED None    Amorphous, UA NOT REPORTED None    Other Observations UA NOT REPORTED NOT REQ.     Yeast, UA NOT REPORTED None   Basic Metabolic Panel w/ Reflex to MG   Result Value Ref Range    Glucose 104 (H) 70 - 99 mg/dL    BUN 13 8 - 23 mg/dL    CREATININE 1.13 0.70 - 1.20 mg/dL    Bun/Cre Ratio NOT REPORTED 9 - 20    Calcium 7.3 (L) 8.6 - 10.4 mg/dL    Sodium 138 135 - 144 mmol/L    Potassium 3.2 (L) 3.7 - 5.3 mmol/L    Chloride 107 98 - 107 mmol/L    CO2 22 20 - 31 mmol/L    Anion Gap 9 9 - 17 mmol/L    GFR Non-African American >60 >60 mL/min    GFR African American >60 >60 mL/min    GFR Comment          GFR Staging NOT REPORTED    CBC   Result Value Ref Range    WBC 15.7 (H) 3.5 - 11.3 k/uL    RBC 2.92 (L) 4.21 - 5.77 m/uL    Hemoglobin 8.8 (L) 13.0 - 17.0 g/dL    Hematocrit 27.8 (L) 40.7 - 50.3 %    MCV 95.2 82.6 - 102.9 fL    MCH 30.1 25.2 - 33.5 pg    MCHC 31.7 28.4 - 34.8 g/dL    RDW 22.4 (H) 11.8 - 14.4 %    Platelets 740 734 - 006 k/uL    MPV 9.8 8.1 - 13.5 fL    NRBC Automated 0.1 (H) 0.0 per 100 WBC   Protime-INR   Result Value Ref Range    Protime 14.4 (H) 9.0 - 12.0 sec    INR 1.4    Magnesium   Result Value Ref Range    Magnesium 2.0 1.6 - 2.6 mg/dL   CBC WITH AUTO DIFFERENTIAL   Result Value Ref Range    WBC 17.8 (H) 3.5 - 11.3 k/uL    RBC 2.94 (L) 4.21 - 5.77 m/uL    Hemoglobin 9.0 (L) 13.0 - 17.0 g/dL    Hematocrit 29.5 (L) 40.7 - 50.3 %    .3 82.6 - 102.9 fL    MCH 30.6 25.2 - 33.5 pg    MCHC 30.5 28.4 - 34.8 g/dL    RDW 22.8 (H) 11.8 - 14.4 %    Platelets 212 930 - 460 k/uL    MPV 10.0 8.1 - 13.5 fL    NRBC Automated 0.0 0.0 per 100 WBC    Differential Type NOT REPORTED     WBC Morphology NOT REPORTED     RBC Morphology NOT REPORTED     Platelet Estimate NOT REPORTED     Immature Granulocytes 6 (H) 0 %    Seg Neutrophils 84 (H) 36 - 66 %    Lymphocytes 4 (L) 24 - 44 %    Monocytes 5 1 - 7 %    Eosinophils % 1 1 - 4 %    Basophils 0 0 - 2 %    Absolute Immature Granulocyte 1.07 (H) 0.00 - 0.30 k/uL    Segs Absolute 14.95 (H) 1.8 - 7.7 k/uL    Absolute Lymph # 0.71 (L) 1.0 - 4.8 k/uL    Absolute Mono # 0.89 (H) 0.1 - 0.8 k/uL    Absolute Eos # 0.18 0.0 - 0.4 k/uL    Basophils Absolute 0.00 0.0 - 0.2 k/uL    Morphology ANISOCYTOSIS PRESENT     Morphology 1+ POLYCHROMASIA    BASIC METABOLIC PANEL   Result Value Ref Range    Glucose 119 (H) 70 - 99 mg/dL    BUN 13 8 - 23 mg/dL    CREATININE 0.97 0.70 - 1.20 mg/dL    Bun/Cre Ratio NOT REPORTED 9 - 20    Calcium 7.1 (L) 8.6 - 10.4 mg/dL    Sodium 137 135 - 144 mmol/L    Potassium 3.2 (L) 3.7 - 5.3 mmol/L    Chloride 110 (H) 98 - 107 mmol/L    CO2 19 (L) 20 - 31 mmol/L    Anion Gap 8 (L) 9 - 17 mmol/L    GFR Non-African American >60 >60 mL/min    GFR African American >60 >60 mL/min    GFR Comment          GFR Staging NOT REPORTED    Basic Metabolic Panel   Result Value Ref Range    Glucose 105 (H) 70 - 99 mg/dL    BUN 12 8 - 23 mg/dL    CREATININE 0.96 0.70 - 1.20 mg/dL    Bun/Cre Ratio NOT REPORTED 9 - 20    Calcium 7.3 (L) 8.6 - 10.4 mg/dL    Sodium 140 135 - 144 mmol/L    Potassium 3.4 (L) 3.7 - 5.3 mmol/L    Chloride 112 (H) 98 - 107 mmol/L    CO2 18 (L) 20 - 31 mmol/L    Anion Gap 10 9 - 17 mmol/L    GFR Non-African American >60 >60 mL/min    GFR African American >60 >60 mL/min    GFR Comment          GFR Staging NOT REPORTED    CBC   Result Value Ref Range    WBC 17.2 (H) 3.5 - 11.3 k/uL    RBC 2.87 (L) 4.21 - 5.77 m/uL    Hemoglobin 9.0 (L) 13.0 - 17.0 g/dL    Hematocrit 28.5 (L) 40.7 - 50.3 %    MCV 99.3 82.6 - 102.9 fL    MCH 31.4 25.2 - 33.5 pg    MCHC 31.6 28.4 - 34.8 g/dL    RDW 22.7 (H) 11.8 - 14.4 %    Platelets 104 561 - 022 k/uL    MPV 10.8 8.1 - 13.5 fL    NRBC Automated 0.1 (H) 0.0 per 100 WBC   EKG 12 Lead   Result Value Ref Range Ventricular Rate 112 BPM    Atrial Rate 112 BPM    P-R Interval 128 ms    QRS Duration 158 ms    Q-T Interval 384 ms    QTc Calculation (Bazett) 524 ms    P Axis 70 degrees    R Axis -76 degrees    T Axis 96 degrees   EKG 12 Lead   Result Value Ref Range    Ventricular Rate 106 BPM    Atrial Rate 202 BPM    QRS Duration 184 ms    Q-T Interval 380 ms    QTc Calculation (Bazett) 504 ms    P Axis 72 degrees    R Axis -76 degrees    T Axis 89 degrees         IMAGING DATA:    Xr Thoracic Spine (2 Views)    Result Date: 9/28/2020  EXAMINATION: SPOT FLUOROSCOPIC IMAGES 9/28/2020 3:21 pm TECHNIQUE: Fluoroscopy was provided by the radiology department for procedure. Radiologist was not present during examination. FLUOROSCOPY DOSE AND TYPE OR TIME AND EXPOSURES: 123.9 seconds. Air kerma 12.69 mGy. COMPARISON: CT chest dated 09/24/2020 HISTORY: Intraprocedural imaging. FINDINGS: 6 spot images of the thoracic spine were obtained. Limited intraoperative fluoroscopic spot images demonstrate instruments utilized to administer kyphoplasty cement in the midthoracic spine. Intraprocedural fluoroscopic spot images as above. See separate procedure report for more information. Ct Head Wo Contrast    Result Date: 9/24/2020  EXAMINATION: CT OF THE HEAD WITHOUT CONTRAST  9/24/2020 9:38 pm TECHNIQUE: CT of the head was performed without the administration of intravenous contrast. Dose modulation, iterative reconstruction, and/or weight based adjustment of the mA/kV was utilized to reduce the radiation dose to as low as reasonably achievable. COMPARISON: Unenhanced head CT dated 08/09/2020 and MRI brain dated 08/10/2020 HISTORY: ORDERING SYSTEM PROVIDED HISTORY: AMS, hx thrombocytopenia TECHNOLOGIST PROVIDED HISTORY: AMS, hx thrombocytopenia Reason for Exam: ams Acuity: Acute Type of Exam: Initial FINDINGS: BRAIN/VENTRICLES: There is no evidence of an acute infarct or intracranial hemorrhage.   There has been marked improvement of vasogenic edema seen previously, most prominent in the left parietal lobe but also in the right parietal lobe. Subtle residual hypodensity is seen in the left frontoparietal centrum semiovale on series 2 images 43 through 51. The findings are suggestive of improvement of known intracranial metastatic disease. Mild atrophic changes are again noted. There is no evidence of midline shift. ORBITS: The visualized portion of the orbits demonstrate no acute abnormality. SINUSES: The visualized paranasal sinuses and mastoid air cells demonstrate no acute abnormality. SOFT TISSUES/SKULL:  No acute abnormality of the visualized skull or soft tissues. 1. No evidence of acute intracranial hemorrhage or infarct. 2. Marked improvement of intracranial metastatic disease. Slight residual vasogenic edema seen in the left frontoparietal centrum semiovale. If clinically indicated, follow-up MRI of the brain could be done for further evaluation. Xr Chest Portable    Result Date: 10/9/2020  EXAMINATION: ONE XRAY VIEW OF THE CHEST 10/9/2020 12:27 pm COMPARISON: September 24, 2020 HISTORY: ORDERING SYSTEM PROVIDED HISTORY: weakness, hx of lung CA TECHNOLOGIST PROVIDED HISTORY: weakness, hx of lung CA FINDINGS: Left AICD. Right-sided Port-A-Cath. No focal consolidation. Right perihilar-lower lobe mass as seen on prior CT. Cardiomegaly. No pulmonary edema. No acute pulmonary process. Right lower lobe mass. Xr Chest Portable    Result Date: 9/24/2020  EXAMINATION: ONE XRAY VIEW OF THE CHEST 9/24/2020 6:10 pm COMPARISON: 09/19/2020 HISTORY: ORDERING SYSTEM PROVIDED HISTORY: hypoxic, tachycardic, metastatic lung CA, on chemo TECHNOLOGIST PROVIDED HISTORY: hypoxic, tachycardic, metastatic lung CA, on chemo Reason for Exam: hypoxic, tachycardic, metastic lung CA, on chemo Acuity: Unknown Type of Exam: Unknown FINDINGS: Cardiac silhouette is normal in size.   Vascular port terminates overlying the expected location of the SVC. Implantable cardiac device leads overlying the right atrium and ventricle. Mild generalized interstitial prominence without superimposed focal airspace consolidation, sizeable pleural effusion or pneumothorax. Stable appearing right hilar mass. Osseous structures and soft tissues are grossly intact. No evidence for acute cardiopulmonary pathology. Stable right hilar mass. Xr Chest Portable    Result Date: 9/19/2020  EXAMINATION: ONE XRAY VIEW OF THE CHEST 9/19/2020 8:43 pm COMPARISON: Prior studies including 08/20/2020. HISTORY: ORDERING SYSTEM PROVIDED HISTORY: Chest Pain TECHNOLOGIST PROVIDED HISTORY: Chest Pain Reason for Exam: fatigue Acuity: Unknown Type of Exam: Unknown FINDINGS: Mass in the right infrahilar region has moderately decreased in size in the interval.  No acute infiltrate, pneumothorax or pleural effusion. Heart size is normal.  There is an unchanged 3 lead left subclavian AICD. There is been interval placement of a right IJ MediPort catheter with the tip in good position in the region of the superior vena cava. There is a lytic lesion involving the lateral right 7th rib. Right hilar mass has moderately decreased in size consistent with response to therapy. No acute infiltrate, pneumothorax or pleural fluid. Lytic metastatic lesion involving the lateral right 7th rib. Ct Chest Pulmonary Embolism W Contrast    Result Date: 9/24/2020  EXAMINATION: CTA OF THE CHEST 9/24/2020 9:16 pm TECHNIQUE: CTA of the chest was performed after the administration of intravenous contrast.  Multiplanar reformatted images are provided for review. MIP images are provided for review. Dose modulation, iterative reconstruction, and/or weight based adjustment of the mA/kV was utilized to reduce the radiation dose to as low as reasonably achievable.  COMPARISON: 08/09/2020 HISTORY: ORDERING SYSTEM PROVIDED HISTORY: hx metastatic lung CA, tachycardic and hypoxic TECHNOLOGIST PROVIDED HISTORY: hx metastatic lung CA, tachycardic and hypoxic Reason for Exam: pt currently on chemo, Acuity: Acute Type of Exam: Initial FINDINGS: Pulmonary Arteries: There is mild motion artifact. No definite evidence of intraluminal filling defect to suggest pulmonary embolism. Main pulmonary artery is normal in caliber. Mediastinum: Mediastinal and right hilar adenopathy has mildly improved. The heart and pericardium demonstrate no acute abnormality. The left ventricle is dilated. There is no acute abnormality of the thoracic aorta. Lungs/pleura: There is a small right pleural effusion, increased in volume. No effusion is seen on the left. There is no pneumothorax. The low-attenuation subpleural right lower lobe mass has decreased in size and now measures 3.0 x 3.5 cm. The right upper lobe nodule adjacent to the right hilum is decreased in size and now measures 9 x 13 mm. Upper Abdomen: There is a 1.6 x 1.7 cm low-density lesion anteriorly in the spleen. The visualized upper abdomen is otherwise unremarkable. Soft Tissues/Bones: There is a pathologic compression fracture at T6 with approximately 70% loss of vertebral body height and minimal retropulsion. There is increased destruction and associated soft tissue involving the right 7th rib. There are subtle lytic lesions in the sternum. 1. No definite scan evidence for pulmonary embolus. 2. Pathologic T6 compression fracture with enlarging right 7th rib metastasis and new sternal metastasis. 3. Improving right upper lobe nodule, right lower lobe mass and, right hilar and mediastinal adenopathy. 4. Low-density lesion in the spleen could represent a metastasis or a hemangioma.          IMPRESSION:   Primary Problem  Acute on chronic systolic CHF (congestive heart failure) [facility])    Active Hospital Problems    Diagnosis Date Noted   Emily Dailey [B37.0] 09/25/2020     Priority: Medium    Severe malnutrition ([facility] [address] [E43] 10/10/2020    Bacteremia [R78.81] 10/10/2020    Acute on chronic systolic CHF (congestive heart failure) ([facility] [address] [I50.23] 10/10/2020    CHF with unknown LVEF ([facility] [address] [I50.9] 10/09/2020    Failure to thrive in adult [R62.7] 09/25/2020    Bone metastasis ([facility] [address] [C79.51] 09/20/2020    Other fatigue [R53.83]     Adenocarcinoma of right lung ([facility] [address] [C34.91] 08/17/2020    CKD (chronic kidney disease) [N18.9] 08/09/2020    Lung mass [R91.8] 08/09/2020    CVA (cerebral vascular accident) ([facility] [address] [I63.9] 08/09/2020    Brain metastasis ([facility] [address] [C79.31] 08/09/2020    Elevated troponin [R77.8]     CAD (coronary artery disease) [I25.10] 02/08/2019    Ischemic cardiomyopathy [I25.5] 02/08/2019    HTN (hypertension) [I10] 02/08/2019    AICD (automatic cardioverter/defibrillator) present [Z95.810] 02/08/2019     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 [place] only for cycle 2  Failure to thrive  Weight loss  PDL 1 expression 99%  Cancer cachexia     RECOMMENDATIONS:  1. I personally reviewed results of lab work-up imaging studies and other relevant clinical data. 2. Pt condition is worsening. 3. Patient CT scan done during last hospitalization did show decrease in size of the primary lung tumor however patient overall condition has over time deteriorated. Will continue to discuss goals of care with pt and his family   Appreciate Palliative care input     Discussed with patient and Nurse. Thank you for asking us to see this patient.             NESSA GRIMALDO [facility] MD Jackelyn  Hematologist/Medical Oncologist  Cell: [phone]            This note is created with the assistance of a speech recognition program.  While intending to generate a document that actually reflects the content of the visit, the document can still have some errors including those of syntax and sound a like substitutions which may escape proof reading. It such instances, actual meaning can be extrapolated by contextual diversion.

## 2020-10-14 NOTE — PROGRESS NOTES
Pt HR remained in the mid 120s, spiked up to the 130s, paged cardiology, gave lopressor HR is now in the 90s at 2:00 AM. 1 gram of Mag was given IVPB

## 2020-10-14 NOTE — CARE COORDINATION
Discharge [facility] Case Management Department  Written by: Raquel Ward RN    Patient Name: Eamon Mcnair  Attending Provider: Yves Quevedo MD  Admit Date: 10/9/2020 10:45 AM  MRN: [id number]  Account: [de-identified]                     : 1950  Discharge Date: 10-      Disposition: home with [facility]    Raquel Ward RN

## 2020-10-14 NOTE — PROGRESS NOTES
Comprehensive Nutrition Assessment    Type and Reason for Visit:  Reassess    Nutrition Recommendations/Plan:   -Continue general diet   -continue  ensure enlive supplements TID   -Pt may benefit from starting EN 2/2 severe malnutrition  -Will continue to monitor po intake, weights and wound healing     Nutrition Assessment:  Pt progressing from a nutritional standpoint aeb pt is consuming 1-25% of his meals. Observed untouched b-fast tray and 50% of his ensure enlive supplement consumed. Pt remains nutritionally compromised aeb severe malnutrition. Will continue nutritional supplements d/t severe malnutrition. Staff continue to encourage adequate po/fluid intake. Goal of wt stabilization/gain as medically able.     Malnutrition Assessment:  Malnutrition Status:  Severe malnutrition    Context:  Chronic Illness     Findings of the 6 clinical characteristics of malnutrition:  Energy Intake:  7 - 75% or less estimated energy requirements for 1 month or longer  Weight Loss:  7 - 5% over 1 month     Body Fat Loss:  1 - Mild body fat loss Orbital, Triceps   Muscle Mass Loss:  1 - Mild muscle mass loss Temples (temporalis), Clavicles (pectoralis & deltoids), Scapula (trapezius)  Fluid Accumulation:  1 - Mild Extremities, Generalized   Strength:  Not Performed    Estimated Daily Nutrient Needs:  Energy (kcal):  30-35 ~> 2598-6864 kcals/d; Weight Used for Energy Requirements:  Admission     Protein (g):  1.5-1.7 gm/kg ~> 75-85 gms/d; Weight Used for Protein Requirements:  Admission          Nutrition Related Findings:  labs/meds reviewed      Wounds:  Multiple, Open Wounds       Current Nutrition Therapies:    DIET GENERAL;  Dietary Nutrition Supplements: Standard High Calorie Oral Supplement    Anthropometric Measures:  · Height: 5' 5\" (165.1 cm)  · Current Body Weight: 128 lb (58.1 kg)   · Admission Body Weight: 109 lb (49.4 kg)    · Usual Body Weight: 140 lb (63.5 kg)(per pt)     · Ideal Body Weight: 136 lbs; %

## 2020-10-14 NOTE — DISCHARGE SUMMARY
[facility]  Office: [address], DO, Sunshine Abdi, DO, Aguilar Newsome, DO, Sandie Durbin, DO, Valiant Sicard, MD, Donny Carter MD, Frantz Dowd MD, Latasha Davis MD, Shantel Saini MD, Eduardo Stoll MD, Malia Torres MD, Mario Greenberg MD, Ollie Naqvi MD, Zonia Seo DO, Eliane Brink MD, Aminata Guzman MD, Valentina Leary DO, Nikki Lange MD,  Filomena Salazar DO, Renee Banerjee MD, Lilly Banks MD, Shabbir Yates, [facility], [facility], [facility], Doris Patel, [facility], Nancy Aquino, CNS, Cherrie Cartagena, CNP, Jess Shultz, CNP, Marti Mcfarland, CNP, Maral Rodriguez, CNP, Ana Paula Marquez, CNP, Jean Claude Obregon PA-C, Filomena Brown, [facility], Sharif Ontiveros, CNP, Winston, CNP, Yadira Acharya, CNP, Marifer Hoover, CNP, Rafael Jasmine, [facility]    Discharge Summary     Patient ID: Dave Ralph  :  [id number]   MRN: [id number]     ACCOUNT:  [de-identified]   Patient's PCP: Tabitha Hernandez MD  Admit Date: 10/9/2020   Discharge Date: 10/14/2020     Length of Stay: 5  Code Status:  Full Code  Admitting Physician: Latasha Davis MD  Discharge Physician: Latasha Davis MD     Active Discharge Diagnoses:     Hospital Problem Lists:  Principal Problem:    Acute on chronic systolic CHF (congestive heart failure) ([facility] [address]  Active Problems:    CAD (coronary artery disease)    Thrush    HTN (hypertension)    Ischemic cardiomyopathy    AICD (automatic cardioverter/defibrillator) present    Elevated troponin    Lung mass    CKD (chronic kidney disease)    CVA (cerebral vascular accident) ([facility] [address]    Brain metastasis ([facility] [address]    Adenocarcinoma of right lung ([facility] [address]    Bone metastasis ([facility] [address]    Other fatigue    Failure to thrive in adult    CHF with unknown LVEF ([facility] [address]    Severe malnutrition ([facility] [address]    Bacteremia  Resolved Problems:    * No resolved hospital problems.  *      Admission Condition:  poor     Discharged Condition: poor    Hospital Significant Diagnostic Studies:     Radiology:  Xr Chest Portable    Result Date: 10/9/2020  No acute pulmonary process. Right lower lobe mass. Consultations:    Consults:     Final Specialist Recommendations/Findings:   IP CONSULT TO INTERNAL MEDICINE  IP CONSULT TO HOSPITALIST  IP CONSULT TO CARDIOLOGY  IP CONSULT TO ONCOLOGY  IP CONSULT TO DIETITIAN  IP CONSULT TO PALLIATIVE CARE  IP CONSULT TO HOME CARE NEEDS      The patient was seen and examined on day of discharge   Physical Exam  Vitals signs and nursing note reviewed. Sinus tachycardia  Constitutional:       General: He is not in acute distress. Appearance: He is ill-appearing ( cachectic) . HENT:      Head: Normocephalic and atraumatic. Eyes:      Conjunctiva/sclera: Conjunctivae normal.      Pupils: Pupils are equal, round, and reactive to light. Cardiovascular:      Rate and Rhythm: Normal rate and regular rhythm. Heart sounds: No murmur. Pulmonary:      Effort: Pulmonary effort is normal. No accessory muscle usage or respiratory distress. Breath sounds: No stridor. Decreased breath sounds present. No wheezing, rhonchi or rales. Abdominal:      General: Bowel sounds are normal. There is no distension. Palpations: Abdomen is soft. Abdomen is not rigid. Tenderness: There is no abdominal tenderness. There is no guarding. Musculoskeletal:         General: No tenderness. Skin:     General: Skin is warm and dry. Findings: No erythema, lesion or rash. Comments: Erythema on lower back   Neurological:      Mental Status: He is alert and oriented to person, place, and time. Cranial Nerves: No cranial nerve deficit. Motor: No seizure activity. Psychiatric:         Speech: Speech normal.         Behavior: Behavior normal. Behavior is cooperative. Discharge plan:     Disposition: Home with hospice    Physician Follow Up:     No follow-up provider specified.      Requiring Further Evaluation/Follow Up POST HOSPITALIZATION/Incidental Findings:     Diet: regular diet    Activity: As tolerated    Instructions to Patient:     Discharge Medications:      Medication List      START taking these medications    sacubitril-valsartan 24-26 MG per tablet  Commonly known as:  ENTRESTO  Take 1 tablet by mouth 2 times daily        CONTINUE taking these medications    aspirin 81 MG chewable tablet  Take 1 tablet by mouth daily     carvedilol 12.5 MG tablet  Commonly known as:  COREG     dronabinol 2.5 MG capsule  Commonly known as:  Marinol  Take 1 capsule by mouth 2 times daily (before meals) for 30 days. folic acid 1 MG tablet  Commonly known as:  FOLVITE  Take 1 tablet by mouth daily START 7 DAYS BEFORE FIRST DOSE OF PEMETREXED---TO BE TAKEN DAILY --AND CONTINUE 21 DAYS AFTER LAST DOSE OF PEMETREXED     megestrol 625 MG/5ML suspension  Commonly known as:  Megace ES  Take 5 mLs by mouth daily     nitroGLYCERIN 0.4 MG SL tablet  Commonly known as:  NITROSTAT  up to max of 3 total doses.  If no relief after 1 dose, call 911.     nystatin [id number] UNIT/ML suspension  Commonly known as:  MYCOSTATIN  Take 5 mLs by mouth 4 times daily for 10 days     ondansetron 4 MG disintegrating tablet  Commonly known as:  Zofran ODT  Take 1 tablet by mouth every 8 hours as needed for Nausea     pantoprazole 40 MG tablet  Commonly known as:  PROTONIX  Take 1 tablet by mouth every morning (before breakfast)     polyethylene glycol 17 g packet  Commonly known as:  GLYCOLAX        STOP taking these medications    Cipro 500 MG/5ML (10%) suspension  Generic drug:  ciprofloxacin     ciprofloxacin 500 MG tablet  Commonly known as:  Cipro           Where to Get Your Medications      These medications were sent to [facility] [address] [place] [address],[address]  [address] [place] [address], Jim Barbour     Phone:  [phone]   · nystatin [id number] UNIT/ML suspension  · sacubitril-valsartan 24-26 MG per tablet         No discharge procedures on file. Time Spent on discharge is  33 mins in patient examination, evaluation, counseling as well as medication reconciliation, prescriptions for required medications, discharge plan and follow up. Electronically signed by   Dakota Morgan MD  10/14/2020  1:38 PM      Thank you Dr. Ronald Isidro MD for the opportunity to be involved in this patient's care.

## 2020-10-14 NOTE — TELEPHONE ENCOUNTER
Daughter in law called to cancel follow up on 10/15/2020. Patient is going to Hospice. Hadley Patel Rn notified.

## 2020-10-14 NOTE — PROGRESS NOTES
Occupational Therapy  Facility/Department: [facility]  Daily Treatment Note  NAME: Ely Cornejo  : 1950  MRN: [id number]    Date of Service: 10/14/2020    Discharge Recommendations:  Patient would benefit from continued therapy after discharge       Assessment   Performance deficits / Impairments: Decreased functional mobility ; Decreased safe awareness;Decreased ADL status; Decreased endurance;Decreased high-level IADLs;Decreased strength  Assessment: Pt would benefit from continued OT services during acute hospitilization to maximize safety and increase independence in ADL/functional mobility tasks. Prognosis: Good  REQUIRES OT FOLLOW UP: Yes  Activity Tolerance  Activity Tolerance: Patient limited by fatigue  Safety Devices  Safety Devices in place: Yes  Type of devices: All fall risk precautions in place;Call light within reach; Chair alarm in place;Gait belt;Patient at risk for falls; Left in chair;Nurse notified         Patient Diagnosis(es): The primary encounter diagnosis was Acute on chronic congestive heart failure, unspecified heart failure type ([facility] [address] Diagnoses of Fatigue, unspecified type, Leukocytosis, unspecified type, and Hx of cancer of lung were also pertinent to this visit. has a past medical history of Cancer [facility]), Cardiac resynchronization therapy defibrillator (CRT-D) in place, Chest pain, Coronary artery disease, Dyspnea on exertion, Elevated troponin, Heart attack ([facility] [address] Hyperlipidemia, Hypertension, Ischemic cardiomyopathy, and NSTEMI (non-ST elevated myocardial infarction) ([facility] [address] has a past surgical history that includes Coronary angioplasty with stent (); pacemaker placement (2019); CT BIOPSY DEEP BONE PERCUTANEOUS (2020); IR PORT PLACEMENT > 5 YEARS (2020); Fixation Kyphoplasty (2020); and Kyphosis surgery (N/A, 2020).     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 Pt agreeable to session, pleasent/cooperative throughout. Vital Signs  Patient Currently in Pain: Denies      Objective    ADL  Feeding: Modified independent ;Setup(assist to open some containers)  Grooming: Stand by assistance;Setup; Increased time to complete(washed face and combed hair)  UE Bathing: Setup;Minimal assistance; Increased time to complete(would need assist w/back)  LE Bathing: Moderate assistance;Setup; Increased time to complete(would need assist w/washing lower legs and feet)  UE Dressing: Minimal assistance;Setup; Increased time to complete(would need assist w/gown)  LE Dressing: Maximum assistance;Setup; Increased time to complete(assist w/brief)  Toileting: Maximum assistance;Setup; Increased time to complete(condom cath and brief)      Pt in bed upon arrival. Needed encouragement to participate in OT tx and to get up OOB. Bed mob to eob for grooming. Demon U/LB self care while seated at eob (see above for LOF). Pt dizzy upon sitting and standing which subsided after a couple of minutes. STS and transfer to recliner w/SW and CGA. Pt needed brief changed and stood at chair side for writer to change. Pt retired to recliner, BLE elevated, call light and phone in reach. Educ on AE/DME, EC/WS tech, Fall Prev, Safety with func mob and adls. Pt verbalized understanding. RN notified.        Balance  Sitting Balance: Stand by assistance(sitting eob for approx 5 min)  Standing Balance: Contact guard assistance(w/SW)  Standing Balance  Time: Pt stood for approx 3-4 min for transfers, func mob and removing brief  Comment: Pt slighly unsteady, no LOB, w/SW  Functional Mobility  Functional - Mobility Device: Standard Walker  Activity: (to recliner)  Assist Level: Contact guard assistance  Functional Mobility Comments: pt completed functional mobility from bed to recliner with CGA, slighly unsteady but no LOB throughout.   Bed mobility  Supine to Sit: Minimal assistance  Sit to Supine: Unable to assess(left up in chair)  Scooting: Stand by assistance  Transfers  Stand Step Transfers: Contact guard assistance  Sit to stand: Contact guard assistance  Stand to sit: Contact guard assistance  Transfer Comments: w/SW and vc's for hand placement      Plan   Plan  Times per week: 3-4x/week  Current Treatment Recommendations: Strengthening, Safety Education & Training, Patient/Caregiver Education & Training, Self-Care / ADL, Functional Mobility Training, Equipment Evaluation, Education, & procurement, Endurance Training    Goals  Short term goals  Time Frame for Short term goals: By discharge  Short term goal 1: Pt will complete functional mobility with Mod I, use of LRD  Short term goal 2: Pt will complete ADLs with Mod I, use of adaptive strategies and equiptment PRN  Short term goal 3: Pt will demo +20 minutes of standing tolerance during functional mobility/ADL tasks, using LRD, using energy conservation techniques PRN  Short term goal 4: Pt will demo good use of energy conservation techniques thorughout all functional mobility/ADL tasks PRN  Short term goal 5: Pt will demo good safety awareness throughout all functional mobility/ADL tasks     Therapy Time   Individual Concurrent Group Co-treatment   Time In  1040         Time Out  1140         Minutes  60 total tx time                 LENARD STAPLETON/MOJGAN

## 2020-10-14 NOTE — PLAN OF CARE
Problem: Pain:  Goal: Pain level will decrease  Description: Pain level will decrease  10/14/2020 1253 by Severiano Lipschutz, RN  Outcome: Ongoing  10/14/2020 0104 by Richelle Blanton  Outcome: Ongoing  Goal: Control of acute pain  Description: Control of acute pain  10/14/2020 1253 by Severiano Lipschutz, RN  Outcome: Ongoing  10/14/2020 0104 by Richelle Blanton  Outcome: Ongoing  Goal: Control of chronic pain  Description: Control of chronic pain  10/14/2020 1253 by Severiano Lipschutz, RN  Outcome: Ongoing  10/14/2020 0104 by Richelle Blanton  Outcome: Ongoing     Problem: Falls - Risk of:  Goal: Will remain free from falls  Description: Will remain free from falls  10/14/2020 1253 by Severiano Lipschutz, RN  Outcome: Ongoing  10/14/2020 0104 by Richelle Blanton  Outcome: Ongoing  Goal: Absence of physical injury  Description: Absence of physical injury  10/14/2020 1253 by Severiano Lipschutz, RN  Outcome: Ongoing  10/14/2020 0104 by Richelle Blanton  Outcome: Ongoing     Problem: Skin Integrity:  Goal: Will show no infection signs and symptoms  Description: Will show no infection signs and symptoms  10/14/2020 1253 by Severiano Lipschutz, RN  Outcome: Ongoing  10/14/2020 0104 by Richelle Blanton  Outcome: Ongoing  Goal: Absence of new skin breakdown  Description: Absence of new skin breakdown  10/14/2020 1253 by Severiano Lipschutz, RN  Outcome: Ongoing  10/14/2020 0104 by Richelle Blanton  Outcome: Ongoing     Problem: IP BALANCE  Goal: BALANCE EDUCATION  Description: Educate patients on maintaining dynamic/static standing/sitting balance, with/without upper extremity support.   10/14/2020 1253 by Severiano Lipschutz, RN  Outcome: Ongoing  10/14/2020 0104 by Richelle Blanton  Outcome: Ongoing     Problem: IP MOBILITY  Goal: LTG - patient will ambulate household distance  10/14/2020 1253 by Severiano Lipschutz, RN  Outcome: Ongoing  10/14/2020 0104 by Richelle Blanton  Outcome: Ongoing

## 2020-10-14 NOTE — PROGRESS NOTES
at 1900 pt had a round of 30 beat Vtach, paced himself out of it, pts HR is in the 120s resps are in the mid 20s,  oxygen sat is 100%, Medicine and Cardiology notified, orders for a 12 lead EKG, and lab draws received

## 2020-10-14 NOTE — CARE COORDINATION
Family [place] wanting me to set up transportation. Set up transportation with [facility] for 2-230PM.    1325 called Mikala Waggoner with [id number].  He will pull needed information from [facility]

## 2020-10-14 NOTE — PROGRESS NOTES
Today's Date: 10/13/2020  Patient Name: Larry Mcgarry  Date of admission: 10/9/2020 10:45 AM  Patient's age: 69 y.o., 1950  Admission Dx: CHF with unknown LVEF ([facility] [address] [I50.9]    Reason for Consult: management recommendations  Requesting Physician: Jermaine Davis MD    CHIEF COMPLAINT: Failure to thrive. Weakness. Fatigue. Interim  history  Pt seen and examined  Not feeling any better  Family meeting today     HISTORY OF PRESENT ILLNESS:      The patient is a 69 y.o.  male who is admitted to the hospital for chief complaint of weakness poor appetite and weight loss and failure to thrive. Patient is well-known to us. Patient was recently diagnosed with a metastatic adenocarcinoma of lung. Patient has had whole brain radiation as well as palliative radiation to the spine. Patient was started on combination chemoimmunotherapy using carboplatin Alimta and Keytruda. Patient was hospitalized twice after cycle #1. Patient was just seen in the office earlier this week as he was scheduled for cycle 2. Overall patient's back pain is improved. But he still complains of being very weak. Subsequently he only received Keytruda without any chemotherapy. Over the last couple of days patient has progressively gotten weaker. He has lost weight. Complains of poor appetite. Patient has been on Marinol earlier and was given a prescription for Megace on the last visit but continues to have a poor appetite. Patient was also having fever over the last week and was prescribed ciprofloxacin. Patient does have area of erythema on the back however this is thought to be secondary radiation to the thoracic spine.     Relevant oncology history:    Current problems:  Metastatic poorly differentiated adenocarcinoma of lung primary  Brain metastasis  Bone metastasis     Active and recent treatments:  Whole brain radiation, 3000 cGy-8/2020  Palliative radiation to thoracic spine  Thoracic spine kyphoplasty- Take 1 tablet by mouth daily 2/12/19   Kashif Perales MD   nitroGLYCERIN (NITROSTAT) 0.4 MG SL tablet up to max of 3 total doses.  If no relief after 1 dose, call 911. 2/11/19   Kashif Perales MD     Current Facility-Administered Medications   Medication Dose Route Frequency Provider Last Rate Last Dose    potassium chloride (KLOR-CON M) extended release tablet 20 mEq  20 mEq Oral Daily with breakfast German Murray MD   20 mEq at 10/12/20 0841    aspirin chewable tablet 81 mg  81 mg Oral Daily Gloria Small MD   81 mg at 10/13/20 0854    atorvastatin (LIPITOR) tablet 40 mg  40 mg Oral Nightly Gloria Small MD   40 mg at 10/13/20 2057    0.9 % sodium chloride infusion   Intravenous Continuous German Murray MD 75 mL/hr at 10/13/20 2055      sacubitril-valsartan (ENTRESTO) 24-26 MG per tablet 1 tablet  1 tablet Oral BID Gloria Small MD   1 tablet at 10/13/20 2055    levoFLOXacin (LEVAQUIN) 500 MG/100ML infusion 500 mg  500 mg Intravenous Q24H German Murray MD   Stopped at 10/13/20 2054    dronabinol (MARINOL) capsule 2.5 mg  2.5 mg Oral BID AC Lizeth Milder, APRN - NP   2.5 mg at 64/74/86 7420    folic acid (FOLVITE) tablet 1 mg  1 mg Oral Daily Lizeth Milder, APRN - NP   1 mg at 10/13/20 0854    ondansetron (ZOFRAN-ODT) disintegrating tablet 4 mg  4 mg Oral Q8H PRN Lizeth Milder, APRN - NP        pantoprazole (PROTONIX) tablet 40 mg  40 mg Oral QAM AC Lizeth Milder, APRN - NP   40 mg at 10/13/20 0615    polyethylene glycol (GLYCOLAX) packet 17 g  17 g Oral Daily PRN Lizeth Milder, APRN - NP        sodium chloride flush 0.9 % injection 10 mL  10 mL Intravenous 2 times per day Lizeth Milder, APRN - NP   10 mL at 10/13/20 0855    sodium chloride flush 0.9 % injection 10 mL  10 mL Intravenous PRN Lizeth Milder, APRN - NP        potassium chloride (KLOR-CON M) extended release tablet 40 mEq  40 mEq Oral PRN Lizeth Young, APRN - NP   40 mEq at 10/13/20 0854    Or    potassium bicarb-citric acid (EFFER-K) effervescent tablet 40 mEq  40 mEq Oral PRN Arjun Ou, APRN - NP        Or    potassium chloride 10 mEq/100 mL IVPB (Peripheral Line)  10 mEq Intravenous PRN Arjun Ou, APRN - NP        magnesium sulfate 1 g in dextrose 5% 100 mL IVPB  1 g Intravenous PRN Arjun Ou, APRN - NP        acetaminophen (TYLENOL) tablet 650 mg  650 mg Oral Q6H PRN Arjun Ou, APRN - NP   650 mg at 10/13/20 0501    Or    acetaminophen (TYLENOL) suppository 650 mg  650 mg Rectal Q6H PRN Arjun Ou, APRN - NP        enoxaparin (LOVENOX) injection 40 mg  40 mg Subcutaneous Daily Arjun Ou, APRN - NP   40 mg at 10/13/20 8432       Allergies:  Penicillins    Social History:   reports that he quit smoking about 8 years ago. His smoking use included cigarettes. He has never used smokeless tobacco. He reports previous drug use. Drug: Marijuana. He reports that he does not drink alcohol. Family History: family history includes Cancer in his father and mother; Prostate Cancer in his father. REVIEW OF SYSTEMS:      Constitutional: No fever or chills. No night sweats, positive weight loss. Positive fatigue  Eyes: No eye discharge, double vision, or eye pain   HEENT: negative for sore mouth, sore throat, hoarseness and voice change   Respiratory: negative for cough , sputum, dyspnea, wheezing, hemoptysis, chest pain   Cardiovascular: negative for chest pain, dyspnea, palpitations, orthopnea, PND   Gastrointestinal: negative for nausea, vomiting, diarrhea, constipation, abdominal pain, Dysphagia, hematemesis and hematochezia   Genitourinary: negative for frequency, dysuria, nocturia, urinary incontinence, and hematuria   Integument: negative for rash, skin lesions, bruises.    Hematologic/Lymphatic: negative for easy bruising, bleeding, lymphadenopathy, or petechiae   Endocrine: negative for heat or cold intolerance,weight changes, change in bowel habits and hair loss   Musculoskeletal: Positive back pain  Neurological: negative for headaches, dizziness, seizures, weakness, numbness    PHYSICAL EXAM:        /76   Pulse 123   Temp 98.4 °F (36.9 °C) (Oral)   Resp 24   Ht 5' 5\" (1.651 m)   Wt 128 lb 2.4 oz (58.1 kg)   SpO2 96%   BMI 21.33 kg/m²    Temp (24hrs), Av.3 °F (36.8 °C), Min:98.2 °F (36.8 °C), Max:98.4 °F (36.9 °C)      General appearance -frail appearing, no in pain or distress   Mental status - alert and cooperative   Eyes - pupils equal and reactive, extraocular eye movements intact   Ears - bilateral TM's and external ear canals normal   Mouth - mucous membranes moist, pharynx normal without lesions   Neck - supple, no significant adenopathy   Lymphatics - no palpable lymphadenopathy, no hepatosplenomegaly   Chest -decreased breathing sounds  Heart - normal rate, regular rhythm, normal S1, S2, no murmurs  Abdomen - soft, nontender, nondistended, no masses or organomegaly   Neurological - alert, oriented, normal speech, no focal findings or movement disorder noted   Musculoskeletal - no joint tenderness, deformity or swelling   Extremities - peripheral pulses normal, no pedal edema, no clubbing or cyanosis   Skin - normal coloration and turgor, no rashes, no suspicious skin lesions noted ,      DATA:      Labs:     Results for orders placed or performed during the hospital encounter of 10/09/20   Culture, Urine    Specimen: Urine   Result Value Ref Range    Specimen Description . URINE     Special Requests NOT REPORTED     Culture NO GROWTH    Culture, Blood 1    Specimen: Blood   Result Value Ref Range    Specimen Description . BLOOD     Special Requests 10 R ARM     Culture NO GROWTH 4 DAYS    Culture, Blood 2    Specimen: Blood   Result Value Ref Range    Specimen Description . BLOOD     Special Requests LA 6 ML     Culture (A)      POSITIVE Blood Culture Results called to and read back by: 0730 10/10/2020 CHITRA STEWART    Culture       DIRECT GRAM STAIN FROM BOTTLE: GRAM POSITIVE COCCI IN Total Bilirubin 0.65 0.3 - 1.2 mg/dL    Total Protein 5.1 (L) 6.4 - 8.3 g/dL    Alb 2.4 (L) 3.5 - 5.2 g/dL    Albumin/Globulin Ratio 0.9 (L) 1.0 - 2.5    GFR Non-African American >60 >60 mL/min    GFR African American >60 >60 mL/min    GFR Comment          GFR Staging NOT REPORTED    Brain Natriuretic Peptide   Result Value Ref Range    Pro-BNP 9,289 (H) <300 pg/mL    BNP Interpretation Pro-BNP Reference Range:    Protime-INR   Result Value Ref Range    Protime 13.1 (H) 9.0 - 12.0 sec    INR 1.3    APTT   Result Value Ref Range    PTT 29.4 20.5 - 30.5 sec   Urinalysis, reflex to microscopic   Result Value Ref Range    Color, UA YELLOW YELLOW    Turbidity UA CLEAR CLEAR    Glucose, Ur NEGATIVE NEGATIVE    Bilirubin Urine NEGATIVE NEGATIVE    Ketones, Urine NEGATIVE NEGATIVE    Specific Gravity, UA 1.013 1.005 - 1.030    Urine Hgb NEGATIVE NEGATIVE    pH, UA 7.0 5.0 - 8.0    Protein, UA TRACE (A) NEGATIVE    Urobilinogen, Urine Normal Normal    Nitrite, Urine NEGATIVE NEGATIVE    Leukocyte Esterase, Urine NEGATIVE NEGATIVE    Urinalysis Comments NOT REPORTED    Lactic Acid, Plasma   Result Value Ref Range    Lactic Acid NOT REPORTED mmol/L    Lactic Acid, Whole Blood 2.9 (H) 0.7 - 2.1 mmol/L   Troponin   Result Value Ref Range    Troponin, High Sensitivity 79 (HH) 0 - 22 ng/L    Troponin T NOT REPORTED <0.03 ng/mL    Troponin Interp NOT REPORTED    Troponin   Result Value Ref Range    Troponin, High Sensitivity 55 (HH) 0 - 22 ng/L    Troponin T NOT REPORTED <0.03 ng/mL    Troponin Interp NOT REPORTED    Lactic Acid, Plasma   Result Value Ref Range    Lactic Acid NOT REPORTED mmol/L    Lactic Acid, Whole Blood 3.2 (H) 0.7 - 2.1 mmol/L   Microscopic Urinalysis   Result Value Ref Range    -          WBC, UA 2 TO 5 0 - 5 /HPF    RBC, UA 2 TO 5 0 - 4 /HPF    Casts UA  0 - 8 /LPF     5 TO 10 HYALINE Reference range defined for non-centrifuged specimen.     Crystals, UA NOT REPORTED None /HPF    Epithelial Cells UA 2 TO 5 0 - 5 /HPF    Renal Epithelial, UA NOT REPORTED 0 /HPF    Bacteria, UA NOT REPORTED None    Mucus, UA NOT REPORTED None    Trichomonas, UA NOT REPORTED None    Amorphous, UA NOT REPORTED None    Other Observations UA NOT REPORTED NOT REQ.     Yeast, UA NOT REPORTED None   Basic Metabolic Panel w/ Reflex to MG   Result Value Ref Range    Glucose 104 (H) 70 - 99 mg/dL    BUN 13 8 - 23 mg/dL    CREATININE 1.13 0.70 - 1.20 mg/dL    Bun/Cre Ratio NOT REPORTED 9 - 20    Calcium 7.3 (L) 8.6 - 10.4 mg/dL    Sodium 138 135 - 144 mmol/L    Potassium 3.2 (L) 3.7 - 5.3 mmol/L    Chloride 107 98 - 107 mmol/L    CO2 22 20 - 31 mmol/L    Anion Gap 9 9 - 17 mmol/L    GFR Non-African American >60 >60 mL/min    GFR African American >60 >60 mL/min    GFR Comment          GFR Staging NOT REPORTED    CBC   Result Value Ref Range    WBC 15.7 (H) 3.5 - 11.3 k/uL    RBC 2.92 (L) 4.21 - 5.77 m/uL    Hemoglobin 8.8 (L) 13.0 - 17.0 g/dL    Hematocrit 27.8 (L) 40.7 - 50.3 %    MCV 95.2 82.6 - 102.9 fL    MCH 30.1 25.2 - 33.5 pg    MCHC 31.7 28.4 - 34.8 g/dL    RDW 22.4 (H) 11.8 - 14.4 %    Platelets 863 034 - 254 k/uL    MPV 9.8 8.1 - 13.5 fL    NRBC Automated 0.1 (H) 0.0 per 100 WBC   Protime-INR   Result Value Ref Range    Protime 14.4 (H) 9.0 - 12.0 sec    INR 1.4    Magnesium   Result Value Ref Range    Magnesium 2.0 1.6 - 2.6 mg/dL   CBC WITH AUTO DIFFERENTIAL   Result Value Ref Range    WBC 17.8 (H) 3.5 - 11.3 k/uL    RBC 2.94 (L) 4.21 - 5.77 m/uL    Hemoglobin 9.0 (L) 13.0 - 17.0 g/dL    Hematocrit 29.5 (L) 40.7 - 50.3 %    .3 82.6 - 102.9 fL    MCH 30.6 25.2 - 33.5 pg    MCHC 30.5 28.4 - 34.8 g/dL    RDW 22.8 (H) 11.8 - 14.4 %    Platelets 756 815 - 954 k/uL    MPV 10.0 8.1 - 13.5 fL    NRBC Automated 0.0 0.0 per 100 WBC    Differential Type NOT REPORTED     WBC Morphology NOT REPORTED     RBC Morphology NOT REPORTED     Platelet Estimate NOT REPORTED     Immature Granulocytes 6 (H) 0 %    Seg Neutrophils 84 (H) 36 - 66 % Lymphocytes 4 (L) 24 - 44 %    Monocytes 5 1 - 7 %    Eosinophils % 1 1 - 4 %    Basophils 0 0 - 2 %    Absolute Immature Granulocyte 1.07 (H) 0.00 - 0.30 k/uL    Segs Absolute 14.95 (H) 1.8 - 7.7 k/uL    Absolute Lymph # 0.71 (L) 1.0 - 4.8 k/uL    Absolute Mono # 0.89 (H) 0.1 - 0.8 k/uL    Absolute Eos # 0.18 0.0 - 0.4 k/uL    Basophils Absolute 0.00 0.0 - 0.2 k/uL    Morphology ANISOCYTOSIS PRESENT     Morphology 1+ POLYCHROMASIA    BASIC METABOLIC PANEL   Result Value Ref Range    Glucose 119 (H) 70 - 99 mg/dL    BUN 13 8 - 23 mg/dL    CREATININE 0.97 0.70 - 1.20 mg/dL    Bun/Cre Ratio NOT REPORTED 9 - 20    Calcium 7.1 (L) 8.6 - 10.4 mg/dL    Sodium 137 135 - 144 mmol/L    Potassium 3.2 (L) 3.7 - 5.3 mmol/L    Chloride 110 (H) 98 - 107 mmol/L    CO2 19 (L) 20 - 31 mmol/L    Anion Gap 8 (L) 9 - 17 mmol/L    GFR Non-African American >60 >60 mL/min    GFR African American >60 >60 mL/min    GFR Comment          GFR Staging NOT REPORTED    Basic Metabolic Panel   Result Value Ref Range    Glucose 105 (H) 70 - 99 mg/dL    BUN 12 8 - 23 mg/dL    CREATININE 0.96 0.70 - 1.20 mg/dL    Bun/Cre Ratio NOT REPORTED 9 - 20    Calcium 7.3 (L) 8.6 - 10.4 mg/dL    Sodium 140 135 - 144 mmol/L    Potassium 3.4 (L) 3.7 - 5.3 mmol/L    Chloride 112 (H) 98 - 107 mmol/L    CO2 18 (L) 20 - 31 mmol/L    Anion Gap 10 9 - 17 mmol/L    GFR Non-African American >60 >60 mL/min    GFR African American >60 >60 mL/min    GFR Comment          GFR Staging NOT REPORTED    CBC   Result Value Ref Range    WBC 17.2 (H) 3.5 - 11.3 k/uL    RBC 2.87 (L) 4.21 - 5.77 m/uL    Hemoglobin 9.0 (L) 13.0 - 17.0 g/dL    Hematocrit 28.5 (L) 40.7 - 50.3 %    MCV 99.3 82.6 - 102.9 fL    MCH 31.4 25.2 - 33.5 pg    MCHC 31.6 28.4 - 34.8 g/dL    RDW 22.7 (H) 11.8 - 14.4 %    Platelets 429 024 - 921 k/uL    MPV 10.8 8.1 - 13.5 fL    NRBC Automated 0.1 (H) 0.0 per 100 WBC   CBC   Result Value Ref Range    WBC 18.2 (H) 3.5 - 11.3 k/uL    RBC 2.83 (L) 4.21 - 5.77 m/uL Hemoglobin 8.7 (L) 13.0 - 17.0 g/dL    Hematocrit 29.0 (L) 40.7 - 50.3 %    .5 82.6 - 102.9 fL    MCH 30.7 25.2 - 33.5 pg    MCHC 30.0 28.4 - 34.8 g/dL    RDW 23.0 (H) 11.8 - 14.4 %    Platelets 171 871 - 912 k/uL    MPV 10.9 8.1 - 13.5 fL    NRBC Automated 0.1 (H) 0.0 per 100 WBC   Comprehensive Metabolic Panel   Result Value Ref Range    Glucose 107 (H) 70 - 99 mg/dL    BUN 13 8 - 23 mg/dL    CREATININE 0.93 0.70 - 1.20 mg/dL    Bun/Cre Ratio NOT REPORTED 9 - 20    Calcium 7.3 (L) 8.6 - 10.4 mg/dL    Sodium 141 135 - 144 mmol/L    Potassium 3.2 (L) 3.7 - 5.3 mmol/L    Chloride 113 (H) 98 - 107 mmol/L    CO2 18 (L) 20 - 31 mmol/L    Anion Gap 10 9 - 17 mmol/L    Alkaline Phosphatase 159 (H) 40 - 129 U/L    ALT 8 5 - 41 U/L    AST 15 <40 U/L    Total Bilirubin 0.62 0.3 - 1.2 mg/dL    Total Protein 4.0 (L) 6.4 - 8.3 g/dL    Alb 1.9 (L) 3.5 - 5.2 g/dL    Albumin/Globulin Ratio 0.9 (L) 1.0 - 2.5    GFR Non-African American >60 >60 mL/min    GFR African American >60 >60 mL/min    GFR Comment          GFR Staging NOT REPORTED    EKG 12 Lead   Result Value Ref Range    Ventricular Rate 112 BPM    Atrial Rate 112 BPM    P-R Interval 128 ms    QRS Duration 158 ms    Q-T Interval 384 ms    QTc Calculation (Bazett) 524 ms    P Axis 70 degrees    R Axis -76 degrees    T Axis 96 degrees   EKG 12 Lead   Result Value Ref Range    Ventricular Rate 106 BPM    Atrial Rate 202 BPM    QRS Duration 184 ms    Q-T Interval 380 ms    QTc Calculation (Bazett) 504 ms    P Axis 72 degrees    R Axis -76 degrees    T Axis 89 degrees         IMAGING DATA:    Xr Thoracic Spine (2 Views)    Result Date: 9/28/2020  EXAMINATION: SPOT FLUOROSCOPIC IMAGES 9/28/2020 3:21 pm TECHNIQUE: Fluoroscopy was provided by the radiology department for procedure. Radiologist was not present during examination. FLUOROSCOPY DOSE AND TYPE OR TIME AND EXPOSURES: 123.9 seconds. Air kerma 12.69 mGy.  COMPARISON: CT chest dated 09/24/2020 HISTORY: Intraprocedural imaging. FINDINGS: 6 spot images of the thoracic spine were obtained. Limited intraoperative fluoroscopic spot images demonstrate instruments utilized to administer kyphoplasty cement in the midthoracic spine. Intraprocedural fluoroscopic spot images as above. See separate procedure report for more information. Ct Head Wo Contrast    Result Date: 9/24/2020  EXAMINATION: CT OF THE HEAD WITHOUT CONTRAST  9/24/2020 9:38 pm TECHNIQUE: CT of the head was performed without the administration of intravenous contrast. Dose modulation, iterative reconstruction, and/or weight based adjustment of the mA/kV was utilized to reduce the radiation dose to as low as reasonably achievable. COMPARISON: Unenhanced head CT dated 08/09/2020 and MRI brain dated 08/10/2020 HISTORY: ORDERING SYSTEM PROVIDED HISTORY: AMS, hx thrombocytopenia TECHNOLOGIST PROVIDED HISTORY: AMS, hx thrombocytopenia Reason for Exam: ams Acuity: Acute Type of Exam: Initial FINDINGS: BRAIN/VENTRICLES: There is no evidence of an acute infarct or intracranial hemorrhage. There has been marked improvement of vasogenic edema seen previously, most prominent in the left parietal lobe but also in the right parietal lobe. Subtle residual hypodensity is seen in the left frontoparietal centrum semiovale on series 2 images 43 through 51. The findings are suggestive of improvement of known intracranial metastatic disease. Mild atrophic changes are again noted. There is no evidence of midline shift. ORBITS: The visualized portion of the orbits demonstrate no acute abnormality. SINUSES: The visualized paranasal sinuses and mastoid air cells demonstrate no acute abnormality. SOFT TISSUES/SKULL:  No acute abnormality of the visualized skull or soft tissues. 1. No evidence of acute intracranial hemorrhage or infarct. 2. Marked improvement of intracranial metastatic disease. Slight residual vasogenic edema seen in the left frontoparietal centrum semiovale.   If clinically indicated, follow-up MRI of the brain could be done for further evaluation. Xr Chest Portable    Result Date: 10/9/2020  EXAMINATION: ONE XRAY VIEW OF THE CHEST 10/9/2020 12:27 pm COMPARISON: September 24, 2020 HISTORY: ORDERING SYSTEM PROVIDED HISTORY: weakness, hx of lung CA TECHNOLOGIST PROVIDED HISTORY: weakness, hx of lung CA FINDINGS: Left AICD. Right-sided Port-A-Cath. No focal consolidation. Right perihilar-lower lobe mass as seen on prior CT. Cardiomegaly. No pulmonary edema. No acute pulmonary process. Right lower lobe mass. Xr Chest Portable    Result Date: 9/24/2020  EXAMINATION: ONE XRAY VIEW OF THE CHEST 9/24/2020 6:10 pm COMPARISON: 09/19/2020 HISTORY: ORDERING SYSTEM PROVIDED HISTORY: hypoxic, tachycardic, metastatic lung CA, on chemo TECHNOLOGIST PROVIDED HISTORY: hypoxic, tachycardic, metastatic lung CA, on chemo Reason for Exam: hypoxic, tachycardic, metastic lung CA, on chemo Acuity: Unknown Type of Exam: Unknown FINDINGS: Cardiac silhouette is normal in size. Vascular port terminates overlying the expected location of the SVC. Implantable cardiac device leads overlying the right atrium and ventricle. Mild generalized interstitial prominence without superimposed focal airspace consolidation, sizeable pleural effusion or pneumothorax. Stable appearing right hilar mass. Osseous structures and soft tissues are grossly intact. No evidence for acute cardiopulmonary pathology. Stable right hilar mass. Xr Chest Portable    Result Date: 9/19/2020  EXAMINATION: ONE XRAY VIEW OF THE CHEST 9/19/2020 8:43 pm COMPARISON: Prior studies including 08/20/2020. HISTORY: ORDERING SYSTEM PROVIDED HISTORY: Chest Pain TECHNOLOGIST PROVIDED HISTORY: Chest Pain Reason for Exam: fatigue Acuity: Unknown Type of Exam: Unknown FINDINGS: Mass in the right infrahilar region has moderately decreased in size in the interval.  No acute infiltrate, pneumothorax or pleural effusion.   Heart size is normal.  There is an unchanged 3 lead left subclavian AICD. There is been interval placement of a right IJ MediPort catheter with the tip in good position in the region of the superior vena cava. There is a lytic lesion involving the lateral right 7th rib. Right hilar mass has moderately decreased in size consistent with response to therapy. No acute infiltrate, pneumothorax or pleural fluid. Lytic metastatic lesion involving the lateral right 7th rib. Ct Chest Pulmonary Embolism W Contrast    Result Date: 9/24/2020  EXAMINATION: CTA OF THE CHEST 9/24/2020 9:16 pm TECHNIQUE: CTA of the chest was performed after the administration of intravenous contrast.  Multiplanar reformatted images are provided for review. MIP images are provided for review. Dose modulation, iterative reconstruction, and/or weight based adjustment of the mA/kV was utilized to reduce the radiation dose to as low as reasonably achievable. COMPARISON: 08/09/2020 HISTORY: ORDERING SYSTEM PROVIDED HISTORY: hx metastatic lung CA, tachycardic and hypoxic TECHNOLOGIST PROVIDED HISTORY: hx metastatic lung CA, tachycardic and hypoxic Reason for Exam: pt currently on chemo, Acuity: Acute Type of Exam: Initial FINDINGS: Pulmonary Arteries: There is mild motion artifact. No definite evidence of intraluminal filling defect to suggest pulmonary embolism. Main pulmonary artery is normal in caliber. Mediastinum: Mediastinal and right hilar adenopathy has mildly improved. The heart and pericardium demonstrate no acute abnormality. The left ventricle is dilated. There is no acute abnormality of the thoracic aorta. Lungs/pleura: There is a small right pleural effusion, increased in volume. No effusion is seen on the left. There is no pneumothorax. The low-attenuation subpleural right lower lobe mass has decreased in size and now measures 3.0 x 3.5 cm.   The right upper lobe nodule adjacent to the right hilum is decreased in size and now measures 9 x 13 mm. Upper Abdomen: There is a 1.6 x 1.7 cm low-density lesion anteriorly in the spleen. The visualized upper abdomen is otherwise unremarkable. Soft Tissues/Bones: There is a pathologic compression fracture at T6 with approximately 70% loss of vertebral body height and minimal retropulsion. There is increased destruction and associated soft tissue involving the right 7th rib. There are subtle lytic lesions in the sternum. 1. No definite scan evidence for pulmonary embolus. 2. Pathologic T6 compression fracture with enlarging right 7th rib metastasis and new sternal metastasis. 3. Improving right upper lobe nodule, right lower lobe mass and, right hilar and mediastinal adenopathy. 4. Low-density lesion in the spleen could represent a metastasis or a hemangioma.          IMPRESSION:   Primary Problem  Acute on chronic systolic CHF (congestive heart failure) [facility])    Active Hospital Problems    Diagnosis Date Noted    Thrush [B37.0] 09/25/2020     Priority: Medium    Severe malnutrition ([address] [E43] 10/10/2020    Bacteremia [R78.81] 10/10/2020    Acute on chronic systolic CHF (congestive heart failure) ([address] [I50.23] 10/10/2020    CHF with unknown LVEF ([address] [I50.9] 10/09/2020    Failure to thrive in adult [R62.7] 09/25/2020    Bone metastasis ([address] [C79.51] 09/20/2020    Other fatigue [R53.83]     Adenocarcinoma of right lung ([address] [C34.91] 08/17/2020    CKD (chronic kidney disease) [N18.9] 08/09/2020    Lung mass [R91.8] 08/09/2020    CVA (cerebral vascular accident) ([address] [I63.9] 08/09/2020    Brain metastasis ([address] [C79.31] 08/09/2020    Elevated troponin [R77.8]     CAD (coronary artery disease) [I25.10] 02/08/2019    Ischemic cardiomyopathy [I25.5] 02/08/2019    HTN (hypertension) [I10] 02/08/2019    AICD (automatic cardioverter/defibrillator) present [Z95.810] 02/08/2019     Metastatic adenocarcinoma of lung with metastasis to brain and thoracic spine  Status post whole

## 2020-10-19 NOTE — TELEPHONE ENCOUNTER
Dr. Bear Chung into triage. Signed/ dated [facility] orders. Faxed to [phone] w/ confirmation.

## 2020-10-20 PROBLEM — E86.0 DEHYDRATION: Status: RESOLVED | Noted: 2020-01-01 | Resolved: 2020-01-01

## 2020-10-27 ENCOUNTER — TELEPHONE (OUTPATIENT)
Dept: ONCOLOGY | Age: 70
End: 2020-10-27

## 2020-11-09 ENCOUNTER — HOSPITAL ENCOUNTER (OUTPATIENT)
Facility: MEDICAL CENTER | Age: 70
End: 2020-11-09
Payer: MEDICARE

## 2020-11-16 ENCOUNTER — HOSPITAL ENCOUNTER (OUTPATIENT)
Dept: INFUSION THERAPY | Facility: MEDICAL CENTER | Age: 70
End: 2020-11-16
Payer: MEDICARE

## 2024-09-10 NOTE — TELEPHONE ENCOUNTER
Name: Maryellen Rasmussen  : 1950  MRN: [id number]    Oncology Navigation Follow-Up Note    Navigator speaking throughout morning with RO Nurse-Elza and pt. Needing PCP, pt. Taking multiple medications. Writer spoke with PCP office in [place] ([address] [place] [address])and taking new pts.-daughter to call and set up appt. Then writer spoke with daughter Uli Perea and daughter asking questions about Medicare/medicaid and pt. Is a . Writer informing Uli Perea will plan to have Khushi reach out for specific questions, Franny Thompson informed. RO having trouble with E-scribing (network issue) and Anabell Barnett asking for assistance with pain medication scripts. Navigator contacting Dr. Shayla Browning and willing to provide short term supply and pt. To see MO . Script sent to [facility] and Anabell Barnett aware. Writer reviewed RO POC with Dr. Shayla Browning.     Electronically signed by Everlina Habermann, RN on 2020 at 2:05 PM Detail Level: Generalized Detail Level: Detailed

## (undated) DEVICE — GLOVE ORANGE PI 7   MSG9070

## (undated) DEVICE — FEE RENTAL EQUIPMT OSTEOCOOL-PER PROCDR

## (undated) DEVICE — PROBE OCP210 OSTEOCOOL RF 17G 10MMX2: Brand: OSTEOCOOL™ RF ABLATION SYSTEM

## (undated) DEVICE — SUTURE NONABSORBABLE MONOFILAMENT 3-0 PS-1 18 IN BLK ETHILON 1663H

## (undated) DEVICE — MARKER,SKIN,WI/RULER AND LABELS: Brand: MEDLINE

## (undated) DEVICE — MIXER A07A CEMENT

## (undated) DEVICE — JACKSON / MODULAR SPINAL TABLE STYLE POSITIONING KIT - STANDARD: Brand: SOULE MEDICAL

## (undated) DEVICE — GARMENT,MEDLINE,DVT,INT,CALF,MED, GEN2: Brand: MEDLINE

## (undated) DEVICE — BONE BIOPSY DEVICE F07A TAPERED SIZE 2: Brand: MEDTRONIC REUSABLE INSTRUMENTS

## (undated) DEVICE — GLOVE ORANGE PI 8   MSG9080

## (undated) DEVICE — BONE TAMP KIT KEX152EB FF E2 15/2 OI: Brand: KYPHON EXPRESS II KYPHOPAK TRAY

## (undated) DEVICE — PAD,NON-ADHERENT,3X8,STERILE,LF,1/PK: Brand: MEDLINE

## (undated) DEVICE — DRAPE,REIN 53X77,STERILE: Brand: MEDLINE

## (undated) DEVICE — 3M™ IOBAN™ 2 ANTIMICROBIAL INCISE DRAPE 6650EZ: Brand: IOBAN™ 2

## (undated) DEVICE — KIT OCN002 OSTEOCOOL BONE ACCESS 10G 090: Brand: OSTEOCOOL™ BONE ACCESS KIT

## (undated) DEVICE — APPLICATOR MEDICATED 26 CC SOLUTION HI LT ORNG CHLORAPREP

## (undated) DEVICE — CURETTE A13A SIZE 2 T-TIP: Brand: KYPHON® EXPRESS ™ CURETTE